# Patient Record
Sex: MALE | Race: WHITE | Employment: FULL TIME | URBAN - METROPOLITAN AREA
[De-identification: names, ages, dates, MRNs, and addresses within clinical notes are randomized per-mention and may not be internally consistent; named-entity substitution may affect disease eponyms.]

---

## 2019-03-16 ENCOUNTER — HOSPITAL ENCOUNTER (EMERGENCY)
Age: 49
Discharge: HOME | End: 2019-03-16

## 2019-03-16 VITALS
TEMPERATURE: 98.6 F | BODY MASS INDEX: 29.82 KG/M2 | OXYGEN SATURATION: 96 % | WEIGHT: 225 LBS | SYSTOLIC BLOOD PRESSURE: 144 MMHG | DIASTOLIC BLOOD PRESSURE: 86 MMHG | HEART RATE: 106 BPM | HEIGHT: 73 IN | RESPIRATION RATE: 18 BRPM

## 2019-03-16 DIAGNOSIS — L73.9 FOLLICULITIS: Primary | ICD-10-CM

## 2019-03-16 DIAGNOSIS — L03.312 CELLULITIS OF BACK EXCEPT BUTTOCK: ICD-10-CM

## 2019-03-16 PROCEDURE — 99282 EMERGENCY DEPT VISIT SF MDM: CPT

## 2019-03-16 PROCEDURE — 6370000000 HC RX 637 (ALT 250 FOR IP): Performed by: PHYSICIAN ASSISTANT

## 2019-03-16 RX ORDER — CEPHALEXIN 250 MG/1
500 CAPSULE ORAL ONCE
Status: COMPLETED | OUTPATIENT
Start: 2019-03-16 | End: 2019-03-16

## 2019-03-16 RX ORDER — SULFAMETHOXAZOLE AND TRIMETHOPRIM 800; 160 MG/1; MG/1
1 TABLET ORAL ONCE
Status: COMPLETED | OUTPATIENT
Start: 2019-03-16 | End: 2019-03-16

## 2019-03-16 RX ORDER — ONDANSETRON 4 MG/1
4 TABLET, FILM COATED ORAL EVERY 8 HOURS PRN
Qty: 10 TABLET | Refills: 0 | Status: SHIPPED | OUTPATIENT
Start: 2019-03-16 | End: 2019-09-12

## 2019-03-16 RX ORDER — CEPHALEXIN 500 MG/1
500 CAPSULE ORAL 4 TIMES DAILY
Qty: 28 CAPSULE | Refills: 0 | Status: SHIPPED | OUTPATIENT
Start: 2019-03-16 | End: 2019-03-23

## 2019-03-16 RX ORDER — MUPIROCIN CALCIUM 20 MG/G
CREAM TOPICAL
Qty: 1 TUBE | Refills: 0 | Status: SHIPPED | OUTPATIENT
Start: 2019-03-16 | End: 2019-04-15

## 2019-03-16 RX ORDER — SULFAMETHOXAZOLE AND TRIMETHOPRIM 800; 160 MG/1; MG/1
1 TABLET ORAL 2 TIMES DAILY
Qty: 14 TABLET | Refills: 0 | Status: SHIPPED | OUTPATIENT
Start: 2019-03-16 | End: 2019-03-23

## 2019-03-16 RX ADMIN — CEPHALEXIN 500 MG: 250 CAPSULE ORAL at 15:42

## 2019-03-16 RX ADMIN — SULFAMETHOXAZOLE AND TRIMETHOPRIM 1 TABLET: 800; 160 TABLET ORAL at 15:42

## 2019-03-16 ASSESSMENT — PAIN SCALES - GENERAL: PAINLEVEL_OUTOF10: 6

## 2019-03-16 ASSESSMENT — PAIN DESCRIPTION - LOCATION: LOCATION: BACK

## 2019-03-16 ASSESSMENT — PAIN DESCRIPTION - PAIN TYPE: TYPE: ACUTE PAIN

## 2019-09-12 ENCOUNTER — OFFICE VISIT (OUTPATIENT)
Dept: INTERNAL MEDICINE CLINIC | Age: 49
End: 2019-09-12
Payer: COMMERCIAL

## 2019-09-12 DIAGNOSIS — M54.50 CHRONIC BILATERAL LOW BACK PAIN WITHOUT SCIATICA: ICD-10-CM

## 2019-09-12 DIAGNOSIS — Z76.89 ENCOUNTER TO ESTABLISH CARE WITH NEW DOCTOR: ICD-10-CM

## 2019-09-12 DIAGNOSIS — G89.29 CHRONIC BILATERAL LOW BACK PAIN WITHOUT SCIATICA: ICD-10-CM

## 2019-09-12 DIAGNOSIS — E11.622 TYPE 2 DIABETES MELLITUS WITH OTHER SKIN ULCER, WITHOUT LONG-TERM CURRENT USE OF INSULIN (HCC): ICD-10-CM

## 2019-09-12 DIAGNOSIS — I10 BENIGN ESSENTIAL HYPERTENSION: ICD-10-CM

## 2019-09-12 DIAGNOSIS — M72.0 DUPUYTREN'S CONTRACTURE: ICD-10-CM

## 2019-09-12 DIAGNOSIS — E78.2 MIXED HYPERLIPIDEMIA: ICD-10-CM

## 2019-09-12 DIAGNOSIS — L98.491 CHRONIC SKIN ULCER, LIMITED TO BREAKDOWN OF SKIN (HCC): ICD-10-CM

## 2019-09-12 PROBLEM — E11.9 DIABETES MELLITUS (HCC): Status: ACTIVE | Noted: 2019-09-12

## 2019-09-12 PROBLEM — K21.9 GASTROESOPHAGEAL REFLUX DISEASE WITHOUT ESOPHAGITIS: Status: ACTIVE | Noted: 2019-09-12

## 2019-09-12 LAB
BASOPHILS ABSOLUTE: 0 K/UL (ref 0–0.2)
BASOPHILS RELATIVE PERCENT: 0.9 %
BILIRUBIN URINE: NEGATIVE
BLOOD, URINE: NEGATIVE
CLARITY: CLEAR
COLOR: YELLOW
EOSINOPHILS ABSOLUTE: 0.1 K/UL (ref 0–0.6)
EOSINOPHILS RELATIVE PERCENT: 2.3 %
GLUCOSE URINE: >=1000 MG/DL
HCT VFR BLD CALC: 48.2 % (ref 40.5–52.5)
HEMOGLOBIN: 16.9 G/DL (ref 13.5–17.5)
KETONES, URINE: NEGATIVE MG/DL
LEUKOCYTE ESTERASE, URINE: NEGATIVE
LYMPHOCYTES ABSOLUTE: 1.4 K/UL (ref 1–5.1)
LYMPHOCYTES RELATIVE PERCENT: 34.1 %
MCH RBC QN AUTO: 30.3 PG (ref 26–34)
MCHC RBC AUTO-ENTMCNC: 35 G/DL (ref 31–36)
MCV RBC AUTO: 86.6 FL (ref 80–100)
MICROSCOPIC EXAMINATION: ABNORMAL
MONOCYTES ABSOLUTE: 0.4 K/UL (ref 0–1.3)
MONOCYTES RELATIVE PERCENT: 9 %
NEUTROPHILS ABSOLUTE: 2.2 K/UL (ref 1.7–7.7)
NEUTROPHILS RELATIVE PERCENT: 53.7 %
NITRITE, URINE: NEGATIVE
PDW BLD-RTO: 13.6 % (ref 12.4–15.4)
PH UA: 6.5 (ref 5–8)
PLATELET # BLD: 187 K/UL (ref 135–450)
PMV BLD AUTO: 8.6 FL (ref 5–10.5)
PROTEIN UA: NEGATIVE MG/DL
RBC # BLD: 5.57 M/UL (ref 4.2–5.9)
SPECIFIC GRAVITY UA: >1.03 (ref 1–1.03)
URINE REFLEX TO CULTURE: ABNORMAL
URINE TYPE: ABNORMAL
UROBILINOGEN, URINE: 0.2 E.U./DL
WBC # BLD: 4.1 K/UL (ref 4–11)

## 2019-09-12 PROCEDURE — 99204 OFFICE O/P NEW MOD 45 MIN: CPT | Performed by: FAMILY MEDICINE

## 2019-09-12 RX ORDER — CYCLOBENZAPRINE HCL 5 MG
5 TABLET ORAL NIGHTLY
Qty: 30 TABLET | Refills: 0 | Status: SHIPPED | OUTPATIENT
Start: 2019-09-12 | End: 2019-10-09

## 2019-09-12 RX ORDER — LOSARTAN POTASSIUM 100 MG/1
100 TABLET ORAL DAILY
Qty: 30 TABLET | Refills: 2 | Status: SHIPPED | OUTPATIENT
Start: 2019-09-12 | End: 2019-10-30 | Stop reason: SDUPTHER

## 2019-09-12 RX ORDER — EPINEPHRINE 0.15 MG/.3ML
INJECTION INTRAMUSCULAR
Qty: 2 DEVICE | Refills: 3 | Status: SHIPPED | OUTPATIENT
Start: 2019-09-12 | End: 2019-12-30 | Stop reason: ALTCHOICE

## 2019-09-12 RX ORDER — ATORVASTATIN CALCIUM 40 MG/1
40 TABLET, FILM COATED ORAL DAILY
Qty: 30 TABLET | Refills: 2 | Status: SHIPPED | OUTPATIENT
Start: 2019-09-12 | End: 2019-12-30 | Stop reason: SDUPTHER

## 2019-09-12 ASSESSMENT — ENCOUNTER SYMPTOMS
ABDOMINAL PAIN: 0
SORE THROAT: 0
EYE ITCHING: 0
NAUSEA: 0
EYE REDNESS: 0
DIARRHEA: 0
VOMITING: 0
SHORTNESS OF BREATH: 0
ABDOMINAL DISTENTION: 0
SINUS PAIN: 0
BACK PAIN: 1
CHEST TIGHTNESS: 0
COUGH: 0
TROUBLE SWALLOWING: 0
WHEEZING: 0
CONSTIPATION: 0

## 2019-09-12 NOTE — PROGRESS NOTES
Tobacco Use    Smoking status: Never Smoker    Smokeless tobacco: Never Used   Substance Use Topics    Alcohol use: Not Currently     Alcohol/week: 2.0 standard drinks     Types: 2 Standard drinks or equivalent per week        Review of Systems   Constitutional: Positive for fatigue. Negative for appetite change, chills, fever and unexpected weight change. HENT: Negative for congestion, ear pain, sinus pain, sore throat and trouble swallowing. Eyes: Negative for redness and itching. Respiratory: Negative for cough, chest tightness, shortness of breath and wheezing. Cardiovascular: Negative for chest pain and palpitations. Gastrointestinal: Negative for abdominal distention, abdominal pain, constipation, diarrhea, nausea and vomiting. Endocrine: Negative for polyuria. Genitourinary: Negative for difficulty urinating, dysuria, frequency and urgency. Difficulty in erection  Polyuria and polydipsea   Musculoskeletal: Positive for arthralgias, back pain and myalgias. Skin: Negative for rash. Non-healing skin ulcer on forehead. Neurological: Negative for dizziness and headaches. Psychiatric/Behavioral: Negative for behavioral problems, confusion and suicidal ideas. Objective: There were no vitals taken for this visit. Physical Exam   Constitutional: He is oriented to person, place, and time. He appears well-developed and well-nourished. HENT:   Head: Normocephalic. Mouth/Throat: Oropharynx is clear and moist.   Eyes: Pupils are equal, round, and reactive to light. Conjunctivae are normal.   Neck: Normal range of motion. Neck supple. No thyromegaly present. Cardiovascular: Normal rate, regular rhythm and normal heart sounds. Pulmonary/Chest: Effort normal and breath sounds normal.   Abdominal: Soft. Bowel sounds are normal. He exhibits no distension. There is no tenderness. There is no guarding. Musculoskeletal: He exhibits no edema.    Low back tenderness   Neurological: He is alert and oriented to person, place, and time. Skin: Skin is warm and dry. Larger than pin size ulcer with clean base, no rolling of edges. Midline surgical scar of lower back   Psychiatric: He has a normal mood and affect. His behavior is normal. Judgment and thought content normal.          Assessment / Plan:      1. Type 2 diabetes mellitus with other skin ulcer, without long-term current use of insulin ([facility]  - Patient was started on oral medication. Janumet. - Patient office A1C was over 11. Per diabetic guidline, he was supposed to be on  Insulin, but patient at this time refused insulin and preferred oral medication.   - Patient will bring the record of daily morning and evening blood sugar level    - CBC Auto Differential  - Comprehensive Metabolic Panel  - Lipid, Fasting  - Urinalysis Reflex to Culture  - T4, Free  - TSH without Reflex  - Lipid Panel  - losartan (COZAAR) 100 MG tablet; Take 1 tablet by mouth daily  Dispense: 30 tablet; Refill: 2  - sitaGLIPtan-metformin (JANUMET)  MG per tablet; Take 1 tablet by mouth 2 times daily (with meals)  Dispense: 180 tablet; Refill: 1    2. Benign essential hypertension  - Elevated BP, started on Losartan    - losartan (COZAAR) 100 MG tablet; Take 1 tablet by mouth daily  Dispense: 30 tablet; Refill: 2    3. Chronic bilateral low back pain without sciatica  - Continue home stretching exercises and as need Tylenol  - Muscular relaxant.    - SEDIMENTATION RATE; Future  - GABRIELA Reflex to Antibody Cascade; Future  - cyclobenzaprine (FLEXERIL) 5 MG tablet; Take 1 tablet by mouth nightly  Dispense: 30 tablet; Refill: 0    4. Mixed hyperlipidemia  - Started on Statin    - atorvastatin (LIPITOR) 40 MG tablet; Take 1 tablet by mouth daily  Dispense: 30 tablet; Refill: 2    5. Dupuytren's contracture  - NO action, watch full wating    6.  Chronic skin ulcer, limited to breakdown of skin ([facility] [address]  - Need excision biopsy at later

## 2019-09-13 LAB
A/G RATIO: 1.9 (ref 1.1–2.2)
ALBUMIN SERPL-MCNC: 4.8 G/DL (ref 3.4–5)
ALP BLD-CCNC: 79 U/L (ref 40–129)
ALT SERPL-CCNC: 44 U/L (ref 10–40)
ANION GAP SERPL CALCULATED.3IONS-SCNC: 17 MMOL/L (ref 3–16)
ANTI-NUCLEAR ANTIBODY (ANA): NEGATIVE
AST SERPL-CCNC: 24 U/L (ref 15–37)
BILIRUB SERPL-MCNC: 0.4 MG/DL (ref 0–1)
BUN BLDV-MCNC: 14 MG/DL (ref 7–20)
CALCIUM SERPL-MCNC: 9.6 MG/DL (ref 8.3–10.6)
CHLORIDE BLD-SCNC: 99 MMOL/L (ref 99–110)
CHOLESTEROL, TOTAL: 206 MG/DL (ref 0–199)
CO2: 22 MMOL/L (ref 21–32)
CREAT SERPL-MCNC: 0.7 MG/DL (ref 0.9–1.3)
GFR AFRICAN AMERICAN: >60
GFR NON-AFRICAN AMERICAN: >60
GLOBULIN: 2.5 G/DL
GLUCOSE BLD-MCNC: 257 MG/DL (ref 70–99)
HDLC SERPL-MCNC: 61 MG/DL (ref 40–60)
LDL CHOLESTEROL CALCULATED: 128 MG/DL
POTASSIUM SERPL-SCNC: 4.1 MMOL/L (ref 3.5–5.1)
SEDIMENTATION RATE, ERYTHROCYTE: 3 MM/HR (ref 0–15)
SODIUM BLD-SCNC: 138 MMOL/L (ref 136–145)
T4 FREE: 1 NG/DL (ref 0.9–1.8)
TOTAL PROTEIN: 7.3 G/DL (ref 6.4–8.2)
TRIGL SERPL-MCNC: 84 MG/DL (ref 0–150)
TSH SERPL DL<=0.05 MIU/L-ACNC: 2.22 UIU/ML (ref 0.27–4.2)
VLDLC SERPL CALC-MCNC: 17 MG/DL

## 2019-09-26 ENCOUNTER — OFFICE VISIT (OUTPATIENT)
Dept: INTERNAL MEDICINE CLINIC | Age: 49
End: 2019-09-26
Payer: COMMERCIAL

## 2019-09-26 VITALS — HEART RATE: 79 BPM | DIASTOLIC BLOOD PRESSURE: 92 MMHG | RESPIRATION RATE: 16 BRPM | SYSTOLIC BLOOD PRESSURE: 154 MMHG

## 2019-09-26 DIAGNOSIS — L98.491 CHRONIC SKIN ULCER, LIMITED TO BREAKDOWN OF SKIN (HCC): ICD-10-CM

## 2019-09-26 DIAGNOSIS — I10 BENIGN ESSENTIAL HYPERTENSION: ICD-10-CM

## 2019-09-26 DIAGNOSIS — N52.1 ERECTILE DYSFUNCTION DUE TO DISEASES CLASSIFIED ELSEWHERE: ICD-10-CM

## 2019-09-26 DIAGNOSIS — E11.622 TYPE 2 DIABETES MELLITUS WITH OTHER SKIN ULCER, WITHOUT LONG-TERM CURRENT USE OF INSULIN (HCC): ICD-10-CM

## 2019-09-26 PROCEDURE — 99213 OFFICE O/P EST LOW 20 MIN: CPT | Performed by: FAMILY MEDICINE

## 2019-09-26 RX ORDER — HYDROCHLOROTHIAZIDE 12.5 MG/1
12.5 CAPSULE, GELATIN COATED ORAL EVERY MORNING
Qty: 30 CAPSULE | Refills: 1 | Status: SHIPPED | OUTPATIENT
Start: 2019-09-26 | End: 2019-10-30 | Stop reason: SDUPTHER

## 2019-09-26 RX ORDER — POTASSIUM CHLORIDE 750 MG/1
10 TABLET, EXTENDED RELEASE ORAL DAILY
Qty: 30 TABLET | Refills: 1 | Status: SHIPPED | OUTPATIENT
Start: 2019-09-26 | End: 2019-10-30 | Stop reason: SDUPTHER

## 2019-09-26 RX ORDER — SILDENAFIL CITRATE 20 MG/1
20 TABLET ORAL DAILY PRN
Qty: 10 TABLET | Refills: 1 | Status: SHIPPED | OUTPATIENT
Start: 2019-09-26 | End: 2019-10-30 | Stop reason: SDUPTHER

## 2019-09-26 ASSESSMENT — ENCOUNTER SYMPTOMS
COUGH: 0
WHEEZING: 0
CONSTIPATION: 0
CHEST TIGHTNESS: 0
TROUBLE SWALLOWING: 0
SHORTNESS OF BREATH: 0
ABDOMINAL DISTENTION: 0
EYE ITCHING: 0
NAUSEA: 0
DIARRHEA: 0
VOMITING: 0
BACK PAIN: 0
EYE REDNESS: 0
ROS SKIN COMMENTS: SKIN ULCERS
SORE THROAT: 0
ABDOMINAL PAIN: 0
SINUS PAIN: 0

## 2019-09-26 ASSESSMENT — PATIENT HEALTH QUESTIONNAIRE - PHQ9
SUM OF ALL RESPONSES TO PHQ QUESTIONS 1-9: 0
SUM OF ALL RESPONSES TO PHQ QUESTIONS 1-9: 0
2. FEELING DOWN, DEPRESSED OR HOPELESS: 0
1. LITTLE INTEREST OR PLEASURE IN DOING THINGS: 0
SUM OF ALL RESPONSES TO PHQ9 QUESTIONS 1 & 2: 0

## 2019-09-26 NOTE — PROGRESS NOTES
back pain. Skin: Negative for rash. Skin ulcers   Neurological: Negative for dizziness and headaches. Psychiatric/Behavioral: Negative for behavioral problems, confusion and suicidal ideas. Objective:      BP (!) 154/92 (Site: Right Upper Arm, Position: Sitting, Cuff Size: Large Adult)   Pulse 79   Resp 16      Physical Exam   Constitutional: He is oriented to person, place, and time. He appears well-developed and well-nourished. HENT:   Head: Normocephalic. Mouth/Throat: Oropharynx is clear and moist.   Eyes: Pupils are equal, round, and reactive to light. Conjunctivae are normal.   Neck: Normal range of motion. Neck supple. No thyromegaly present. Cardiovascular: Normal rate, regular rhythm and normal heart sounds. Pulmonary/Chest: Effort normal and breath sounds normal.   Abdominal: Soft. Bowel sounds are normal. He exhibits no distension. There is no tenderness. There is no guarding. Musculoskeletal: Normal range of motion. He exhibits no edema. Neurological: He is alert and oriented to person, place, and time. Skin: Skin is warm and dry. Small, non-healing ulcer on forehead   Psychiatric: He has a normal mood and affect. His behavior is normal. Judgment and thought content normal.          Assessment / Plan:      1. Benign essential hypertension  - Improving on Losartan. - Adding new medication, Hydrochlorothiazides  . - hydrochlorothiazide (MICROZIDE) 12.5 MG capsule; Take 1 capsule by mouth every morning  Dispense: 30 capsule; Refill: 1  - potassium chloride (KLOR-CON M) 10 MEQ extended release tablet; Take 1 tablet by mouth daily  Dispense: 30 tablet; Refill: 1    2. Type 2 diabetes mellitus with other skin ulcer, without long-term current use of insulin ([facility])  - Improving and started new medication, Jardiance    - empagliflozin (JARDIANCE) 10 MG tablet; Take 1 tablet by mouth daily  Dispense: 30 tablet;  Refill: 3  - sitaGLIPtan-metformin (JANUMET)  MG per

## 2019-10-09 ENCOUNTER — OFFICE VISIT (OUTPATIENT)
Dept: INTERNAL MEDICINE CLINIC | Age: 49
End: 2019-10-09
Payer: COMMERCIAL

## 2019-10-09 VITALS — DIASTOLIC BLOOD PRESSURE: 70 MMHG | SYSTOLIC BLOOD PRESSURE: 140 MMHG | RESPIRATION RATE: 15 BRPM | HEART RATE: 64 BPM

## 2019-10-09 DIAGNOSIS — E11.622 TYPE 2 DIABETES MELLITUS WITH OTHER SKIN ULCER, WITHOUT LONG-TERM CURRENT USE OF INSULIN (HCC): ICD-10-CM

## 2019-10-09 DIAGNOSIS — N52.1 ERECTILE DYSFUNCTION DUE TO DISEASES CLASSIFIED ELSEWHERE: ICD-10-CM

## 2019-10-09 DIAGNOSIS — H81.11 BENIGN PAROXYSMAL POSITIONAL VERTIGO OF RIGHT EAR: ICD-10-CM

## 2019-10-09 DIAGNOSIS — I10 BENIGN ESSENTIAL HYPERTENSION: ICD-10-CM

## 2019-10-09 PROCEDURE — 95992 CANALITH REPOSITIONING PROC: CPT | Performed by: FAMILY MEDICINE

## 2019-10-09 PROCEDURE — 99213 OFFICE O/P EST LOW 20 MIN: CPT | Performed by: FAMILY MEDICINE

## 2019-10-09 RX ORDER — HYDROCODONE BITARTRATE AND ACETAMINOPHEN 5; 325 MG/1; MG/1
1 TABLET ORAL 2 TIMES DAILY PRN
COMMUNITY
End: 2020-12-23

## 2019-10-09 ASSESSMENT — ENCOUNTER SYMPTOMS
BACK PAIN: 0
EYE ITCHING: 0
VOMITING: 0
WHEEZING: 0
TROUBLE SWALLOWING: 0
NAUSEA: 0
CHEST TIGHTNESS: 0
SINUS PAIN: 0
ABDOMINAL DISTENTION: 0
DIARRHEA: 0
ABDOMINAL PAIN: 0
CONSTIPATION: 0
EYE REDNESS: 0
SHORTNESS OF BREATH: 0
COUGH: 0
SORE THROAT: 0

## 2019-10-21 ENCOUNTER — HOSPITAL ENCOUNTER (OUTPATIENT)
Dept: CT IMAGING | Age: 49
Discharge: HOME | End: 2019-10-21
Payer: COMMERCIAL

## 2019-10-21 DIAGNOSIS — M54.50 LOW BACK PAIN, UNSPECIFIED BACK PAIN LATERALITY, UNSPECIFIED CHRONICITY, UNSPECIFIED WHETHER SCIATICA PRESENT: ICD-10-CM

## 2019-10-21 DIAGNOSIS — Z98.1 S/P SPINAL FUSION: ICD-10-CM

## 2019-10-21 PROCEDURE — 72131 CT LUMBAR SPINE W/O DYE: CPT

## 2019-10-30 ENCOUNTER — OFFICE VISIT (OUTPATIENT)
Dept: INTERNAL MEDICINE CLINIC | Age: 49
End: 2019-10-30
Payer: COMMERCIAL

## 2019-10-30 VITALS — SYSTOLIC BLOOD PRESSURE: 112 MMHG | DIASTOLIC BLOOD PRESSURE: 78 MMHG | RESPIRATION RATE: 15 BRPM | HEART RATE: 68 BPM

## 2019-10-30 DIAGNOSIS — L98.491 CHRONIC SKIN ULCER, LIMITED TO BREAKDOWN OF SKIN (HCC): ICD-10-CM

## 2019-10-30 DIAGNOSIS — E11.622 TYPE 2 DIABETES MELLITUS WITH OTHER SKIN ULCER, WITHOUT LONG-TERM CURRENT USE OF INSULIN (HCC): ICD-10-CM

## 2019-10-30 DIAGNOSIS — R25.2 MUSCLE CRAMPING: ICD-10-CM

## 2019-10-30 DIAGNOSIS — I10 BENIGN ESSENTIAL HYPERTENSION: ICD-10-CM

## 2019-10-30 DIAGNOSIS — N52.1 ERECTILE DYSFUNCTION DUE TO DISEASES CLASSIFIED ELSEWHERE: ICD-10-CM

## 2019-10-30 PROCEDURE — 99213 OFFICE O/P EST LOW 20 MIN: CPT | Performed by: FAMILY MEDICINE

## 2019-10-30 RX ORDER — LANOLIN ALCOHOL/MO/W.PET/CERES
1000 CREAM (GRAM) TOPICAL DAILY
Qty: 30 TABLET | Refills: 3 | Status: SHIPPED | OUTPATIENT
Start: 2019-10-30 | End: 2019-12-30 | Stop reason: SDUPTHER

## 2019-10-30 RX ORDER — LOSARTAN POTASSIUM 100 MG/1
100 TABLET ORAL DAILY
Qty: 30 TABLET | Refills: 2 | Status: SHIPPED | OUTPATIENT
Start: 2019-10-30 | End: 2019-12-30 | Stop reason: SDUPTHER

## 2019-10-30 RX ORDER — HYDROCHLOROTHIAZIDE 12.5 MG/1
12.5 CAPSULE, GELATIN COATED ORAL EVERY MORNING
Qty: 30 CAPSULE | Refills: 1 | Status: SHIPPED | OUTPATIENT
Start: 2019-10-30 | End: 2019-12-30 | Stop reason: SDUPTHER

## 2019-10-30 RX ORDER — SILDENAFIL CITRATE 20 MG/1
20 TABLET ORAL DAILY PRN
Qty: 30 TABLET | Refills: 2 | Status: SHIPPED | OUTPATIENT
Start: 2019-10-30 | End: 2019-11-25 | Stop reason: SDUPTHER

## 2019-10-30 RX ORDER — CHOLECALCIFEROL (VITAMIN D3) 125 MCG
CAPSULE ORAL
Qty: 30 TABLET | Refills: 3 | Status: SHIPPED | OUTPATIENT
Start: 2019-10-30 | End: 2020-04-08 | Stop reason: SDUPTHER

## 2019-10-30 RX ORDER — POTASSIUM CHLORIDE 750 MG/1
10 TABLET, EXTENDED RELEASE ORAL DAILY
Qty: 30 TABLET | Refills: 1 | Status: SHIPPED | OUTPATIENT
Start: 2019-10-30 | End: 2019-12-30 | Stop reason: SDUPTHER

## 2019-10-30 RX ORDER — CYCLOBENZAPRINE HCL 5 MG
5 TABLET ORAL 2 TIMES DAILY PRN
Qty: 10 TABLET | Refills: 0 | Status: SHIPPED | OUTPATIENT
Start: 2019-10-30 | End: 2019-11-09

## 2019-10-30 ASSESSMENT — ENCOUNTER SYMPTOMS
WHEEZING: 0
SHORTNESS OF BREATH: 0
COUGH: 0
CHEST TIGHTNESS: 0
CONSTIPATION: 0
EYE REDNESS: 0
ABDOMINAL PAIN: 0
BACK PAIN: 1
VOMITING: 0
ABDOMINAL DISTENTION: 0
SORE THROAT: 0
EYE ITCHING: 0
SINUS PAIN: 0
DIARRHEA: 0
TROUBLE SWALLOWING: 0
NAUSEA: 0

## 2019-11-18 ENCOUNTER — TELEPHONE (OUTPATIENT)
Dept: INTERNAL MEDICINE CLINIC | Age: 49
End: 2019-11-18

## 2019-11-19 DIAGNOSIS — E11.622 TYPE 2 DIABETES MELLITUS WITH OTHER SKIN ULCER, WITHOUT LONG-TERM CURRENT USE OF INSULIN (HCC): ICD-10-CM

## 2019-11-25 ENCOUNTER — OFFICE VISIT (OUTPATIENT)
Dept: INTERNAL MEDICINE CLINIC | Age: 49
End: 2019-11-25
Payer: COMMERCIAL

## 2019-11-25 VITALS
OXYGEN SATURATION: 99 % | SYSTOLIC BLOOD PRESSURE: 130 MMHG | RESPIRATION RATE: 16 BRPM | WEIGHT: 227.4 LBS | BODY MASS INDEX: 30 KG/M2 | DIASTOLIC BLOOD PRESSURE: 78 MMHG | HEART RATE: 83 BPM

## 2019-11-25 DIAGNOSIS — R25.2 MUSCLE CRAMPING: ICD-10-CM

## 2019-11-25 DIAGNOSIS — I10 BENIGN ESSENTIAL HYPERTENSION: ICD-10-CM

## 2019-11-25 DIAGNOSIS — E11.42 TYPE 2 DIABETES MELLITUS WITH DIABETIC POLYNEUROPATHY, WITHOUT LONG-TERM CURRENT USE OF INSULIN (HCC): ICD-10-CM

## 2019-11-25 DIAGNOSIS — N52.1 ERECTILE DYSFUNCTION DUE TO DISEASES CLASSIFIED ELSEWHERE: ICD-10-CM

## 2019-11-25 LAB
BASOPHILS ABSOLUTE: 0 K/UL (ref 0–0.2)
BASOPHILS RELATIVE PERCENT: 0.8 %
EOSINOPHILS ABSOLUTE: 0.3 K/UL (ref 0–0.6)
EOSINOPHILS RELATIVE PERCENT: 4.8 %
HBA1C MFR BLD: 14 %
HCT VFR BLD CALC: 48.9 % (ref 40.5–52.5)
HEMOGLOBIN: 17.1 G/DL (ref 13.5–17.5)
LYMPHOCYTES ABSOLUTE: 1.9 K/UL (ref 1–5.1)
LYMPHOCYTES RELATIVE PERCENT: 33.7 %
MCH RBC QN AUTO: 30.5 PG (ref 26–34)
MCHC RBC AUTO-ENTMCNC: 34.9 G/DL (ref 31–36)
MCV RBC AUTO: 87.5 FL (ref 80–100)
MONOCYTES ABSOLUTE: 0.5 K/UL (ref 0–1.3)
MONOCYTES RELATIVE PERCENT: 8.2 %
NEUTROPHILS ABSOLUTE: 3 K/UL (ref 1.7–7.7)
NEUTROPHILS RELATIVE PERCENT: 52.5 %
PDW BLD-RTO: 12.9 % (ref 12.4–15.4)
PLATELET # BLD: 225 K/UL (ref 135–450)
PMV BLD AUTO: 9.2 FL (ref 5–10.5)
RBC # BLD: 5.59 M/UL (ref 4.2–5.9)
WBC # BLD: 5.7 K/UL (ref 4–11)

## 2019-11-25 PROCEDURE — 83036 HEMOGLOBIN GLYCOSYLATED A1C: CPT | Performed by: FAMILY MEDICINE

## 2019-11-25 PROCEDURE — 99213 OFFICE O/P EST LOW 20 MIN: CPT | Performed by: FAMILY MEDICINE

## 2019-11-25 RX ORDER — SILDENAFIL CITRATE 20 MG/1
20 TABLET ORAL DAILY PRN
Qty: 30 TABLET | Refills: 2 | Status: SHIPPED | OUTPATIENT
Start: 2019-11-25 | End: 2019-12-30 | Stop reason: ALTCHOICE

## 2019-11-25 RX ORDER — GABAPENTIN 300 MG/1
300 CAPSULE ORAL 3 TIMES DAILY
Qty: 270 CAPSULE | Refills: 1 | Status: SHIPPED | OUTPATIENT
Start: 2019-11-25 | End: 2019-12-30 | Stop reason: SDUPTHER

## 2019-11-25 ASSESSMENT — ENCOUNTER SYMPTOMS
TROUBLE SWALLOWING: 0
ABDOMINAL PAIN: 0
EYE ITCHING: 0
NAUSEA: 0
DIARRHEA: 0
SINUS PAIN: 0
CONSTIPATION: 0
SORE THROAT: 0
SHORTNESS OF BREATH: 0
VOMITING: 0
BACK PAIN: 0
CHEST TIGHTNESS: 0
EYE REDNESS: 0
WHEEZING: 0
ABDOMINAL DISTENTION: 0
COUGH: 0

## 2019-11-26 LAB
ESTIMATED AVERAGE GLUCOSE: 300.6 MG/DL
HBA1C MFR BLD: 12.1 %

## 2019-12-30 ENCOUNTER — OFFICE VISIT (OUTPATIENT)
Dept: INTERNAL MEDICINE CLINIC | Age: 49
End: 2019-12-30
Payer: COMMERCIAL

## 2019-12-30 VITALS
HEART RATE: 76 BPM | WEIGHT: 221 LBS | RESPIRATION RATE: 20 BRPM | SYSTOLIC BLOOD PRESSURE: 118 MMHG | DIASTOLIC BLOOD PRESSURE: 80 MMHG | BODY MASS INDEX: 29.16 KG/M2

## 2019-12-30 DIAGNOSIS — N52.1 ERECTILE DYSFUNCTION DUE TO DISEASES CLASSIFIED ELSEWHERE: ICD-10-CM

## 2019-12-30 DIAGNOSIS — E78.2 MIXED HYPERLIPIDEMIA: ICD-10-CM

## 2019-12-30 DIAGNOSIS — R25.2 MUSCLE CRAMPING: ICD-10-CM

## 2019-12-30 DIAGNOSIS — I10 BENIGN ESSENTIAL HYPERTENSION: ICD-10-CM

## 2019-12-30 DIAGNOSIS — E11.622 TYPE 2 DIABETES MELLITUS WITH OTHER SKIN ULCER, WITHOUT LONG-TERM CURRENT USE OF INSULIN (HCC): ICD-10-CM

## 2019-12-30 DIAGNOSIS — E11.42 TYPE 2 DIABETES MELLITUS WITH DIABETIC POLYNEUROPATHY, WITHOUT LONG-TERM CURRENT USE OF INSULIN (HCC): ICD-10-CM

## 2019-12-30 PROCEDURE — 99213 OFFICE O/P EST LOW 20 MIN: CPT | Performed by: FAMILY MEDICINE

## 2019-12-30 RX ORDER — ATORVASTATIN CALCIUM 40 MG/1
40 TABLET, FILM COATED ORAL DAILY
Qty: 90 TABLET | Refills: 1 | Status: SHIPPED | OUTPATIENT
Start: 2019-12-30 | End: 2020-04-08 | Stop reason: SDUPTHER

## 2019-12-30 RX ORDER — SILDENAFIL CITRATE 20 MG/1
20 TABLET ORAL DAILY PRN
Qty: 30 TABLET | Refills: 2 | Status: SHIPPED | OUTPATIENT
Start: 2019-12-30 | End: 2020-04-08 | Stop reason: SDUPTHER

## 2019-12-30 RX ORDER — POTASSIUM CHLORIDE 750 MG/1
10 TABLET, EXTENDED RELEASE ORAL DAILY
Qty: 90 TABLET | Refills: 1 | Status: SHIPPED | OUTPATIENT
Start: 2019-12-30 | End: 2020-04-08 | Stop reason: SDUPTHER

## 2019-12-30 RX ORDER — LANOLIN ALCOHOL/MO/W.PET/CERES
1000 CREAM (GRAM) TOPICAL DAILY
Qty: 90 TABLET | Refills: 1 | Status: SHIPPED | OUTPATIENT
Start: 2019-12-30 | End: 2020-04-08 | Stop reason: SDUPTHER

## 2019-12-30 RX ORDER — HYDROCHLOROTHIAZIDE 12.5 MG/1
12.5 CAPSULE, GELATIN COATED ORAL EVERY MORNING
Qty: 90 CAPSULE | Refills: 1 | Status: SHIPPED | OUTPATIENT
Start: 2019-12-30 | End: 2020-04-08 | Stop reason: SDUPTHER

## 2019-12-30 RX ORDER — GABAPENTIN 300 MG/1
300 CAPSULE ORAL 3 TIMES DAILY
Qty: 270 CAPSULE | Refills: 5 | Status: SHIPPED | OUTPATIENT
Start: 2019-12-30 | End: 2020-04-08 | Stop reason: SDUPTHER

## 2019-12-30 RX ORDER — LOSARTAN POTASSIUM 100 MG/1
100 TABLET ORAL DAILY
Qty: 90 TABLET | Refills: 1 | Status: SHIPPED | OUTPATIENT
Start: 2019-12-30 | End: 2020-04-08 | Stop reason: SDUPTHER

## 2019-12-30 ASSESSMENT — ENCOUNTER SYMPTOMS
BACK PAIN: 0
CHEST TIGHTNESS: 0
SINUS PAIN: 0
EYE ITCHING: 0
CONSTIPATION: 0
WHEEZING: 0
SORE THROAT: 0
EYE REDNESS: 0
VOMITING: 0
ABDOMINAL DISTENTION: 0
TROUBLE SWALLOWING: 0
COUGH: 0
NAUSEA: 0
ABDOMINAL PAIN: 0
DIARRHEA: 0
SHORTNESS OF BREATH: 0

## 2020-01-16 ENCOUNTER — OFFICE VISIT (OUTPATIENT)
Dept: INTERNAL MEDICINE CLINIC | Age: 50
End: 2020-01-16
Payer: COMMERCIAL

## 2020-01-16 VITALS — SYSTOLIC BLOOD PRESSURE: 150 MMHG | HEART RATE: 72 BPM | DIASTOLIC BLOOD PRESSURE: 82 MMHG

## 2020-01-16 LAB — HBA1C MFR BLD: 12.2 %

## 2020-01-16 PROCEDURE — 83036 HEMOGLOBIN GLYCOSYLATED A1C: CPT | Performed by: FAMILY MEDICINE

## 2020-01-16 PROCEDURE — 99213 OFFICE O/P EST LOW 20 MIN: CPT | Performed by: FAMILY MEDICINE

## 2020-01-16 RX ORDER — INSULIN LISPRO 100 [IU]/ML
12 INJECTION, SOLUTION INTRAVENOUS; SUBCUTANEOUS EVERY EVENING
COMMUNITY
End: 2020-02-04 | Stop reason: SDUPTHER

## 2020-01-16 ASSESSMENT — ENCOUNTER SYMPTOMS
CONSTIPATION: 0
DIARRHEA: 0
TROUBLE SWALLOWING: 0
SORE THROAT: 0
EYE ITCHING: 0
SHORTNESS OF BREATH: 0
COUGH: 0
NAUSEA: 0
WHEEZING: 0
EYE REDNESS: 0
ABDOMINAL PAIN: 0
CHEST TIGHTNESS: 0
ABDOMINAL DISTENTION: 0
VOMITING: 0
BACK PAIN: 0
SINUS PAIN: 0

## 2020-01-16 NOTE — PROGRESS NOTES
Subjective:      Chief Complaint: Follow-up on diabetes    HPI:  Jermaine Ayala is a 54 y.o. male who presents today follow-up on diabetes    Type 2 diabetes mellitus: Patient continues to have glucose over 200 sometimes over 300 morning. Currently on  Metformin and Steglujan. . Patient last visit was started on insulin at why he was not able to take it due to lack of knowledge of usage. Peripheral neuropathy: Stable on gabapentin    Hypertension: Stable on hydrochlorothiazide and losartan. Patient Active Problem List   Diagnosis    Ventral hernia without obstruction or gangrene    Benign essential hypertension    Mixed hyperlipidemia    Gastroesophageal reflux disease without esophagitis    Dupuytren's contracture    Chronic skin ulcer, limited to breakdown of skin (HCC)    Chronic bilateral low back pain without sciatica    Diabetes mellitus ([facility] [address]    Muscle cramping    Erectile dysfunction due to diseases classified elsewhere       Past Medical History:   Diagnosis Date    Sleep disorder     Ventral hernia without obstruction or gangrene 11/4/2015        Social History     Tobacco Use    Smoking status: Never Smoker    Smokeless tobacco: Never Used   Substance Use Topics    Alcohol use: Not Currently     Alcohol/week: 2.0 standard drinks     Types: 2 Standard drinks or equivalent per week        Review of Systems   Constitutional: Negative for appetite change, chills, fatigue, fever and unexpected weight change. HENT: Negative for congestion, ear pain, sinus pain, sore throat and trouble swallowing. Eyes: Negative for redness and itching. Respiratory: Negative for cough, chest tightness, shortness of breath and wheezing. Cardiovascular: Negative for chest pain and palpitations. Gastrointestinal: Negative for abdominal distention, abdominal pain, constipation, diarrhea, nausea and vomiting. Endocrine: Negative for polyuria.    Genitourinary: Negative for difficulty urinating,

## 2020-02-04 ENCOUNTER — OFFICE VISIT (OUTPATIENT)
Dept: INTERNAL MEDICINE CLINIC | Age: 50
End: 2020-02-04
Payer: COMMERCIAL

## 2020-02-04 VITALS
HEART RATE: 85 BPM | DIASTOLIC BLOOD PRESSURE: 84 MMHG | OXYGEN SATURATION: 97 % | BODY MASS INDEX: 29.92 KG/M2 | SYSTOLIC BLOOD PRESSURE: 138 MMHG | WEIGHT: 226.8 LBS

## 2020-02-04 LAB — HBA1C MFR BLD: 10.3 %

## 2020-02-04 PROCEDURE — 83036 HEMOGLOBIN GLYCOSYLATED A1C: CPT | Performed by: FAMILY MEDICINE

## 2020-02-04 PROCEDURE — 99213 OFFICE O/P EST LOW 20 MIN: CPT | Performed by: FAMILY MEDICINE

## 2020-02-04 RX ORDER — INSULIN LISPRO 100 [IU]/ML
INJECTION, SOLUTION INTRAVENOUS; SUBCUTANEOUS
Qty: 5 PEN | Refills: 5 | Status: SHIPPED | OUTPATIENT
Start: 2020-02-04 | End: 2020-04-08 | Stop reason: SDUPTHER

## 2020-02-04 RX ORDER — PEN NEEDLE, DIABETIC 30 GX5/16"
1 NEEDLE, DISPOSABLE MISCELLANEOUS DAILY
Qty: 100 EACH | Refills: 3 | Status: SHIPPED | OUTPATIENT
Start: 2020-02-04 | End: 2020-04-08 | Stop reason: SDUPTHER

## 2020-02-04 ASSESSMENT — ENCOUNTER SYMPTOMS
SINUS PAIN: 0
SHORTNESS OF BREATH: 0
NAUSEA: 0
EYE ITCHING: 0
ABDOMINAL DISTENTION: 0
CHEST TIGHTNESS: 0
EYE REDNESS: 0
DIARRHEA: 0
BACK PAIN: 1
VOMITING: 0
WHEEZING: 0
SORE THROAT: 0
ABDOMINAL PAIN: 0
CONSTIPATION: 0
COUGH: 0
TROUBLE SWALLOWING: 0

## 2020-02-04 ASSESSMENT — PATIENT HEALTH QUESTIONNAIRE - PHQ9
2. FEELING DOWN, DEPRESSED OR HOPELESS: 0
SUM OF ALL RESPONSES TO PHQ QUESTIONS 1-9: 0
1. LITTLE INTEREST OR PLEASURE IN DOING THINGS: 0
SUM OF ALL RESPONSES TO PHQ9 QUESTIONS 1 & 2: 0
SUM OF ALL RESPONSES TO PHQ QUESTIONS 1-9: 0

## 2020-02-05 NOTE — PROGRESS NOTES
Type 2 diabetes mellitus with diabetic polyneuropathy, without long-term current use of insulin ([address]  - Patient requested to increase his basilar insulin level 16 units per night.  - Continue taking metformin and Steglujan. - Patient hemoglobin A1c dropped from 12.2 to 10. 3. Hilda Colindres - POCT glycosylated hemoglobin (Hb A1C)  - insulin lispro, 1 Unit Dial, (HUMALOG KWIKPEN) 100 UNIT/ML SOPN; TID with meals Glucose [id number] No Insulin 140-199 2 Units 200-249 4 Units 250-299 6 Units 300-349 8 Units 350-400 10 Units over 400 12 Units  Dispense: 5 pen; Refill: 5  - insulin glargine (BASAGLAR KWIKPEN) 100 UNIT/ML injection pen; Inject 16 Units into the skin nightly  Dispense: 5 pen; Refill: 5  - Insulin Pen Needle (PEN NEEDLES 3/16\") 31G X 5 MM MISC; 1 each by Does not apply route daily  Dispense: 100 each; Refill: 3  - Diabetic Foot Exam     2. Benign essential hypertension  -  Stable on hydrochlorothiazide and losartan. Note:   Patient understand the assessment and agreed to the plan. Medication side effects was discussed during the encounter. Before discharging the patient, all questions and concern were addressed. After visit summary was provided. Advice to call clinic or me for any further questions or concern.        Matthew Mccarthy,

## 2020-02-14 ENCOUNTER — TELEPHONE (OUTPATIENT)
Dept: INTERNAL MEDICINE CLINIC | Age: 50
End: 2020-02-14

## 2020-03-17 ENCOUNTER — OFFICE VISIT (OUTPATIENT)
Dept: FAMILY MEDICINE CLINIC | Age: 50
End: 2020-03-17
Payer: COMMERCIAL

## 2020-03-17 VITALS
HEIGHT: 73 IN | TEMPERATURE: 98.8 F | DIASTOLIC BLOOD PRESSURE: 74 MMHG | WEIGHT: 226 LBS | SYSTOLIC BLOOD PRESSURE: 124 MMHG | BODY MASS INDEX: 29.95 KG/M2 | HEART RATE: 92 BPM | OXYGEN SATURATION: 97 %

## 2020-03-17 PROCEDURE — 99213 OFFICE O/P EST LOW 20 MIN: CPT | Performed by: NURSE PRACTITIONER

## 2020-03-17 ASSESSMENT — ENCOUNTER SYMPTOMS
CHEST TIGHTNESS: 0
VOMITING: 0
DIARRHEA: 1
SHORTNESS OF BREATH: 0
WHEEZING: 0
COUGH: 0
SINUS PRESSURE: 0
NAUSEA: 0
SINUS PAIN: 0
SORE THROAT: 0
RHINORRHEA: 1

## 2020-03-17 NOTE — PROGRESS NOTES
Candida Seals   54 y.o.  male  [id number]      Chief Complaint   Patient presents with    Letter for School/Work        Subjective:  54 y.o.male is here for a follow up. He has the following chronic/acute medical problems:  Patient Active Problem List   Diagnosis    Ventral hernia without obstruction or gangrene    Benign essential hypertension    Mixed hyperlipidemia    Gastroesophageal reflux disease without esophagitis    Dupuytren's contracture    Chronic skin ulcer, limited to breakdown of skin ([facility])    Chronic bilateral low back pain without sciatica    Diabetes mellitus ([address]    Muscle cramping    Erectile dysfunction due to diseases classified elsewhere       Patient states on  night he started with a sore throat, low grade fever, chills, and body aches. Patient states he had a low grade fever this morning. He denies cough and shortness of breath. He states he did have some body aches this morning. Review of Systems   Constitutional: Positive for appetite change (), chills (this morning) and fever (low grade this morning). Negative for fatigue. HENT: Positive for postnasal drip and rhinorrhea. Negative for congestion, ear pain, sinus pressure, sinus pain, sneezing and sore throat. Respiratory: Negative for cough, chest tightness, shortness of breath and wheezing. Cardiovascular: Negative for chest pain and palpitations. Gastrointestinal: Positive for diarrhea. Negative for nausea and vomiting. Skin: Negative for rash. Neurological: Negative for dizziness, light-headedness and headaches.        Current Outpatient Medications   Medication Sig Dispense Refill    insulin lispro, 1 Unit Dial, (HUMALOG KWIKPEN) 100 UNIT/ML SOPN TID with meals Glucose [id number] No Insulin 140-199 2 Units 200-249 4 Units 250-299 6 Units 300-349 8 Units 350-400 10 Units over 400 12 Units 5 pen 5    insulin glargine (BASAGLAR KWIKPEN) 100 UNIT/ML injection pen Inject 16 Units into the skin membrane, ear canal and external ear normal.      Nose: Nose normal.      Mouth/Throat:      Lips: Pink. Mouth: Mucous membranes are moist.      Pharynx: Oropharynx is clear. Neck:      Musculoskeletal: Neck supple. Cardiovascular:      Rate and Rhythm: Normal rate and regular rhythm. Heart sounds: Normal heart sounds. Pulmonary:      Effort: Pulmonary effort is normal.      Breath sounds: Normal breath sounds. Skin:     General: Skin is warm and dry. Neurological:      Mental Status: He is alert and oriented to person, place, and time. Psychiatric:         Mood and Affect: Mood normal.         Behavior: Behavior normal.         Lab Results   Component Value Date    WBC 5.7 11/25/2019    HGB 17.1 11/25/2019    HCT 48.9 11/25/2019    MCV 87.5 11/25/2019     11/25/2019     Lab Results   Component Value Date     09/12/2019    K 4.1 09/12/2019    CL 99 09/12/2019    CO2 22 09/12/2019    BUN 14 09/12/2019    CREATININE 0.7 (L) 09/12/2019    GLUCOSE 257 (H) 09/12/2019    CALCIUM 9.6 09/12/2019    PROT 7.3 09/12/2019    LABALBU 4.8 09/12/2019    BILITOT 0.4 09/12/2019    ALKPHOS 79 09/12/2019    AST 24 09/12/2019    ALT 44 (H) 09/12/2019    LABGLOM >60 09/12/2019    GFRAA >60 09/12/2019    AGRATIO 1.9 09/12/2019    GLOB 2.5 09/12/2019     Lab Results   Component Value Date    CHOL 206 (H) 09/12/2019    CHOL 171 12/28/2011     Lab Results   Component Value Date    TRIG 84 09/12/2019    TRIG 73 12/28/2011     Lab Results   Component Value Date    HDL 61 (H) 09/12/2019    HDL 48 (L) 12/28/2011     Lab Results   Component Value Date    LDLCALC 128 (H) 09/12/2019     Lab Results   Component Value Date    LABA1C 10.3 02/04/2020     Lab Results   Component Value Date    TSH 2.22 09/12/2019    TSHHS 1.534 12/28/2011         ASSESSMENT/PLAN:      1. Flu-like symptoms  We will swab and sent for a respiratory panel. Will call patient when results are back.   Explained to patient if results are negative and he is afebrile for 24 hours without the use of Tylenol or Motrin he may return to work at this time. Can continue to use Tylenol or Motrin over-the-counter as needed for fever and muscle aches. - Respiratory Virus PCR Panel        No orders of the defined types were placed in this encounter. Medications Discontinued During This Encounter   Medication Reason    Insulin Glargine ([facility] [place]) [address] discussed with patient. Questions answered. Patient verbalizes understanding and agrees with plan. After visit summary provided. Advised to call for any problems, questions, or concerns. Return if symptoms worsen or fail to improve.                                              Signed:  EDGAR Charles CNP  03/17/20  2:46 PM

## 2020-03-21 LAB
ADENOVIRUS PCR: NOT DETECTED
HUMAN METAPNEUMOVIRUS PCR: NOT DETECTED
INFLUENZA A: NOT DETECTED
INFLUENZA B: NOT DETECTED
PARAINFLUENZA 1 PCR: NOT DETECTED
PARAINFLUENZA 2 PCR: NOT DETECTED
PARAINFLUENZA 3 PCR: NOT DETECTED
PARAINFLUENZA 4 PCR: NOT DETECTED
RHINO/ENTEROVIRUS PCR: NOT DETECTED
RSV BY PCR: NOT DETECTED
RSV SOURCE: NORMAL

## 2020-03-23 ENCOUNTER — TELEPHONE (OUTPATIENT)
Dept: FAMILY MEDICINE CLINIC | Age: 50
End: 2020-03-23

## 2020-03-23 NOTE — TELEPHONE ENCOUNTER
Pt returned message and informed of negative respiratory panel. Pt asked if he could return to work. Pt informed he could return if he has been afebrile for 24 hrs w/o the use of Tylenol or Motrin. Pt states he has not had a fever since 3/18/2020. Encouraged pt to cont social distancing practice. Pt verbalizes understanding and thanked this nurse.

## 2020-04-08 RX ORDER — POTASSIUM CHLORIDE 750 MG/1
10 TABLET, EXTENDED RELEASE ORAL DAILY
Qty: 90 TABLET | Refills: 1 | Status: SHIPPED | OUTPATIENT
Start: 2020-04-08 | End: 2020-08-07 | Stop reason: SDUPTHER

## 2020-04-08 RX ORDER — CHOLECALCIFEROL (VITAMIN D3) 125 MCG
CAPSULE ORAL
Qty: 30 TABLET | Refills: 3 | Status: SHIPPED | OUTPATIENT
Start: 2020-04-08 | End: 2020-06-30 | Stop reason: SDUPTHER

## 2020-04-08 RX ORDER — INSULIN GLARGINE 100 [IU]/ML
16 INJECTION, SOLUTION SUBCUTANEOUS NIGHTLY
Qty: 5 PEN | Refills: 5 | Status: SHIPPED | OUTPATIENT
Start: 2020-04-08 | End: 2020-06-30 | Stop reason: SDUPTHER

## 2020-04-08 RX ORDER — INSULIN LISPRO 100 [IU]/ML
INJECTION, SOLUTION INTRAVENOUS; SUBCUTANEOUS
Qty: 5 PEN | Refills: 5 | Status: SHIPPED | OUTPATIENT
Start: 2020-04-08 | End: 2020-06-30 | Stop reason: SDUPTHER

## 2020-04-08 RX ORDER — ATORVASTATIN CALCIUM 40 MG/1
40 TABLET, FILM COATED ORAL DAILY
Qty: 90 TABLET | Refills: 1 | Status: SHIPPED | OUTPATIENT
Start: 2020-04-08 | End: 2020-06-30 | Stop reason: SDUPTHER

## 2020-04-08 RX ORDER — SILDENAFIL CITRATE 20 MG/1
20 TABLET ORAL DAILY PRN
Qty: 30 TABLET | Refills: 2 | Status: SHIPPED | OUTPATIENT
Start: 2020-04-08 | End: 2020-06-18 | Stop reason: SDUPTHER

## 2020-04-08 RX ORDER — LOSARTAN POTASSIUM 100 MG/1
100 TABLET ORAL DAILY
Qty: 90 TABLET | Refills: 1 | Status: SHIPPED | OUTPATIENT
Start: 2020-04-08 | End: 2020-06-30 | Stop reason: SDUPTHER

## 2020-04-08 RX ORDER — PEN NEEDLE, DIABETIC 30 GX5/16"
1 NEEDLE, DISPOSABLE MISCELLANEOUS DAILY
Qty: 100 EACH | Refills: 3 | Status: SHIPPED | OUTPATIENT
Start: 2020-04-08 | End: 2020-06-30 | Stop reason: SDUPTHER

## 2020-04-08 RX ORDER — GABAPENTIN 300 MG/1
300 CAPSULE ORAL 3 TIMES DAILY
Qty: 270 CAPSULE | Refills: 5 | Status: SHIPPED | OUTPATIENT
Start: 2020-04-08 | End: 2020-06-30 | Stop reason: SDUPTHER

## 2020-04-08 RX ORDER — HYDROCODONE BITARTRATE AND ACETAMINOPHEN 5; 325 MG/1; MG/1
1 TABLET ORAL 2 TIMES DAILY PRN
Status: CANCELLED | OUTPATIENT
Start: 2020-04-08

## 2020-04-08 RX ORDER — LANOLIN ALCOHOL/MO/W.PET/CERES
1000 CREAM (GRAM) TOPICAL DAILY
Qty: 90 TABLET | Refills: 1 | Status: SHIPPED | OUTPATIENT
Start: 2020-04-08 | End: 2020-06-30 | Stop reason: SDUPTHER

## 2020-04-08 RX ORDER — HYDROCHLOROTHIAZIDE 12.5 MG/1
12.5 CAPSULE, GELATIN COATED ORAL EVERY MORNING
Qty: 90 CAPSULE | Refills: 1 | Status: SHIPPED | OUTPATIENT
Start: 2020-04-08 | End: 2020-06-30 | Stop reason: SDUPTHER

## 2020-04-23 ENCOUNTER — TELEPHONE (OUTPATIENT)
Dept: INTERNAL MEDICINE CLINIC | Age: 50
End: 2020-04-23

## 2020-05-01 ENCOUNTER — TELEPHONE (OUTPATIENT)
Dept: INTERNAL MEDICINE CLINIC | Age: 50
End: 2020-05-01

## 2020-05-04 ENCOUNTER — PATIENT MESSAGE (OUTPATIENT)
Dept: PRIMARY CARE CLINIC | Age: 50
End: 2020-05-04

## 2020-05-08 ENCOUNTER — OFFICE VISIT (OUTPATIENT)
Dept: PRIMARY CARE CLINIC | Age: 50
End: 2020-05-08
Payer: COMMERCIAL

## 2020-05-08 ENCOUNTER — HOSPITAL ENCOUNTER (OUTPATIENT)
Age: 50
Setting detail: SPECIMEN
Discharge: HOME | End: 2020-05-08
Payer: COMMERCIAL

## 2020-05-08 VITALS — TEMPERATURE: 98.2 F | HEART RATE: 69 BPM | OXYGEN SATURATION: 99 %

## 2020-05-08 PROCEDURE — U0002 COVID-19 LAB TEST NON-CDC: HCPCS

## 2020-05-08 PROCEDURE — 99213 OFFICE O/P EST LOW 20 MIN: CPT | Performed by: FAMILY MEDICINE

## 2020-05-08 NOTE — PATIENT INSTRUCTIONS
- Your covid-19 testing result takes 3-4 days to come back. [address] will notify the test result or result can be viewed at [address] [place] [address] [address]. - Fluid hydration with plain water was advised. Mix Gatorade with Pedialyte. - Stay at home and self quarantine until COVID-19 test results are back. If  COVID-19 test result is positive, 14 self quarantine is recommended. - Please return to clinic or call us if symptoms are worsened.

## 2020-05-11 LAB
SARS-COV-2: NOT DETECTED
SOURCE: NORMAL

## 2020-06-18 RX ORDER — SILDENAFIL CITRATE 20 MG/1
20 TABLET ORAL DAILY PRN
Qty: 30 TABLET | Refills: 2 | Status: SHIPPED | OUTPATIENT
Start: 2020-06-18 | End: 2020-11-16 | Stop reason: SDUPTHER

## 2020-06-30 ENCOUNTER — OFFICE VISIT (OUTPATIENT)
Dept: INTERNAL MEDICINE CLINIC | Age: 50
End: 2020-06-30
Payer: COMMERCIAL

## 2020-06-30 VITALS
BODY MASS INDEX: 29.29 KG/M2 | DIASTOLIC BLOOD PRESSURE: 86 MMHG | WEIGHT: 222 LBS | HEART RATE: 75 BPM | SYSTOLIC BLOOD PRESSURE: 140 MMHG | OXYGEN SATURATION: 99 % | RESPIRATION RATE: 18 BRPM | TEMPERATURE: 97.2 F

## 2020-06-30 PROCEDURE — 99213 OFFICE O/P EST LOW 20 MIN: CPT | Performed by: FAMILY MEDICINE

## 2020-06-30 RX ORDER — CHOLECALCIFEROL (VITAMIN D3) 125 MCG
CAPSULE ORAL
Qty: 30 TABLET | Refills: 3 | Status: SHIPPED | OUTPATIENT
Start: 2020-06-30

## 2020-06-30 RX ORDER — LOSARTAN POTASSIUM 100 MG/1
100 TABLET ORAL DAILY
Qty: 90 TABLET | Refills: 1 | Status: SHIPPED | OUTPATIENT
Start: 2020-06-30 | End: 2020-10-28 | Stop reason: SDUPTHER

## 2020-06-30 RX ORDER — INSULIN LISPRO 100 [IU]/ML
INJECTION, SOLUTION INTRAVENOUS; SUBCUTANEOUS
Qty: 5 PEN | Refills: 5 | Status: SHIPPED | OUTPATIENT
Start: 2020-06-30 | End: 2021-03-24 | Stop reason: SDUPTHER

## 2020-06-30 RX ORDER — GABAPENTIN 300 MG/1
300 CAPSULE ORAL 3 TIMES DAILY
Qty: 270 CAPSULE | Refills: 5 | Status: SHIPPED | OUTPATIENT
Start: 2020-06-30 | End: 2020-10-28 | Stop reason: SDUPTHER

## 2020-06-30 RX ORDER — LANOLIN ALCOHOL/MO/W.PET/CERES
1000 CREAM (GRAM) TOPICAL DAILY
Qty: 90 TABLET | Refills: 1 | Status: SHIPPED | OUTPATIENT
Start: 2020-06-30

## 2020-06-30 RX ORDER — ATORVASTATIN CALCIUM 40 MG/1
40 TABLET, FILM COATED ORAL DAILY
Qty: 90 TABLET | Refills: 1 | Status: SHIPPED | OUTPATIENT
Start: 2020-06-30 | End: 2020-10-28 | Stop reason: SDUPTHER

## 2020-06-30 RX ORDER — PEN NEEDLE, DIABETIC 30 GX5/16"
1 NEEDLE, DISPOSABLE MISCELLANEOUS DAILY
Qty: 100 EACH | Refills: 3 | Status: SHIPPED | OUTPATIENT
Start: 2020-06-30 | End: 2021-10-20 | Stop reason: SDUPTHER

## 2020-06-30 RX ORDER — HYDROCHLOROTHIAZIDE 12.5 MG/1
12.5 CAPSULE, GELATIN COATED ORAL EVERY MORNING
Qty: 90 CAPSULE | Refills: 1 | Status: SHIPPED | OUTPATIENT
Start: 2020-06-30 | End: 2020-10-28 | Stop reason: SDUPTHER

## 2020-06-30 RX ORDER — INSULIN GLARGINE 100 [IU]/ML
16 INJECTION, SOLUTION SUBCUTANEOUS NIGHTLY
Qty: 5 PEN | Refills: 5 | Status: SHIPPED | OUTPATIENT
Start: 2020-06-30 | End: 2020-12-23 | Stop reason: SDUPTHER

## 2020-06-30 NOTE — PROGRESS NOTES
and unexpected weight change. HENT: Negative for congestion, ear pain, sinus pain, sore throat and trouble swallowing. Eyes: Negative for redness and itching. Respiratory: Negative for cough, chest tightness, shortness of breath and wheezing. Cardiovascular: Negative for chest pain and palpitations. Gastrointestinal: Negative for abdominal distention, abdominal pain, constipation, diarrhea, nausea and vomiting. Endocrine: Negative for polyuria. Genitourinary: Negative for difficulty urinating, dysuria, frequency and urgency. Musculoskeletal: Negative for arthralgias, back pain and myalgias. Skin: Negative for rash. Neurological: Negative for dizziness and headaches. Psychiatric/Behavioral: Negative for behavioral problems, confusion and suicidal ideas. Objective:      BP (!) 140/86   Pulse 75   Temp 97.2 °F (36.2 °C) (Tympanic)   Resp 18   Wt 222 lb (100.7 kg)   SpO2 99%   BMI 29.29 kg/m²      Physical Exam  Vitals signs reviewed. Constitutional:       Appearance: Normal appearance. He is well-developed. HENT:      Head: Normocephalic and atraumatic. Right Ear: External ear normal.      Left Ear: External ear normal.      Mouth/Throat:      Mouth: Mucous membranes are moist.      Pharynx: Oropharynx is clear. Eyes:      Extraocular Movements: Extraocular movements intact. Conjunctiva/sclera: Conjunctivae normal.      Pupils: Pupils are equal, round, and reactive to light. Neck:      Musculoskeletal: Normal range of motion and neck supple. Thyroid: No thyromegaly or thyroid tenderness. Vascular: No carotid bruit. Cardiovascular:      Rate and Rhythm: Normal rate and regular rhythm. Pulses: Normal pulses. Heart sounds: Normal heart sounds, S1 normal and S2 normal. No murmur. Pulmonary:      Effort: Pulmonary effort is normal.      Breath sounds: Normal breath sounds.    Abdominal:      General: Bowel sounds are normal. There is no distension. Palpations: Abdomen is soft. Tenderness: There is no abdominal tenderness. There is no guarding. Hernia: No hernia is present. Comments: Soft, no hepatosplenomegaly   Musculoskeletal: Normal range of motion. Right lower leg: No edema. Left lower leg: No edema. Comments: 5-5 muscular strength throughout and bilateral.   Lymphadenopathy:      Cervical: No cervical adenopathy. Skin:     General: Skin is warm and dry. Findings: No rash. Neurological:      General: No focal deficit present. Mental Status: He is alert and oriented to person, place, and time. Sensory: No sensory deficit. Motor: No weakness. Psychiatric:         Mood and Affect: Mood normal.         Behavior: Behavior normal.         Thought Content: Thought content normal.         Judgment: Judgment normal.            Assessment / Plan:      1. Type 2 diabetes mellitus with diabetic polyneuropathy, with long-term current use of insulin ([facility])  -Uncontrolled and improving. Continue current home medication.    - Insulin Pen Needle (PEN NEEDLES 3/16\") 31G X 5 MM MISC; 1 each by Does not apply route daily  Dispense: 100 each; Refill: 3  - insulin lispro, 1 Unit Dial, (HUMALOG KWIKPEN) 100 UNIT/ML SOPN; TID with meals Glucose [id number] No Insulin 140-199 2 Units 200-249 4 Units 250-299 6 Units 300-349 8 Units 350-400 10 Units over 400 12 Units  Dispense: 5 pen; Refill: 5  - insulin glargine (BASAGLAR KWIKPEN) 100 UNIT/ML injection pen; Inject 16 Units into the skin nightly  Dispense: 5 pen; Refill: 5  - gabapentin (NEURONTIN) 300 MG capsule; Take 1 capsule by mouth 3 times daily for 90 days. Intended supply: 90 days  Dispense: 270 capsule; Refill: 5  - Ertugliflozin-SITagliptin 5-100 MG TABS; Take 1 tablet by mouth daily  Dispense: 90 tablet; Refill: 1    2. Benign essential hypertension  -Stable and near goal.  Continue home medication, losartan and hydrochlorothiazide.   - losartan (COZAAR) 100 MG tablet; Take 1 tablet by mouth daily  Dispense: 90 tablet; Refill: 1  - hydroCHLOROthiazide (MICROZIDE) 12.5 MG capsule; Take 1 capsule by mouth every morning  Dispense: 90 capsule; Refill: 1    3. Muscle cramping  - Stable and asymptomatic  - vitamin B-12 (CYANOCOBALAMIN) 1000 MCG tablet; Take 1 tablet by mouth daily  Dispense: 90 tablet; Refill: 1  - Cholecalciferol (VITAMIN D3) 50 MCG (2000 UT) TABS; Take one tablet by mouth once a day  Dispense: 30 tablet; Refill: 3    4. Mixed hyperlipidemia  - atorvastatin (LIPITOR) 40 MG tablet; Take 1 tablet by mouth daily  Dispense: 90 tablet; Refill: 1          Note:   Patient understand the assessment and agreed to the plan. Medication side effects was discussed during the encounter. Before discharging the patient, all questions and concern were addressed. After visit summary was provided. Advice to call clinic or me for any further questions or concern.        Sami Levora Klinefelter, DO

## 2020-07-01 PROBLEM — E11.42 TYPE 2 DIABETES MELLITUS WITH DIABETIC POLYNEUROPATHY, WITH LONG-TERM CURRENT USE OF INSULIN (HCC): Status: ACTIVE | Noted: 2019-09-12

## 2020-07-01 PROBLEM — Z79.4 TYPE 2 DIABETES MELLITUS WITH DIABETIC POLYNEUROPATHY, WITH LONG-TERM CURRENT USE OF INSULIN (HCC): Status: ACTIVE | Noted: 2019-09-12

## 2020-07-01 ASSESSMENT — ENCOUNTER SYMPTOMS
CONSTIPATION: 0
SORE THROAT: 0
VOMITING: 0
EYE REDNESS: 0
DIARRHEA: 0
ABDOMINAL DISTENTION: 0
BACK PAIN: 0
COUGH: 0
ABDOMINAL PAIN: 0
SINUS PAIN: 0
NAUSEA: 0
TROUBLE SWALLOWING: 0
WHEEZING: 0
SHORTNESS OF BREATH: 0
CHEST TIGHTNESS: 0
EYE ITCHING: 0

## 2020-08-07 RX ORDER — POTASSIUM CHLORIDE 750 MG/1
10 TABLET, EXTENDED RELEASE ORAL DAILY
Qty: 90 TABLET | Refills: 1 | Status: SHIPPED | OUTPATIENT
Start: 2020-08-07 | End: 2020-10-28 | Stop reason: SDUPTHER

## 2020-08-28 ENCOUNTER — HOSPITAL ENCOUNTER (OUTPATIENT)
Age: 50
Setting detail: SPECIMEN
Discharge: HOME | End: 2020-08-28
Payer: COMMERCIAL

## 2020-08-28 ENCOUNTER — OFFICE VISIT (OUTPATIENT)
Dept: PRIMARY CARE CLINIC | Age: 50
End: 2020-08-28

## 2020-08-28 VITALS — OXYGEN SATURATION: 99 % | TEMPERATURE: 96.7 F | HEART RATE: 69 BPM

## 2020-08-28 PROCEDURE — 99213 OFFICE O/P EST LOW 20 MIN: CPT | Performed by: NURSE PRACTITIONER

## 2020-08-28 PROCEDURE — U0002 COVID-19 LAB TEST NON-CDC: HCPCS

## 2020-08-28 NOTE — PROGRESS NOTES
8/28/20  Brayden Graham  1970    FLU/COVID-19 CLINIC EVALUATION    HPI SYMPTOMS:    Employer: [employer]    [] Fevers  [x] Chills  [] Cough  [] Coughing up blood  [] Chest Congestion  [] Nasal Congestion  [] Feeling short of breath  [] Sometimes  [] Frequently  [] All the time  [] Chest pain  [] Headaches  []Tolerable  [] Severe  [x] Sore throat  [] Muscle aches  [] Nausea  [] Vomiting  []Unable to keep fluids down  [] Diarrhea  []Severe    [x] OTHER SYMPTOMS: SWEATS      Symptom Duration:   [] 1  [] 2   [] 3   [] 4    [] 5   [] 6   [] 7   [] 8   [] 9   [] 10   [] 11   [] 12   [] 13   [] 14   [] Longer than 14 days    Symptom course:   [] Worsening     [] Stable     [] Improving    RISK FACTORS:    [] Pregnant or possibly pregnant  [] Age over 61  [] Diabetes  [] Heart disease  [] Asthma  [] COPD/Other chronic lung diseases  [] Active Cancer  [] On Chemotherapy  [] Taking oral steroids  [] History Lymphoma/Leukemia  [] Close contact with a lab confirmed COVID-19 patient within 14 days of symptom onset  [] History of travel from affected geographical areas within 14 days of symptom onset       VITALS:  There were no vitals filed for this visit. TESTS:    POCT FLU:  [] Positive     []Negative    ASSESSMENT:    [] Flu  [] Possible COVID-19  [] Strep    PLAN:    [] Discharge home with written instructions for:  [] Flu management  [] Possible COVID-19 infection with self-quarantine and management of symptoms  [] Follow-up with primary care physician or emergency department if worsens  [] Evaluation per physician/nurse practitioner in clinic  [] Sent to ER       An  electronic signature was used to authenticate this note.      --Chirag Wild MA on 8/28/2020 at 10:27 AM

## 2020-08-28 NOTE — PROGRESS NOTES
8/28/2020    HPI:  Chief complaint and history of present illness as per medical assistant/nurse documented today in the Flu/COVID-19 clinic. MEDICATIONS:  Prior to Visit Medications    Medication Sig Taking? Authorizing Provider   potassium chloride (KLOR-CON M) 10 MEQ extended release tablet Take 1 tablet by mouth daily  Matthew Parker DO   vitamin B-12 (CYANOCOBALAMIN) 1000 MCG tablet Take 1 tablet by mouth daily  Matthew Parker DO   losartan (COZAAR) 100 MG tablet Take 1 tablet by mouth daily  Matthew Parker DO   Insulin Pen Needle (PEN NEEDLES 3/16\") 31G X 5 MM MISC 1 each by Does not apply route daily  Matthew Parker DO   insulin lispro, 1 Unit Dial, (HUMALOG KWIKPEN) 100 UNIT/ML SOPN TID with meals Glucose setb651 No Insulin 140-199 2 Units 200-249 4 Units 250-299 6 Units 300-349 8 Units 350-400 10 Units over 400 12 Units  Matthew Parker DO   insulin glargine (BASAGLAR KWIKPEN) 100 UNIT/ML injection pen Inject 16 Units into the skin nightly  Matthew Parker DO   hydroCHLOROthiazide (MICROZIDE) 12.5 MG capsule Take 1 capsule by mouth every morning  Matthew Parker DO   gabapentin (NEURONTIN) 300 MG capsule Take 1 capsule by mouth 3 times daily for 90 days. Intended supply: 90 days  Matthew Parker DO   Cholecalciferol (VITAMIN D3) 50 MCG (2000 UT) TABS Take one tablet by mouth once a day  Matthew Parker DO   atorvastatin (LIPITOR) 40 MG tablet Take 1 tablet by mouth daily  Matthew Parker DO   Ertugliflozin-SITagliptin 5-100 MG TABS Take 1 tablet by mouth daily  Matthew Parker DO   sildenafil (REVATIO) 20 MG tablet Take 1 tablet by mouth daily as needed (Erectile Dysfuction)  Matthew Parker DO   metFORMIN (GLUCOPHAGE) 1000 MG tablet Take 1 tablet by mouth 2 times daily (with meals)  Matthew Parker DO   HYDROcodone-acetaminophen (NORCO) 5-325 MG per tablet Take 1 tablet by mouth 2 times daily as needed for Pain.   Historical Provider, MD Allergies   Allergen Reactions    Bee Venom Anaphylaxis    Hornet Venom    ,   Past Medical History:   Diagnosis Date    Sleep disorder     Ventral hernia without obstruction or gangrene 11/4/2015   ,   Past Surgical History:   Procedure Laterality Date    HERNIA REPAIR      [address] [facility], 2010    Fusion of L3, L4, L5    UMBILICAL HERNIA REPAIR  11/4/15    repair incarcerated supra umbilical hernia       PHYSICAL EXAM:  Physical Exam  Constitutional:       Appearance: He is well-developed. HENT:      Mouth/Throat:      Mouth: Mucous membranes are moist.   Eyes:      General: No scleral icterus. Conjunctiva/sclera: Conjunctivae normal.   Cardiovascular:      Rate and Rhythm: Normal rate and regular rhythm. Heart sounds: Normal heart sounds. No murmur. Pulmonary:      Effort: Pulmonary effort is normal. No respiratory distress. Breath sounds: Normal breath sounds. No wheezing. Neurological:      General: No focal deficit present. Mental Status: He is alert. Psychiatric:         Mood and Affect: Mood normal.         ASSESSMENT/PLAN:  1. Sore throat  - COVID-19 Ambulatory    2. Chills  - COVID-19 Ambulatory    FOLLOW-UP:  Return if symptoms worsen or fail to improve.     In addition to other information, the printed after visit summary provided to the patient includes:  [x] COVID-19 Self care instructions  [x] COVID-19 General patient information

## 2020-08-28 NOTE — PATIENT INSTRUCTIONS
Your COVID 19 test can take 3-5 days for the results come back. We ask that you make a Mychart page and view your test results this way. You will need to Self quarantine until you know your results. Increase fluids rest  Saline nasal spray as directed  Warm salt gargles for throat discomfort  Monitor temperature twice a day  Tylenol for fevers and/or discomfort. If symptoms are worse -Go to the ER. Follow up with your primary doctor    To Whom it May Concern:    Savanna Forrest has been tested for COVID on 08/28/20. They may NOT return to work until their lab test results back and they been fever free for 3 days. If test is positive they must stay home for 2 weeks or until they test negative or as directed by the [facility] Department.

## 2020-08-30 LAB
SARS-COV-2: DETECTED
SOURCE: ABNORMAL

## 2020-09-01 ENCOUNTER — CARE COORDINATION (OUTPATIENT)
Dept: CARE COORDINATION | Age: 50
End: 2020-09-01

## 2020-09-01 ENCOUNTER — TELEPHONE (OUTPATIENT)
Dept: INTERNAL MEDICINE CLINIC | Age: 50
End: 2020-09-01

## 2020-09-01 RX ORDER — ONDANSETRON 4 MG/1
4 TABLET, ORALLY DISINTEGRATING ORAL EVERY 8 HOURS PRN
Qty: 15 TABLET | Refills: 0 | Status: SHIPPED | OUTPATIENT
Start: 2020-09-01 | End: 2020-09-04

## 2020-09-01 NOTE — CARE COORDINATION
Michael Rajan returned my call. Date/Time:  2020 10:49 AM    Patient contacted regarding remote symptom monitoring program alert or comment received. Verified patients name and  as identifiers. Discussed COVID-19 related testing which was available at this time. Test results were positive. Patient informed of results, if available? Yes    RN contacted the patient by telephone regarding red alert in Loop remote symptom monitoring program.    Patient reported the following symptoms: shortness of breath, chills or shaking, sweating and nausea. Michael Rajan reports that although he isn't vomiting , he feels nauseous and has no desire to eat or drink. I advised him to call his primary care doctor and ask them for something for the nausea. He is diabetic and understands that it is important for him to be able to eat and push more fluid. He agreed and will call now. Due to worsening symptoms, patient was advised to contact provider. Due to worsening symptoms encounter was not routed to provider for escalation. Education provided regarding infection prevention, and signs and symptoms of COVID-19 and when to seek medical attention with patient who verbalized understanding. Discussed exposure protocols and quarantine from [address] you at higher risk for severe illness 2019 and given an opportunity for questions and concerns. The patient agrees to contact the [facility] exposure line [phone], [facility] department [place] Department [facility]: ([phone]) and PCP office for questions related to their healthcare. From CDC: Are you at higher risk for severe illness?  Wash your hands often.  Avoid close contact (6 feet, which is about two arm lengths) with people who are sick.  Put distance between yourself and other people if COVID-19 is spreading in your community.  Clean and disinfect frequently touched surfaces.  Avoid all cruise travel and non-essential air travel.    Call your healthcare professional if you have concerns about COVID-19 and your underlying condition or if you are sick. For more information on steps you can take to protect yourself, see CDC's How to Protect Yourself      Patient will continue to self report symptoms using Loop self monitoring. Patient has RN's contact information.

## 2020-09-01 NOTE — TELEPHONE ENCOUNTER
Patient called to request Zofran for nausea. Patient states he had a positive Covid-19 result yesterday. Patient uses [facility].

## 2020-09-01 NOTE — CARE COORDINATION
This RN responded to a red alert sent over the get well loop for breathing issues. I attempted to call and left a voicemail with contact information. I also sent a message back over the get well loop. Ewelina Isbell, RN, 10:34 AM   Thank you for checking in on the loop [place] [facility]. I tried to call and left you a voicemail. If you would like to speak to a nurse we are available M-F 8-4 and 9-1 weekends.  I can be reached today at [phone], Ewelina Isbell RN

## 2020-09-02 ENCOUNTER — CARE COORDINATION (OUTPATIENT)
Dept: CARE COORDINATION | Age: 50
End: 2020-09-02

## 2020-09-02 NOTE — CARE COORDINATION
This RN responded to a red alert on the get well loop for breathing issues and symptom check. Chris Medrano RN, 8:17 AM   Good morning Marcus Vasquez and thank you for checking in with the loop team today. I see that you were able to get some Zofran yesterday from your doctor. I hope it is helping. Please let us know if you have any new or worsening symptoms since yesterday. If you would like to speak to a nurse we are available until 4 pm. I can be reached at [phone], Chris Vasquez, 10:12 AM   Yes the Zofran does work and it does help but my issue is I cant get more than 40 or 45 minutes asleep at a time due to the cold sweats the hot sweats the chills its been two days now and I havent slept more than an hour at a time. My breathing is still hindered I have no appetite Im still having issues with fluids thank you   reply     Chris Medrano RN, 10:25 AM   I am sorry Marcus Vasquez that you have been so sick. It's worse when you can't sleep. I don't really know how to deal with the hot and cold sweats accept to continue to try and increase your fluid intake and possibly using Tylenol consistently to help control fever. I really don't know if it is related. Keep using your albuterol inhaler. Maybe you could get a virtual visit today with your doctor to get some advice.  Jessica Lomeli, 10:29 AM   Thank you I will Keep you updated today I feel the worst

## 2020-09-03 ENCOUNTER — CARE COORDINATION (OUTPATIENT)
Dept: CARE COORDINATION | Age: 50
End: 2020-09-03

## 2020-09-03 NOTE — CARE COORDINATION
This RN responded to a yellow alert on the loop for symptom check. Bobette Cockayne, RN, 8:12 AM   Good morning Charlie Langford and thank you for checking in with the loop team. I hope you are feeling better today. Please let us know if you have any new or worsening symptoms from yesterday. If you would like to speak to a nurse, I am available today until 4 pm. Bobette Cockayne RN, [phone].

## 2020-09-04 ENCOUNTER — CARE COORDINATION (OUTPATIENT)
Dept: FAMILY MEDICINE CLINIC | Age: 50
End: 2020-09-04

## 2020-09-04 ENCOUNTER — APPOINTMENT (OUTPATIENT)
Dept: GENERAL RADIOLOGY | Age: 50
End: 2020-09-04
Payer: COMMERCIAL

## 2020-09-04 ENCOUNTER — HOSPITAL ENCOUNTER (EMERGENCY)
Age: 50
Discharge: HOME | End: 2020-09-04
Payer: COMMERCIAL

## 2020-09-04 VITALS
DIASTOLIC BLOOD PRESSURE: 87 MMHG | SYSTOLIC BLOOD PRESSURE: 124 MMHG | OXYGEN SATURATION: 98 % | HEART RATE: 69 BPM | TEMPERATURE: 98.7 F | BODY MASS INDEX: 27.83 KG/M2 | WEIGHT: 210 LBS | RESPIRATION RATE: 25 BRPM | HEIGHT: 73 IN

## 2020-09-04 LAB
ALBUMIN SERPL-MCNC: 3.8 GM/DL (ref 3.4–5)
ALP BLD-CCNC: 81 IU/L (ref 40–129)
ALT SERPL-CCNC: 42 U/L (ref 10–40)
ANION GAP SERPL CALCULATED.3IONS-SCNC: 13 MMOL/L (ref 4–16)
AST SERPL-CCNC: 41 IU/L (ref 15–37)
BASOPHILS ABSOLUTE: 0 K/CU MM
BASOPHILS RELATIVE PERCENT: 0.3 % (ref 0–1)
BILIRUB SERPL-MCNC: 0.5 MG/DL (ref 0–1)
BUN BLDV-MCNC: 13 MG/DL (ref 6–23)
CALCIUM SERPL-MCNC: 9.1 MG/DL (ref 8.3–10.6)
CHLORIDE BLD-SCNC: 96 MMOL/L (ref 99–110)
CO2: 20 MMOL/L (ref 21–32)
CREAT SERPL-MCNC: 0.7 MG/DL (ref 0.9–1.3)
DIFFERENTIAL TYPE: ABNORMAL
EOSINOPHILS ABSOLUTE: 0 K/CU MM
EOSINOPHILS RELATIVE PERCENT: 0.6 % (ref 0–3)
GFR AFRICAN AMERICAN: >60 ML/MIN/1.73M2
GFR NON-AFRICAN AMERICAN: >60 ML/MIN/1.73M2
GLUCOSE BLD-MCNC: 270 MG/DL (ref 70–99)
HCT VFR BLD CALC: 52.3 % (ref 42–52)
HEMOGLOBIN: 18.3 GM/DL (ref 13.5–18)
IMMATURE NEUTROPHIL %: 0.6 % (ref 0–0.43)
LV EF: 53 %
LVEF MODALITY: NORMAL
LYMPHOCYTES ABSOLUTE: 0.9 K/CU MM
LYMPHOCYTES RELATIVE PERCENT: 28.5 % (ref 24–44)
MAGNESIUM: 2 MG/DL (ref 1.8–2.4)
MCH RBC QN AUTO: 29.5 PG (ref 27–31)
MCHC RBC AUTO-ENTMCNC: 35 % (ref 32–36)
MCV RBC AUTO: 84.4 FL (ref 78–100)
MONOCYTES ABSOLUTE: 0.4 K/CU MM
MONOCYTES RELATIVE PERCENT: 11.3 % (ref 0–4)
NUCLEATED RBC %: 0 %
PDW BLD-RTO: 11.7 % (ref 11.7–14.9)
PLATELET # BLD: 150 K/CU MM (ref 140–440)
PMV BLD AUTO: 10 FL (ref 7.5–11.1)
POTASSIUM SERPL-SCNC: 5.2 MMOL/L (ref 3.5–5.1)
PRO-BNP: 15.33 PG/ML
RBC # BLD: 6.2 M/CU MM (ref 4.6–6.2)
SEGMENTED NEUTROPHILS ABSOLUTE COUNT: 1.9 K/CU MM
SEGMENTED NEUTROPHILS RELATIVE PERCENT: 58.7 % (ref 36–66)
SODIUM BLD-SCNC: 129 MMOL/L (ref 135–145)
TOTAL CK: 107 IU/L (ref 38–174)
TOTAL IMMATURE NEUTOROPHIL: 0.02 K/CU MM
TOTAL NUCLEATED RBC: 0 K/CU MM
TOTAL PROTEIN: 7.5 GM/DL (ref 6.4–8.2)
TROPONIN T: <0.01 NG/ML
TROPONIN T: <0.01 NG/ML
WBC # BLD: 3.2 K/CU MM (ref 4–10.5)

## 2020-09-04 PROCEDURE — 93306 TTE W/DOPPLER COMPLETE: CPT

## 2020-09-04 PROCEDURE — 2580000003 HC RX 258: Performed by: PHYSICIAN ASSISTANT

## 2020-09-04 PROCEDURE — 99285 EMERGENCY DEPT VISIT HI MDM: CPT

## 2020-09-04 PROCEDURE — 71045 X-RAY EXAM CHEST 1 VIEW: CPT

## 2020-09-04 PROCEDURE — 6360000002 HC RX W HCPCS: Performed by: PHYSICIAN ASSISTANT

## 2020-09-04 PROCEDURE — 83880 ASSAY OF NATRIURETIC PEPTIDE: CPT

## 2020-09-04 PROCEDURE — 93005 ELECTROCARDIOGRAM TRACING: CPT | Performed by: PHYSICIAN ASSISTANT

## 2020-09-04 PROCEDURE — 80053 COMPREHEN METABOLIC PANEL: CPT

## 2020-09-04 PROCEDURE — 99203 OFFICE O/P NEW LOW 30 MIN: CPT | Performed by: INTERNAL MEDICINE

## 2020-09-04 PROCEDURE — 94761 N-INVAS EAR/PLS OXIMETRY MLT: CPT

## 2020-09-04 PROCEDURE — 6370000000 HC RX 637 (ALT 250 FOR IP): Performed by: PHYSICIAN ASSISTANT

## 2020-09-04 PROCEDURE — 93010 ELECTROCARDIOGRAM REPORT: CPT | Performed by: INTERNAL MEDICINE

## 2020-09-04 PROCEDURE — 82550 ASSAY OF CK (CPK): CPT

## 2020-09-04 PROCEDURE — 84484 ASSAY OF TROPONIN QUANT: CPT

## 2020-09-04 PROCEDURE — 96374 THER/PROPH/DIAG INJ IV PUSH: CPT

## 2020-09-04 PROCEDURE — 83735 ASSAY OF MAGNESIUM: CPT

## 2020-09-04 PROCEDURE — 85025 COMPLETE CBC W/AUTO DIFF WBC: CPT

## 2020-09-04 RX ORDER — ASPIRIN 325 MG
325 TABLET ORAL ONCE
Status: COMPLETED | OUTPATIENT
Start: 2020-09-04 | End: 2020-09-04

## 2020-09-04 RX ORDER — 0.9 % SODIUM CHLORIDE 0.9 %
500 INTRAVENOUS SOLUTION INTRAVENOUS ONCE
Status: COMPLETED | OUTPATIENT
Start: 2020-09-04 | End: 2020-09-04

## 2020-09-04 RX ORDER — ONDANSETRON 2 MG/ML
4 INJECTION INTRAMUSCULAR; INTRAVENOUS EVERY 30 MIN PRN
Status: DISCONTINUED | OUTPATIENT
Start: 2020-09-04 | End: 2020-09-04 | Stop reason: HOSPADM

## 2020-09-04 RX ORDER — ONDANSETRON 4 MG/1
4 TABLET, ORALLY DISINTEGRATING ORAL EVERY 8 HOURS PRN
Qty: 15 TABLET | Refills: 0 | Status: SHIPPED | OUTPATIENT
Start: 2020-09-04 | End: 2020-12-23

## 2020-09-04 RX ADMIN — ASPIRIN 325 MG ORAL TABLET 325 MG: 325 PILL ORAL at 11:51

## 2020-09-04 RX ADMIN — SODIUM CHLORIDE 500 ML: 9 INJECTION, SOLUTION INTRAVENOUS at 11:51

## 2020-09-04 RX ADMIN — ONDANSETRON 4 MG: 2 INJECTION INTRAMUSCULAR; INTRAVENOUS at 11:51

## 2020-09-04 ASSESSMENT — HEART SCORE: ECG: 0

## 2020-09-04 NOTE — ED PROVIDER NOTES
EKG:   Normal sinus rhythm with a rate of 78. FL interval 114, , QTc 481. No ST elevations or depressions. Normal T waves. 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 Impression: Sigrid-Parkinson-White pattern, abnormal EKG. When compared to previous EKG from 8/14/2017, the previously noted atrial fibrillation with RVR is resolved and the WPW pattern is new.       Linda Whiting MD  09/04/20 [id number]

## 2020-09-04 NOTE — CONSULTS
Name:  Kj Alfred /Age/Sex: 1970  (54 y.o. male)   MRN & CSN:  [id number] & [id number] Admission Date/Time: 2020 10:12 AM   Location:  ED14/ED-14 PCP: Dev Wooten, [address] Day: 1          Referring physician:  No admitting provider for patient encounter. Reason for consultation:  WPW        Thanks for referral.    Information source: patient    CC;  SOB      HPI:   Thank you for involving me in taking  care of Kj Alfred who  is a 54 y. o.year  Old male  Presents with  covid pos, now  With mid sternal CP with mild SOB, very anxious regarding being Covid pos. 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 Jessica trop. Past medical history:    has a past medical history of Depression, Sleep disorder, and Ventral hernia without obstruction or gangrene. Past surgical history:   has a past surgical history that includes hernia repair; Spine surgery (, ); Umbilical hernia repair (11/4/15); and Elbow surgery (Right). Social History:   reports that he has never smoked. He has never used smokeless tobacco. He reports previous alcohol use of about 2.0 standard drinks of alcohol per week. He reports current drug use. Drug: Marijuana. Family history:  family history is not on file.     Allergies   Allergen Reactions    Bee Venom Anaphylaxis    Hornet Venom        0.9 % sodium chloride bolus, Once  ondansetron (ZOFRAN) injection 4 mg, Q30 Min PRN      Current Facility-Administered Medications   Medication Dose Route Frequency Provider Last Rate Last Dose    0.9 % sodium chloride bolus  500 mL Intravenous Once Korinne Lusterio, PA-C 250 mL/hr at 20 1151 500 mL at 20 1151    ondansetron (ZOFRAN) injection 4 mg  4 mg Intravenous Q30 Min PRN Korinne Lusterio, PA-C   4 mg at 20 1151     Current Outpatient Medications   Medication Sig Dispense Refill    ondansetron (ZOFRAN ODT) 4 MG disintegrating tablet Take 1 tablet by mouth every 8 hours as needed for Nausea 15 tablet 0    potassium chloride (KLOR-CON M) 10 MEQ extended release tablet Take 1 tablet by mouth daily 90 tablet 1    vitamin B-12 (CYANOCOBALAMIN) 1000 MCG tablet Take 1 tablet by mouth daily 90 tablet 1    losartan (COZAAR) 100 MG tablet Take 1 tablet by mouth daily 90 tablet 1    Insulin Pen Needle (PEN NEEDLES 3/16\") 31G X 5 MM MISC 1 each by Does not apply route daily 100 each 3    insulin lispro, 1 Unit Dial, (HUMALOG KWIKPEN) 100 UNIT/ML SOPN TID with meals Glucose vpht554 No Insulin 140-199 2 Units 200-249 4 Units 250-299 6 Units 300-349 8 Units 350-400 10 Units over 400 12 Units 5 pen 5    insulin glargine (BASAGLAR KWIKPEN) 100 UNIT/ML injection pen Inject 16 Units into the skin nightly 5 pen 5    hydroCHLOROthiazide (MICROZIDE) 12.5 MG capsule Take 1 capsule by mouth every morning 90 capsule 1    gabapentin (NEURONTIN) 300 MG capsule Take 1 capsule by mouth 3 times daily for 90 days. Intended supply: 90 days 270 capsule 5    Cholecalciferol (VITAMIN D3) 50 MCG (2000 UT) TABS Take one tablet by mouth once a day 30 tablet 3    atorvastatin (LIPITOR) 40 MG tablet Take 1 tablet by mouth daily 90 tablet 1    Ertugliflozin-SITagliptin 5-100 MG TABS Take 1 tablet by mouth daily 90 tablet 1    sildenafil (REVATIO) 20 MG tablet Take 1 tablet by mouth daily as needed (Erectile Dysfuction) 30 tablet 2    metFORMIN (GLUCOPHAGE) 1000 MG tablet Take 1 tablet by mouth 2 times daily (with meals) 180 tablet 5    HYDROcodone-acetaminophen (NORCO) 5-325 MG per tablet Take 1 tablet by mouth 2 times daily as needed for Pain.        Review of Systems:  All 14 systems reviewed, all negative except for  CP, SOB    Physical Examination:    /89   Pulse 71   Temp 98.7 °F (37.1 °C) (Oral)   Resp 28   Ht 6' 1\" (1.854 m)   Wt 210 lb (95.3 kg)   SpO2 100%   BMI 27.71 kg/m²      Wt Readings from Last 3 Encounters:   09/04/20 210 lb (95.3 kg)   06/30/20 222 lb (100.7 kg)   03/17/20 226 lb (102.5 kg)     Body mass index is 27.71 kg/m². General Appearance:  Fair   Head: normocephalic     Eyes: normal, noninjected conjunctiva    ENT: normal mucosa, noninjected throat, normal     NECK: No JVP  No thyromegaly        Cardiovascular: No thrills palpated   Auscultation: Normal S1 and S2,  no murmur   carotid bruit no   Abdominal Aorta no bruit    Respiratory:    Breath sounds Clear = 0    Extremities:  none Edema clubbing ,   no cyanosis    SKIN: Warm and well perfused, no pallor or cyanosis    Vascular exam:  Pedal Pulses: palp  bilaterally        Abdomen:  No masses or tenderness. No organomegaly noted. Neurological:  Oriented to time, place, and person   No focal neurological deficit noted. Psychiatric:normal mood, no anxiety    Lab Review   Recent Labs     09/04/20  1055   WBC 3.2*   HGB 18.3*   HCT 52.3*         Recent Labs     09/04/20  1055   *   K 5.2*   CL 96*   CO2 20*   BUN 13   CREATININE 0.7*     Recent Labs     09/04/20  1055   AST 41*   ALT 42*   BILITOT 0.5   ALKPHOS 81     No results for input(s): TROPONINI in the last 72 hours. No results found for: BNP  No results found for: INR, PROTIME        Assessment/Recommendations:     - CP; ?  Atypical  - has WPW had A fib in past  - echo today  - EP will FU as OP         Bhavana Moses MD, 9/4/2020 1:02 PM

## 2020-09-04 NOTE — ED PROVIDER NOTES
EMERGENCY DEPARTMENT ENCOUNTER      PCP: [facility], [address] [facility]    Chief Complaint   Patient presents with    Shortness of Breath     diagnosed with covid on monday; symptoms have worsened    Chills    Nausea       This patient was not evaluated by the attending physician. I have independently evaluated this patient. ELOISA Templeton is a 54 y.o. male who presents with body aches, shortness of breath and chest pain. Was prior to arrival, approximately a week ago. Context is patient states that he and female significant other began having similar onset of symptoms with generalized body aches, body sweats and chills. Has had low-grade temperatures up to 100 point 1F. [facility] and was tested positive for COVID-19 as well as his contact. He has since been self isolating. States for the last week, he has not been eating and drinking secondary to nausea and significant decreased appetite. Denies any loss of taste or sensation. He is beginning to have increased shortness of breath without cough as well as anterior chest \"discomfort\" without hemoptysis. Patient has no underlying known history of coronary artery disease never been seen by cardiologist.  Does have a history of hypertension, hyperlipidemia as well as type 2 diabetes. He states that his cough and shortness of breath symptoms feel similar to his asthma \"bronchitis\" symptoms and he has been utilizing an old prescription of albuterol inhaler. Patient is a non-smoker. Has had intermittent diarrhea without abdominal pain dysuria hematuria. Feels generally fatigued. Patient is tearful and anxious, states that since being told he was diagnosed with COVID-19, his been isolating and feels \"very alone. \"  Denying any SI/HI. Does have chronic pain surgery for multiple back surgeries, is currently on medical marijuana, no other narcotics.   Patient states that with his decreased appetite and decreased food intake, he has lost 7 pounds in the last week. Denying any eye pain, sore throat, headache, difficulty swallowing. No previous history of DVT/PE. No calf pain or swelling. Does state the chest pain and shortness of breath is slightly exertional.  Denying any palpitations dizziness or lightheadedness. Chest pain at this time is a heavy anterior 6/10 pressure, does not rating to the back, no tearing or ripping sensation. No previous history of DVT/PE.     REVIEW OF SYSTEMS    Constitutional:  No fever. +chills  HEENT:  See HPI  Cardiovascular: See HPI  Respiratory:  See HPI  GI:  Denies abdominal pain, vomiting, or diarrhea   Neurologic:  Denies confusion, light headedness, dizziness, or syncope   Skin:  No rash    All other review of systems are negative  See HPI and nursing notes for additional information       PAST MEDICAL AND SURGICAL HISTORY    Past Medical History:   Diagnosis Date    Depression     Sleep disorder     Ventral hernia without obstruction or gangrene 11/4/2015     Past Surgical History:   Procedure Laterality Date    ELBOW SURGERY Right    [address] [facility], 2010    Fusion of L3, L4, L5    UMBILICAL HERNIA REPAIR  11/4/15    repair incarcerated supra umbilical hernia       CURRENT MEDICATIONS    Current Outpatient Rx   Medication Sig Dispense Refill    ondansetron (ZOFRAN ODT) 4 MG disintegrating tablet Take 1 tablet by mouth every 8 hours as needed for Nausea 15 tablet 0    potassium chloride (KLOR-CON M) 10 MEQ extended release tablet Take 1 tablet by mouth daily 90 tablet 1    vitamin B-12 (CYANOCOBALAMIN) 1000 MCG tablet Take 1 tablet by mouth daily 90 tablet 1    losartan (COZAAR) 100 MG tablet Take 1 tablet by mouth daily 90 tablet 1    Insulin Pen Needle (PEN NEEDLES 3/16\") 31G X 5 MM MISC 1 each by Does not apply route daily 100 each 3    insulin lispro, 1 Unit Dial, (HUMALOG KWIKPEN) 100 UNIT/ML SOPN TID with meals Glucose [id number] No Insulin 140-199 2 Units 200-249 4 Units 250-299 6 Units 300-349 8 Units 350-400 10 Units over 400 12 Units 5 pen 5    insulin glargine (BASAGLAR KWIKPEN) 100 UNIT/ML injection pen Inject 16 Units into the skin nightly 5 pen 5    hydroCHLOROthiazide (MICROZIDE) 12.5 MG capsule Take 1 capsule by mouth every morning 90 capsule 1    gabapentin (NEURONTIN) 300 MG capsule Take 1 capsule by mouth 3 times daily for 90 days. Intended supply: 90 days 270 capsule 5    Cholecalciferol (VITAMIN D3) 50 MCG (2000 UT) TABS Take one tablet by mouth once a day 30 tablet 3    atorvastatin (LIPITOR) 40 MG tablet Take 1 tablet by mouth daily 90 tablet 1    Ertugliflozin-SITagliptin 5-100 MG TABS Take 1 tablet by mouth daily 90 tablet 1    sildenafil (REVATIO) 20 MG tablet Take 1 tablet by mouth daily as needed (Erectile Dysfuction) 30 tablet 2    metFORMIN (GLUCOPHAGE) 1000 MG tablet Take 1 tablet by mouth 2 times daily (with meals) 180 tablet 5    HYDROcodone-acetaminophen (NORCO) 5-325 MG per tablet Take 1 tablet by mouth 2 times daily as needed for Pain. ALLERGIES    Allergies   Allergen Reactions    Bee Venom Anaphylaxis    Hornet Venom        FAMILY AND SOCIAL HISTORY    History reviewed. No pertinent family history.   Social History     Socioeconomic History    Marital status: Single     Spouse name: None    Number of children: None    Years of education: None    Highest education level: None   Occupational History    None   Social Needs    Financial resource strain: None    Food insecurity     Worry: None     Inability: None    Transportation needs     Medical: None     Non-medical: None   Tobacco Use    Smoking status: Never Smoker    Smokeless tobacco: Never Used   Substance and Sexual Activity    Alcohol use: Not Currently     Alcohol/week: 2.0 standard drinks     Types: 2 Standard drinks or equivalent per week    Drug use: Yes     Types: Marijuana    Sexual activity: None   Lifestyle    Physical activity     Days per week: None     Minutes per session: None    Stress: None   Relationships    Social connections     Talks on phone: None     Gets together: None     Attends Taoist service: None     Active member of club or organization: None     Attends meetings of clubs or organizations: None     Relationship status: None    Intimate partner violence     Fear of current or ex partner: None     Emotionally abused: None     Physically abused: None     Forced sexual activity: None   Other Topics Concern    None   Social History Narrative    None       PHYSICAL EXAM    VITAL SIGNS: /87   Pulse 69   Temp 98.7 °F (37.1 °C) (Oral)   Resp 25   Ht 6' 1\" (1.854 m)   Wt 210 lb (95.3 kg)   SpO2 98%   BMI 27.71 kg/m²   Constitutional:  Well developed, well nourished, no acute distress, non-toxic appearance   Eyes: Conjunctiva normal, sclera non-icteric  HEENT:     - Normocephalic, atraumatic   - PERRL, EOM intact. Conjunctiva normal    -  Frontal/Maxillary sinuses NONtender to percussion.   - External auditory canals clear   - TMs clear without discharge, fluid, or bulging.   - Nasal passages with mildly erythematous and edematous turbinates. No masses. - Oropharynx mildly erythematous without tonsillar hypertrophy or exudate. Uvula is midline and rises evenly with phonation. Tolerating oral secretions. No hot potato voice. No inspiratory stridor. Neck/Lymphatics:    - supple, no JVD, no swollen nodes   - No nuchal rigidity. Trachea is midline. Respiratory:  99% on room air. Clear to auscultation in bilateral lung fields, no wheezes/rales/rhonchi. No retractions, no accessory muscle use  Cardiovascular:  Normal rate, normal rhythm   GI:   No gross discoloration. Bowel sounds present in all quadrants, No audible bruits. Soft,  Nondistended. + Abdominal tenderness without rebound tenderness or guarding, No palpable pulsatile masses or obvious hernias.     No McBurney's point tenderness Negative Rovsing sign    Negative Garay's sign. Back:  No CVA tenderness to percussion. Musculoskeletal:  No obvious deficits, no edema. Compartments are soft throughout the bilateral upper and lower extremities. Integument:  Skin pink, warm, dry, intact   Neuro: Alert and oriented x3. Cranial nerves II through XII grossly intact. No slurred speech. No facial droop. Moving all extremities appropriate tone and coordination. Psych: Tearful and anxious, cooperative. No SI/HI. No hallucinations.       LABS:  Results for orders placed or performed during the hospital encounter of 09/04/20   CBC Auto Differential   Result Value Ref Range    WBC 3.2 (L) 4.0 - 10.5 K/CU MM    RBC 6.20 4.6 - 6.2 M/CU MM    Hemoglobin 18.3 (H) 13.5 - 18.0 GM/DL    Hematocrit 52.3 (H) 42 - 52 %    MCV 84.4 78 - 100 FL    MCH 29.5 27 - 31 PG    MCHC 35.0 32.0 - 36.0 %    RDW 11.7 11.7 - 14.9 %    Platelets 247 142 - 914 K/CU MM    MPV 10.0 7.5 - 11.1 FL    Differential Type AUTOMATED DIFFERENTIAL     Segs Relative 58.7 36 - 66 %    Lymphocytes % 28.5 24 - 44 %    Monocytes % 11.3 (H) 0 - 4 %    Eosinophils % 0.6 0 - 3 %    Basophils % 0.3 0 - 1 %    Segs Absolute 1.9 K/CU MM    Lymphocytes Absolute 0.9 K/CU MM    Monocytes Absolute 0.4 K/CU MM    Eosinophils Absolute 0.0 K/CU MM    Basophils Absolute 0.0 K/CU MM    Nucleated RBC % 0.0 %    Total Nucleated RBC 0.0 K/CU MM    Total Immature Neutrophil 0.02 K/CU MM    Immature Neutrophil % 0.6 (H) 0 - 0.43 %   Comprehensive Metabolic Panel   Result Value Ref Range    Sodium 129 (L) 135 - 145 MMOL/L    Potassium 5.2 (H) 3.5 - 5.1 MMOL/L    Chloride 96 (L) 99 - 110 mMol/L    CO2 20 (L) 21 - 32 MMOL/L    BUN 13 6 - 23 MG/DL    CREATININE 0.7 (L) 0.9 - 1.3 MG/DL    Glucose 270 (H) 70 - 99 MG/DL    Calcium 9.1 8.3 - 10.6 MG/DL    Alb 3.8 3.4 - 5.0 GM/DL    Total Protein 7.5 6.4 - 8.2 GM/DL    Total Bilirubin 0.5 0.0 - 1.0 MG/DL    ALT 42 (H) 10 - 40 U/L    AST 41 (H) 15 - 37 IU/L    Alkaline Phosphatase 81 40 - 129 IU/L    GFR Non-African American >60 >60 mL/min/1.73m2    GFR African American >60 >60 mL/min/1.73m2    Anion Gap 13 4 - 16   Troponin   Result Value Ref Range    Troponin T <0.010 <0.01 NG/ML   Brain Natriuretic Peptide   Result Value Ref Range    Pro-BNP 15.33 <300 PG/ML   Magnesium   Result Value Ref Range    Magnesium 2.0 1.8 - 2.4 mg/dl   CK   Result Value Ref Range    Total  38 - 174 IU/L   Troponin   Result Value Ref Range    Troponin T <0.010 <0.01 NG/ML   EKG 12 Lead   Result Value Ref Range    Ventricular Rate 78 BPM    Atrial Rate 78 BPM    P-R Interval 114 ms    QRS Duration 112 ms    Q-T Interval 422 ms    QTc Calculation (Bazett) 481 ms    P Axis 54 degrees    R Axis 79 degrees    T Axis 52 degrees    Diagnosis       Normal sinus rhythm  Sigrid-Parkinson-White  Abnormal ECG  When compared with ECG of 14-AUG-2017 13:40,  Sinus rhythm has replaced Atrial fibrillation  Vent. rate has decreased BY  76 BPM  Sigrid-Parkinson-White is now present         RADIOLOGY/PROCEDURES    XR CHEST PORTABLE   Preliminary Result   No acute cardiopulmonary process. XR CHEST PORTABLE (Preliminary result)   Result time 09/04/20 11:32:57   Preliminary result by Emmie Prabhakar MD (09/04/20 11:32:57)                 Impression:     No acute cardiopulmonary process.              Narrative:     EXAMINATION:   ONE XRAY VIEW OF THE CHEST     9/4/2020 11:00 am     COMPARISON:   August 14, 2017     HISTORY:   ORDERING SYSTEM PROVIDED HISTORY: chest pain   TECHNOLOGIST PROVIDED HISTORY:   Reason for exam:->chest pain   Reason for Exam: chest pain   Acuity: Acute   Type of Exam: Initial   Relevant Medical/Surgical History: COVID 19     FINDINGS:   The cardiomediastinal silhouette is normal in size.  The lungs are clear.  No   pleural effusion or pneumothorax is present. Quyen Friday is no subdiaphragmatic   free air.                  Preliminary result by Emmie Prabhakar MD (09/04/20 11:32:28) without peak T waves. Mildly decreased sodium of 129 the patient has normal mentation. Creatinine is normal.  CO2 is also mildly decreased at 20 with a normal anion gap and patient does not appear clinically septic. Very minimal elevated LFTs which does appear consistent with COVID-19. Normal troponin and BNP. Normal Magnesium and CK. Chest x-ray reveals no evidence of acute cardiopulmonary process. iven patient's age, history and risk factors, heart score is a 3-4. Given new EKG findings as well as patient's chest pain complaints, plan to consult with cardiology. Likely majority of presenting symptoms are secondary to his known Covid 19 infection. Does not appear to be in acute respiratory failure at this time and is not requiring nasal cannula therapy. Patient was ambulated in the ED on room air and remained 99 % and above and did not feel short of breath or having chest pain. I did consult with Dr. Raven Casiano - cardiology - and discussed patient's history, ED presentation/course including any pertinent laboratory findings and imaging study findings. He/she did come evaluate patient in the ED. Ordering stat echocardiogram on emergency department and will be agreeable plan for discharge if echo is unremarkable with a repeat unchanged throughout his troponin. Three hour delta trop is normal/unchanged. While in the ED, patient did well on room air. I was called by cardiology who states echocardiogram is unremarkable and they are comfortable with patient being discharged home and following up outpatient. This plan was discussed with patient who verbalizes understanding and agreement. I did utilize appropriate PPE (including N95 face mask, safety glasses, gloves, isolation gown), as recommended by the health facility/national standard best practice, during my bedside interactions with the patient.  Full droplet precaution as well as full PPE was followed throughout patient's ED course and evaluation. Patient was masked throughout ED course. Given the best available information and clinical assessment, I estimate the risk of hospitalization to be greater than the risk of treatment at home. I have explained to the patient that their condition may change rapidly and have given them strict return precautions. In addition, they are given instructions regarding appropriate symptomatic care at home and instructions on how to minimize spread of the infection. Out of an abundance of caution I advised patient and household members to self quarantine for at least 14 days or until patient is asymptomatic    I prescribed patient Zofran, encouraged Tylenol for body aches and subjective fevers. Patient is given a low threshold to return back to the ED with any new or worsening symptoms including intractable fevers, worsening shortness of breath, abscess, worsening chest pain. Should otherwise follow-up with cardiology as well as PCP. Educated on continuing self-isolation until resolution of symptoms or until cleared by PCP. I have low clinical suspicion for ARDS, pneumonia requiring admission, bacteremia/sepsis, airway compromise or airway obstruction. Patient is comfortable with discharge at this time. Patient is nontoxic appearing. Vital signs stable. Patient is stable for outpatient management. The patient and/or the family were informed of the results of any tests/labs/imaging, the treatment plan, and time was allotted to answer questions. Diagnosis, disposition, and plan discussed in detail with patient who understands and agrees. Patient understands and agrees to follow up with PCP for recheck in the next 2-3 days. Patient understands and agrees to return to emergency department if symptoms worsen or any new symptoms develop- strict return precautions given.       Disposition referral (if applicable):  Matthew Elizabeth,   [address]

## 2020-09-04 NOTE — CARE COORDINATION
Comment alert noted in Loop remote symptom monitoring program.     Patient responded to previous RN message: \"Okay I just woke up thank you and I will\"    Patient agreeable to go to ED for further evaluation.     Debbie Sorto RN, MSN  Ambulatory Care Manager  [phone]

## 2020-09-05 ENCOUNTER — CARE COORDINATION (OUTPATIENT)
Dept: CARE COORDINATION | Age: 50
End: 2020-09-05

## 2020-09-05 NOTE — CARE COORDINATION
Yellow alert noted in Loop remote symptom monitoring program. Messaged patient to notify Xander Goff if symptoms have worsened since yesterday. Donnell Pineda RN, 9:20 AM  Hi, I'm Donnell Pineda RN from the [facility]. I see you triggered an Alert. Thank you for checking in with us. Please let us know if your symptoms are worse than yesterday or if you wish to speak to a nurse. You can also message us here on Loop. We are available between 8am to 4 pm Mon-Fri. and 9am to 1pm weekends. If your symptoms are severe - Please call your doctor, call 911 and/or go to the nearest Emergency Room.

## 2020-09-07 ENCOUNTER — CARE COORDINATION (OUTPATIENT)
Dept: CARE COORDINATION | Age: 50
End: 2020-09-07

## 2020-09-08 ENCOUNTER — TELEPHONE (OUTPATIENT)
Dept: CARDIOLOGY CLINIC | Age: 50
End: 2020-09-08

## 2020-09-09 LAB
EKG ATRIAL RATE: 78 BPM
EKG DIAGNOSIS: NORMAL
EKG P AXIS: 54 DEGREES
EKG P-R INTERVAL: 114 MS
EKG Q-T INTERVAL: 422 MS
EKG QRS DURATION: 112 MS
EKG QTC CALCULATION (BAZETT): 481 MS
EKG R AXIS: 79 DEGREES
EKG T AXIS: 52 DEGREES
EKG VENTRICULAR RATE: 78 BPM

## 2020-09-17 ENCOUNTER — TELEPHONE (OUTPATIENT)
Dept: INTERNAL MEDICINE CLINIC | Age: 50
End: 2020-09-17

## 2020-09-17 NOTE — TELEPHONE ENCOUNTER
Patient needs a letter stating he can work the donut schedule working [place] off Wed work [address] off weekend. Letter for patient to  to give to his work.

## 2020-09-17 NOTE — LETTER
[address] [place] [address] Internal and Family Medicine  [facility] [place] [address]  Phone: [phone]  Fax: [phone]    Matthew Delvalle,         September 21, 2020     Patient: Monico Thomas   YOB: 1970   Date of Visit:  8/28/20       To Whom it May Concern:    Phil Arriaza has been cleared to return to work. Return date 9/16/20. It is my professional opinion, that he work the donut schedule, due to shortness of breath and irregular heartbeat, caused by late effect of Covid-19. If you have any questions or concerns, please don't hesitate to call.     Sincerely,         Matthew Delvalle, DO

## 2020-09-21 ENCOUNTER — TELEPHONE (OUTPATIENT)
Dept: CARDIOLOGY CLINIC | Age: 50
End: 2020-09-21

## 2020-09-28 ENCOUNTER — TELEPHONE (OUTPATIENT)
Dept: CARDIOLOGY CLINIC | Age: 50
End: 2020-09-28

## 2020-10-07 ENCOUNTER — TELEPHONE (OUTPATIENT)
Dept: CARDIOLOGY CLINIC | Age: 50
End: 2020-10-07

## 2020-10-28 RX ORDER — GABAPENTIN 300 MG/1
300 CAPSULE ORAL 3 TIMES DAILY
Qty: 270 CAPSULE | Refills: 0 | Status: SHIPPED | OUTPATIENT
Start: 2020-10-28 | End: 2021-03-24 | Stop reason: SDUPTHER

## 2020-10-28 RX ORDER — LOSARTAN POTASSIUM 100 MG/1
100 TABLET ORAL DAILY
Qty: 90 TABLET | Refills: 0 | Status: SHIPPED | OUTPATIENT
Start: 2020-10-28 | End: 2021-01-25

## 2020-10-28 RX ORDER — POTASSIUM CHLORIDE 750 MG/1
10 TABLET, EXTENDED RELEASE ORAL DAILY
Qty: 90 TABLET | Refills: 0 | Status: SHIPPED | OUTPATIENT
Start: 2020-10-28 | End: 2021-01-25

## 2020-10-28 RX ORDER — HYDROCHLOROTHIAZIDE 12.5 MG/1
12.5 CAPSULE, GELATIN COATED ORAL EVERY MORNING
Qty: 90 CAPSULE | Refills: 0 | Status: SHIPPED | OUTPATIENT
Start: 2020-10-28 | End: 2021-01-25

## 2020-10-28 RX ORDER — ATORVASTATIN CALCIUM 40 MG/1
40 TABLET, FILM COATED ORAL DAILY
Qty: 90 TABLET | Refills: 0 | Status: SHIPPED | OUTPATIENT
Start: 2020-10-28 | End: 2021-01-25

## 2020-11-03 ENCOUNTER — TELEPHONE (OUTPATIENT)
Dept: INTERNAL MEDICINE CLINIC | Age: 50
End: 2020-11-03

## 2020-11-03 RX ORDER — ERTUGLIFLOZIN 5 MG/1
5 TABLET, FILM COATED ORAL DAILY
COMMUNITY
End: 2021-07-21

## 2020-11-03 NOTE — TELEPHONE ENCOUNTER
The pervious medication was too expensive for the patient. The doctor wanted to chnge the medication to Steglatro 5 MG and Januvia 100 MG.

## 2020-11-16 RX ORDER — SILDENAFIL CITRATE 20 MG/1
20 TABLET ORAL DAILY PRN
Qty: 30 TABLET | Refills: 1 | Status: SHIPPED | OUTPATIENT
Start: 2020-11-16 | End: 2020-12-23

## 2020-12-23 ENCOUNTER — OFFICE VISIT (OUTPATIENT)
Dept: INTERNAL MEDICINE CLINIC | Age: 50
End: 2020-12-23
Payer: COMMERCIAL

## 2020-12-23 ENCOUNTER — TELEPHONE (OUTPATIENT)
Dept: CARDIOLOGY CLINIC | Age: 50
End: 2020-12-23

## 2020-12-23 VITALS
TEMPERATURE: 97.3 F | DIASTOLIC BLOOD PRESSURE: 82 MMHG | SYSTOLIC BLOOD PRESSURE: 136 MMHG | WEIGHT: 217.6 LBS | OXYGEN SATURATION: 98 % | HEART RATE: 87 BPM | BODY MASS INDEX: 28.71 KG/M2

## 2020-12-23 LAB
CHOLESTEROL, FASTING: 131 MG/DL (ref 0–199)
CHP ED QC CHECK: NORMAL
GLUCOSE BLD-MCNC: 449 MG/DL
HDLC SERPL-MCNC: 57 MG/DL (ref 40–60)
LDL CHOLESTEROL CALCULATED: 64 MG/DL
TRIGLYCERIDE, FASTING: 48 MG/DL (ref 0–150)
VLDLC SERPL CALC-MCNC: 10 MG/DL

## 2020-12-23 PROCEDURE — 99204 OFFICE O/P NEW MOD 45 MIN: CPT | Performed by: INTERNAL MEDICINE

## 2020-12-23 PROCEDURE — 82962 GLUCOSE BLOOD TEST: CPT | Performed by: INTERNAL MEDICINE

## 2020-12-23 RX ORDER — INSULIN GLARGINE 100 [IU]/ML
30 INJECTION, SOLUTION SUBCUTANEOUS NIGHTLY
Qty: 5 PEN | Refills: 5 | Status: SHIPPED | OUTPATIENT
Start: 2020-12-23 | End: 2021-03-24 | Stop reason: SDUPTHER

## 2020-12-23 RX ORDER — SILDENAFIL 50 MG/1
50 TABLET, FILM COATED ORAL DAILY PRN
Qty: 10 TABLET | Refills: 0 | Status: SHIPPED | OUTPATIENT
Start: 2020-12-23 | End: 2021-03-15 | Stop reason: SDUPTHER

## 2020-12-23 NOTE — PROGRESS NOTES
Name: Brittani Loo  [id number]  Age: 52 y.o. YOB: 1970  Sex: male    CHIEF COMPLAINT:    Chief Complaint   Patient presents with    New Patient    Diabetes       HISTORY OF PRESENT ILLNESS:     This is a pleasant  52 y.o. male  is seen today to establish care and for management of chronic medical problems and medications refills. Previous records reviewed . Patient claims that his sugars are running high recently. Mostly in the 300s to 400+. He is taking all his medications regularly. Denies CP or SOB. No fever , sore throat or cough or congestion. But he complains of palpitation when he exerted himself. He had tested positive for COVID-19 in August 2020. Since then he has this palpitation. Denies any abdominal pain. Appetite OK. Bowels moving [place]. No urinary symptoms. Denies any significant arthritis. Hearing is ok. Vision Ok with glasses. Denies  any significant skin lesions. Denies any significant depression or anxiety. Complains of erectile dysfunction and it has gone little worse since he got COVID-19. No other complaints. Refused a Flushot.         Past Medical History:    Patient Active Problem List   Diagnosis    Ventral hernia without obstruction or gangrene    Benign essential hypertension    Mixed hyperlipidemia    Gastroesophageal reflux disease without esophagitis    Dupuytren's contracture    Chronic skin ulcer, limited to breakdown of skin ([address]    Chronic bilateral low back pain without sciatica    Type 2 diabetes mellitus with diabetic polyneuropathy, with long-term current use of insulin ([facility])    Muscle cramping    Erectile dysfunction due to diseases classified elsewhere    Chest pain    Laboratory confirmed diagnosis of COVID-19    Kayden Coaster Parkinson White pattern seen on electrocardiogram        Past Surgical History:        Procedure Laterality Date    ELBOW SURGERY Right     HERNIA REPAIR     [facility] SURGERY  1990, 2010 Fusion of L3, L4, L5    UMBILICAL HERNIA REPAIR  11/4/15    repair incarcerated supra umbilical hernia       Social History:   Social History     Tobacco Use    Smoking status: Never Smoker    Smokeless tobacco: Never Used   Substance Use Topics    Alcohol use: Not Currently     Alcohol/week: 2.0 standard drinks     Types: 2 Standard drinks or equivalent per week       Family History:       Problem Relation Age of Onset    Breast Cancer Mother        Allergies:  Bee venom and Hornet venom    Current Medications :      Prior to Admission medications    Medication Sig Start Date End Date Taking? Authorizing Provider   sildenafil (VIAGRA) 50 MG tablet Take 1 tablet by mouth daily as needed for Erectile Dysfunction 12/23/20  Yes Vince Dinero MD   insulin glargine [facility]) 100 UNIT/ML injection pen Inject 30 Units into the skin nightly 12/23/20  Yes Vince Dinero MD   Ertugliflozin L-PyroglutamicAc (STEGLATRO) 5 MG TABS Take 5 mg by mouth daily   Yes Historical Provider, MD   SITagliptin (JANUVIA) 100 MG tablet Take 100 mg by mouth daily   Yes Historical Provider, MD   atorvastatin (LIPITOR) 40 MG tablet Take 1 tablet by mouth daily 10/28/20  Yes Vince Dinero MD   gabapentin (NEURONTIN) 300 MG capsule Take 1 capsule by mouth 3 times daily for 90 days.  Intended supply: 90 days 10/28/20 1/26/21 Yes Vince Dinero MD   losartan (COZAAR) 100 MG tablet Take 1 tablet by mouth daily 10/28/20 1/26/21 Yes Vince Dinero MD   metFORMIN (GLUCOPHAGE) 1000 MG tablet Take 1 tablet by mouth 2 times daily (with meals) 10/28/20  Yes Vince Dinero MD   potassium chloride (KLOR-CON M) 10 MEQ extended release tablet Take 1 tablet by mouth daily 10/28/20  Yes Vince Dinero MD   vitamin B-12 (CYANOCOBALAMIN) 1000 MCG tablet Take 1 tablet by mouth daily 6/30/20  Yes [address] [place] [address],[facility],    Insulin Pen Needle (PEN NEEDLES 3/16\") 31G X 5 MM MISC 1 each by Does not apply route daily 6/30/20  Yes [address] [place] [address],[address] [place] [address], [place] Advised low sugar  and exercise . Advised to take medications regularly. Blood sugars reviewed from the home log. Medications reviewed. Patient denies side effects with medications. Continue Januvia and [place] and Glucophage. Increase Tresiba to 30 units at bedtime, currently taking 16 units. NovoLog subcu as needed. Also advised patient to increase Tresiba 2 units extra every 3 days until his morning blood sugar is less than 150 or he reaches [place] 45 units at bedtime.  -     POCT Glucose  -     Hemoglobin A1C  -     insulin glargine (BASAGLAR KWIKPEN) 100 UNIT/ML injection pen; Inject 30 Units into the skin nightly  -     Amb External Referral To Endocrinology    Benign essential hypertension. Continue current medications, denies side effect with medicationss. Low salt diet and exercise advised. Continue Cozaar and hydrochlorothiazide.  -     CBC Auto Differential; Future  -     Comprehensive Metabolic Panel; Future    Mixed hyperlipidemia. Patient is taking cholesterol medications regularly. Denies any side effects. Diet and exercise advised. Continue Lipitor.  -     Lipid, Fasting    Laboratory confirmed diagnosis of COVID-19. History of COVID-19 in August 2020. Erectile dysfunction, unspecified erectile dysfunction type. Increase sildenafil to 50 mg as needed. -     sildenafil (VIAGRA) 50 MG tablet; Take 1 tablet by mouth daily as needed for Erectile Dysfunction    Palpitations. Refer to cardiologist.  Multiple cardiovascular risk factors also present. -     Marlyn Gil MD, Cardiology, [facility]    Fatty liver. Possible fatty liver. History of abnormal liver function tests since many years. Patient claimed that he used to be heavy and has lost around 200+ pounds. Monitor liver function tests and may need further testing if liver function tests remains high. I have recommended that the patient follow CDC guidelines for prevention of COVID-19 infection.

## 2020-12-24 LAB
ESTIMATED AVERAGE GLUCOSE: 263.3 MG/DL
HBA1C MFR BLD: 10.8 %

## 2020-12-29 ENCOUNTER — TELEPHONE (OUTPATIENT)
Dept: CARDIOLOGY CLINIC | Age: 50
End: 2020-12-29

## 2021-01-06 ENCOUNTER — TELEPHONE (OUTPATIENT)
Dept: CARDIOLOGY CLINIC | Age: 51
End: 2021-01-06

## 2021-01-15 ENCOUNTER — TELEPHONE (OUTPATIENT)
Dept: CARDIOLOGY CLINIC | Age: 51
End: 2021-01-15

## 2021-01-15 NOTE — TELEPHONE ENCOUNTER
LM to r/s maxim appt      make appt w/ lena  F/ u from [facility]  9/20  Not seen in office yet     cx'd multiple OVs

## 2021-01-23 DIAGNOSIS — E78.2 MIXED HYPERLIPIDEMIA: ICD-10-CM

## 2021-01-23 DIAGNOSIS — I10 BENIGN ESSENTIAL HYPERTENSION: ICD-10-CM

## 2021-01-25 RX ORDER — HYDROCHLOROTHIAZIDE 12.5 MG/1
CAPSULE, GELATIN COATED ORAL
Qty: 90 CAPSULE | Refills: 0 | Status: SHIPPED | OUTPATIENT
Start: 2021-01-25 | End: 2021-03-24 | Stop reason: SDUPTHER

## 2021-01-25 RX ORDER — ATORVASTATIN CALCIUM 40 MG/1
TABLET, FILM COATED ORAL
Qty: 90 TABLET | Refills: 0 | Status: SHIPPED | OUTPATIENT
Start: 2021-01-25 | End: 2021-03-24 | Stop reason: SDUPTHER

## 2021-01-25 RX ORDER — POTASSIUM CHLORIDE 750 MG/1
TABLET, EXTENDED RELEASE ORAL
Qty: 90 TABLET | Refills: 0 | Status: SHIPPED | OUTPATIENT
Start: 2021-01-25 | End: 2021-04-26

## 2021-01-25 RX ORDER — LOSARTAN POTASSIUM 100 MG/1
TABLET ORAL
Qty: 90 TABLET | Refills: 0 | Status: SHIPPED | OUTPATIENT
Start: 2021-01-25 | End: 2021-03-24 | Stop reason: SDUPTHER

## 2021-02-02 ENCOUNTER — TELEPHONE (OUTPATIENT)
Dept: INTERNAL MEDICINE CLINIC | Age: 51
End: 2021-02-02

## 2021-02-03 NOTE — TELEPHONE ENCOUNTER
Did not see an Emergency room note? Patient has appointment scheduled with DR. Awa Schilling 2/17/2021. Verbal order DR. Toth add Humalog 10 units with each meal and still use the sliding scale. Left message on patient's voice mail to callback.

## 2021-03-15 DIAGNOSIS — N52.9 ERECTILE DYSFUNCTION, UNSPECIFIED ERECTILE DYSFUNCTION TYPE: ICD-10-CM

## 2021-03-15 RX ORDER — SILDENAFIL 50 MG/1
50 TABLET, FILM COATED ORAL DAILY PRN
Qty: 10 TABLET | Refills: 0 | Status: SHIPPED | OUTPATIENT
Start: 2021-03-15 | End: 2021-03-24 | Stop reason: SDUPTHER

## 2021-03-22 ENCOUNTER — OFFICE VISIT (OUTPATIENT)
Dept: PRIMARY CARE CLINIC | Age: 51
End: 2021-03-22
Payer: COMMERCIAL

## 2021-03-22 ENCOUNTER — HOSPITAL ENCOUNTER (OUTPATIENT)
Age: 51
Setting detail: SPECIMEN
Discharge: HOME | End: 2021-03-22
Payer: COMMERCIAL

## 2021-03-22 VITALS — HEART RATE: 78 BPM | OXYGEN SATURATION: 98 % | TEMPERATURE: 96.8 F

## 2021-03-22 DIAGNOSIS — R05.9 COUGH: ICD-10-CM

## 2021-03-22 DIAGNOSIS — J40 BRONCHITIS: Primary | ICD-10-CM

## 2021-03-22 PROCEDURE — 99213 OFFICE O/P EST LOW 20 MIN: CPT | Performed by: NURSE PRACTITIONER

## 2021-03-22 PROCEDURE — U0002 COVID-19 LAB TEST NON-CDC: HCPCS

## 2021-03-22 RX ORDER — AZITHROMYCIN 250 MG/1
TABLET, FILM COATED ORAL
Qty: 1 PACKET | Refills: 0 | Status: SHIPPED | OUTPATIENT
Start: 2021-03-22 | End: 2021-07-21

## 2021-03-22 RX ORDER — BENZONATATE 100 MG/1
100 CAPSULE ORAL 3 TIMES DAILY PRN
Qty: 20 CAPSULE | Refills: 0 | Status: SHIPPED | OUTPATIENT
Start: 2021-03-22 | End: 2021-07-21

## 2021-03-22 NOTE — PROGRESS NOTES
3/22/21  Davida Organ  1970    FLU/COVID-19 CLINIC EVALUATION    HPI SYMPTOMS:    Employer:    [] Fevers  [] Chills  [x] Cough  [] Coughing up blood  [] Chest Congestion  [] Nasal Congestion  [] Feeling short of breath  [] Sometimes  [] Frequently  [] All the time  [] Chest pain  [] Headaches  []Tolerable  [] Severe  [] Sore throat  [] Muscle aches  [] Nausea  [] Vomiting  []Unable to keep fluids down  [] Diarrhea  []Severe    [] OTHER SYMPTOMS:      Symptom Duration:   [] 1  [] 2   [] 3   [x] 4    [] 5   [] 6   [] 7   [] 8   [] 9   [] 10   [] 11   [] 12   [] 13   [] 14   [] Longer than 14 days    Symptom course:   [] Worsening     [] Stable     [x] Improving    RISK FACTORS:    [] Pregnant or possibly pregnant  [] Age over 61  [x] Diabetes  [] Heart disease  [x] Asthma  [] COPD/Other chronic lung diseases  [] Active Cancer  [] On Chemotherapy  [] Taking oral steroids  [] History Lymphoma/Leukemia  [] Close contact with a lab confirmed COVID-19 patient within 14 days of symptom onset  [] History of travel from affected geographical areas within 14 days of symptom onset       VITALS:  There were no vitals filed for this visit. TESTS:    POCT FLU:  [] Positive     []Negative    ASSESSMENT:    [] Flu  [] Possible COVID-19  [] Strep    PLAN:    [] Discharge home with written instructions for:  [] Flu management  [] Possible COVID-19 infection with self-quarantine and management of symptoms  [] Follow-up with primary care physician or emergency department if worsens  [] Evaluation per physician/NP/PA in clinic  [] Sent to ER       An  electronic signature was used to authenticate this note.      --Josette Sumner MA on 3/22/2021 at 2:50 PM

## 2021-03-22 NOTE — PATIENT INSTRUCTIONS
Your COVID 19 test can take 3-5 days for the results to come back. We ask that you make a Mychart page and view your test results this way. You will need to Self quarantine until you know your results. Increase fluids and rest  Saline nasal spray as needed for nasal congestion  Warm salt gargles as needed for throat discomfort  Monitor temperature twice a day  Tylenol as needed for fevers and/or discomfort. Big deep breaths periodically throughout the day  Regular Mucinex over the counter as needed for chest congestion  If symptoms worsen -Go to the ER. Follow up with your primary care provider      To Whom it May Concern:    Leena Gray was tested for COVID-19 3/22/2021. He/she must stay home until test results are back. If test is positive, he/she must quarantine for a total of 10 days starting from day one of symptom onset. He/she must also be fever-free for 24 hours at that time, and also have improvement in symptoms. We do not recommend retesting as patients may continue to test positive for months even though no longer contagious. It is suggested you call [facility] ([facility]) or [address] [place] [address] with any questions regarding quarantine timeframe/return to work/school details.

## 2021-03-23 LAB
SARS-COV-2: NOT DETECTED
SOURCE: NORMAL

## 2021-03-24 ENCOUNTER — OFFICE VISIT (OUTPATIENT)
Dept: INTERNAL MEDICINE CLINIC | Age: 51
End: 2021-03-24
Payer: COMMERCIAL

## 2021-03-24 VITALS
WEIGHT: 215 LBS | HEIGHT: 73 IN | OXYGEN SATURATION: 97 % | HEART RATE: 101 BPM | TEMPERATURE: 96.9 F | BODY MASS INDEX: 28.49 KG/M2

## 2021-03-24 DIAGNOSIS — Z11.4 ENCOUNTER FOR SCREENING FOR HIV: ICD-10-CM

## 2021-03-24 DIAGNOSIS — Z11.59 NEED FOR HEPATITIS C SCREENING TEST: ICD-10-CM

## 2021-03-24 DIAGNOSIS — Z79.4 TYPE 2 DIABETES MELLITUS WITH DIABETIC POLYNEUROPATHY, WITH LONG-TERM CURRENT USE OF INSULIN (HCC): Primary | ICD-10-CM

## 2021-03-24 DIAGNOSIS — N52.9 ERECTILE DYSFUNCTION, UNSPECIFIED ERECTILE DYSFUNCTION TYPE: ICD-10-CM

## 2021-03-24 DIAGNOSIS — M54.9 DORSALGIA: ICD-10-CM

## 2021-03-24 DIAGNOSIS — E78.2 MIXED HYPERLIPIDEMIA: ICD-10-CM

## 2021-03-24 DIAGNOSIS — G62.9 NEUROPATHY: ICD-10-CM

## 2021-03-24 DIAGNOSIS — E11.42 TYPE 2 DIABETES MELLITUS WITH DIABETIC POLYNEUROPATHY, WITH LONG-TERM CURRENT USE OF INSULIN (HCC): Primary | ICD-10-CM

## 2021-03-24 DIAGNOSIS — K76.0 FATTY LIVER: ICD-10-CM

## 2021-03-24 DIAGNOSIS — I10 BENIGN ESSENTIAL HYPERTENSION: ICD-10-CM

## 2021-03-24 LAB
CHP ED QC CHECK: NORMAL
GLUCOSE BLD-MCNC: 293 MG/DL

## 2021-03-24 PROCEDURE — 82962 GLUCOSE BLOOD TEST: CPT | Performed by: INTERNAL MEDICINE

## 2021-03-24 PROCEDURE — 99214 OFFICE O/P EST MOD 30 MIN: CPT | Performed by: INTERNAL MEDICINE

## 2021-03-24 RX ORDER — ATORVASTATIN CALCIUM 40 MG/1
40 TABLET, FILM COATED ORAL DAILY
Qty: 90 TABLET | Refills: 1 | Status: SHIPPED | OUTPATIENT
Start: 2021-03-24 | End: 2021-10-20 | Stop reason: SDUPTHER

## 2021-03-24 RX ORDER — HYDROCHLOROTHIAZIDE 12.5 MG/1
12.5 CAPSULE, GELATIN COATED ORAL EVERY MORNING
Qty: 90 CAPSULE | Refills: 1 | Status: SHIPPED | OUTPATIENT
Start: 2021-03-24 | End: 2021-10-20 | Stop reason: SDUPTHER

## 2021-03-24 RX ORDER — AMITRIPTYLINE HYDROCHLORIDE 50 MG/1
TABLET, FILM COATED ORAL
COMMUNITY
Start: 2021-03-07 | End: 2021-08-10

## 2021-03-24 RX ORDER — TIZANIDINE 4 MG/1
4 TABLET ORAL DAILY
Qty: 30 TABLET | Refills: 3 | Status: SHIPPED | OUTPATIENT
Start: 2021-03-24 | End: 2021-09-15

## 2021-03-24 RX ORDER — INSULIN GLARGINE 100 [IU]/ML
30 INJECTION, SOLUTION SUBCUTANEOUS 2 TIMES DAILY
Qty: 6 VIAL | Refills: 3 | Status: SHIPPED | OUTPATIENT
Start: 2021-03-24 | End: 2021-10-20

## 2021-03-24 RX ORDER — INSULIN LISPRO 100 [IU]/ML
INJECTION, SOLUTION INTRAVENOUS; SUBCUTANEOUS
Qty: 5 PEN | Refills: 5 | Status: SHIPPED | OUTPATIENT
Start: 2021-03-24 | End: 2021-10-20 | Stop reason: SDUPTHER

## 2021-03-24 RX ORDER — ERTUGLIFLOZIN 5 MG/1
5 TABLET, FILM COATED ORAL DAILY
Qty: 90 TABLET | Refills: 1 | Status: CANCELLED | OUTPATIENT
Start: 2021-03-24

## 2021-03-24 RX ORDER — ERTUGLIFLOZIN 15 MG/1
1 TABLET, FILM COATED ORAL DAILY
Qty: 30 TABLET | Refills: 3 | Status: SHIPPED | OUTPATIENT
Start: 2021-03-24 | End: 2021-10-25

## 2021-03-24 RX ORDER — TIZANIDINE 4 MG/1
TABLET ORAL
COMMUNITY
Start: 2021-03-07 | End: 2021-03-24 | Stop reason: SDUPTHER

## 2021-03-24 RX ORDER — LOSARTAN POTASSIUM 100 MG/1
100 TABLET ORAL DAILY
Qty: 90 TABLET | Refills: 1 | Status: SHIPPED | OUTPATIENT
Start: 2021-03-24 | End: 2021-10-20 | Stop reason: SDUPTHER

## 2021-03-24 RX ORDER — SILDENAFIL 50 MG/1
50 TABLET, FILM COATED ORAL DAILY PRN
Qty: 10 TABLET | Refills: 0 | Status: SHIPPED | OUTPATIENT
Start: 2021-03-24 | End: 2021-04-05

## 2021-03-24 RX ORDER — GABAPENTIN 300 MG/1
300 CAPSULE ORAL 3 TIMES DAILY
Qty: 270 CAPSULE | Refills: 1 | Status: SHIPPED | OUTPATIENT
Start: 2021-03-24 | End: 2021-07-21 | Stop reason: SDUPTHER

## 2021-03-24 RX ORDER — AMITRIPTYLINE HYDROCHLORIDE 50 MG/1
50 TABLET, FILM COATED ORAL NIGHTLY
Qty: 30 TABLET | Refills: 1 | Status: SHIPPED | OUTPATIENT
Start: 2021-03-24 | End: 2021-07-21

## 2021-03-24 ASSESSMENT — PATIENT HEALTH QUESTIONNAIRE - PHQ9
SUM OF ALL RESPONSES TO PHQ QUESTIONS 1-9: 0
SUM OF ALL RESPONSES TO PHQ9 QUESTIONS 1 & 2: 0
SUM OF ALL RESPONSES TO PHQ QUESTIONS 1-9: 0
SUM OF ALL RESPONSES TO PHQ QUESTIONS 1-9: 0

## 2021-03-24 NOTE — PROGRESS NOTES
Name: Carla Simon  [id number]  Age: 52 y.o. YOB: 1970  Sex: male    CHIEF COMPLAINT:    Chief Complaint   Patient presents with    Diabetes       HISTORY OF PRESENT ILLNESS:     This is a pleasant  52 y.o. male  is seen today for management of chronic medical problems and medications refills. Previous records reviewed . His sugars remain around 300+. He did see Dr. Chelly Lomax and he recommended him to take Basaglar 30 units twice a day and Humalog subcu as needed low scale along with Steglatro and Glucophage and Januvia. Patient claims that he could not afford Januvia or [place] Baseman and is not taking it. He claims his sugars remain high. Complains of paresthesia of the feet. Takes amitriptyline which helped his pain and helps him sleep. He is seeing pain management doctor. He has chronic low back pain. Doing OK . Denies CP or SOB. No fever , sore throat or cough or congestion. Denies any abdominal pain. Appetite OK. Bowels moving [place]. No urinary symptoms. Has chronic low back pain. Hearing is ok. Vision Ok with glasses. Denies  any significant skin lesions. Denies any significant depression or anxiety. No other complaints.       Past Medical History:    Patient Active Problem List   Diagnosis    Ventral hernia without obstruction or gangrene    Benign essential hypertension    Mixed hyperlipidemia    Gastroesophageal reflux disease without esophagitis    Dupuytren's contracture    Chronic skin ulcer, limited to breakdown of skin (HCC)    Chronic bilateral low back pain without sciatica    Type 2 diabetes mellitus with diabetic polyneuropathy, with long-term current use of insulin (HCC)    Muscle cramping    Erectile dysfunction    Chest pain    Laboratory confirmed diagnosis of COVID-19    Sigrid Parkinson White pattern seen on electrocardiogram    Fatty liver    Neuropathy    Dorsalgia    Need for hepatitis C screening test    Encounter for screening for HIV Past Surgical History:        Procedure Laterality Date    ELBOW SURGERY Right     HERNIA REPAIR      SPINE SURGERY  1990, 2010    Fusion of L3, L4, L5    UMBILICAL HERNIA REPAIR  11/4/15    repair incarcerated supra umbilical hernia       Social History:   Social History     Tobacco Use    Smoking status: Never Smoker    Smokeless tobacco: Never Used   Substance Use Topics    Alcohol use: Not Currently     Alcohol/week: 2.0 standard drinks     Types: 2 Standard drinks or equivalent per week       Family History:       Problem Relation Age of Onset    Breast Cancer Mother        Allergies:  Bee venom and Hornet venom    Current Medications :      Prior to Admission medications    Medication Sig Start Date End Date Taking? Authorizing Provider   amitriptyline (ELAVIL) 50 MG tablet TAKE 1 TABLET BY MOUTH AT BEDTIME 3/7/21  Yes Historical Provider, MD   atorvastatin (LIPITOR) 40 MG tablet Take 1 tablet by mouth daily 3/24/21  Yes Alta Paez MD   gabapentin (NEURONTIN) 300 MG capsule Take 1 capsule by mouth 3 times daily for 90 days.  Intended supply: 90 days 3/24/21 6/22/21 Yes Alta Paez MD   hydroCHLOROthiazide (MICROZIDE) 12.5 MG capsule Take 1 capsule by mouth every morning 3/24/21  Yes Alta Paez MD   insulin lispro, 1 Unit Dial, (HUMALOG KWIKPEN) 100 UNIT/ML SOPN TID with meals Glucose [id number] No Insulin 140-199 2 Units 200-249 4 Units 250-299 6 Units 300-349 8 Units 350-400 10 Units over 400 12 Units 3/24/21  Yes Alta Paez MD   losartan (COZAAR) 100 MG tablet Take 1 tablet by mouth daily 3/24/21  Yes Alta Paez MD   metFORMIN (GLUCOPHAGE) 1000 MG tablet Take 1 tablet by mouth 2 times daily (with meals) 3/24/21  Yes Alta Paez MD   sildenafil (VIAGRA) 50 MG tablet Take 1 tablet by mouth daily as needed for Erectile Dysfunction 3/24/21  Yes Alta Paez MD   Ertugliflozin L-PyroglutamicAc [facility]) 15 MG TABS Take 1 tablet by mouth daily 3/24/21  Yes Alta Paez MD   tiZANidine (ZANAFLEX) 4 MG tablet Take 1 tablet by mouth daily 3/24/21  Yes Suzy Hope MD   amitriptyline (ELAVIL) 50 MG tablet Take 1 tablet by mouth nightly 3/24/21  Yes Suzy Hope MD   insulin glargine (LANTUS) 100 UNIT/ML injection vial Inject 30 Units into the skin 2 times daily 3/24/21  Yes Suzy Hope MD   azithromycin (ZITHROMAX) 250 MG tablet Take 2 tabs (500 mg) on Day 1, and take 1 tab (250 mg) on days 2 through 5. 3/22/21  Yes EDGAR Hopper CNP   benzonatate (TESSALON PERLES) 100 MG capsule Take 1 capsule by mouth 3 times daily as needed for Cough 3/22/21  Yes EDGAR Hopper CNP   potassium chloride (KLOR-CON M) 10 MEQ extended release tablet TAKE 1 TABLET BY MOUTH EVERY DAY   Patient taking differently: OTC 1/25/21  Yes Suzy Hope MD   Ertugliflozin L-PyroglutamicAc (STEGLATRO) 5 MG TABS Take 5 mg by mouth daily   Yes Historical Provider, MD   vitamin B-12 (CYANOCOBALAMIN) 1000 MCG tablet Take 1 tablet by mouth daily  Patient taking differently: Take 1,000 mcg by mouth daily OTC 6/30/20  Yes Matthew Lee DO   Insulin Pen Needle (PEN NEEDLES 3/16\") 31G X 5 MM MISC 1 each by Does not apply route daily 6/30/20  Yes Matthew Lee DO   Cholecalciferol (VITAMIN D3) 50 MCG (2000 UT) TABS Take one tablet by mouth once a day  Patient taking differently: Take one tablet by mouth once a day OTC 6/30/20  Yes Matthew Lee DO       LAB DATA: Reviewed. REVIEW OF SYSTEMS:   see HPI/ Comprehensive review of systems negative except for the ones mentioned in HPI. PHYSICAL EXAMINATION:   Pulse 101   Temp 96.9 °F (36.1 °C)   Ht 6' 1\" (1.854 m)   Wt 215 lb (97.5 kg)   SpO2 97%   BMI 28.37 kg/m²      GENERAL APPEARANCE:    Alert, oriented x 3, well developed, cooperative, not in any distress, appears stated age. HEAD:   Normocephalic, atraumatic   EYES:   PERRLA, EOMI, lids normal, conjuctivea clear, sclera anicteric.    NECK:    Supple, symmetrical,  trachea midline, no thyromegaly, no JVD, no lymphadenopathy. LUNGS:    Clear to auscultation bilaterally, respirations unlabored, accessory muscles are not used. HEART:     Regular rate and rhythm, S1 and S2 normal, no murmur, rub or gallop. PMI in MCL. ABDOMEN:    Soft, non-tender, bowel sounds are normoactive, no masses, no hepatospleenomegaly. EXTREMITY:   no bipedal edema  NEURO:  Alert, oriented to person, place and time. Paresthesia of of of his feet. Musculoskeletal:         No kyphosis or scoliosis, mild tenderness in his lower back. Skin:                            Warm and dry. No rash or obvious suspicious lesions. PSYCH:  Mood euthymic, insight and judgement good. ASSESSMENT/PLAN:    1. Type 2 diabetes mellitus with diabetic polyneuropathy, with long-term current use of insulin ([facility])  Advised strict diet and exercise. Advised to restart taking insulin. His insurance covers Lantus and not Basaglar as such we will switch it to Lantus. Also will increase Steglatro to 15 mg daily and DC Januvia as it is expensive for him. Advised to continue to follow with his endocrinologist.  - POCT Glucose  - gabapentin (NEURONTIN) 300 MG capsule; Take 1 capsule by mouth 3 times daily for 90 days. Intended supply: 90 days  Dispense: 270 capsule; Refill: 1  - insulin lispro, 1 Unit Dial, (HUMALOG KWIKPEN) 100 UNIT/ML SOPN; TID with meals Glucose [id number] No Insulin 140-199 2 Units 200-249 4 Units 250-299 6 Units 300-349 8 Units 350-400 10 Units over 400 12 Units  Dispense: 5 pen; Refill: 5  - metFORMIN (GLUCOPHAGE) 1000 MG tablet; Take 1 tablet by mouth 2 times daily (with meals)  Dispense: 180 tablet; Refill: 1  - insulin glargine (LANTUS) 100 UNIT/ML injection vial; Inject 30 Units into the skin 2 times daily  Dispense: 6 vial; Refill: 3  - Hemoglobin A1C; Future    2. Mixed hyperlipidemia  Patient is taking cholesterol medications regularly. Denies any side effects. Diet and exercise advised.   - atorvastatin (LIPITOR) 40 MG tablet; Take 1 tablet by mouth daily  Dispense: 90 tablet; Refill: 1  - Lipid, Fasting; Future    3. Benign essential hypertension  Continue current medications, denies side effect with medicationss. Low salt diet and exercise advised. - hydroCHLOROthiazide (MICROZIDE) 12.5 MG capsule; Take 1 capsule by mouth every morning  Dispense: 90 capsule; Refill: 1  - losartan (COZAAR) 100 MG tablet; Take 1 tablet by mouth daily  Dispense: 90 tablet; Refill: 1  - Comprehensive Metabolic Panel; Future  - CBC Auto Differential; Future    4. Erectile dysfunction, unspecified erectile dysfunction type  - sildenafil (VIAGRA) 50 MG tablet; Take 1 tablet by mouth daily as needed for Erectile Dysfunction  Dispense: 10 tablet; Refill: 0    5. Fatty liver  Advised low-fat diet and exercise. Recheck labs. 6. Neuropathy  Continue amitriptyline. 7. Dorsalgia  Continue Zanaflex and Tylenol. Advised to follow with his pain management doctor whom he was following before. 8. Need for hepatitis C screening test  - Hepatitis C Antibody; Future    9. Encounter for screening for HIV  - HIV-1 AND HIV-2 ANTIBODIES; Future    I have recommended that the patient follow CDC guidelines for prevention of COVID-19 infection. I also discussed Coronavirus precaution including wearing face mask, handwashing practice, wiping items touched in public such as gas pumps, door handles, shopping carts, etc. Also Self monitoring for infection - fever, chills, cough, SOB. Should he/she develop symptoms he/she should call office or go to ER for further instructions. Care discussed with patient. Questions answered and patient verbalizes understanding and agrees with plan. Medications reviewed and reconciled. Continue current medications. Appropriate prescriptions are ordered. Risks and benefits of meds are discussed. After visit summary provided. Advised to call for any problems, questions, or concerns.    If symptoms worsen

## 2021-04-03 DIAGNOSIS — N52.9 ERECTILE DYSFUNCTION, UNSPECIFIED ERECTILE DYSFUNCTION TYPE: ICD-10-CM

## 2021-04-05 RX ORDER — SILDENAFIL 50 MG/1
50 TABLET, FILM COATED ORAL DAILY PRN
Qty: 10 TABLET | Refills: 0 | Status: SHIPPED | OUTPATIENT
Start: 2021-04-05 | End: 2021-05-24

## 2021-04-23 PROBLEM — Z11.4 ENCOUNTER FOR SCREENING FOR HIV: Status: RESOLVED | Noted: 2021-03-24 | Resolved: 2021-04-23

## 2021-04-23 PROBLEM — Z11.59 NEED FOR HEPATITIS C SCREENING TEST: Status: RESOLVED | Noted: 2021-03-24 | Resolved: 2021-04-23

## 2021-04-26 DIAGNOSIS — I10 BENIGN ESSENTIAL HYPERTENSION: ICD-10-CM

## 2021-04-26 RX ORDER — POTASSIUM CHLORIDE 750 MG/1
10 TABLET, EXTENDED RELEASE ORAL DAILY
Qty: 90 TABLET | Refills: 1 | Status: SHIPPED | OUTPATIENT
Start: 2021-04-26 | End: 2021-10-20 | Stop reason: SDUPTHER

## 2021-05-24 DIAGNOSIS — N52.9 ERECTILE DYSFUNCTION, UNSPECIFIED ERECTILE DYSFUNCTION TYPE: ICD-10-CM

## 2021-05-24 RX ORDER — SILDENAFIL 50 MG/1
TABLET, FILM COATED ORAL
Qty: 10 TABLET | Refills: 0 | Status: SHIPPED | OUTPATIENT
Start: 2021-05-24 | End: 2021-07-14 | Stop reason: SDUPTHER

## 2021-07-14 DIAGNOSIS — N52.9 ERECTILE DYSFUNCTION, UNSPECIFIED ERECTILE DYSFUNCTION TYPE: ICD-10-CM

## 2021-07-14 RX ORDER — SILDENAFIL 50 MG/1
50 TABLET, FILM COATED ORAL PRN
Qty: 10 TABLET | Refills: 3 | Status: SHIPPED | OUTPATIENT
Start: 2021-07-14 | End: 2022-01-20 | Stop reason: SDUPTHER

## 2021-07-21 ENCOUNTER — OFFICE VISIT (OUTPATIENT)
Dept: INTERNAL MEDICINE CLINIC | Age: 51
End: 2021-07-21
Payer: COMMERCIAL

## 2021-07-21 VITALS
BODY MASS INDEX: 28.23 KG/M2 | WEIGHT: 214 LBS | DIASTOLIC BLOOD PRESSURE: 64 MMHG | HEART RATE: 81 BPM | OXYGEN SATURATION: 99 % | SYSTOLIC BLOOD PRESSURE: 132 MMHG

## 2021-07-21 DIAGNOSIS — G89.29 CHRONIC PAIN OF LEFT KNEE: ICD-10-CM

## 2021-07-21 DIAGNOSIS — I10 ESSENTIAL HYPERTENSION: Primary | ICD-10-CM

## 2021-07-21 DIAGNOSIS — M79.672 PAIN IN BOTH FEET: ICD-10-CM

## 2021-07-21 DIAGNOSIS — K21.9 GASTROESOPHAGEAL REFLUX DISEASE WITHOUT ESOPHAGITIS: ICD-10-CM

## 2021-07-21 DIAGNOSIS — E11.42 TYPE 2 DIABETES MELLITUS WITH DIABETIC POLYNEUROPATHY, WITH LONG-TERM CURRENT USE OF INSULIN (HCC): ICD-10-CM

## 2021-07-21 DIAGNOSIS — N52.9 ERECTILE DYSFUNCTION, UNSPECIFIED ERECTILE DYSFUNCTION TYPE: ICD-10-CM

## 2021-07-21 DIAGNOSIS — E78.00 PURE HYPERCHOLESTEROLEMIA: ICD-10-CM

## 2021-07-21 DIAGNOSIS — M79.671 PAIN IN BOTH FEET: ICD-10-CM

## 2021-07-21 DIAGNOSIS — Z12.11 SCREENING FOR COLON CANCER: ICD-10-CM

## 2021-07-21 DIAGNOSIS — M25.562 CHRONIC PAIN OF LEFT KNEE: ICD-10-CM

## 2021-07-21 DIAGNOSIS — Z79.4 TYPE 2 DIABETES MELLITUS WITH DIABETIC POLYNEUROPATHY, WITH LONG-TERM CURRENT USE OF INSULIN (HCC): ICD-10-CM

## 2021-07-21 DIAGNOSIS — K40.90 LEFT GROIN HERNIA: ICD-10-CM

## 2021-07-21 DIAGNOSIS — M54.9 DORSALGIA: ICD-10-CM

## 2021-07-21 LAB
A/G RATIO: 1.8 (ref 1.1–2.2)
ALBUMIN SERPL-MCNC: 5 G/DL (ref 3.4–5)
ALP BLD-CCNC: 98 U/L (ref 40–129)
ALT SERPL-CCNC: 39 U/L (ref 10–40)
ANION GAP SERPL CALCULATED.3IONS-SCNC: 19 MMOL/L (ref 3–16)
AST SERPL-CCNC: 32 U/L (ref 15–37)
BASOPHILS ABSOLUTE: 0.1 K/UL (ref 0–0.2)
BASOPHILS RELATIVE PERCENT: 1 %
BILIRUB SERPL-MCNC: 0.6 MG/DL (ref 0–1)
BUN BLDV-MCNC: 26 MG/DL (ref 7–20)
CALCIUM SERPL-MCNC: 10.2 MG/DL (ref 8.3–10.6)
CHLORIDE BLD-SCNC: 98 MMOL/L (ref 99–110)
CHOLESTEROL, FASTING: 158 MG/DL (ref 0–199)
CHP ED QC CHECK: NORMAL
CO2: 23 MMOL/L (ref 21–32)
CREAT SERPL-MCNC: 0.8 MG/DL (ref 0.9–1.3)
EOSINOPHILS ABSOLUTE: 0.2 K/UL (ref 0–0.6)
EOSINOPHILS RELATIVE PERCENT: 4 %
GFR AFRICAN AMERICAN: >60
GFR NON-AFRICAN AMERICAN: >60
GLOBULIN: 2.8 G/DL
GLUCOSE BLD-MCNC: 274 MG/DL
GLUCOSE BLD-MCNC: 288 MG/DL (ref 70–99)
HBA1C MFR BLD: 12.8 %
HCT VFR BLD CALC: 53.5 % (ref 40.5–52.5)
HDLC SERPL-MCNC: 63 MG/DL (ref 40–60)
HEMOGLOBIN: 18.2 G/DL (ref 13.5–17.5)
LDL CHOLESTEROL CALCULATED: 75 MG/DL
LYMPHOCYTES ABSOLUTE: 1.5 K/UL (ref 1–5.1)
LYMPHOCYTES RELATIVE PERCENT: 29.3 %
MCH RBC QN AUTO: 29.6 PG (ref 26–34)
MCHC RBC AUTO-ENTMCNC: 34 G/DL (ref 31–36)
MCV RBC AUTO: 86.9 FL (ref 80–100)
MONOCYTES ABSOLUTE: 0.5 K/UL (ref 0–1.3)
MONOCYTES RELATIVE PERCENT: 9.4 %
NEUTROPHILS ABSOLUTE: 2.9 K/UL (ref 1.7–7.7)
NEUTROPHILS RELATIVE PERCENT: 56.3 %
PDW BLD-RTO: 12.9 % (ref 12.4–15.4)
PLATELET # BLD: 221 K/UL (ref 135–450)
PMV BLD AUTO: 9.5 FL (ref 5–10.5)
POTASSIUM SERPL-SCNC: 4.7 MMOL/L (ref 3.5–5.1)
RBC # BLD: 6.15 M/UL (ref 4.2–5.9)
SODIUM BLD-SCNC: 140 MMOL/L (ref 136–145)
TOTAL PROTEIN: 7.8 G/DL (ref 6.4–8.2)
TRIGLYCERIDE, FASTING: 100 MG/DL (ref 0–150)
VLDLC SERPL CALC-MCNC: 20 MG/DL
WBC # BLD: 5.1 K/UL (ref 4–11)

## 2021-07-21 PROCEDURE — 83036 HEMOGLOBIN GLYCOSYLATED A1C: CPT | Performed by: INTERNAL MEDICINE

## 2021-07-21 PROCEDURE — 82962 GLUCOSE BLOOD TEST: CPT | Performed by: INTERNAL MEDICINE

## 2021-07-21 PROCEDURE — 36415 COLL VENOUS BLD VENIPUNCTURE: CPT | Performed by: INTERNAL MEDICINE

## 2021-07-21 PROCEDURE — 99214 OFFICE O/P EST MOD 30 MIN: CPT | Performed by: INTERNAL MEDICINE

## 2021-07-21 RX ORDER — GABAPENTIN 400 MG/1
400 CAPSULE ORAL 3 TIMES DAILY
Qty: 270 CAPSULE | Refills: 1 | Status: SHIPPED | OUTPATIENT
Start: 2021-07-21 | End: 2021-10-20 | Stop reason: SDUPTHER

## 2021-07-21 RX ORDER — MELOXICAM 7.5 MG/1
7.5 TABLET ORAL DAILY
Qty: 30 TABLET | Refills: 3 | Status: SHIPPED | OUTPATIENT
Start: 2021-07-21 | End: 2021-10-20 | Stop reason: SDUPTHER

## 2021-07-21 RX ORDER — INSULIN DEGLUDEC INJECTION 100 U/ML
30 INJECTION, SOLUTION SUBCUTANEOUS NIGHTLY
Qty: 3 PEN | Refills: 5 | Status: SHIPPED | OUTPATIENT
Start: 2021-07-21 | End: 2021-10-20 | Stop reason: SDUPTHER

## 2021-07-21 SDOH — ECONOMIC STABILITY: FOOD INSECURITY: WITHIN THE PAST 12 MONTHS, THE FOOD YOU BOUGHT JUST DIDN'T LAST AND YOU DIDN'T HAVE MONEY TO GET MORE.: NEVER TRUE

## 2021-07-21 SDOH — ECONOMIC STABILITY: FOOD INSECURITY: WITHIN THE PAST 12 MONTHS, YOU WORRIED THAT YOUR FOOD WOULD RUN OUT BEFORE YOU GOT MONEY TO BUY MORE.: NEVER TRUE

## 2021-07-21 ASSESSMENT — SOCIAL DETERMINANTS OF HEALTH (SDOH): HOW HARD IS IT FOR YOU TO PAY FOR THE VERY BASICS LIKE FOOD, HOUSING, MEDICAL CARE, AND HEATING?: NOT HARD AT ALL

## 2021-07-21 NOTE — LETTER
[address] [place] [address] Internal and Family Medicine  [address] [place] [address]  Phone: [phone]  Fax: [phone]    Paula Verdugo MD        July 21, 2021     Patient: Brittani Loo   YOB: 1970   Date of Visit: 7/21/2021       To Whom It May Concern: It is my medical opinion that [employer] Odell only work during the hours of 9:30am-6pm Monday through Friday. If you have any questions or concerns, please don't hesitate to call.     Sincerely,        Paula Verdugo MD

## 2021-07-21 NOTE — PROGRESS NOTES
Name: Dell Rodriguez  [id number]  Age: 52 y.o. YOB: 1970  Sex: male    CHIEF COMPLAINT:    Chief Complaint   Patient presents with    Diabetes     discuss meds    Foot Pain     both feet    Knee Pain     right knee    Hernia     possible: left side    Back Pain     left side lower back       HISTORY OF PRESENT ILLNESS:     This is a pleasant  52 y.o. male  is seen today for management of chronic medical problems and medications refills. Previous records reviewed . Patient has multiple complaints. C/O pain in his feet along with numbness and tingling and can hardly walk in the mornings and also has difficulty walking during the day. C/O pain in right knee and it pops out at times. C/O left groin pain  and a lump and he thinks he has developed hernia. He was pulling a skid and felt a pop 10 days ago. Patient claims that his sugars are still running high  But better than before @ 200-250. He did see Dr. Rodrick Ontiveros once but did not keep follow up appointment because of job schedule. He is taking all his medications regularly. But not taking Lantus insulin as he could not afford it. Denies CP or SOB. No fever , sore throat or cough or congestion. Denies any abdominal pain. Appetite OK. Bowels moving [id number] [place] [address] No urinary symptoms. Hearing is ok. Vision Ok with glasses. .. He sees Dr. Velasquez Alonso, an optometrist and she told him that there is small hemorrhage in each eye. He does not want to see an ophthalmologist at this time and going to follow with Dr. Em Carpenter. Denies  any significant skin lesions. Denies any significant depression or anxiety. He has chronic erectile dysfunction and Viagra helps. No other complaints.       Past Medical History:    Patient Active Problem List   Diagnosis    Ventral hernia without obstruction or gangrene    Essential hypertension    Gastroesophageal reflux disease without esophagitis    Dupuytren's contracture    Chronic skin ulcer, limited to breakdown of skin ([facility] [address]    Chronic bilateral low back pain without sciatica    Type 2 diabetes mellitus with diabetic polyneuropathy, with long-term current use of insulin (HCC)    Muscle cramping    Erectile dysfunction    Chest pain    Laboratory confirmed diagnosis of COVID-19    Sigrid Parkinson White pattern seen on electrocardiogram    Fatty liver    Neuropathy    Dorsalgia    Pure hypercholesterolemia        Past Surgical History:        Procedure Laterality Date    ELBOW SURGERY Right     HERNIA REPAIR      SPINE SURGERY  1990, 2010    Fusion of L3, L4, L5    UMBILICAL HERNIA REPAIR  11/4/15    repair incarcerated supra umbilical hernia       Social History:   Social History     Tobacco Use    Smoking status: Never Smoker    Smokeless tobacco: Never Used   Substance Use Topics    Alcohol use: Not Currently     Alcohol/week: 2.0 standard drinks     Types: 2 Standard drinks or equivalent per week       Family History:       Problem Relation Age of Onset    Breast Cancer Mother        Allergies:  Bee venom and Hornet venom    Current Medications :      Prior to Admission medications    Medication Sig Start Date End Date Taking? Authorizing Provider   Insulin Degludec (TRESIBA FLEXTOUCH) 100 UNIT/ML SOPN Inject 30 Units into the skin nightly 7/21/21  Yes Guille Harper MD   meloxicam (MOBIC) 7.5 MG tablet Take 1 tablet by mouth daily 7/21/21  Yes Guille Harper MD   gabapentin (NEURONTIN) 400 MG capsule Take 1 capsule by mouth 3 times daily for 90 days.  Intended supply: 90 days 7/21/21 10/19/21 Yes Guille Harper MD   sildenafil (VIAGRA) 50 MG tablet Take 1 tablet by mouth as needed for Erectile Dysfunction 7/14/21  Yes Guille Harper MD   potassium chloride (KLOR-CON M) 10 MEQ extended release tablet Take 1 tablet by mouth daily  Patient taking differently: Take 10 mEq by mouth daily OTC 4/26/21  Yes Guille Harper MD   amitriptyline (ELAVIL) 50 MG tablet TAKE 1 TABLET BY MOUTH AT BEDTIME 3/7/21  Yes Historical Provider, MD   atorvastatin (LIPITOR) 40 MG tablet Take 1 tablet by mouth daily 3/24/21  Yes Adryan Alicia MD   hydroCHLOROthiazide (MICROZIDE) 12.5 MG capsule Take 1 capsule by mouth every morning 3/24/21  Yes Adryan Alicia MD   insulin lispro, 1 Unit Dial, (HUMALOG KWIKPEN) 100 UNIT/ML SOPN TID with meals Glucose [id number] No Insulin 140-199 2 Units 200-249 4 Units 250-299 6 Units 300-349 8 Units 350-400 10 Units over 400 12 Units 3/24/21  Yes Adryan Alicia MD   losartan (COZAAR) 100 MG tablet Take 1 tablet by mouth daily 3/24/21  Yes Adryan Alicia MD   metFORMIN (GLUCOPHAGE) 1000 MG tablet Take 1 tablet by mouth 2 times daily (with meals) 3/24/21  Yes Adryan Alicia MD   Ertugliflozin L-PyroglutamicAc [facility]) 15 MG TABS Take 1 tablet by mouth daily 3/24/21  Yes Adryan Alicia MD   tiZANidine (ZANAFLEX) 4 MG tablet Take 1 tablet by mouth daily 3/24/21  Yes Adryan Alicia MD   vitamin B-12 (CYANOCOBALAMIN) 1000 MCG tablet Take 1 tablet by mouth daily  Patient taking differently: Take 1,000 mcg by mouth daily OTC 6/30/20  Yes Matthew Pan DO   Insulin Pen Needle (PEN NEEDLES 3/16\") 31G X 5 MM MISC 1 each by Does not apply route daily 6/30/20  Yes Matthew Pan DO   Cholecalciferol (VITAMIN D3) 50 MCG (2000 UT) TABS Take one tablet by mouth once a day  Patient taking differently: Take one tablet by mouth once a day OTC 6/30/20  Yes Matthew Pan DO   insulin glargine (LANTUS) 100 UNIT/ML injection vial Inject 30 Units into the skin 2 times daily  Patient not taking: Reported on 7/21/2021 3/24/21   Adryan Alicia MD       LAB DATA: Reviewed. REVIEW OF SYSTEMS:   see HPI/ Comprehensive review of systems negative except for the ones mentioned in HPI. PHYSICAL EXAMINATION:   /64   Pulse 81   Wt 214 lb (97.1 kg)   SpO2 99%   BMI 28.23 kg/m²      GENERAL APPEARANCE:    Alert, oriented x 3, well developed, cooperative, not in any distress, appears stated age.   HEAD: Normocephalic, atraumatic   EYES:   PERRLA, EOMI, lids normal, conjuctivea clear, sclera anicteric. NECK:    Supple, symmetrical,  trachea midline, no thyromegaly, no JVD, no lymphadenopathy. LUNGS:    Clear to auscultation bilaterally, respirations unlabored, accessory muscles are not used. HEART:     Regular rate and rhythm, S1 and S2 normal, no murmur, rub or gallop. PMI in MCL. ABDOMEN:    Soft, non-tender, bowel sounds are normoactive, no masses, no hepatospleenomegaly. .  Questionable small mass in the left groin with mild tenderness. EXTREMITY:   no bipedal edema, no ulcers. Tenderness both feet. .  Mild tenderness left knee. NEURO:  Alert, oriented to person, place and time. Paresthesia of both feet. Musculoskeletal:         No kyphosis or scoliosis, no deformity in any extremity noted, muscle strength and tone are normal.  Skin:                            Warm and dry. No rash or obvious suspicious lesions. PSYCH:  Mood euthymic, insight and judgement good. ASSESSMENT/PLAN:    1. Essential hypertension  Stable, Continue current medications, denies side effect with medicationss. Low salt diet and exercise advised. continue Cozaar and hydrochlorothiazide  - Comprehensive Metabolic Panel  - CBC Auto Differential    2. Pure hypercholesterolemia  Patient is taking cholesterol medications regularly. Denies any side effects. Diet and exercise advised. Continue Lipitor  - Lipid, Fasting    3. Type 2 diabetes mellitus with diabetic polyneuropathy, with long-term current use of insulin (HCC)  Will DC Lantus as he is not taking it. Start him on Tresiba 30 units at bedtime and advised to increase by 2 units every 5 days until his morning sugars are around 140 or less. Advised to get a coupon for [facility] and call if it is not covered.   Continue Glucophage, Steglatro and Humalog as needed  - POCT Glucose  - Insulin Degludec (TRESIBA FLEXTOUCH) 100 UNIT/ML SOPN; Inject 30 Units into the skin nightly  Dispense: 3 pen; Refill: 5  - Amb External Referral To Podiatry  - POCT glycosylated hemoglobin (Hb A1C)  - Microalbumin / Creatinine Urine Ratio  Advised to keep following with Dr. Ashutosh Han    4. Dorsalgia  Start him on Mobic and continue Zanaflex and Elavil and Neurontin. 5. Gastroesophageal reflux disease without esophagitis  Not on any medication for gastritis at this time. Denies any symptoms. 6. Erectile dysfunction, unspecified erectile dysfunction type  Continue Viagra as needed    7. Left groin hernia  Referred to Dr. Arielle Diaz MD, [facility]    8. Pain in both feet  Secondary to plantar fasciitis and neuropathy. - Amb External Referral To Podiatry  - meloxicam (MOBIC) 7.5 MG tablet; Take 1 tablet by mouth daily  Dispense: 30 tablet; Refill: 3  Increase Neurontin to 400 mg 3 times daily.  - gabapentin (NEURONTIN) 400 MG capsule; Take 1 capsule by mouth 3 times daily for 90 days. Intended supply: 90 days  Dispense: 270 capsule; Refill: 1    9. Chronic pain of left knee  Start him on Mobic and also can take Tylenol as needed. Refer to Dr. Cano. - External Referral To Orthopedic Surgery  - meloxicam (MOBIC) 7.5 MG tablet; Take 1 tablet by mouth daily  Dispense: 30 tablet; Refill: 3    10. Screening for colon cancer  Referred to Dr. Roz Connors for surveillance colonoscopy. - Tang Lopez MD, Gastroenterology, [place]    Advised to follow COVID-19 precautions    Care discussed with patient. Questions answered and patient verbalizes understanding and agrees with plan. Medications reviewed and reconciled. Continue current medications. Appropriate prescriptions are ordered. Risks and benefits of meds are discussed. After visit summary provided. Advised to call for any problems, questions, or concerns. If symptoms worsen or don't improve as expected, to call us or go to ER.     Follow up as directed, sooner if needed. Return in about 3 months (around 10/21/2021). This dictation was performed with a verbal recognition program and it was checked for errors. It is possible that there are still dictated errors within this office note. Any errors should be brought immediately to my attention for correction. All efforts were made to ensure that this office note is accurate.      Gabriela Marinelli MD MD

## 2021-07-22 LAB
CREATININE URINE: 39.1 MG/DL (ref 39–259)
MICROALBUMIN UR-MCNC: <1.2 MG/DL
MICROALBUMIN/CREAT UR-RTO: NORMAL MG/G (ref 0–30)

## 2021-08-10 RX ORDER — AMITRIPTYLINE HYDROCHLORIDE 50 MG/1
TABLET, FILM COATED ORAL
Qty: 30 TABLET | Refills: 0 | Status: SHIPPED | OUTPATIENT
Start: 2021-08-10 | End: 2021-09-15

## 2021-08-16 NOTE — PROGRESS NOTES
Meg Graham  1970  52 y.o. SUBJECT BRADEN:    HPI Mathew Apley is a 52 y.o. male who presents for left inguinal hernia. Symptoms were first noted 1 month ago. States he was pulling a skid and felt a pop. Pain is intermittent, feels like something is pushing out. Patient has no symptoms of  chronic constipation, chronic cough, difficulty urinating. Patient has previous hx of ventral and umbilical hernia    Past Medical History:   Diagnosis Date    Depression     Hyperlipidemia     Hypertension     Sleep disorder     Ventral hernia without obstruction or gangrene 11/4/2015     Past Surgical History:   Procedure Laterality Date    ELBOW SURGERY Right     HERNIA REPAIR      SPINE SURGERY  1990, 2010    Fusion of L3, L4, L5    UMBILICAL HERNIA REPAIR  11/4/15    repair incarcerated supra umbilical hernia     Current Outpatient Medications   Medication Sig Dispense Refill    amitriptyline (ELAVIL) 50 MG tablet TAKE 1 TABLET BY MOUTH nightly 30 tablet 0    Insulin Degludec (TRESIBA FLEXTOUCH) 100 UNIT/ML SOPN Inject 30 Units into the skin nightly 3 pen 5    meloxicam (MOBIC) 7.5 MG tablet Take 1 tablet by mouth daily 30 tablet 3    gabapentin (NEURONTIN) 400 MG capsule Take 1 capsule by mouth 3 times daily for 90 days.  Intended supply: 90 days 270 capsule 1    sildenafil (VIAGRA) 50 MG tablet Take 1 tablet by mouth as needed for Erectile Dysfunction 10 tablet 3    potassium chloride (KLOR-CON M) 10 MEQ extended release tablet Take 1 tablet by mouth daily (Patient taking differently: Take 10 mEq by mouth daily OTC) 90 tablet 1    atorvastatin (LIPITOR) 40 MG tablet Take 1 tablet by mouth daily 90 tablet 1    hydroCHLOROthiazide (MICROZIDE) 12.5 MG capsule Take 1 capsule by mouth every morning 90 capsule 1    insulin lispro, 1 Unit Dial, (HUMALOG KWIKPEN) 100 UNIT/ML SOPN TID with meals Glucose [id number] No Insulin 140-199 2 Units 200-249 4 Units 250-299 6 Units 300-349 8 Units 350-400 10 Units over 400 12 Units 5 pen 5    losartan (COZAAR) 100 MG tablet Take 1 tablet by mouth daily 90 tablet 1    metFORMIN (GLUCOPHAGE) 1000 MG tablet Take 1 tablet by mouth 2 times daily (with meals) 180 tablet 1    Ertugliflozin L-PyroglutamicAc (STEGLATRO) 15 MG TABS Take 1 tablet by mouth daily 30 tablet 3    tiZANidine (ZANAFLEX) 4 MG tablet Take 1 tablet by mouth daily 30 tablet 3    insulin glargine (LANTUS) 100 UNIT/ML injection vial Inject 30 Units into the skin 2 times daily 6 vial 3    vitamin B-12 (CYANOCOBALAMIN) 1000 MCG tablet Take 1 tablet by mouth daily (Patient taking differently: Take 1,000 mcg by mouth daily OTC) 90 tablet 1    Insulin Pen Needle (PEN NEEDLES 3/16\") 31G X 5 MM MISC 1 each by Does not apply route daily 100 each 3    Cholecalciferol (VITAMIN D3) 50 MCG (2000 UT) TABS Take one tablet by mouth once a day (Patient taking differently: Take one tablet by mouth once a day OTC) 30 tablet 3     No current facility-administered medications for this visit. Family History   Problem Relation Age of Onset    Breast Cancer Mother      Allergies   Allergen Reactions    Bee Venom Anaphylaxis    Hornet Venom     Tramadol Other (See Comments)     Night terrors and sweats      Review of Systems   Constitutional: Negative for chills and fever. Respiratory: Negative for shortness of breath and wheezing. Cardiovascular: Negative for chest pain and leg swelling. Gastrointestinal: Positive for abdominal pain (left groin). Negative for constipation, diarrhea, nausea and vomiting. Skin: Negative for color change. Neurological: Negative for light-headedness. OBJECTIVE:     BP (!) 140/80 (Site: Right Upper Arm, Position: Sitting, Cuff Size: Large Adult)   Pulse 73   Temp 97.1 °F (36.2 °C) (Infrared)   Ht 6' 1\" (1.854 m)   Wt 215 lb 6.4 oz (97.7 kg)   SpO2 100%   BMI 28.42 kg/m²     Physical Exam  Constitutional:       Appearance: He is well-developed.    Eyes:      General: No scleral icterus. Conjunctiva/sclera: Conjunctivae normal.   Cardiovascular:      Rate and Rhythm: Normal rate and regular rhythm. Heart sounds: Normal heart sounds. No murmur heard. Pulmonary:      Effort: Pulmonary effort is normal. No respiratory distress. Breath sounds: Normal breath sounds. No wheezing. Abdominal:      Comments: Tenderness in left groin. No mass palpated on exam       ASSESSMENT/ PLAN:  1. Groin pain, left  - CT PELVIS WO CONTRAST Additional Contrast? None; Future    Will get CT and have him follow up after for results.       Orders Placed This Encounter   Procedures    CT PELVIS WO CONTRAST Additional Contrast? None     Standing Status:   Future     Standing Expiration Date:   8/17/2022     Order Specific Question:   Additional Contrast?     Answer:   None     Order Specific Question:   Reason for exam:     Answer:   possible inguinal hernia       EDGAR Peace - CNP, CNP

## 2021-08-17 ENCOUNTER — OFFICE VISIT (OUTPATIENT)
Dept: SURGERY | Age: 51
End: 2021-08-17
Payer: COMMERCIAL

## 2021-08-17 VITALS
OXYGEN SATURATION: 100 % | DIASTOLIC BLOOD PRESSURE: 80 MMHG | SYSTOLIC BLOOD PRESSURE: 140 MMHG | HEIGHT: 73 IN | TEMPERATURE: 97.1 F | HEART RATE: 73 BPM | BODY MASS INDEX: 28.55 KG/M2 | WEIGHT: 215.4 LBS

## 2021-08-17 DIAGNOSIS — R10.32 GROIN PAIN, LEFT: Primary | ICD-10-CM

## 2021-08-17 PROCEDURE — 99204 OFFICE O/P NEW MOD 45 MIN: CPT | Performed by: NURSE PRACTITIONER

## 2021-08-17 ASSESSMENT — ENCOUNTER SYMPTOMS
COLOR CHANGE: 0
VOMITING: 0
CONSTIPATION: 0
WHEEZING: 0
NAUSEA: 0
DIARRHEA: 0
SHORTNESS OF BREATH: 0
ABDOMINAL PAIN: 1

## 2021-08-25 ENCOUNTER — HOSPITAL ENCOUNTER (OUTPATIENT)
Dept: CT IMAGING | Age: 51
Discharge: HOME | End: 2021-08-25
Payer: COMMERCIAL

## 2021-08-25 DIAGNOSIS — R10.32 GROIN PAIN, LEFT: ICD-10-CM

## 2021-08-25 PROCEDURE — 72192 CT PELVIS W/O DYE: CPT

## 2021-08-26 ENCOUNTER — TELEPHONE (OUTPATIENT)
Dept: GASTROENTEROLOGY | Age: 51
End: 2021-08-26

## 2021-09-15 RX ORDER — TIZANIDINE 4 MG/1
TABLET ORAL
Qty: 30 TABLET | Refills: 0 | Status: SHIPPED | OUTPATIENT
Start: 2021-09-15 | End: 2021-10-22

## 2021-09-15 RX ORDER — AMITRIPTYLINE HYDROCHLORIDE 50 MG/1
TABLET, FILM COATED ORAL
Qty: 30 TABLET | Refills: 0 | Status: SHIPPED | OUTPATIENT
Start: 2021-09-15 | End: 2021-10-20 | Stop reason: SDUPTHER

## 2021-10-20 ENCOUNTER — OFFICE VISIT (OUTPATIENT)
Dept: INTERNAL MEDICINE CLINIC | Age: 51
End: 2021-10-20
Payer: COMMERCIAL

## 2021-10-20 VITALS
SYSTOLIC BLOOD PRESSURE: 130 MMHG | TEMPERATURE: 97.2 F | HEART RATE: 78 BPM | WEIGHT: 220 LBS | HEIGHT: 73 IN | DIASTOLIC BLOOD PRESSURE: 73 MMHG | OXYGEN SATURATION: 99 % | BODY MASS INDEX: 29.16 KG/M2

## 2021-10-20 DIAGNOSIS — Z12.11 SCREENING FOR COLON CANCER: ICD-10-CM

## 2021-10-20 DIAGNOSIS — Z79.4 TYPE 2 DIABETES MELLITUS WITH DIABETIC POLYNEUROPATHY, WITH LONG-TERM CURRENT USE OF INSULIN (HCC): Primary | ICD-10-CM

## 2021-10-20 DIAGNOSIS — I10 BENIGN ESSENTIAL HYPERTENSION: ICD-10-CM

## 2021-10-20 DIAGNOSIS — M79.672 PAIN IN BOTH FEET: ICD-10-CM

## 2021-10-20 DIAGNOSIS — G62.9 NEUROPATHY: ICD-10-CM

## 2021-10-20 DIAGNOSIS — E11.42 TYPE 2 DIABETES MELLITUS WITH DIABETIC POLYNEUROPATHY, WITH LONG-TERM CURRENT USE OF INSULIN (HCC): Primary | ICD-10-CM

## 2021-10-20 DIAGNOSIS — M25.562 CHRONIC PAIN OF LEFT KNEE: ICD-10-CM

## 2021-10-20 DIAGNOSIS — E78.2 MIXED HYPERLIPIDEMIA: ICD-10-CM

## 2021-10-20 DIAGNOSIS — N52.9 ERECTILE DYSFUNCTION, UNSPECIFIED ERECTILE DYSFUNCTION TYPE: ICD-10-CM

## 2021-10-20 DIAGNOSIS — G89.29 CHRONIC PAIN OF LEFT KNEE: ICD-10-CM

## 2021-10-20 DIAGNOSIS — M79.671 PAIN IN BOTH FEET: ICD-10-CM

## 2021-10-20 LAB
CHP ED QC CHECK: NORMAL
GLUCOSE BLD-MCNC: 233 MG/DL
HBA1C MFR BLD: 8.9 %

## 2021-10-20 PROCEDURE — 99214 OFFICE O/P EST MOD 30 MIN: CPT | Performed by: INTERNAL MEDICINE

## 2021-10-20 PROCEDURE — 82962 GLUCOSE BLOOD TEST: CPT | Performed by: INTERNAL MEDICINE

## 2021-10-20 PROCEDURE — 3052F HG A1C>EQUAL 8.0%<EQUAL 9.0%: CPT | Performed by: INTERNAL MEDICINE

## 2021-10-20 PROCEDURE — 83036 HEMOGLOBIN GLYCOSYLATED A1C: CPT | Performed by: INTERNAL MEDICINE

## 2021-10-20 RX ORDER — AMITRIPTYLINE HYDROCHLORIDE 50 MG/1
50 TABLET, FILM COATED ORAL NIGHTLY
Qty: 90 TABLET | Refills: 1 | Status: SHIPPED | OUTPATIENT
Start: 2021-10-20 | End: 2022-01-20 | Stop reason: SDUPTHER

## 2021-10-20 RX ORDER — GABAPENTIN 400 MG/1
400 CAPSULE ORAL 3 TIMES DAILY
Qty: 270 CAPSULE | Refills: 1 | Status: SHIPPED | OUTPATIENT
Start: 2021-10-20 | End: 2022-01-20 | Stop reason: SDUPTHER

## 2021-10-20 RX ORDER — INSULIN DEGLUDEC INJECTION 100 U/ML
40 INJECTION, SOLUTION SUBCUTANEOUS NIGHTLY
Qty: 6 PEN | Refills: 5 | Status: SHIPPED | OUTPATIENT
Start: 2021-10-20 | End: 2022-01-20 | Stop reason: SDUPTHER

## 2021-10-20 RX ORDER — MELOXICAM 7.5 MG/1
7.5 TABLET ORAL DAILY
Qty: 90 TABLET | Refills: 1 | Status: SHIPPED | OUTPATIENT
Start: 2021-10-20 | End: 2022-01-20 | Stop reason: SDUPTHER

## 2021-10-20 RX ORDER — SILDENAFIL 50 MG/1
50 TABLET, FILM COATED ORAL PRN
Qty: 10 TABLET | Refills: 3 | Status: CANCELLED | OUTPATIENT
Start: 2021-10-20

## 2021-10-20 RX ORDER — HYDROCHLOROTHIAZIDE 12.5 MG/1
12.5 CAPSULE, GELATIN COATED ORAL EVERY MORNING
Qty: 90 CAPSULE | Refills: 1 | Status: SHIPPED | OUTPATIENT
Start: 2021-10-20 | End: 2022-01-20 | Stop reason: SDUPTHER

## 2021-10-20 RX ORDER — TADALAFIL 20 MG/1
20 TABLET ORAL DAILY PRN
Qty: 30 TABLET | Refills: 2 | Status: SHIPPED | OUTPATIENT
Start: 2021-10-20 | End: 2022-01-20

## 2021-10-20 RX ORDER — POTASSIUM CHLORIDE 750 MG/1
10 TABLET, EXTENDED RELEASE ORAL DAILY
Qty: 90 TABLET | Refills: 1 | Status: SHIPPED | OUTPATIENT
Start: 2021-10-20 | End: 2022-01-20 | Stop reason: SDUPTHER

## 2021-10-20 RX ORDER — INSULIN LISPRO 200 [IU]/ML
INJECTION, SOLUTION SUBCUTANEOUS
Qty: 5 PEN | Refills: 5 | Status: SHIPPED | OUTPATIENT
Start: 2021-10-20 | End: 2022-01-20 | Stop reason: SDUPTHER

## 2021-10-20 RX ORDER — LOSARTAN POTASSIUM 100 MG/1
100 TABLET ORAL DAILY
Qty: 90 TABLET | Refills: 1 | Status: SHIPPED | OUTPATIENT
Start: 2021-10-20 | End: 2022-01-20 | Stop reason: SDUPTHER

## 2021-10-20 RX ORDER — TIZANIDINE 4 MG/1
4 TABLET ORAL DAILY
Qty: 90 TABLET | Refills: 1 | Status: CANCELLED | OUTPATIENT
Start: 2021-10-20

## 2021-10-20 RX ORDER — PEN NEEDLE, DIABETIC 30 GX5/16"
1 NEEDLE, DISPOSABLE MISCELLANEOUS DAILY
Qty: 100 EACH | Refills: 5 | Status: SHIPPED | OUTPATIENT
Start: 2021-10-20 | End: 2022-03-15

## 2021-10-20 RX ORDER — ATORVASTATIN CALCIUM 40 MG/1
40 TABLET, FILM COATED ORAL DAILY
Qty: 90 TABLET | Refills: 1 | Status: SHIPPED | OUTPATIENT
Start: 2021-10-20 | End: 2022-01-20 | Stop reason: SDUPTHER

## 2021-10-20 NOTE — PROGRESS NOTES
Name: Massimo Chairez  [id number]  Age: 52 y.o. YOB: 1970  Sex: male    CHIEF COMPLAINT:    Chief Complaint   Patient presents with    Diabetes    Hypertension    Other     other chronic conditions       HISTORY OF PRESENT ILLNESS:     This is a pleasant  52 y.o. male  is seen today for management of chronic medical problems and medications refills. Previous records reviewed . Doing better. Pain and numbness and tingling of the feet is a still there but much better. He did see Dr. Nancy Roche recently and he is going to order MRI of the both knees. Patient think that he is planning to give cortisone injection in his right knee.     Patient claims that his sugars are still running high  But better than before @150-220 most of the time  He did see Dr. Krystina Sim once but did not keep follow up appointment because of job schedule. He will try to make an appointment Dr. Krystina Sim soon. He is taking all his medications regularly. He is taking [place] 36 units at night now. Denies CP or SOB. No fever , sore throat or cough or congestion. Denies any abdominal pain. Appetite OK. Bowels moving [id number] No urinary symptoms. Hearing is ok. Vision Ok with glasses. .. He sees Dr. Sita Auguste, an optometrist and she told him that there is small hemorrhage in each eye. He does not want to see an ophthalmologist at this time and going to follow with Dr. Jeanine Miranda. Denies  any significant skin lesions. Denies any significant depression or anxiety. He has chronic erectile dysfunction and Viagra is not helping him. He wants Cialis to try. No other new complaints. He has been fully vaccinated for COVID-19 and also had a booster dose. He will get a flu shot at his job. He could not keep appointment for colonoscopy and he wants a Cologuard test instead of colonoscopy. He takes medical marijuana for aches and pains and insomnia.     Past Medical History:    Patient Active Problem List   Diagnosis    Ventral hernia without obstruction or gangrene    Essential hypertension    Gastroesophageal reflux disease without esophagitis    Dupuytren's contracture    Chronic skin ulcer, limited to breakdown of skin (HCC)    Chronic bilateral low back pain without sciatica    Type 2 diabetes mellitus with diabetic polyneuropathy, with long-term current use of insulin (HCC)    Muscle cramping    Erectile dysfunction    Chest pain    Laboratory confirmed diagnosis of COVID-19    Sigrid Parkinson White pattern seen on electrocardiogram    Fatty liver    Neuropathy    Dorsalgia    Pure hypercholesterolemia        Past Surgical History:        Procedure Laterality Date    ELBOW SURGERY Right     HERNIA REPAIR      SPINE SURGERY  1990, 2010    Fusion of L3, L4, L5    UMBILICAL HERNIA REPAIR  11/4/15    repair incarcerated supra umbilical hernia       Social History:   Social History     Tobacco Use    Smoking status: Never Smoker    Smokeless tobacco: Never Used   Substance Use Topics    Alcohol use: Not Currently     Alcohol/week: 2.0 standard drinks     Types: 2 Standard drinks or equivalent per week       Family History:       Problem Relation Age of Onset    Breast Cancer Mother        Allergies:  Bee venom, Hornet venom, and Tramadol    Current Medications :      Prior to Admission medications    Medication Sig Start Date End Date Taking? Authorizing Provider   amitriptyline (ELAVIL) 50 MG tablet Take 1 tablet by mouth nightly 10/20/21  Yes Mike Lomax MD   atorvastatin (LIPITOR) 40 MG tablet Take 1 tablet by mouth daily 10/20/21  Yes Mike Lomax MD   gabapentin (NEURONTIN) 400 MG capsule Take 1 capsule by mouth 3 times daily for 90 days.  Intended supply: 90 days 10/20/21 1/18/22 Yes Mike Lomax MD   hydroCHLOROthiazide (MICROZIDE) 12.5 MG capsule Take 1 capsule by mouth every morning 10/20/21  Yes Mike Lomax MD   Insulin Degludec (TRESIBA FLEXTOUCH) 100 UNIT/ML SOPN Inject 40 Units into the skin nightly 10/20/21  Yes Norma Goode MD   Insulin Pen Needle (PEN NEEDLES 3/16\") 31G X 5 MM MISC 1 each by Does not apply route daily 10/20/21  Yes Norma Goode MD   losartan (COZAAR) 100 MG tablet Take 1 tablet by mouth daily 10/20/21  Yes Norma Goode MD   meloxicam (MOBIC) 7.5 MG tablet Take 1 tablet by mouth daily 10/20/21  Yes Norma Goode MD   metFORMIN (GLUCOPHAGE) 1000 MG tablet Take 1 tablet by mouth 2 times daily (with meals) 10/20/21  Yes Norma Goode MD   potassium chloride (KLOR-CON M) 10 MEQ extended release tablet Take 1 tablet by mouth daily OTC 10/20/21  Yes Norma Goode MD   insulin lispro (HUMALOG KWIKPEN) 200 UNIT/ML SOPN pen TID with meals Glucose [id number] No Insulin 140-199 2 Units 200-249 4 Units 250-299 6 Units 300-349 8 Units 350-400 10 Units over 400 12 Units 10/20/21  Yes Norma Goode MD   tadalafil (CIALIS) 20 MG tablet Take 1 tablet by mouth daily as needed for Erectile Dysfunction 10/20/21  Yes Norma Goode MD   tiZANidine (ZANAFLEX) 4 MG tablet TAKE 1 TABLET BY MOUTH EVERY DAY  9/15/21  Yes Norma Goode MD   sildenafil (VIAGRA) 50 MG tablet Take 1 tablet by mouth as needed for Erectile Dysfunction 7/14/21  Yes Norma Goode MD   Ertugliflozin L-PyroglutamicAc [facility]) 15 MG TABS Take 1 tablet by mouth daily 3/24/21  Yes Norma Goode MD   vitamin B-12 (CYANOCOBALAMIN) 1000 MCG tablet Take 1 tablet by mouth daily  Patient taking differently: Take 1,000 mcg by mouth daily OTC 6/30/20  Yes Matthew Lewis DO   Cholecalciferol (VITAMIN D3) 50 MCG (2000 UT) TABS Take one tablet by mouth once a day  Patient taking differently: Take one tablet by mouth once a day OTC 6/30/20  Yes Matthew Lewis DO       LAB DATA: Reviewed. REVIEW OF SYSTEMS:   see HPI/ Comprehensive review of systems negative except for the ones mentioned in HPI.     PHYSICAL EXAMINATION:   /73 (Site: Right Upper Arm, Position: Sitting, Cuff Size: Medium Adult)   Pulse 78   Temp 97.2 °F (36.2 °C)   Ht 6' 1\" (1.854 m)   Wt 220 lb (99.8 kg)   SpO2 99%   BMI 29.03 kg/m²      GENERAL APPEARANCE:    Alert, oriented x 3, well developed, cooperative, not in any distress, appears stated age. HEAD:   Normocephalic, atraumatic   EYES:   PERRLA, EOMI, lids normal, conjuctivea clear, sclera anicteric. NECK:    Supple, symmetrical,  trachea midline, no thyromegaly, no JVD, no lymphadenopathy. LUNGS:    Clear to auscultation bilaterally, respirations unlabored, accessory muscles are not used. HEART:     Regular rate and rhythm, S1 and S2 normal, no murmur, rub or gallop. PMI in MCL. ABDOMEN:    Soft, non-tender, bowel sounds are normoactive, no masses, no hepatospleenomegaly. EXTREMITY:   no bipedal edema  NEURO:  Alert, oriented to person, place and time. Mild paresthesia of his feet. Musculoskeletal:         No kyphosis or scoliosis, no deformity in any extremity noted, muscle strength and tone are normal.  Skin:                            Warm and dry. No rash or obvious suspicious lesions. PSYCH:  Mood euthymic, insight and judgement good. ASSESSMENT/PLAN:    1. Type 2 diabetes mellitus with diabetic polyneuropathy, with long-term current use of insulin (HCC)  A1c has come down to 8.9. It was around 15+ a few months ago. Will increase Tresiba to 40 units nightly. Continue other meds. Continue low sugar diet and exercise. - POCT Glucose  - POCT glycosylated hemoglobin (Hb A1C)  - Insulin Degludec (TRESIBA FLEXTOUCH) 100 UNIT/ML SOPN; Inject 40 Units into the skin nightly  Dispense: 6 pen; Refill: 5  - Insulin Pen Needle (PEN NEEDLES 3/16\") 31G X 5 MM MISC; 1 each by Does not apply route daily  Dispense: 100 each; Refill: 5  - metFORMIN (GLUCOPHAGE) 1000 MG tablet; Take 1 tablet by mouth 2 times daily (with meals)  Dispense: 180 tablet;  Refill: 1  - insulin lispro (HUMALOG KWIKPEN) 200 UNIT/ML SOPN pen; TID with meals Glucose [id number] No Insulin 140-199 2 Units 200-249 4 Units 250-299 6 Units 300-349 8 Units 350-400 10 Units over 400 12 Units  Dispense: 5 pen; Refill: 5    2. Mixed hyperlipidemia  Patient is taking cholesterol medications regularly. Denies any side effects. Diet and exercise advised. Continue Lipitor  - atorvastatin (LIPITOR) 40 MG tablet; Take 1 tablet by mouth daily  Dispense: 90 tablet; Refill: 1    3. Benign essential hypertension  Stable,Continue current medications, denies side effect with medicationss. Low salt diet and exercise advised. Continue Cozaar and hydrochlorothiazide  - hydroCHLOROthiazide (MICROZIDE) 12.5 MG capsule; Take 1 capsule by mouth every morning  Dispense: 90 capsule; Refill: 1  - losartan (COZAAR) 100 MG tablet; Take 1 tablet by mouth daily  Dispense: 90 tablet; Refill: 1  - potassium chloride (KLOR-CON M) 10 MEQ extended release tablet; Take 1 tablet by mouth daily OTC  Dispense: 90 tablet; Refill: 1    4. Pain in both feet  Continue meloxicam and Tylenol as needed. - meloxicam (MOBIC) 7.5 MG tablet; Take 1 tablet by mouth daily  Dispense: 90 tablet; Refill: 1    5. Chronic pain of left knee  Continue meloxicam and Tylenol as needed. Also he has right knee pain for which he is seeing Dr. James Esquivel and going to have MRI of both knees. - meloxicam (MOBIC) 7.5 MG tablet; Take 1 tablet by mouth daily  Dispense: 90 tablet; Refill: 1    6. Erectile dysfunction, unspecified erectile dysfunction type  DC Viagra and try Cialis 20 mg daily as needed  - tadalafil (CIALIS) 20 MG tablet; Take 1 tablet by mouth daily as needed for Erectile Dysfunction  Dispense: 30 tablet; Refill: 2    7. Neuropathy  Continue Neurontin and Elavil  - amitriptyline (ELAVIL) 50 MG tablet; Take 1 tablet by mouth nightly  Dispense: 90 tablet; Refill: 1  - gabapentin (NEURONTIN) 400 MG capsule; Take 1 capsule by mouth 3 times daily for 90 days. Intended supply: 90 days  Dispense: 270 capsule; Refill: 1    8.  [facility] colon cancer  [facility] Future    Advised to follow COVID-19 precautions    Care discussed with patient. Questions answered and patient verbalizes understanding and agrees with plan. Medications reviewed and reconciled. Continue current medications. Appropriate prescriptions are ordered. Risks and benefits of meds are discussed. After visit summary provided. Advised to call for any problems, questions, or concerns. If symptoms worsen or don't improve as expected, to call us or go to ER. Follow up as directed, sooner if needed. No follow-ups on file. This dictation was performed with a verbal recognition program and it was checked for errors. It is possible that there are still dictated errors within this office note. Any errors should be brought immediately to my attention for correction. All efforts were made to ensure that this office note is accurate.      Phil Don MD MD

## 2021-10-22 RX ORDER — TIZANIDINE 4 MG/1
TABLET ORAL
Qty: 30 TABLET | Refills: 0 | Status: SHIPPED | OUTPATIENT
Start: 2021-10-22 | End: 2021-12-02

## 2021-10-25 RX ORDER — ERTUGLIFLOZIN 15 MG/1
TABLET, FILM COATED ORAL
Qty: 30 TABLET | Refills: 0 | Status: SHIPPED | OUTPATIENT
Start: 2021-10-25 | End: 2021-12-02

## 2021-12-02 RX ORDER — ERTUGLIFLOZIN 15 MG/1
TABLET, FILM COATED ORAL
Qty: 30 TABLET | Refills: 0 | Status: SHIPPED | OUTPATIENT
Start: 2021-12-02 | End: 2022-01-03

## 2021-12-02 RX ORDER — TIZANIDINE 4 MG/1
TABLET ORAL
Qty: 30 TABLET | Refills: 0 | Status: SHIPPED | OUTPATIENT
Start: 2021-12-02 | End: 2022-01-03

## 2021-12-29 ENCOUNTER — PATIENT MESSAGE (OUTPATIENT)
Dept: INTERNAL MEDICINE CLINIC | Age: 51
End: 2021-12-29

## 2022-01-03 RX ORDER — TIZANIDINE 4 MG/1
TABLET ORAL
Qty: 30 TABLET | Refills: 0 | Status: SHIPPED | OUTPATIENT
Start: 2022-01-03 | End: 2022-01-20 | Stop reason: ALTCHOICE

## 2022-01-03 RX ORDER — ERTUGLIFLOZIN 15 MG/1
TABLET, FILM COATED ORAL
Qty: 30 TABLET | Refills: 0 | Status: SHIPPED | OUTPATIENT
Start: 2022-01-03 | End: 2022-01-20 | Stop reason: SDUPTHER

## 2022-01-13 DIAGNOSIS — K92.0 HEMATEMESIS WITH NAUSEA: Primary | ICD-10-CM

## 2022-01-13 RX ORDER — OMEPRAZOLE 20 MG/1
20 CAPSULE, DELAYED RELEASE ORAL DAILY
Qty: 30 CAPSULE | Refills: 3 | Status: SHIPPED | OUTPATIENT
Start: 2022-01-13 | End: 2022-07-08

## 2022-01-14 ENCOUNTER — TELEPHONE (OUTPATIENT)
Dept: GASTROENTEROLOGY | Age: 52
End: 2022-01-14

## 2022-01-20 ENCOUNTER — OFFICE VISIT (OUTPATIENT)
Dept: INTERNAL MEDICINE CLINIC | Age: 52
End: 2022-01-20
Payer: COMMERCIAL

## 2022-01-20 ENCOUNTER — OFFICE VISIT (OUTPATIENT)
Dept: PSYCHOLOGY | Age: 52
End: 2022-01-20
Payer: COMMERCIAL

## 2022-01-20 VITALS
WEIGHT: 220 LBS | SYSTOLIC BLOOD PRESSURE: 132 MMHG | HEART RATE: 72 BPM | HEIGHT: 73 IN | TEMPERATURE: 97 F | BODY MASS INDEX: 29.16 KG/M2 | OXYGEN SATURATION: 99 % | DIASTOLIC BLOOD PRESSURE: 82 MMHG

## 2022-01-20 VITALS — TEMPERATURE: 97.7 F

## 2022-01-20 DIAGNOSIS — F32.A DEPRESSIVE DISORDER: Primary | ICD-10-CM

## 2022-01-20 DIAGNOSIS — M25.562 CHRONIC PAIN OF LEFT KNEE: ICD-10-CM

## 2022-01-20 DIAGNOSIS — Z79.4 TYPE 2 DIABETES MELLITUS WITH DIABETIC POLYNEUROPATHY, WITH LONG-TERM CURRENT USE OF INSULIN (HCC): Primary | ICD-10-CM

## 2022-01-20 DIAGNOSIS — G89.29 CHRONIC PAIN OF LEFT KNEE: ICD-10-CM

## 2022-01-20 DIAGNOSIS — M79.672 PAIN IN BOTH FEET: ICD-10-CM

## 2022-01-20 DIAGNOSIS — E78.2 MIXED HYPERLIPIDEMIA: ICD-10-CM

## 2022-01-20 DIAGNOSIS — N52.9 ERECTILE DYSFUNCTION, UNSPECIFIED ERECTILE DYSFUNCTION TYPE: ICD-10-CM

## 2022-01-20 DIAGNOSIS — G62.9 NEUROPATHY: ICD-10-CM

## 2022-01-20 DIAGNOSIS — K21.9 GASTROESOPHAGEAL REFLUX DISEASE WITHOUT ESOPHAGITIS: ICD-10-CM

## 2022-01-20 DIAGNOSIS — I10 ESSENTIAL HYPERTENSION: ICD-10-CM

## 2022-01-20 DIAGNOSIS — F41.9 ANXIETY: ICD-10-CM

## 2022-01-20 DIAGNOSIS — F32.A CHRONIC DEPRESSION: ICD-10-CM

## 2022-01-20 DIAGNOSIS — Z23 NEED FOR INFLUENZA VACCINATION: ICD-10-CM

## 2022-01-20 DIAGNOSIS — M79.671 PAIN IN BOTH FEET: ICD-10-CM

## 2022-01-20 DIAGNOSIS — E11.42 TYPE 2 DIABETES MELLITUS WITH DIABETIC POLYNEUROPATHY, WITH LONG-TERM CURRENT USE OF INSULIN (HCC): Primary | ICD-10-CM

## 2022-01-20 LAB
A/G RATIO: 1.7 (ref 1.1–2.2)
ALBUMIN SERPL-MCNC: 5 G/DL (ref 3.4–5)
ALP BLD-CCNC: 89 U/L (ref 40–129)
ALT SERPL-CCNC: 24 U/L (ref 10–40)
ANION GAP SERPL CALCULATED.3IONS-SCNC: 17 MMOL/L (ref 3–16)
AST SERPL-CCNC: 25 U/L (ref 15–37)
BASOPHILS ABSOLUTE: 0 K/UL (ref 0–0.2)
BASOPHILS RELATIVE PERCENT: 0.5 %
BILIRUB SERPL-MCNC: 0.9 MG/DL (ref 0–1)
BUN BLDV-MCNC: 20 MG/DL (ref 7–20)
CALCIUM SERPL-MCNC: 10.4 MG/DL (ref 8.3–10.6)
CHLORIDE BLD-SCNC: 96 MMOL/L (ref 99–110)
CHOLESTEROL, FASTING: 156 MG/DL (ref 0–199)
CHP ED QC CHECK: NORMAL
CO2: 24 MMOL/L (ref 21–32)
CREAT SERPL-MCNC: 0.8 MG/DL (ref 0.9–1.3)
EOSINOPHILS ABSOLUTE: 0.1 K/UL (ref 0–0.6)
EOSINOPHILS RELATIVE PERCENT: 2.7 %
GFR AFRICAN AMERICAN: >60
GFR NON-AFRICAN AMERICAN: >60
GLUCOSE BLD-MCNC: 131 MG/DL
GLUCOSE BLD-MCNC: 132 MG/DL (ref 70–99)
HCT VFR BLD CALC: 57.3 % (ref 40.5–52.5)
HDLC SERPL-MCNC: 56 MG/DL (ref 40–60)
HEMOGLOBIN: 19.3 G/DL (ref 13.5–17.5)
LDL CHOLESTEROL CALCULATED: 86 MG/DL
LYMPHOCYTES ABSOLUTE: 1.4 K/UL (ref 1–5.1)
LYMPHOCYTES RELATIVE PERCENT: 27.8 %
MCH RBC QN AUTO: 29.7 PG (ref 26–34)
MCHC RBC AUTO-ENTMCNC: 33.6 G/DL (ref 31–36)
MCV RBC AUTO: 88.2 FL (ref 80–100)
MONOCYTES ABSOLUTE: 0.5 K/UL (ref 0–1.3)
MONOCYTES RELATIVE PERCENT: 10.2 %
NEUTROPHILS ABSOLUTE: 3 K/UL (ref 1.7–7.7)
NEUTROPHILS RELATIVE PERCENT: 58.8 %
PDW BLD-RTO: 13.9 % (ref 12.4–15.4)
PLATELET # BLD: 204 K/UL (ref 135–450)
PMV BLD AUTO: 8.8 FL (ref 5–10.5)
POTASSIUM SERPL-SCNC: 5.2 MMOL/L (ref 3.5–5.1)
RBC # BLD: 6.5 M/UL (ref 4.2–5.9)
SODIUM BLD-SCNC: 137 MMOL/L (ref 136–145)
TOTAL PROTEIN: 8 G/DL (ref 6.4–8.2)
TRIGLYCERIDE, FASTING: 70 MG/DL (ref 0–150)
VLDLC SERPL CALC-MCNC: 14 MG/DL
WBC # BLD: 5 K/UL (ref 4–11)

## 2022-01-20 PROCEDURE — 90674 CCIIV4 VAC NO PRSV 0.5 ML IM: CPT | Performed by: INTERNAL MEDICINE

## 2022-01-20 PROCEDURE — 90791 PSYCH DIAGNOSTIC EVALUATION: CPT | Performed by: PSYCHOLOGIST

## 2022-01-20 PROCEDURE — 90471 IMMUNIZATION ADMIN: CPT | Performed by: INTERNAL MEDICINE

## 2022-01-20 PROCEDURE — 36415 COLL VENOUS BLD VENIPUNCTURE: CPT | Performed by: INTERNAL MEDICINE

## 2022-01-20 PROCEDURE — 99214 OFFICE O/P EST MOD 30 MIN: CPT | Performed by: INTERNAL MEDICINE

## 2022-01-20 PROCEDURE — 82962 GLUCOSE BLOOD TEST: CPT | Performed by: INTERNAL MEDICINE

## 2022-01-20 RX ORDER — ATORVASTATIN CALCIUM 40 MG/1
40 TABLET, FILM COATED ORAL DAILY
Qty: 90 TABLET | Refills: 1 | Status: SHIPPED | OUTPATIENT
Start: 2022-01-20 | End: 2022-10-12 | Stop reason: SDUPTHER

## 2022-01-20 RX ORDER — ERTUGLIFLOZIN 15 MG/1
15 TABLET, FILM COATED ORAL DAILY
Qty: 30 TABLET | Refills: 3 | Status: SHIPPED | OUTPATIENT
Start: 2022-01-20 | End: 2022-08-15

## 2022-01-20 RX ORDER — LOSARTAN POTASSIUM 100 MG/1
100 TABLET ORAL DAILY
Qty: 90 TABLET | Refills: 1 | Status: SHIPPED | OUTPATIENT
Start: 2022-01-20 | End: 2022-10-12 | Stop reason: SDUPTHER

## 2022-01-20 RX ORDER — SILDENAFIL 50 MG/1
50 TABLET, FILM COATED ORAL PRN
Qty: 10 TABLET | Refills: 3 | Status: SHIPPED | OUTPATIENT
Start: 2022-01-20 | End: 2022-02-23

## 2022-01-20 RX ORDER — TIZANIDINE 4 MG/1
4 TABLET ORAL NIGHTLY
COMMUNITY
End: 2022-01-20

## 2022-01-20 RX ORDER — TIZANIDINE 4 MG/1
4 TABLET ORAL NIGHTLY
Qty: 30 TABLET | Refills: 3 | Status: SHIPPED | OUTPATIENT
Start: 2022-01-20 | End: 2022-08-15

## 2022-01-20 RX ORDER — POTASSIUM CHLORIDE 750 MG/1
10 TABLET, EXTENDED RELEASE ORAL DAILY
Qty: 90 TABLET | Refills: 1 | Status: SHIPPED | OUTPATIENT
Start: 2022-01-20 | End: 2022-10-12 | Stop reason: SDUPTHER

## 2022-01-20 RX ORDER — MELOXICAM 7.5 MG/1
7.5 TABLET ORAL DAILY
Qty: 90 TABLET | Refills: 1 | Status: SHIPPED | OUTPATIENT
Start: 2022-01-20 | End: 2022-10-12 | Stop reason: SDUPTHER

## 2022-01-20 RX ORDER — AMITRIPTYLINE HYDROCHLORIDE 50 MG/1
50 TABLET, FILM COATED ORAL NIGHTLY
Qty: 90 TABLET | Refills: 1 | Status: SHIPPED | OUTPATIENT
Start: 2022-01-20

## 2022-01-20 RX ORDER — HYDROCHLOROTHIAZIDE 12.5 MG/1
12.5 CAPSULE, GELATIN COATED ORAL EVERY MORNING
Qty: 90 CAPSULE | Refills: 1 | Status: SHIPPED | OUTPATIENT
Start: 2022-01-20 | End: 2022-10-12 | Stop reason: SDUPTHER

## 2022-01-20 RX ORDER — INSULIN DEGLUDEC INJECTION 100 U/ML
40 INJECTION, SOLUTION SUBCUTANEOUS NIGHTLY
Qty: 6 PEN | Refills: 5 | Status: SHIPPED | OUTPATIENT
Start: 2022-01-20 | End: 2022-10-12 | Stop reason: SDUPTHER

## 2022-01-20 RX ORDER — GABAPENTIN 400 MG/1
400 CAPSULE ORAL 3 TIMES DAILY
Qty: 270 CAPSULE | Refills: 1 | Status: SHIPPED | OUTPATIENT
Start: 2022-01-20 | End: 2022-10-12 | Stop reason: SDUPTHER

## 2022-01-20 RX ORDER — INSULIN LISPRO 200 [IU]/ML
INJECTION, SOLUTION SUBCUTANEOUS
Qty: 5 PEN | Refills: 5 | Status: SHIPPED | OUTPATIENT
Start: 2022-01-20

## 2022-01-20 RX ORDER — ESCITALOPRAM OXALATE 10 MG/1
10 TABLET ORAL DAILY
Qty: 30 TABLET | Refills: 3 | Status: SHIPPED | OUTPATIENT
Start: 2022-01-20 | End: 2022-06-30

## 2022-01-20 ASSESSMENT — PATIENT HEALTH QUESTIONNAIRE - PHQ9
8. MOVING OR SPEAKING SO SLOWLY THAT OTHER PEOPLE COULD HAVE NOTICED. OR THE OPPOSITE, BEING SO FIGETY OR RESTLESS THAT YOU HAVE BEEN MOVING AROUND A LOT MORE THAN USUAL: 0
SUM OF ALL RESPONSES TO PHQ QUESTIONS 1-9: 19
7. TROUBLE CONCENTRATING ON THINGS, SUCH AS READING THE NEWSPAPER OR WATCHING TELEVISION: 0
4. FEELING TIRED OR HAVING LITTLE ENERGY: 2
SUM OF ALL RESPONSES TO PHQ9 QUESTIONS 1 & 2: 6
SUM OF ALL RESPONSES TO PHQ QUESTIONS 1-9: 19
9. THOUGHTS THAT YOU WOULD BE BETTER OFF DEAD, OR OF HURTING YOURSELF: 2
2. FEELING DOWN, DEPRESSED OR HOPELESS: 3
1. LITTLE INTEREST OR PLEASURE IN DOING THINGS: 3
3. TROUBLE FALLING OR STAYING ASLEEP: 3
SUM OF ALL RESPONSES TO PHQ QUESTIONS 1-9: 19
6. FEELING BAD ABOUT YOURSELF - OR THAT YOU ARE A FAILURE OR HAVE LET YOURSELF OR YOUR FAMILY DOWN: 3
SUM OF ALL RESPONSES TO PHQ QUESTIONS 1-9: 17
5. POOR APPETITE OR OVEREATING: 3

## 2022-01-20 NOTE — PROGRESS NOTES
Vaccine Information Sheet, \"Influenza - Inactivated\"  given to Ariadne Jordan, or parent/legal guardian of  Ariadne Jordan and verbalized understanding. Patient responses:    Have you ever had a reaction to a flu vaccine? No  Are you able to eat eggs without adverse effects? Yes  Do you have any current illness? No  Have you ever had Guillian Gleason Syndrome? No    Flu vaccine given per order. Please see immunization tab.

## 2022-01-20 NOTE — PROGRESS NOTES
Behavioral Health Consultation  Fran Dodd Psy.D. Psychologist    Time spent with Patient: 30 minutes  Visit number: 1  Reason for Consult:  depression and anxiety  Referring Provider: Mimi Warner MD  [address] [place] [address]    Informed consent:  Pt provided informed consent for the behavioral health program. Discussed with patient model of service to include the limits of confidentiality (i.e. abuse reporting, suicide intervention, etc.) and focused intervention approach. Pt indicated understanding. S:  ----------------------------------------------------------------------------------------------------------------------    Presenting problem:  Anxiety and depression   Endorsed depressed mood, anhedonia, anergia, amotivation, poor sleep, fluctuating appetite, diminished concentration, and corrosive self-image. Passive SI, w neither planning, nor intent. Endorsed nervousness, anxiety, worry, difficulty relaxing, restlessness, irritability, and fear. Obtained degree in education years ago and was let go d/t budget cuts. 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 He plans to return to working in education when 12 mos ban is over. \"I'm immature and act like a kid still. \" Went on to remark, \"I don't have any direction in life. \"     Ended dating relationship of 4 years ~12 mos ago     Social:   One son in college, [place]. Eldest son, Yordan Gillis, does not speak w him. He has not communicated w him since he left his mother 8 years ago. Currently works at [employer] and \"hate[s] it. \"     curated.by is going to raise rent in near future. 77yo mom lives locally. She remarried last year     Last saw father in 18. He  years ago.      Substance Use: not assessed d/t time   EtOH:   Cannabis:   Tobacco:    Other:     Psychiatric tx hx:    Psych meds: None at present, however paxil and effexor in the past.   Outpatient psychotherapy: 9 years ago during divorce     Inpatient psych hospitalizations: denied     Relevant medical conditions/concerns:  See EHR    Goals:  \"I want to feel like an adult. \"     O:  ----------------------------------------------------------------------------------------------------------------------  MSE:    Orientation:  oriented to person, place, time, and general circumstances  Appearance and behavior:  alert, cooperative, crying  Speech:  spontaneous, normal rate and normal volume  Mood: depressed and anxious   Thought Content:  intact, hopelessness, helplessness, worthlessness and excessive guilt  Thought Process:  linear, goal directed and coherent  Interest/Pleasure: Loss of Pleasure/Fun  Sleep disturbance: Yes  Motivation: Poor  Energy: Tired/Fatigued  Morbid ideation No  Suicide Assessment: no suicidal ideation    A:  ----------------------------------------------------------------------------------------------------------------------  Diagnosis:    1. Depressive disorder         PHQ Scores 1/20/2022 3/24/2021 2/4/2020 9/26/2019   PHQ2 Score 6 0 0 0   PHQ9 Score 19 0 0 0     Interpretation of Total Score Depression Severity: 1-4 = Minimal depression, 5-9 = Mild depression, 10-14 = Moderate depression, 15-19 = Moderately severe depression, 20-27 = Severe depression    P:  ----------------------------------------------------------------------------------------------------------------------     [x]Discussed potential treatments for   1. Depressive disorder       [x]Conducted functional assessment  [x]Established rapport  [x]Supportive interventions   [x]Celeste-setting to identify pt's primary goals for [facility] visit / overall health  [x]Provided psychoeducation/handout on   1. Depressive disorder            Other:   []     Pt Behavioral Change Plan:    1. Return to Dr. Ethan Hollingsworth in 4 week(s)    2.                 Feedback provided to pt's PCP via EPIC and/or oral report

## 2022-01-20 NOTE — PROGRESS NOTES
Name: Génesis Benson  [id number]  Age: 54 y.o. YOB: 1970  Sex: male    CHIEF COMPLAINT:    Chief Complaint   Patient presents with    Diabetes    Cough    Other     other chronic conditions       HISTORY OF PRESENT ILLNESS:     This is a pleasant  54 y.o. male  is seen today for management of chronic medical problems and medications refills. Previous records reviewed . Patient claims that he has nausea and vomiting and vomiting blood-tinged sputum mostly in the morning. He claims that he thinks that his nausea and vomiting are related to his anxiety and depression. He is depressed about financial situations. He was referred to Dr. Marino Hatfield but has not seen him yet. Also he was started on omeprazole which he has not picked up from the pharmacy yet. He does not want any medication for anxiety as he takes medical marijuana and it helps him. He wants some medications for depression and he wants to see his psychiatrist and psychologist.  He claims his sugars are better mostly around 1520-180 most of the time. He continues to have pain in his knees and lower back. And also complains of pain and tingling in his feet which is better with Neurontin and Elavil. He is seeing Dr. Brandan Menezes periodically for his pain in his knees. Apparently he did an MRI of his knees told him that it was okay except for arthritis. He is planning to give injections in his knees if pain is not better. Denies CP or SOB. No fever , sore throat or cough or congestion. Denies any abdominal pain. Appetite OK. Bowels moving [id number] No urinary symptoms. He has chronic erectile dysfunction and is taking Viagra as needed. Hearing is ok. Vision Ok with glasses. .. He sees Dr. Erin Ochoa optometrist and she told him that there is small hemorrhage in each eye. Juliane Kumar does not want to see an ophthalmologist at this time and going to follow with Dr. Kendra Chavez. Denies  any significant skin lesions.     He had both shots of COVID-19 but has not had any booster dose yet. He will get booster dose soon. He wants a flu shot given.         Past Medical History:    Patient Active Problem List   Diagnosis    Ventral hernia without obstruction or gangrene    Essential hypertension    Gastroesophageal reflux disease without esophagitis    Dupuytren's contracture    Chronic skin ulcer, limited to breakdown of skin (HCC)    Chronic bilateral low back pain without sciatica    Type 2 diabetes mellitus with diabetic polyneuropathy, with long-term current use of insulin (HCC)    Muscle cramping    Erectile dysfunction    Chest pain    Laboratory confirmed diagnosis of COVID-19    Sigrid Parkinson White pattern seen on electrocardiogram    Fatty liver    Neuropathy    Dorsalgia    Pure hypercholesterolemia    Pain in both feet    Chronic pain of left knee    Chronic depression    Anxiety    Need for influenza vaccination        Past Surgical History:        Procedure Laterality Date    ELBOW SURGERY Right     HERNIA REPAIR      [address] [facility]    Fusion of L3, L4, L5    UMBILICAL HERNIA REPAIR  11/4/15    repair incarcerated supra umbilical hernia       Social History:   Social History     Tobacco Use    Smoking status: Never Smoker    Smokeless tobacco: Never Used   Substance Use Topics    Alcohol use: Not Currently     Alcohol/week: 2.0 standard drinks     Types: 2 Standard drinks or equivalent per week       Family History:       Problem Relation Age of Onset    Breast Cancer Mother        Allergies:  Bee venom, Hornet venom, and Tramadol    Current Medications :      Prior to Admission medications    Medication Sig Start Date End Date Taking?  Authorizing Provider   amitriptyline (ELAVIL) 50 MG tablet Take 1 tablet by mouth nightly 1/20/22  Yes Mike Amador MD   atorvastatin (LIPITOR) 40 MG tablet Take 1 tablet by mouth daily 1/20/22  Yes Mike Amador MD   gabapentin (NEURONTIN) 400 MG capsule Take 1 capsule by mouth 3 times daily for 90 days.  Intended supply: 90 days 1/20/22 4/20/22 Yes Jerry Gannon MD   hydroCHLOROthiazide (MICROZIDE) 12.5 MG capsule Take 1 capsule by mouth every morning 1/20/22  Yes Jerry Gannon MD   Insulin Degludec (TRESIBA FLEXTOUCH) 100 UNIT/ML SOPN Inject 40 Units into the skin nightly 1/20/22  Yes Jerry Gannon MD   insulin lispro (HUMALOG KWIKPEN) 200 UNIT/ML SOPN pen TID with meals Glucose [id number] No Insulin 140-199 2 Units 200-249 4 Units 250-299 6 Units 300-349 8 Units 350-400 10 Units over 400 12 Units 1/20/22  Yes Jerry Gannon MD   losartan (COZAAR) 100 MG tablet Take 1 tablet by mouth daily 1/20/22  Yes Jerry Gannon MD   meloxicam (MOBIC) 7.5 MG tablet Take 1 tablet by mouth daily 1/20/22  Yes Jerry Gannon MD   metFORMIN (GLUCOPHAGE) 1000 MG tablet Take 1 tablet by mouth 2 times daily (with meals) 1/20/22  Yes Jerry Gannon MD   potassium chloride (KLOR-CON M) 10 MEQ extended release tablet Take 1 tablet by mouth daily OTC 1/20/22  Yes Jerry Gannon MD   Ertugliflozin L-PyroglutamicAc (STEGLATRO) 15 MG TABS Take 15 mg by mouth daily 1/20/22  Yes Jerry Gannon MD   sildenafil (VIAGRA) 50 MG tablet Take 1 tablet by mouth as needed for Erectile Dysfunction 1/20/22  Yes Jerry Gannon MD   tiZANidine (ZANAFLEX) 4 MG tablet Take 1 tablet by mouth at bedtime 1/20/22  Yes Jerry Gannon MD   escitalopram (LEXAPRO) 10 MG tablet Take 1 tablet by mouth daily 1/20/22  Yes Jerry Gannon MD   omeprazole (PRILOSEC) 20 MG delayed release capsule Take 1 capsule by mouth daily 1/13/22  Yes Jerry Gannon MD   Insulin Pen Needle (PEN NEEDLES 3/16\") 31G X 5 MM MISC 1 each by Does not apply route daily 10/20/21  Yes Jerry Gannon MD   vitamin B-12 (CYANOCOBALAMIN) 1000 MCG tablet Take 1 tablet by mouth daily  Patient taking differently: Take 1,000 mcg by mouth daily OTC 6/30/20  Yes Matthew Pichardo DO   Cholecalciferol (VITAMIN D3) 50 MCG (2000 UT) TABS Take one tablet by mouth once a day  Patient taking differently: Take one tablet by mouth once a day OTC 6/30/20  Yes Matthew Kolb Police, DO       LAB DATA: Reviewed. REVIEW OF SYSTEMS:   see HPI/ Comprehensive review of systems negative except for the ones mentioned in HPI. PHYSICAL EXAMINATION:   /82 (Site: Left Upper Arm, Position: Sitting, Cuff Size: Medium Adult)   Pulse 72   Temp 97 °F (36.1 °C)   Ht 6' 1\" (1.854 m)   Wt 220 lb (99.8 kg)   SpO2 99%   BMI 29.03 kg/m²      GENERAL APPEARANCE:    Alert, oriented x 3, well developed, cooperative, not in any distress, appears stated age. HEAD:   Normocephalic, atraumatic   EYES:   PERRLA, EOMI, lids normal, conjuctivea clear, sclera anicteric. NECK:    Supple, symmetrical,  trachea midline, no thyromegaly, no JVD, no lymphadenopathy. LUNGS:    Clear to auscultation bilaterally, respirations unlabored, accessory muscles are not used. HEART:     Regular rate and rhythm, S1 and S2 normal, no murmur, rub or gallop. PMI in MCL. ABDOMEN:    Soft, non-tender, bowel sounds are normoactive, no masses, no hepatospleenomegaly. EXTREMITY:   no bipedal edema  NEURO:  Alert, oriented to person, place and time. Grossly intact. Musculoskeletal:         No kyphosis or scoliosis, no deformity in any extremity noted, muscle strength and tone are normal.  Skin:                            Warm and dry. No rash or obvious suspicious lesions. PSYCH:  Mood euthymic, insight and judgement good. ASSESSMENT/PLAN:    1. Type 2 diabetes mellitus with diabetic polyneuropathy, with long-term current use of insulin ([facility])  Advised low sugar diet and exercise. Continue current medication for diabetes.   - POCT Glucose  - Insulin Degludec (TRESIBA FLEXTOUCH) 100 UNIT/ML SOPN; Inject 40 Units into the skin nightly  Dispense: 6 pen; Refill: 5  - insulin lispro (HUMALOG KWIKPEN) 200 UNIT/ML SOPN pen; TID with meals Glucose [id number] No Insulin 140-199 2 Units 200-249 4 Units 250-299 6 Units 300-349 8 Units 350-400 10 Units over 400 12 Units  Dispense: 5 pen; Refill: 5  - metFORMIN (GLUCOPHAGE) 1000 MG tablet; Take 1 tablet by mouth 2 times daily (with meals)  Dispense: 180 tablet; Refill: 1  - Ertugliflozin L-PyroglutamicAc (STEGLATRO) 15 MG TABS; Take 15 mg by mouth daily  Dispense: 30 tablet; Refill: 3    2. Mixed hyperlipidemia  Patient is taking cholesterol medications regularly. Denies any side effects. Diet and exercise advised. Continue Lipitor  - atorvastatin (LIPITOR) 40 MG tablet; Take 1 tablet by mouth daily  Dispense: 90 tablet; Refill: 1  - Lipid, Fasting    3. Essential hypertension  Stable,Continue current medications, denies side effect with medicationss. Low salt diet and exercise advised. Continue losartan and hydrochlorothiazide. - Comprehensive Metabolic Panel  - CBC Auto Differential  - Hemoglobin A1C    4. Gastroesophageal reflux disease without esophagitis  Advised to start taking omeprazole soon. Advised to see Dr. Phuc Rodgers soon. Patient will make his own appointment. Advised to hold Mobic for a few days until omeprazole start working. 5. Neuropathy  Continue Neurontin and Elavil  - amitriptyline (ELAVIL) 50 MG tablet; Take 1 tablet by mouth nightly  Dispense: 90 tablet; Refill: 1  - gabapentin (NEURONTIN) 400 MG capsule; Take 1 capsule by mouth 3 times daily for 90 days. Intended supply: 90 days  Dispense: 270 capsule; Refill: 1    6. Pain in both feet  Continue Tylenol and Zanaflex. Can start Mobic once his gastritis symptoms settles. - meloxicam (MOBIC) 7.5 MG tablet; Take 1 tablet by mouth daily  Dispense: 90 tablet; Refill: 1  - tiZANidine (ZANAFLEX) 4 MG tablet; Take 1 tablet by mouth at bedtime  Dispense: 30 tablet; Refill: 3    7. Chronic pain of left knee  As above. Advised to continue to follow with Dr. Pako Benson as per his recommendations  - meloxicam (MOBIC) 7.5 MG tablet; Take 1 tablet by mouth daily  Dispense: 90 tablet; Refill: 1    8.  Erectile dysfunction, unspecified erectile dysfunction type  Continue Viagra  - sildenafil (VIAGRA) 50 MG tablet; Take 1 tablet by mouth as needed for Erectile Dysfunction  Dispense: 10 tablet; Refill: 3    9. Chronic depression  Start him on Lexapro and refer to psychiatrist  - External Referral To Psychiatry  - escitalopram (LEXAPRO) 10 MG tablet; Take 1 tablet by mouth daily  Dispense: 30 tablet; Refill: 3  - [address] [place], [place], [place]    10. Anxiety  Patient does not want any medication for anxiety. He is taking medical marijuana which helps him. 11. Need for influenza vaccination  Flu shot was given  - INFLUENZA, MDCK QUADV, 2 YRS AND OLDER, IM, PF, PREFILL SYR OR SDV, 0.5ML (FLUCELVAX QUADV, PF)    Advised to follow COVID-19 precautions  . Care discussed with patient. Questions answered and patient verbalizes understanding and agrees with plan. Medications reviewed and reconciled. Continue current medications. Appropriate prescriptions are ordered. Risks and benefits of meds are discussed. After visit summary provided. Advised to call for any problems, questions, or concerns. If symptoms worsen or don't improve as expected, to call us or go to ER. Follow up as directed, sooner if needed. Return in about 3 months (around 4/20/2022). This dictation was performed with a verbal recognition program and it was checked for errors. It is possible that there are still dictated errors within this office note. Any errors should be brought immediately to my attention for correction. All efforts were made to ensure that this office note is accurate.      Kami Mcfadden MD MD

## 2022-01-21 ENCOUNTER — TELEPHONE (OUTPATIENT)
Dept: GASTROENTEROLOGY | Age: 52
End: 2022-01-21

## 2022-01-21 LAB
ESTIMATED AVERAGE GLUCOSE: 191.5 MG/DL
HBA1C MFR BLD: 8.3 %

## 2022-01-21 NOTE — TELEPHONE ENCOUNTER
Called pt. In regards to a referral for hematemesis.  Made appt for pt to see dalton on 1/26/22 @9:45am

## 2022-01-26 ENCOUNTER — OFFICE VISIT (OUTPATIENT)
Dept: GASTROENTEROLOGY | Age: 52
End: 2022-01-26
Payer: COMMERCIAL

## 2022-01-26 VITALS
SYSTOLIC BLOOD PRESSURE: 132 MMHG | OXYGEN SATURATION: 97 % | BODY MASS INDEX: 28.89 KG/M2 | WEIGHT: 218 LBS | HEART RATE: 77 BPM | TEMPERATURE: 97.2 F | DIASTOLIC BLOOD PRESSURE: 78 MMHG | HEIGHT: 73 IN

## 2022-01-26 DIAGNOSIS — R10.13 DYSPEPSIA: ICD-10-CM

## 2022-01-26 DIAGNOSIS — Z01.818 PREOP TESTING: ICD-10-CM

## 2022-01-26 DIAGNOSIS — R14.0 BLOATING: ICD-10-CM

## 2022-01-26 DIAGNOSIS — Z12.11 ENCOUNTER FOR SCREENING COLONOSCOPY: ICD-10-CM

## 2022-01-26 DIAGNOSIS — K92.0 HEMATEMESIS WITH NAUSEA: Primary | ICD-10-CM

## 2022-01-26 PROCEDURE — 99204 OFFICE O/P NEW MOD 45 MIN: CPT | Performed by: NURSE PRACTITIONER

## 2022-01-26 RX ORDER — POLYETHYLENE GLYCOL 3350 17 G/17G
238 POWDER, FOR SOLUTION ORAL ONCE
Qty: 238 G | Refills: 0 | Status: SHIPPED | OUTPATIENT
Start: 2022-01-26 | End: 2022-01-26

## 2022-01-26 RX ORDER — ONDANSETRON 4 MG/1
4 TABLET, FILM COATED ORAL DAILY PRN
Qty: 30 TABLET | Refills: 3 | Status: SHIPPED | OUTPATIENT
Start: 2022-01-26 | End: 2022-02-23

## 2022-01-26 RX ORDER — BISACODYL 5 MG
TABLET, DELAYED RELEASE (ENTERIC COATED) ORAL
Qty: 4 TABLET | Refills: 0 | Status: SHIPPED | OUTPATIENT
Start: 2022-01-26 | End: 2022-04-13

## 2022-01-26 ASSESSMENT — ENCOUNTER SYMPTOMS
COUGH: 0
EYE PAIN: 0
ABDOMINAL PAIN: 0
EYE DISCHARGE: 0
DIARRHEA: 0
WHEEZING: 0
SHORTNESS OF BREATH: 0
BLOOD IN STOOL: 1
VOMITING: 0
BACK PAIN: 1
CONSTIPATION: 0
COLOR CHANGE: 0
NAUSEA: 1

## 2022-01-26 NOTE — PROGRESS NOTES
More Parra 54 y.o. male was seen by MARIA ELENA Delacruz on 1/26/2022     Wt Readings from Last 3 Encounters:   01/26/22 218 lb (98.9 kg)   01/20/22 220 lb (99.8 kg)   10/20/21 220 lb (99.8 kg)       ELOISA Parra is a pleasant 54 y.o.  male who presents today for acid reflux, bloating, and hematemesis. He has a past medical history of anxiety, depression, ventral hernia without obstruction, essential hypertension. GERD, Dupuytren's contracture, chronic skin ulcer, chronic bilateral low back pain without sciatica, type 2 diabetes mellitus, muscle cramping, erectile dysfunction, fatty liver, and Sigrid Parkinson's White pattern seen on ECG. He denies NSAID or alcohol use. He has a medical marijuana card. He has never had an EGD or colonoscopy. His appetite is poor with early satiety. He is avoiding sodas, no caffeine or chocolate. His weight is stable. He has lost weight in the last three years and his prior weight was 337 pounds. He had hematemesis after dry heaving that was first noted on January 12. In the last few weeks he has had two to four episodes of dry heaving with mucus tinged with blood. No current nausea. He had vomiting yesterday afternoon with small amount of blood and mucus. His heartburn and acid reflux is controlled with taking Prilosec. No nocturnal awakenings with acid reflux. No dysphagia or pain with swallowing. No abdominal pain. He has abdominal bloating that occurs less than an hour after eating. No excess belching or flatulence. He denies changes in his bowel pattern. His typical bowel pattern is three times daily with soft brown formed stools. No diarrhea or constipation. No blood in his stools or melena. No family history of stomach or colon cancer. ROS  Review of Systems   Constitutional: Positive for appetite change, fatigue and unexpected weight change. Negative for chills, diaphoresis and fever. HENT: Positive for ear pain (both ears). Negative for hearing loss and tinnitus. Eyes: Negative for pain, discharge and visual disturbance. Respiratory: Negative for cough, shortness of breath and wheezing. Cardiovascular: Negative for chest pain, palpitations and leg swelling. Gastrointestinal: Positive for blood in stool and nausea. Negative for abdominal pain, constipation, diarrhea and vomiting. Endocrine: Negative for cold intolerance and heat intolerance. Genitourinary: Negative for dysuria, frequency, hematuria and urgency. Musculoskeletal: Positive for back pain. Negative for myalgias and neck pain. Skin: Negative for color change, pallor and rash. Allergic/Immunologic: Negative for environmental allergies and food allergies. Neurological: Positive for dizziness and headaches. Negative for seizures and weakness. Hematological: Does not bruise/bleed easily. Psychiatric/Behavioral: Positive for dysphoric mood and sleep disturbance. The patient is nervous/anxious. Allergies  Allergies   Allergen Reactions    Bee Venom Anaphylaxis    Hornet Venom     Tramadol Other (See Comments)     Night terrors and sweats       Medications  Current Outpatient Medications   Medication Sig Dispense Refill    amitriptyline (ELAVIL) 50 MG tablet Take 1 tablet by mouth nightly 90 tablet 1    atorvastatin (LIPITOR) 40 MG tablet Take 1 tablet by mouth daily 90 tablet 1    gabapentin (NEURONTIN) 400 MG capsule Take 1 capsule by mouth 3 times daily for 90 days.  Intended supply: 90 days 270 capsule 1    hydroCHLOROthiazide (MICROZIDE) 12.5 MG capsule Take 1 capsule by mouth every morning 90 capsule 1    Insulin Degludec (TRESIBA FLEXTOUCH) 100 UNIT/ML SOPN Inject 40 Units into the skin nightly 6 pen 5    insulin lispro (HUMALOG KWIKPEN) 200 UNIT/ML SOPN pen TID with meals Glucose [id number] No Insulin 140-199 2 Units 200-249 4 Units 250-299 6 Units 300-349 8 Units 350-400 10 Units over 400 12 Units 5 pen 5    losartan (COZAAR) 100 MG tablet Take 1 tablet by mouth daily 90 tablet 1    meloxicam (MOBIC) 7.5 MG tablet Take 1 tablet by mouth daily 90 tablet 1    metFORMIN (GLUCOPHAGE) 1000 MG tablet Take 1 tablet by mouth 2 times daily (with meals) 180 tablet 1    potassium chloride (KLOR-CON M) 10 MEQ extended release tablet Take 1 tablet by mouth daily OTC 90 tablet 1    Ertugliflozin L-PyroglutamicAc (STEGLATRO) 15 MG TABS Take 15 mg by mouth daily 30 tablet 3    tiZANidine (ZANAFLEX) 4 MG tablet Take 1 tablet by mouth at bedtime 30 tablet 3    escitalopram (LEXAPRO) 10 MG tablet Take 1 tablet by mouth daily 30 tablet 3    omeprazole (PRILOSEC) 20 MG delayed release capsule Take 1 capsule by mouth daily 30 capsule 3    vitamin B-12 (CYANOCOBALAMIN) 1000 MCG tablet Take 1 tablet by mouth daily (Patient taking differently: Take 1,000 mcg by mouth daily OTC) 90 tablet 1    Cholecalciferol (VITAMIN D3) 50 MCG (2000 UT) TABS Take one tablet by mouth once a day (Patient taking differently: Take one tablet by mouth once a day OTC) 30 tablet 3    sildenafil (VIAGRA) 50 MG tablet Take 1 tablet by mouth as needed for Erectile Dysfunction (Patient not taking: Reported on 1/26/2022) 10 tablet 3    Insulin Pen Needle (PEN NEEDLES 3/16\") 31G X 5 MM MISC 1 each by Does not apply route daily (Patient not taking: Reported on 1/26/2022) 100 each 5     No current facility-administered medications for this visit. Past medical history:   He has a past medical history of Depression, Hyperlipidemia, Hypertension, Sleep disorder, and Ventral hernia without obstruction or gangrene. Past surgical history:  He has a past surgical history that includes hernia repair; Spine surgery (1990, 2010); Umbilical hernia repair (11/4/15); and Elbow surgery (Right). Social History:  He reports that he has never smoked. He has never used smokeless tobacco. He reports previous alcohol use of about 2.0 standard drinks of alcohol per week.  He reports current drug use. Drug: Marijuana Ana Kianna). Family history:  His family history includes Breast Cancer in his mother. Objective    Vitals:    01/26/22 0951   BP: 132/78   Pulse: 77   Temp: 97.2 °F (36.2 °C)   SpO2: 97%        Physical exam    Physical Exam  Constitutional:       General: He is not in acute distress. Appearance: Normal appearance. He is well-developed. He is not ill-appearing, toxic-appearing or diaphoretic. HENT:      Head: Normocephalic and atraumatic. Nose: Nose normal.      Mouth/Throat:      Mouth: Mucous membranes are moist.   Cardiovascular:      Rate and Rhythm: Normal rate and regular rhythm. Pulses: Normal pulses. Heart sounds: Normal heart sounds. No murmur heard. No gallop. Pulmonary:      Effort: Pulmonary effort is normal. No respiratory distress. Breath sounds: Normal breath sounds. No stridor. No wheezing or rhonchi. Abdominal:      General: Bowel sounds are normal. There is no distension. Palpations: Abdomen is soft. There is no mass. Tenderness: There is no abdominal tenderness. Hernia: No hernia is present. Musculoskeletal:         General: Normal range of motion. Cervical back: Neck supple. Skin:     General: Skin is warm and dry. Neurological:      Mental Status: He is alert and oriented to person, place, and time. Psychiatric:         Mood and Affect: Mood normal.         Office Visit on 01/20/2022   Component Date Value Ref Range Status    Glucose 01/20/2022 131  mg/dL Final    Sodium 01/20/2022 137  136 - 145 mmol/L Final    Potassium 01/20/2022 5.2* 3.5 - 5.1 mmol/L Final    Comment: Specimen hemolysis has exceeded the interference as defined by Roche. Value may be falsely increased. Suggest recollection if clinically  indicated.       Chloride 01/20/2022 96* 99 - 110 mmol/L Final    CO2 01/20/2022 24  21 - 32 mmol/L Final    Anion Gap 01/20/2022 17* 3 - 16 Final    Glucose 01/20/2022 132* 70 - 99 mg/dL Final    BUN 01/20/2022 20  7 - 20 mg/dL Final    CREATININE 01/20/2022 0.8* 0.9 - 1.3 mg/dL Final    GFR Non- 01/20/2022 >60  >60 Final    Comment: >60 mL/min/1.73m2 EGFR, calc. for ages 25 and older using the  MDRD formula (not corrected for weight), is valid for stable  renal function.  GFR  01/20/2022 >60  >60 Final    Comment: Chronic Kidney Disease: less than 60 ml/min/1.73 sq.m. Kidney Failure: less than 15 ml/min/1.73 sq.m. Results valid for patients 18 years and older.  Calcium 01/20/2022 10.4  8.3 - 10.6 mg/dL Final    Total Protein 01/20/2022 8.0  6.4 - 8.2 g/dL Final    Albumin 01/20/2022 5.0  3.4 - 5.0 g/dL Final    Albumin/Globulin Ratio 01/20/2022 1.7  1.1 - 2.2 Final    Total Bilirubin 01/20/2022 0.9  0.0 - 1.0 mg/dL Final    Alkaline Phosphatase 01/20/2022 89  40 - 129 U/L Final    ALT 01/20/2022 24  10 - 40 U/L Final    AST 01/20/2022 25  15 - 37 U/L Final    Comment: Specimen hemolysis has exceeded the interference as defined by Roche. Value may be falsely increased. Suggest recollection if clinically  indicated.       WBC 01/20/2022 5.0  4.0 - 11.0 K/uL Final    RBC 01/20/2022 6.50* 4.20 - 5.90 M/uL Final    Hemoglobin 01/20/2022 19.3* 13.5 - 17.5 g/dL Final    Hematocrit 01/20/2022 57.3* 40.5 - 52.5 % Final    MCV 01/20/2022 88.2  80.0 - 100.0 fL Final    MCH 01/20/2022 29.7  26.0 - 34.0 pg Final    MCHC 01/20/2022 33.6  31.0 - 36.0 g/dL Final    RDW 01/20/2022 13.9  12.4 - 15.4 % Final    Platelets 86/93/6816 204  135 - 450 K/uL Final    MPV 01/20/2022 8.8  5.0 - 10.5 fL Final    Neutrophils % 01/20/2022 58.8  % Final    Lymphocytes % 01/20/2022 27.8  % Final    Monocytes % 01/20/2022 10.2  % Final    Eosinophils % 01/20/2022 2.7  % Final    Basophils % 01/20/2022 0.5  % Final    Neutrophils Absolute 01/20/2022 3.0  1.7 - 7.7 K/uL Final    Lymphocytes Absolute 01/20/2022 1.4  1.0 - 5.1 K/uL Final    Monocytes Absolute 01/20/2022 0.5  0.0 - 1.3 K/uL Final    Eosinophils Absolute 01/20/2022 0.1  0.0 - 0.6 K/uL Final    Basophils Absolute 01/20/2022 0.0  0.0 - 0.2 K/uL Final    Cholesterol, Fasting 01/20/2022 156  0 - 199 mg/dL Final    Triglyceride, Fasting 01/20/2022 70  0 - 150 mg/dL Final    HDL 01/20/2022 56  40 - 60 mg/dL Final    LDL Calculated 01/20/2022 86  <100 mg/dL Final    VLDL Cholesterol Calculated 01/20/2022 14  Not Established mg/dL Final    Hemoglobin A1C 01/20/2022 8.3  See comment % Final    Comment: Comment:  Diagnosis of Diabetes: > or = 6.5%  Increased risk of diabetes (Prediabetes): 5.7-6.4%  Glycemic Control: Nonpregnant Adults: <7.0%                    Pregnant: <6.0%        eAG 01/20/2022 191.5  mg/dL Final       Assessment and Plan:  1. Will plan for an EGD/colonoscopy with MAC anesthesia. The patient was informed of the risks and benefits of the procedures. Will obtain cardiac clearance for [facility] Parkinson's white noted on ECG. 2.  Hematemesis most likely due to [facility] tear from dry heaving. Recommend avoidance of NSAID's. Continue Prilosec. Will order EGD to rule out other abnormalities. 3.  Episodic nausea and bloating most likely due to gastritis or functional dyspepsia. The patient was encouraged to take Zofran as needed. 4. GERD without dysphagia or odynophagia. The patient was encouraged to continue taking Prilosec daily for treatment. The patient was encouraged to continue with anti-reflux measures and avoid foods that trigger. 5.  Age appropriate for screening colonoscopy will order colonoscopy for colorectal cancer surveillance. 6.  Further recommendations for follow-up will be determined after the EGD/colonoscopy have been completed.

## 2022-01-26 NOTE — PATIENT INSTRUCTIONS
Patient Education        Upper GI Endoscopy: Before Your Procedure  What is an upper GI endoscopy? An upper gastrointestinal (or GI) endoscopy is a test that allows your doctor to look at the inside of your esophagus, stomach, and the first part of your small intestine, called the duodenum. The esophagus is the tube that carries food to your stomach. The doctor uses a thin, lighted tube that bends. It is called an endoscope, or scope. The doctor puts the tip of the scope in your mouth and gently moves it down your throat. The scope is a flexible video camera. The doctor looks at a monitor (like a TV set or a computer screen) as he or she moves the scope. A doctor may do this procedure to look for ulcers, tumors, infection, or bleeding. It also can be used to look for signs of acid backing up into your esophagus. This is called gastroesophageal reflux disease, or GERD. The doctor can use the scope to take a sample of tissue for study (a biopsy). The doctor also can use the scope to take out growths or stop bleeding. Follow-up care is a key part of your treatment and safety. Be sure to make and go to all appointments, and call your doctor if you are having problems. It's also a good idea to know your test results and keep a list of the medicines you take. How do you prepare for the procedure? Procedures can be stressful. This information will help you understand what you can expect. And it will help you safely prepare for your procedure. Preparing for the procedure    · Do not eat or drink anything for 6 to 8 hours before the test. An empty stomach helps your doctor see your stomach clearly during the test. It also reduces your chances of vomiting. If you vomit, there is a small risk that the vomit could enter your lungs.  (This is called aspiration.) If the test is done in an emergency, a tube may be inserted through your nose or mouth to empty your stomach.     · Do not take sucralfate (Carafate) or antacids on the day of the test. These medicines can make it hard for your doctor to see your upper GI tract.     · If your doctor tells you to, stop taking iron supplements 7 to 14 days before the test.     · Be sure you have someone to take you home. Anesthesia and pain medicine will make it unsafe for you to drive or get home on your own.     · Understand exactly what procedure is planned, along with the risks, benefits, and other options. · Tell your doctor ALL the medicines, vitamins, supplements, and herbal remedies you take. Some may increase the risk of problems during your procedure. Your doctor will tell you if you should stop taking any of them before the procedure and how soon to do it.     · If you take aspirin or some other blood thinner, ask your doctor if you should stop taking it before your procedure. Make sure that you understand exactly what your doctor wants you to do. These medicines increase the risk of bleeding.     · Make sure your doctor and the hospital have a copy of your advance directive. If you don't have one, you may want to prepare one. It lets others know your health care wishes. It's a good thing to have before any type of surgery or procedure. What happens on the day of the procedure? · Follow the instructions exactly about when to stop eating and drinking. If you don't, your procedure may be canceled. If your doctor told you to take your medicines on the day of the procedure, take them with only a sip of water.     · Take a bath or shower before you come in for your procedure. Do not apply lotions, perfumes, deodorants, or nail polish.     · Take off all jewelry and piercings. And take out contact lenses, if you wear them. At the hospital or surgery center   · Bring a picture ID.     · The test may take 15 to 30 minutes.     · The doctor may spray medicine on the back of your throat to numb it.  You also will get medicine to prevent pain and to relax you.     · You will lie on your left side. The doctor will put the scope in your mouth and toward the back of your throat. The doctor will tell you when to swallow. This helps the scope move down your throat. You will be able to breathe normally. The doctor will move the scope down your esophagus into your stomach. The doctor also may look at the duodenum.     · If your doctor wants to take a sample of tissue for a biopsy, he or she may use small surgical tools, which are put into the scope, to cut off some tissue. You will not feel a biopsy, if one is taken. The doctor also can use the tools to stop bleeding or to do other treatments, if needed.     · You will stay at the hospital or surgery center for 1 to 2 hours until the medicine you were given wears off. What happens after an upper GI endoscopy? · After the test, you may belch and feel bloated for a while.     · You may have a tickling, dry throat or mouth. You may feel a bit hoarse, and you may have a mild sore throat. These symptoms may last several days. Throat lozenges and warm saltwater gargles can help relieve the throat symptoms.     · Ask your doctor when you can drive again.     · Your doctor will tell you when you can go back to your usual diet and activities.     · Don't drink alcohol for 12 to 24 hours after the test.   When should you call your doctor? · You have questions or concerns.     · You don't understand how to prepare for your procedure.     · You become ill before the procedure (such as fever, flu, or a cold).     · You need to reschedule or have changed your mind about having the procedure. Where can you learn more? Go to https://Brain Tunnelgenix Technologies.COZero. org and sign in to your "[employer] account. Enter P790 in the [employer] box to learn more about \"Upper GI Endoscopy: Before Your Procedure. \"     If you do not have an account, please click on the \"Sign Up Now\" link.   Current as of: September 8, 2021               Content Version: 13.1  © 0061-5353 Healthwise, [employer]. Care instructions adapted under license by [facility] ([facility]). If you have questions about a medical condition or this instruction, always ask your healthcare professional. [address] any warranty or liability for your use of this information. Patient Education        Learning About a Meg-Cabrera Tear  What is a Meg-Cabrera tear? A Meg-Cabrera tear is a rip or tear of a mucous membrane in the digestive tract. Mucous membranes are tissues that line body cavities or canals. They make a thick, slippery liquid called mucus. The mucus protects the membranes and keeps them moist.  This kind of tear is most often caused by severe vomiting. It usually happens where the lower esophagus and the stomach meet. (The esophagus is the tube that connects the throat to the stomach.)  How is a Meg-Cabrera tear diagnosed? You will have a physical exam by your doctor. You may also have blood tests. And you might have an endoscopy. This is a test that uses a thin, flexible, lighted viewing tool (endoscope) to allow a doctor to see the insides of organs, canals, and cavities in the body. What are the symptoms? Symptoms may include:  · Belly pain. · Vomiting blood. · Dark stools that look like tar or that contain dark red blood. How is a Meg-Cabrera tear treated? In most cases, the tear heals on its own in a few days. You may not need treatment. If you do need treatment, you may have to stay in the hospital. Treatment may include:  · Intravenous (IV) fluids to help prevent dehydration. · Medicines that block stomach acid. · Repair through the endoscope with a special clip (endoclip) to stop the bleeding. · A blood transfusion to replace heavy blood loss. · Surgery (only in severe cases). Follow-up care is a key part of your treatment and safety. Be sure to make and go to all appointments, and call your doctor if you are having problems.  It's also a good idea to know your test results and keep a list of the medicines you take. Where can you learn more? Go to https://chpepiceweb.SoftArt. org and sign in to your Qminder account. Enter B410 in the [employer] box to learn more about \"Learning About a Meg-Cabrera Tear. \"     If you do not have an account, please click on the \"Sign Up Now\" link. Current as of: September 8, 2021               Content Version: 13.1  © 8665-9852 [employer]Vinyl. Care instructions adapted under license by [facility] ([facility]). If you have questions about a medical condition or this instruction, always ask your healthcare professional. [address] any warranty or liability for your use of this information. Patient Education        Colonoscopy: Before Your Procedure  What is a colonoscopy? A colonoscopy is a test that lets a doctor look inside your colon. The doctor uses a thin, lighted tube called a colonoscope to look for problems. These include small growths called polyps, cancer, or bleeding. During the test, the doctor can take samples of tissue that can be checked for cancer or other problems. This is called a biopsy. The doctor can also take out polyps. Before the test, you will need to stop eating solid foods. You also will be given instructions on how to clean out your colon. This helps your doctor be able to see inside your colon during the test.  How do you prepare for the procedure? Procedures can be stressful. This information will help you understand what you can expect. And it will help you safely prepare for your procedure. Preparing for the procedure    · Be sure you have someone to take you home. Anesthesia and pain medicine will make it unsafe for you to drive or get home on your own.     · Understand exactly what procedure is planned, along with the risks, benefits, and other options.     · Tell your doctor ALL the medicines, vitamins, supplements, and herbal remedies you take. Some may increase the risk of problems during your procedure. Your doctor will tell you if you should stop taking any of them before the procedure and how soon to do it.     · If you take aspirin or some other blood thinner, ask your doctor if you should stop taking it before your procedure. Make sure that you understand exactly what your doctor wants you to do. These medicines increase the risk of bleeding.     · Make sure your doctor and the hospital have a copy of your advance directive. If you don't have one, you may want to prepare one. It lets others know your health care wishes. It's a good thing to have before any type of surgery or procedure. Before the procedure    · Follow your doctor's directions about when to stop eating solid foods and drink only clear liquids. You can drink water, clear juices, clear broths, flavored ice pops, and gelatin (such as Jell-O). Do not eat or drink anything red or purple. This includes grape juice and grape-flavored ice pops. It also includes fruit punch and cherry gelatin.     · Drink the \"colon prep\" liquid as your doctor tells you. You will want to stay home, because the liquid will make you go to the bathroom a lot. Your stools will be loose and watery. It's very important to drink all of the liquid. If you have problems drinking it, call your doctor.     · Do not eat any solid foods after you drink the colon prep.     · Stop drinking clear liquids for a few hours before the test. Your doctor will tell you how many hours this will be. What happens on the day of the procedure? · Follow the instructions exactly about when to stop eating and drinking. If you don't, your procedure may be canceled. If your doctor told you to take your medicines on the day of the procedure, take them with only a sip of water.     · Take a bath or shower before you come in for your procedure.  Do not apply lotions, perfumes, deodorants, or nail polish.     · Take off all jewelry and piercings. And take out contact lenses, if you wear them. At the doctor's office or hospital   · Bring a picture ID.     · You will be kept comfortable and safe by your anesthesia provider. The anesthesia may make you sleep.     · You will lie on your back or your side with your knees drawn up toward your belly. The doctor will gently put a gloved finger into your anus. Then the doctor puts the scope in and moves it into your colon. The scope goes in easily because it is lubricated.     · The doctor may also use small tools to take tissue samples for a biopsy or to remove polyps. This does not hurt.     · The test usually takes 30 to 45 minutes. But it may take longer. It depends on what is found and what is done. When should you call your doctor? · You have questions or concerns.     · You don't understand how to prepare for your procedure.     · You are having trouble with the bowel prep.     · You become ill before the procedure (such as fever, flu, or a cold).     · You need to reschedule or have changed your mind about having the procedure. Where can you learn more? Go to https://Communication Specialist [email or web address].[email or web address] Property Manager. org and sign in to your LogRhythm account. Enter C315 in the [employer]ifi box to learn more about \"Colonoscopy: Before Your Procedure. \"     If you do not have an account, please click on the \"Sign Up Now\" link. Current as of: September 8, 2021               Content Version: 13.1  © 2006-2021 [employer]joy. Care instructions adapted under license by [facility] ([facility]). If you have questions about a medical condition or this instruction, always ask your healthcare professional. [place] [address] any warranty or liability for your use of this information. Patient Education        Indigestion (Dyspepsia or Heartburn): Care Instructions  Your Care Instructions  Sometimes it can be hard to pinpoint the cause of indigestion.  (It is also called dyspepsia or heartburn.) Most cases of an upset stomach with bloating, burning, burping, and nausea are minor and go away within several hours. Home treatment and over-the-counter medicine often are able to control symptoms. But if you take medicine to relieve your indigestion without making diet and lifestyle changes, your symptoms are likely to return again and again. If you get indigestion often, it may be a sign of a more serious medical problem. Be sure to follow up with your doctor, who may want to do tests to be sure of the cause of your indigestion. Follow-up care is a key part of your treatment and safety. Be sure to make and go to all appointments, and call your doctor if you are having problems. It's also a good idea to know your test results and keep a list of the medicines you take. How can you care for yourself at home? · Your doctor may recommend over-the-counter medicine. For mild or occasional indigestion, antacids such as Gaviscon, Mylanta, Maalox, or Tums, may help. Be safe with medicines. Be careful when you take over-the-counter antacid medicines. Many of these medicines have aspirin in them. Read the label to make sure that you are not taking more than the recommended dose. Too much aspirin can be harmful. · Your doctor also may recommend over-the-counter acid reducers, such as Pepcid AC (famotidine), Tagamet HB (cimetidine), or Prilosec (omeprazole). Read and follow all instructions on the label. If you use these medicines often, talk with your doctor. · Change your eating habits. ? It's best to eat several small meals instead of two or three large meals. ? After you eat, wait 2 to 3 hours before you lie down. ? Avoid foods that make your symptoms worse. These may include chocolate, mint, alcohol, pepper, spicy foods, high-fat foods, or drinks with caffeine in them, such as tea, coffee, lou, or energy drinks.  If your symptoms are worse after you eat a certain food, you may want to stop eating it to see if your symptoms get better. · Do not smoke or chew tobacco. Smoking can make GERD worse. If you need help quitting, talk to your doctor about stop-smoking programs and medicines. These can increase your chances of quitting for good. · If you have GERD symptoms at night, raise the head of your bed 6 to 8 inches. You can do this by putting the frame on blocks or placing a foam wedge under the head of your mattress. (Adding extra pillows does not work.)  · Do not wear tight clothing around your middle. · Lose weight if you need to. Losing just 5 to 10 pounds can help. · Do not take anti-inflammatory medicines, such as aspirin, ibuprofen (Advil, Motrin), or naproxen (Aleve). These can irritate the stomach. If you need a pain medicine, try acetaminophen (Tylenol), which does not cause stomach upset. When should you call for help? Call your doctor now or seek immediate medical care if:    · You have new or worse belly pain.     · You are vomiting. Watch closely for changes in your health, and be sure to contact your doctor if:    · You have new or worse symptoms of indigestion.     · You have trouble or pain swallowing.     · You are losing weight.     · You do not get better as expected. Where can you learn more? Go to https://eÃ‡iftpeaniaSymptom.lyeb.SafeLogic. org and sign in to your 5[employer]z [employer] account. Enter Z823 in the [facility] box to learn more about \"Indigestion (Dyspepsia or Heartburn): Care Instructions. \"     If you do not have an account, please click on the \"Sign Up Now\" link. Current as of: September 8, 2021               Content Version: 13.1  © 6367-7615 Healthwise, Incorporated. Care instructions adapted under license by [facility] ([facility]). If you have questions about a medical condition or this instruction, always ask your healthcare professional. [address] any warranty or liability for your use of this information.

## 2022-01-27 ENCOUNTER — TELEPHONE (OUTPATIENT)
Dept: CARDIOLOGY CLINIC | Age: 52
End: 2022-01-27

## 2022-02-19 PROBLEM — Z23 NEED FOR INFLUENZA VACCINATION: Status: RESOLVED | Noted: 2022-01-20 | Resolved: 2022-02-19

## 2022-02-23 ENCOUNTER — TELEMEDICINE (OUTPATIENT)
Dept: PSYCHOLOGY | Age: 52
End: 2022-02-23
Payer: COMMERCIAL

## 2022-02-23 ENCOUNTER — INITIAL CONSULT (OUTPATIENT)
Dept: CARDIOLOGY CLINIC | Age: 52
End: 2022-02-23
Payer: COMMERCIAL

## 2022-02-23 VITALS
SYSTOLIC BLOOD PRESSURE: 130 MMHG | WEIGHT: 219 LBS | DIASTOLIC BLOOD PRESSURE: 86 MMHG | HEART RATE: 61 BPM | HEIGHT: 73 IN | BODY MASS INDEX: 29.03 KG/M2

## 2022-02-23 DIAGNOSIS — E11.42 TYPE 2 DIABETES MELLITUS WITH DIABETIC POLYNEUROPATHY, WITH LONG-TERM CURRENT USE OF INSULIN (HCC): ICD-10-CM

## 2022-02-23 DIAGNOSIS — E78.00 PURE HYPERCHOLESTEROLEMIA: ICD-10-CM

## 2022-02-23 DIAGNOSIS — I10 ESSENTIAL HYPERTENSION: Primary | ICD-10-CM

## 2022-02-23 DIAGNOSIS — I45.6 WOLFF PARKINSON WHITE PATTERN SEEN ON ELECTROCARDIOGRAM: ICD-10-CM

## 2022-02-23 DIAGNOSIS — F32.A DEPRESSIVE DISORDER: Primary | ICD-10-CM

## 2022-02-23 DIAGNOSIS — N52.9 ERECTILE DYSFUNCTION, UNSPECIFIED ERECTILE DYSFUNCTION TYPE: ICD-10-CM

## 2022-02-23 DIAGNOSIS — Z79.4 TYPE 2 DIABETES MELLITUS WITH DIABETIC POLYNEUROPATHY, WITH LONG-TERM CURRENT USE OF INSULIN (HCC): ICD-10-CM

## 2022-02-23 PROCEDURE — 99204 OFFICE O/P NEW MOD 45 MIN: CPT | Performed by: INTERNAL MEDICINE

## 2022-02-23 PROCEDURE — 93000 ELECTROCARDIOGRAM COMPLETE: CPT | Performed by: INTERNAL MEDICINE

## 2022-02-23 PROCEDURE — 3052F HG A1C>EQUAL 8.0%<EQUAL 9.0%: CPT | Performed by: INTERNAL MEDICINE

## 2022-02-23 PROCEDURE — 90832 PSYTX W PT 30 MINUTES: CPT | Performed by: PSYCHOLOGIST

## 2022-02-23 RX ORDER — SILDENAFIL 50 MG/1
50 TABLET, FILM COATED ORAL PRN
Qty: 10 TABLET | Refills: 0 | Status: SHIPPED | OUTPATIENT
Start: 2022-02-23 | End: 2022-03-31

## 2022-02-23 NOTE — PROGRESS NOTES
CARDIAC CONSULT NOTE       Savannah Hooper  54 y.o.  male    Chief Complaint   Patient presents with    Hyperlipidemia    Hypertension       Referring physician:  Maite Barba MD     Primary care physician:  Maite Barba MD    History of Present Illness:     Savannah Hooper is a 54 y.o. male referred for pre-op evaluation prior to GI W/U. Patient has known H/O WPW & at least one EKG with atrial fibrillation in the past. Patient has no complaints. Particularly no H/O Palpitations or Syncope. Never had a stress test. Last Echo was normal.     has a past medical history of Depression, Hyperlipidemia, Hypertension, Diabetes, Sleep disorder, and Ventral hernia without obstruction or gangrene. has a past surgical history that includes hernia repair; Spine surgery (1990, 2010); Umbilical hernia repair (11/4/15); and Elbow surgery (Right). reports that he has never smoked. He has never used smokeless tobacco. He reports previous alcohol use of about 2.0 standard drinks of alcohol per week. He reports previous drug use. Drug: Marijuana Don Safer). family history includes Breast Cancer in his mother. Review of Systems:   1. Cardiovascular: No chest pain, dyspnea on exertion, palpitations or loss of consciousness  2. Respiratory: No cough or wheezing    3. Musculoskeletal:  No gait disturbance, weakness, muscle cramps, aches & pains or joint complaints  4. Neurological: No TIA or stroke symptoms  5. Psychiatric: No anxiety or depression  6. Hematologic/Lymphatic: No bleeding problems, blood clots or swollen lymph nodes    Physical Examination:    /86   Pulse 61   Ht 6' 1\" (1.854 m)   Wt 219 lb (99.3 kg)   BMI 28.89 kg/m²    Wt Readings from Last 3 Encounters:   02/23/22 219 lb (99.3 kg)   01/26/22 218 lb (98.9 kg)   01/20/22 220 lb (99.8 kg)     Body mass index is 28.89 kg/m². General Appearance:  Non-obese/Well Nourished  1. Skin: It is warm & dry. No rashes noted.   2. Eyes: No conjunctival Pallor seen. No jaundice noted. 3. Neck: is supple there is no elevation of JVD. No thyromegaly  4. Respiratory:  · Resp Assessment: No abnormal findings. · Resp Auscultation: Vesicular breath sounds without rales or wheezing. 5. Cardiovascular:  · Auscultation: Normal S1 & S2, No prominent murmurs  · Carotid Arteries: No bruits present  · Abdominal Aorta: Non-palpable  · Pedal Pulses: 2+ and equal   6. Abdomen:  · No masses or tenderness  · Liver/Spleen: No Abnormalities Noted, no organomegaly. 7. Musculoskeletal: No joint deformities. No muscle wasting  8. Extremities:  ·  No Cyanosis or Clubbing. No significant edema   9. Rectal / genital:  ·  Deferred  10.  Neurological/Psychiatric:  · Oriented to time, place, and person  · Non-anxious    Lab Results   Component Value Date    CKTOTAL 107 09/04/2020    CKTOTAL 142 08/14/2017    TROPONINT <0.010 09/04/2020    TROPONINT <0.010 09/04/2020     BNP:    Lab Results   Component Value Date    PROBNP 15.33 09/04/2020    PROBNP 93.09 08/14/2017     PT/INR:  No results found for: PTINR  Lab Results   Component Value Date    LABA1C 8.3 01/20/2022    LABA1C 8.9 10/20/2021     Lab Results   Component Value Date    CHOL 206 (H) 09/12/2019    CHOL 171 12/28/2011    TRIG 84 09/12/2019    TRIG 73 12/28/2011    HDL 56 01/20/2022    HDL 63 (H) 07/21/2021    LDLCALC 86 01/20/2022    LDLCALC 75 07/21/2021    LDLDIRECT 126 (H) 12/28/2011     Lab Results   Component Value Date    ALT 24 01/20/2022    ALT 39 07/21/2021    AST 25 01/20/2022    AST 32 07/21/2021     BMP:    Lab Results   Component Value Date     01/20/2022     07/21/2021    K 5.2 01/20/2022    K 4.7 07/21/2021    CL 96 01/20/2022    CL 98 07/21/2021    CO2 24 01/20/2022    CO2 23 07/21/2021    BUN 20 01/20/2022    BUN 26 07/21/2021    CREATININE 0.8 01/20/2022    CREATININE 0.8 07/21/2021     CMP:    Lab Results   Component Value Date     01/20/2022     07/21/2021    K 5.2 01/20/2022    K 4.7 07/21/2021    CL 96 01/20/2022    CL 98 07/21/2021    CO2 24 01/20/2022    CO2 23 07/21/2021    BUN 20 01/20/2022    BUN 26 07/21/2021    CREATININE 0.8 01/20/2022    CREATININE 0.8 07/21/2021    PROT 8.0 01/20/2022    PROT 7.8 07/21/2021    PROT 6.7 12/28/2011     TSH:    Lab Results   Component Value Date    TSH 2.22 09/12/2019    TSHHS 1.534 12/28/2011       Echo: 9/2020    Left ventricular systolic function is normal.   Ejection fraction is visually estimated at 50-55%. Small left ventricular cavity. No significant valvular disease noted. No evidence of any pericardial effusion. Stress Cardiolyte: not done  EKG: normal sinus rhythm, short WA & WPW. QUALITY MEASURES REVIEWED:  1.CAD:Patient is taking anti platelet agent:No  Patient does not have Hx of documented CAD  2. DYSLIPIDEMIA: Patient is on cholesterol lowering medication:Yes   3. Beta-Blocker therapy for CAD, if prior Myocardial Infarction:No   4. Counselled regarding smoking cessation. No   Patient does not Smoke. 5.Anticoagulation therapy (for A.Fib) No   ? PAF  6. Discussed weight management strategies. Assessment, Recommendations & Tests:     1. Hypertension: for more than 10 years. Controlled on current management. Takes HCTZ & Cozaar. 2. Dyslipidemia: Good profile on current medication. Takes Lipitor    3. Diabetes:For > 10 years, still not well control, but says A1c is down from 15 to 8.3.    4. 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 Explained the condition. Will get ETT done & refer to Khris Baez for further evaluation & recommendations. ? Ablation. 5. PAF; Will have patient wear a 30 day event monitor. His CHADS score is high. 's input will be valuable. ? Anticoagulate. I spent about 30 min.  of time in review of the available data, chart Prep., interviewing patient, obtaining history, performing physical exam, going through decision making analysis for assessment & plans of management on this patient. Office Visit for test results.      Angie Lopez MD, 2/23/2022 8:49 AM

## 2022-02-23 NOTE — PATIENT INSTRUCTIONS
1. Hypertension: for more than 10 years. Controlled on current management. Takes HCTZ & Cozaar. 2. Dyslipidemia: Good profile on current medication. Takes Lipitor    3. Diabetes:For > 10 years, still not well control, but says A1c is down from 15 to 8.3.    4. 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 Explained the condition. Will get ETT done & refer to Sharif Graf for further evaluation & recommendations. ? Ablation. 5. PAF; Will have patient wear a 30 day event monitor. His CHADS score is high. 's input will be valuable. ? Anticoagulate. I spent about 30 min. of time in review of the available data, chart Prep., interviewing patient, obtaining history, performing physical exam, going through decision making analysis for assessment & plans of management on this patient. Office Visit for test results. Please hold on to these instructions the  will call you within 1-9 business days when we receive authorization from your insurance. Treadmill Stress test    WHAT TO EXPECT:     The exercise stress test is a test used to provide information about how the heart responds to exertion. It involves walking on a treadmill at increasing levels of difficulty, while electrocardiogram, heart rate, and blood pressure are monitored. ? This test will take approximately 1 hours: Please arrive at the office 5-10 min before the scheduled testing time. ? Once you are taken back to the stress lab you will be asked to read and sign a consent form before proceeding with the test. At this time feel free to ask any question that you may have as the procedure is explained to you.   ? You will be attached to the EKG monitoring equipment, your blood pressure will be taken, and you will begin walking on the treadmill. The treadmill starts off slowly and every 3 minutes the treadmill speeds up and the elevation increases. The average person usually walks for a period of 6-8min.     PREPARATION FOR TEST:    ? Eat a light meal such as juice and toast at least 2 hours prior to the procedure. ? AVOID CAFFEINE 24 HOURS PRIOR TO THE TEST: Including coffee, Tea, Rosario and other soft drinks even those labeled  caffeine free or decaffeinated. ? Please wear loose comfortable clothing and comfortable walking shoes. Please wear a short sleeved shirt. ? Please shower or bathe and do not apply powder or lotion to the skin prior to testing, as the electrodes will adhere better giving us a clearer visual EKG recording.

## 2022-02-23 NOTE — LETTER
Patient Name: Cade Cespedes  : 1970  MRN# [id number]    REASON FOR VISIT:Consult Cardiac Clearance [facility] C scope DTBD      Current Outpatient Medications   Medication Sig Dispense Refill    ondansetron (ZOFRAN) 4 MG tablet Take 1 tablet by mouth daily as needed for Nausea or Vomiting 30 tablet 3    bisacodyl (BISACODYL) 5 MG EC tablet Take 4 tablets once for colonoscopy prep 4 tablet 0    amitriptyline (ELAVIL) 50 MG tablet Take 1 tablet by mouth nightly 90 tablet 1    atorvastatin (LIPITOR) 40 MG tablet Take 1 tablet by mouth daily 90 tablet 1    gabapentin (NEURONTIN) 400 MG capsule Take 1 capsule by mouth 3 times daily for 90 days.  Intended supply: 90 days 270 capsule 1    hydroCHLOROthiazide (MICROZIDE) 12.5 MG capsule Take 1 capsule by mouth every morning 90 capsule 1    Insulin Degludec (TRESIBA FLEXTOUCH) 100 UNIT/ML SOPN Inject 40 Units into the skin nightly 6 pen 5    insulin lispro (HUMALOG KWIKPEN) 200 UNIT/ML SOPN pen TID with meals Glucose [id number] No Insulin 140-199 2 Units 200-249 4 Units 250-299 6 Units 300-349 8 Units 350-400 10 Units over 400 12 Units 5 pen 5    losartan (COZAAR) 100 MG tablet Take 1 tablet by mouth daily 90 tablet 1    meloxicam (MOBIC) 7.5 MG tablet Take 1 tablet by mouth daily 90 tablet 1    metFORMIN (GLUCOPHAGE) 1000 MG tablet Take 1 tablet by mouth 2 times daily (with meals) 180 tablet 1    potassium chloride (KLOR-CON M) 10 MEQ extended release tablet Take 1 tablet by mouth daily OTC 90 tablet 1    Ertugliflozin L-PyroglutamicAc (STEGLATRO) 15 MG TABS Take 15 mg by mouth daily 30 tablet 3    sildenafil (VIAGRA) 50 MG tablet Take 1 tablet by mouth as needed for Erectile Dysfunction (Patient not taking: Reported on 2022) 10 tablet 3    tiZANidine (ZANAFLEX) 4 MG tablet Take 1 tablet by mouth at bedtime 30 tablet 3    escitalopram (LEXAPRO) 10 MG tablet Take 1 tablet by mouth daily 30 tablet 3    omeprazole (PRILOSEC) 20 MG delayed release capsule Take 1 capsule by mouth daily 30 capsule 3    Insulin Pen Needle (PEN NEEDLES 3/16\") 31G X 5 MM MISC 1 each by Does not apply route daily (Patient not taking: Reported on 1/26/2022) 100 each 5    vitamin B-12 (CYANOCOBALAMIN) 1000 MCG tablet Take 1 tablet by mouth daily (Patient taking differently: Take 1,000 mcg by mouth daily OTC) 90 tablet 1    Cholecalciferol (VITAMIN D3) 50 MCG (2000 UT) TABS Take one tablet by mouth once a day (Patient taking differently: Take one tablet by mouth once a day OTC) 30 tablet 3     No current facility-administered medications for this visit. Smoke: What:                           How much:    Alcohol: How Much:     Caffeine: Pop:         Tea:            Coffee:                Chocolate:    Exercise:    Labs: Lipids:             CBC:       BMP/CMP:          TSH:              A1C:    Last Visit:  Complaints:  Changes:    Last EKG:      STRESS TEST:  NONE    ECHO: 9/2020   Left ventricular systolic function is normal.   Ejection fraction is visually estimated at 50-55%. Small left ventricular cavity. No significant valvular disease noted. No evidence of any pericardial effusion. CAROTID: NONE    MUGA: NONE    LAST PACER CHECK: NONE    CARDIAC CATH: NONE       Amio Protocol:    CHADS: [id number] Score for Atrial Fibrillation Stroke Risk   Risk   Factors  Component Value   C CHF No 0   H HTN Yes 1   A2 Age >= 76 No,  (50 y.o.) 0   D DM Yes 1   S2 Prior Stroke/TIA No 0   V Vascular Disease No 0   A Age 74-69 No,  (50 y.o.) 0   Sc Sex male 0    [id number]  Score  2   Score last updated 2/23/22 9:86 AM EST    Click here for a link to the UpToDate guideline \"Atrial Fibrillation: Anticoagulation therapy to prevent embolization    Disclaimer: Risk Score calculation is dependent on accuracy of patient problem list and past encounter diagnosis.

## 2022-02-23 NOTE — PROGRESS NOTES
Patient Location: Home       Provider Location ([facility]/State): [place]       This virtual visit was conducted via interactive/real-time audio/video. Pursuant to the emergency declaration under the [facility], [facility] waiver authority and the [employer] Act, this Virtual  Visit was conducted, with patient's consent, to reduce the patient's risk of exposure to COVID-19 and provide continuity of care for an established patient. Services were provided through a video synchronous discussion virtually to substitute for in-person clinic visit. Additionally, this provider made reasonable effort to verify identify of patient, conducted risk benefit analysis and have determined patient's presenting problem and condition are consistent with the use of telepsychology to patient's benefit, ensured pt has access, knowledge, and skills required to use required technology, obtained alternative means of contacting patient, provided pt with alternative means of contacting provider, reviewed informed consent and obtained verbal agreement in lieu of written informed consent, as such is rendered impossible due to the unexpected nature secondary to COVID-19 clinical recommendations. Behavioral Health Consultation  Fran Lopes Psy.D. Psychologist      Time spent with Patient: 30 minutes  Visit number: 2  Reason for Consult:  depression  Referring Provider: Lisa Perera MD  [address] [place] [address]    S:  ----------------------------------------------------------------------------------------------------------------------  depression  Has been writing in his \"list of goodbyes. \" Explained he will write in this journal when he is \"feeling really down. \" Denied this being a suicide note. Denied any plan or intent to end his life.      Some excitement abt retuning to classroom, however concerned abt sudden discontinuation of medical marijuana. Discussed mask vs man. Destroy the mask, build the man. \"Why do I avoid people? \"  \"I don't know if my whole life has been an act? \"  -Has been using spider approach-avoidance metaphor and begun \"taking things head on. \"   -wondering if he has AD/HD. He took some adderall given to him by a friend. Open to testing. O:  ----------------------------------------------------------------------------------------------------------------------  MSE:  Orientation:  oriented to person, place, time, and general circumstances  Appearance and behavior:  alert, cooperative, crying  Speech:  spontaneous, normal rate and normal volume  Mood: depressed and anxious   Thought Content:  intact, hopelessness, helplessness, worthlessness and excessive guilt  Thought Process:  linear, goal directed and coherent  Interest/Pleasure: Loss of Pleasure/Fun  Sleep disturbance: Yes  Motivation: Poor  Energy: Tired/Fatigued  Morbid ideation No  Suicide Assessment: no suicidal ideation    A:  ----------------------------------------------------------------------------------------------------------------------  Diagnosis:    1. Depressive disorder         PHQ Scores 1/20/2022 3/24/2021 2/4/2020 9/26/2019   PHQ2 Score 6 0 0 0   PHQ9 Score 19 0 0 0     Interpretation of Total Score Depression Severity: 1-4 = Minimal depression, 5-9 = Mild depression, 10-14 = Moderate depression, 15-19 = Moderately severe depression, 20-27 = Severe depression    P:  ----------------------------------------------------------------------------------------------------------------------    Open to AD/HD assessment. Will discuss at next appt. General:   [x] [place]-setting to identify pt's primary goals for [facility] visit / overall health   [x] Provided psychoeducation/handout on:   1.  Depressive disorder        [x]  Supportive interventions    Cognitive:   [x] Trained in strategies for increasing balanced thinking   [x] Cognitive strategies to target current mental health sx    [x] Identified and challenged maladaptive thoughts    Behavioral:   [x] Discussed and set plan for behavioral activation   [x] Discussed and problem-solved barriers in adhering to behavioral change plan   [x] Motivational Interviewing to increase patient confidence and compliance with       adhering to behavioral change plan   [x] Discussed potential barriers to change   [x] Discussed self-care (sleep, nutrition, rewarding activities, social support, exercise)    Other:   []   []   []   []    Recommendations to patient:    1. Return to Dr. Ana Marlow in 4 week(s)    2.                   Feedback provided to pt's PCP via EPIC and/or oral report

## 2022-02-23 NOTE — LETTER
[address] [place] [address]. [address] [id number]  Phone: [phone]  Fax: [phone]    Daiana Lind MD    February 23, 2022     [facility] [address]    Patient: Estela Schaffer   MR Number: [id number]   YOB: 1970   Date of Visit: 2/23/2022       Dear Starr Nixon:    Thank you for referring Gray Jean to me for evaluation/treatment. Below are the relevant portions of my assessment and plan of care. If you have questions, please do not hesitate to call me. I look forward to following Amy Song along with you.     Sincerely,      Daiana Lind MD

## 2022-03-02 ENCOUNTER — PROCEDURE VISIT (OUTPATIENT)
Dept: CARDIOLOGY CLINIC | Age: 52
End: 2022-03-02
Payer: COMMERCIAL

## 2022-03-02 ENCOUNTER — NURSE ONLY (OUTPATIENT)
Dept: CARDIOLOGY CLINIC | Age: 52
End: 2022-03-02
Payer: COMMERCIAL

## 2022-03-02 DIAGNOSIS — E11.42 TYPE 2 DIABETES MELLITUS WITH DIABETIC POLYNEUROPATHY, WITH LONG-TERM CURRENT USE OF INSULIN (HCC): ICD-10-CM

## 2022-03-02 DIAGNOSIS — R07.9 CHEST PAIN, UNSPECIFIED TYPE: ICD-10-CM

## 2022-03-02 DIAGNOSIS — I10 ESSENTIAL HYPERTENSION: Primary | ICD-10-CM

## 2022-03-02 DIAGNOSIS — E78.00 PURE HYPERCHOLESTEROLEMIA: ICD-10-CM

## 2022-03-02 DIAGNOSIS — Z79.4 TYPE 2 DIABETES MELLITUS WITH DIABETIC POLYNEUROPATHY, WITH LONG-TERM CURRENT USE OF INSULIN (HCC): ICD-10-CM

## 2022-03-02 DIAGNOSIS — I45.6 WOLFF PARKINSON WHITE PATTERN SEEN ON ELECTROCARDIOGRAM: ICD-10-CM

## 2022-03-02 DIAGNOSIS — I10 ESSENTIAL HYPERTENSION: ICD-10-CM

## 2022-03-02 PROCEDURE — 93270 REMOTE 30 DAY ECG REV/REPORT: CPT | Performed by: INTERNAL MEDICINE

## 2022-03-02 PROCEDURE — 93015 CV STRESS TEST SUPVJ I&R: CPT | Performed by: INTERNAL MEDICINE

## 2022-03-02 NOTE — PROGRESS NOTES
30 days e-cardio monitor placed.  # [id number]. Instructed patient on how to record the event and to call monitoring center at [phone] if any problems arise. Instructed patient to disconnect the lead wires from the electrodes before bathing or showering and reattach them afterwards. Instructed patient that the electrodes should be changed every 3 days or if they no longer adhere to the skin. Patient to mail package after the monitor has ended. Patient verbalized understanding.

## 2022-03-15 ENCOUNTER — OFFICE VISIT (OUTPATIENT)
Dept: INTERNAL MEDICINE CLINIC | Age: 52
End: 2022-03-15
Payer: COMMERCIAL

## 2022-03-15 VITALS
BODY MASS INDEX: 29.24 KG/M2 | SYSTOLIC BLOOD PRESSURE: 134 MMHG | HEART RATE: 70 BPM | OXYGEN SATURATION: 97 % | TEMPERATURE: 97.1 F | WEIGHT: 220.6 LBS | DIASTOLIC BLOOD PRESSURE: 84 MMHG | HEIGHT: 73 IN

## 2022-03-15 DIAGNOSIS — Z79.4 TYPE 2 DIABETES MELLITUS WITH DIABETIC POLYNEUROPATHY, WITH LONG-TERM CURRENT USE OF INSULIN (HCC): ICD-10-CM

## 2022-03-15 DIAGNOSIS — F41.9 ANXIETY: ICD-10-CM

## 2022-03-15 DIAGNOSIS — D75.1 POLYCYTHEMIA: ICD-10-CM

## 2022-03-15 DIAGNOSIS — M79.671 PAIN IN BOTH FEET: ICD-10-CM

## 2022-03-15 DIAGNOSIS — G62.9 NEUROPATHY: ICD-10-CM

## 2022-03-15 DIAGNOSIS — M25.562 CHRONIC PAIN OF LEFT KNEE: ICD-10-CM

## 2022-03-15 DIAGNOSIS — I45.6 WPW (WOLFF-PARKINSON-WHITE SYNDROME): ICD-10-CM

## 2022-03-15 DIAGNOSIS — G89.29 CHRONIC BILATERAL LOW BACK PAIN WITHOUT SCIATICA: ICD-10-CM

## 2022-03-15 DIAGNOSIS — G89.29 CHRONIC PAIN OF LEFT KNEE: ICD-10-CM

## 2022-03-15 DIAGNOSIS — M54.50 CHRONIC BILATERAL LOW BACK PAIN WITHOUT SCIATICA: ICD-10-CM

## 2022-03-15 DIAGNOSIS — I10 ESSENTIAL HYPERTENSION: ICD-10-CM

## 2022-03-15 DIAGNOSIS — Z86.16 HISTORY OF COVID-19: ICD-10-CM

## 2022-03-15 DIAGNOSIS — R51.9 SEVERE HEADACHE: Primary | ICD-10-CM

## 2022-03-15 DIAGNOSIS — E11.42 TYPE 2 DIABETES MELLITUS WITH DIABETIC POLYNEUROPATHY, WITH LONG-TERM CURRENT USE OF INSULIN (HCC): ICD-10-CM

## 2022-03-15 DIAGNOSIS — M79.672 PAIN IN BOTH FEET: ICD-10-CM

## 2022-03-15 DIAGNOSIS — K21.9 GASTROESOPHAGEAL REFLUX DISEASE WITHOUT ESOPHAGITIS: ICD-10-CM

## 2022-03-15 DIAGNOSIS — E78.00 PURE HYPERCHOLESTEROLEMIA: ICD-10-CM

## 2022-03-15 PROBLEM — R25.2 MUSCLE CRAMPING: Status: RESOLVED | Noted: 2019-10-30 | Resolved: 2022-03-15

## 2022-03-15 LAB
A/G RATIO: 1.8 (ref 1.1–2.2)
ALBUMIN SERPL-MCNC: 5.1 G/DL (ref 3.4–5)
ALP BLD-CCNC: 89 U/L (ref 40–129)
ALT SERPL-CCNC: 26 U/L (ref 10–40)
ANION GAP SERPL CALCULATED.3IONS-SCNC: 19 MMOL/L (ref 3–16)
AST SERPL-CCNC: 22 U/L (ref 15–37)
BASOPHILS ABSOLUTE: 0 K/UL (ref 0–0.2)
BASOPHILS RELATIVE PERCENT: 0.8 %
BILIRUB SERPL-MCNC: 0.4 MG/DL (ref 0–1)
BUN BLDV-MCNC: 24 MG/DL (ref 7–20)
CALCIUM SERPL-MCNC: 10.4 MG/DL (ref 8.3–10.6)
CHLORIDE BLD-SCNC: 98 MMOL/L (ref 99–110)
CO2: 19 MMOL/L (ref 21–32)
CREAT SERPL-MCNC: 0.8 MG/DL (ref 0.9–1.3)
EOSINOPHILS ABSOLUTE: 0.2 K/UL (ref 0–0.6)
EOSINOPHILS RELATIVE PERCENT: 4.8 %
FERRITIN: 120.4 NG/ML (ref 30–400)
GFR AFRICAN AMERICAN: >60
GFR NON-AFRICAN AMERICAN: >60
GLUCOSE BLD-MCNC: 173 MG/DL (ref 70–99)
HCT VFR BLD CALC: 52 % (ref 40.5–52.5)
HEMOGLOBIN: 18 G/DL (ref 13.5–17.5)
IRON SATURATION: 32 % (ref 20–50)
IRON: 108 UG/DL (ref 59–158)
LYMPHOCYTES ABSOLUTE: 1.2 K/UL (ref 1–5.1)
LYMPHOCYTES RELATIVE PERCENT: 31.1 %
MCH RBC QN AUTO: 30.1 PG (ref 26–34)
MCHC RBC AUTO-ENTMCNC: 34.6 G/DL (ref 31–36)
MCV RBC AUTO: 87 FL (ref 80–100)
MONOCYTES ABSOLUTE: 0.4 K/UL (ref 0–1.3)
MONOCYTES RELATIVE PERCENT: 10.3 %
NEUTROPHILS ABSOLUTE: 2.1 K/UL (ref 1.7–7.7)
NEUTROPHILS RELATIVE PERCENT: 53 %
PDW BLD-RTO: 14 % (ref 12.4–15.4)
PLATELET # BLD: 181 K/UL (ref 135–450)
PMV BLD AUTO: 8.3 FL (ref 5–10.5)
POTASSIUM SERPL-SCNC: 5 MMOL/L (ref 3.5–5.1)
RBC # BLD: 5.98 M/UL (ref 4.2–5.9)
SEDIMENTATION RATE, ERYTHROCYTE: 6 MM/HR (ref 0–15)
SODIUM BLD-SCNC: 136 MMOL/L (ref 136–145)
TOTAL IRON BINDING CAPACITY: 336 UG/DL (ref 260–445)
TOTAL PROTEIN: 7.9 G/DL (ref 6.4–8.2)
WBC # BLD: 4 K/UL (ref 4–11)

## 2022-03-15 PROCEDURE — 99214 OFFICE O/P EST MOD 30 MIN: CPT | Performed by: INTERNAL MEDICINE

## 2022-03-15 PROCEDURE — 3052F HG A1C>EQUAL 8.0%<EQUAL 9.0%: CPT | Performed by: INTERNAL MEDICINE

## 2022-03-15 PROCEDURE — 36415 COLL VENOUS BLD VENIPUNCTURE: CPT | Performed by: INTERNAL MEDICINE

## 2022-03-15 RX ORDER — BUTALBITAL, ACETAMINOPHEN AND CAFFEINE 50; 325; 40 MG/1; MG/1; MG/1
1 TABLET ORAL EVERY 4 HOURS PRN
Qty: 180 TABLET | Refills: 3 | Status: SHIPPED | OUTPATIENT
Start: 2022-03-15 | End: 2022-09-28

## 2022-03-15 NOTE — PROGRESS NOTES
Name: Niyah Mott  Age: 54 y.o. YOB: 1970  Sex: male    CHIEF COMPLAINT:    Chief Complaint   Patient presents with    Headache     for about 3 weeks     Other     other chronic conditions       HISTORY OF PRESENT ILLNESS:     This is a pleasant  54 y.o. male  is seen today for management of chronic medical problems and medications refills. Also for severe HAs from last 2 weeks. Previous records reviewed . He claims he never gets headaches but recently getting severe HAs on his right side and also wobbly from last few days. He claims light bothers him but it is chronic, no N/V. No triggering factors. He did see Dr. Brandan Chapa and he diagnosed him with WPW and PAF and he placed a event monitor for 30 days and referred him to Dr. Mel Selby. Dr. Mel Selby is going to see him on 3/23/2022. Denies any palpitations. Dizziness only with HAs, denies palpitations. Blood sugars are @ 150-175 mostly. Denies CP or SOB. No fever , sore throat or cough or congestion. Denies any abdominal pain, But he feels a mass and a pulling feeling LLQ area. Appetite OK. Bowels moving [facility]. No urinary symptoms. He has chronic erectile dysfunction and is taking Viagra as needed. Hearing is ok. Vision Ok with glasses. .. He sees Dr. Brittaney Taylor optometrist and she told him that there is small hemorrhage in each eye. Paul Doan does not want to see an ophthalmologist at this time and going to follow with Dr. Alexia Metzger. Denies  any significant skin lesions.     Past Medical History:    Patient Active Problem List   Diagnosis    Ventral hernia without obstruction or gangrene    Essential hypertension    Gastroesophageal reflux disease without esophagitis    Dupuytren's contracture    Chronic skin ulcer, limited to breakdown of skin (HCC)    Chronic bilateral low back pain without sciatica    Type 2 diabetes mellitus with diabetic polyneuropathy, with long-term current use of insulin (HCC)    Erectile dysfunction    Chest pain    Sigrid Parkinson White pattern seen on electrocardiogram    Fatty liver    Neuropathy    Dorsalgia    Pure hypercholesterolemia    Pain in both feet    Chronic pain of left knee    Chronic depression    Anxiety    Polycythemia    History of COVID-19        Past Surgical History:        Procedure Laterality Date    ELBOW SURGERY Right     HERNIA REPAIR      SPINE SURGERY  1990, 2010    Fusion of L3, L4, L5    UMBILICAL HERNIA REPAIR  11/4/15    repair incarcerated supra umbilical hernia       Social History:   Social History     Tobacco Use    Smoking status: Never Smoker    Smokeless tobacco: Never Used   Substance Use Topics    Alcohol use: Not Currently     Alcohol/week: 2.0 standard drinks     Types: 2 Standard drinks or equivalent per week       Family History:       Problem Relation Age of Onset    Breast Cancer Mother        Allergies:  Bee venom, Hornet venom, and Tramadol    Current Medications :      Prior to Admission medications    Medication Sig Start Date End Date Taking? Authorizing Provider   butalbital-acetaminophen-caffeine (FIORICET, ESGIC) -56 MG per tablet Take 1 tablet by mouth every 4 hours as needed for Headaches 3/15/22  Yes Dm Torre MD   sildenafil (VIAGRA) 50 MG tablet TAKE 1 TABLET BY MOUTH AS NEEDED FOR ERECTILE DYSFUNCTION 2/23/22  Yes Dm Torre MD   bisacodyl (BISACODYL) 5 MG EC tablet Take 4 tablets once for colonoscopy prep 1/26/22  Yes EDGAR Vargas CNP   amitriptyline (ELAVIL) 50 MG tablet Take 1 tablet by mouth nightly 1/20/22  Yes Dm Torre MD   atorvastatin (LIPITOR) 40 MG tablet Take 1 tablet by mouth daily 1/20/22  Yes Dm Torre MD   gabapentin (NEURONTIN) 400 MG capsule Take 1 capsule by mouth 3 times daily for 90 days.  Intended supply: 90 days 1/20/22 4/20/22 Yes Dm Torre MD   hydroCHLOROthiazide (MICROZIDE) 12.5 MG capsule Take 1 capsule by mouth every morning 1/20/22  Yes Dm Torre MD   Insulin Degludec (TRESIBA FLEXTOUCH) 100 UNIT/ML SOPN Inject 40 Units into the skin nightly 1/20/22  Yes Emily Younger MD   insulin lispro (HUMALOG KWIKPEN) 200 UNIT/ML SOPN pen TID with meals Glucose [id number] No Insulin 140-199 2 Units 200-249 4 Units 250-299 6 Units 300-349 8 Units 350-400 10 Units over 400 12 Units 1/20/22  Yes Emily Younger MD   losartan (COZAAR) 100 MG tablet Take 1 tablet by mouth daily 1/20/22  Yes Emily Younger MD   meloxicam (MOBIC) 7.5 MG tablet Take 1 tablet by mouth daily 1/20/22  Yes Emily Younger MD   metFORMIN (GLUCOPHAGE) 1000 MG tablet Take 1 tablet by mouth 2 times daily (with meals) 1/20/22  Yes Emily Younger MD   potassium chloride (KLOR-CON M) 10 MEQ extended release tablet Take 1 tablet by mouth daily OTC 1/20/22  Yes Emily Younger MD   Ertugliflozin L-PyroglutamicAc (STEGLATRO) 15 MG TABS Take 15 mg by mouth daily 1/20/22  Yes Emily Younger MD   tiZANidine (ZANAFLEX) 4 MG tablet Take 1 tablet by mouth at bedtime 1/20/22  Yes Emily Younger MD   escitalopram (LEXAPRO) 10 MG tablet Take 1 tablet by mouth daily 1/20/22  Yes Emily Younger MD   omeprazole (PRILOSEC) 20 MG delayed release capsule Take 1 capsule by mouth daily 1/13/22  Yes Emily Younger MD   Insulin Pen Needle (PEN NEEDLES 3/16\") 31G X 5 MM MISC 1 each by Does not apply route daily 10/20/21  Yes Emily Younger MD   vitamin B-12 (CYANOCOBALAMIN) 1000 MCG tablet Take 1 tablet by mouth daily  Patient taking differently: Take 1,000 mcg by mouth daily OTC 6/30/20  Yes Sami Harvey Cranker, DO   Cholecalciferol (VITAMIN D3) 50 MCG (2000 UT) TABS Take one tablet by mouth once a day  Patient taking differently: Take one tablet by mouth once a day OTC 6/30/20  Yes Sami Harvey Cranker, DO       LAB DATA: Reviewed. REVIEW OF SYSTEMS:   see HPI/ Comprehensive review of systems negative except for the ones mentioned in HPI.     PHYSICAL EXAMINATION:   /84 (Site: Left Upper Arm, Position: Sitting, Cuff Size: Medium Adult)   Pulse 70 Temp 97.1 °F (36.2 °C)   Ht 6' 1\" (1.854 m)   Wt 220 lb 9.6 oz (100.1 kg)   SpO2 97%   BMI 29.10 kg/m²      GENERAL APPEARANCE:    Alert, oriented x 3, well developed, cooperative, not in any distress, appears stated age. HEAD:   Normocephalic, atraumatic   EYES:   PERRLA, EOMI, lids normal, conjuctivea clear, sclera anicteric. NECK:    Supple, symmetrical,  trachea midline, no thyromegaly, no JVD, no lymphadenopathy. LUNGS:    Clear to auscultation bilaterally, respirations unlabored, accessory muscles are not used. HEART:     Regular rate and rhythm, S1 and S2 normal, no murmur, rub or gallop. PMI in MCL. ABDOMEN:    Soft, non-tender, bowel sounds are normoactive, no masses, no hepatospleenomegaly. EXTREMITY:   no bipedal edema  NEURO:  Alert, oriented to person, place and time. Grossly intact. Musculoskeletal:         No kyphosis or scoliosis, no deformity in any extremity noted, muscle strength and tone are normal.  Skin:                            Warm and dry. No rash or obvious suspicious lesions. PSYCH:  Mood euthymic, insight and judgement good. ASSESSMENT/PLAN:    1. Severe headache  We will check a sed rate, CT of the head, refer him to neurologist and treat him with Fioricet as needed at this time. - butalbital-acetaminophen-caffeine (FIORICET, ESGIC) -40 MG per tablet; Take 1 tablet by mouth every 4 hours as needed for Headaches  Dispense: 180 tablet; Refill: 3  - CT HEAD WO CONTRAST; Future  - Sedimentation Rate; Future  - Mary Paiz MD, Neurology, [facility]    2. Polycythemia  Check iron studies and refer to Dr. Renee Rucker and Nani Rodriguez MD, Hematology Oncology, [facility]    3. WPW (Sigrid-Parkinson-White syndrome)  Advised to continue to follow with Dr. Larry Salinas and Dr. eMri Arteaga. He has a event monitor on now.     4. Type 2 diabetes mellitus with diabetic polyneuropathy, with long-term current use of insulin ([address]  Continue current medication for diabetes and advised diet and exercise    5. Essential hypertension  Stable,Continue current medications, denies side effect with medicationss. Low salt diet and exercise advised. Continue losartan and hydrochlorothiazide  - Comprehensive Metabolic Panel  - CBC with Auto Differential    6. Neuropathy  Continue Neurontin and Elavil    7. Gastroesophageal reflux disease without esophagitis  Patient on omeprazole    8. Chronic pain of left knee  Advised to continue on Mobic and advised to continue seeing Dr. Aliyah Guan    9. Pain in both feet  Advised to continue on Mobic and Tylenol and Zanaflex as needed    10. Pure hypercholesterolemia  Patient is taking cholesterol medications regularly. Denies any side effects. Diet and exercise advised. Continue Lipitor    11. Anxiety  Patient on medical marijuana for it. 12. Chronic bilateral low back pain without sciatica  Continue Mobic and Tylenol and Zanaflex as needed        13. History of COVID-19  Improved. Care discussed with patient. Questions answered and patient verbalizes understanding and agrees with plan. Medications reviewed and reconciled. Continue current medications. Appropriate prescriptions are ordered. Risks and benefits of meds are discussed. After visit summary provided. Advised to call for any problems, questions, or concerns. If symptoms worsen or don't improve as expected, to call us or go to ER. Follow up as directed, sooner if needed. Return as scheduled. This dictation was performed with a verbal recognition program and it was checked for errors. It is possible that there are still dictated errors within this office note. Any errors should be brought immediately to my attention for correction. All efforts were made to ensure that this office note is accurate.      Krish Méndez MD MD

## 2022-03-16 ENCOUNTER — TELEMEDICINE (OUTPATIENT)
Dept: PSYCHOLOGY | Age: 52
End: 2022-03-16
Payer: COMMERCIAL

## 2022-03-16 DIAGNOSIS — E87.20 METABOLIC ACIDOSIS: Primary | ICD-10-CM

## 2022-03-16 DIAGNOSIS — F32.A DEPRESSIVE DISORDER: Primary | ICD-10-CM

## 2022-03-16 DIAGNOSIS — F90.9 ADULT ADHD: ICD-10-CM

## 2022-03-16 DIAGNOSIS — F41.9 ANXIETY: ICD-10-CM

## 2022-03-16 PROCEDURE — 90832 PSYTX W PT 30 MINUTES: CPT | Performed by: PSYCHOLOGIST

## 2022-03-16 ASSESSMENT — PATIENT HEALTH QUESTIONNAIRE - PHQ9
1. LITTLE INTEREST OR PLEASURE IN DOING THINGS: 0
3. TROUBLE FALLING OR STAYING ASLEEP: 3
5. POOR APPETITE OR OVEREATING: 1
SUM OF ALL RESPONSES TO PHQ QUESTIONS 1-9: 19
SUM OF ALL RESPONSES TO PHQ QUESTIONS 1-9: 19
6. FEELING BAD ABOUT YOURSELF - OR THAT YOU ARE A FAILURE OR HAVE LET YOURSELF OR YOUR FAMILY DOWN: 3
4. FEELING TIRED OR HAVING LITTLE ENERGY: 3
8. MOVING OR SPEAKING SO SLOWLY THAT OTHER PEOPLE COULD HAVE NOTICED. OR THE OPPOSITE, BEING SO FIGETY OR RESTLESS THAT YOU HAVE BEEN MOVING AROUND A LOT MORE THAN USUAL: 3
9. THOUGHTS THAT YOU WOULD BE BETTER OFF DEAD, OR OF HURTING YOURSELF: 0
2. FEELING DOWN, DEPRESSED OR HOPELESS: 3
SUM OF ALL RESPONSES TO PHQ QUESTIONS 1-9: 19
SUM OF ALL RESPONSES TO PHQ9 QUESTIONS 1 & 2: 3
7. TROUBLE CONCENTRATING ON THINGS, SUCH AS READING THE NEWSPAPER OR WATCHING TELEVISION: 3
SUM OF ALL RESPONSES TO PHQ QUESTIONS 1-9: 19

## 2022-03-16 NOTE — Clinical Note
Pt presenting w sx that are consistent with a number of psychiatric disorders, including major depressive disorder, generalized anxiety disorder and AD/HD combined type. Pt would like to discuss with PCP the possible utility of medication to treat AD/HD. Recommend consideration of beginning with non-stimulant medication, given pt's recent cardiovascular complaints and current pharmaceutical load. Should pt fail to respond to non-stimulant medication, consider psychostimulant.

## 2022-03-16 NOTE — PROGRESS NOTES
Patient Location: Home       Provider Location ([facility]/State): Kathyconstantino Trcay       This virtual visit was conducted via interactive/real-time audio/video. Pursuant to the emergency declaration under the [facility], [facility] waiver authority and the [employer] Act, this Virtual  Visit was conducted, with patient's consent, to reduce the patient's risk of exposure to COVID-19 and provide continuity of care for an established patient. Services were provided through a video synchronous discussion virtually to substitute for in-person clinic visit. Additionally, this provider made reasonable effort to verify identify of patient, conducted risk benefit analysis and have determined patient's presenting problem and condition are consistent with the use of telepsychology to patient's benefit, ensured pt has access, knowledge, and skills required to use required technology, obtained alternative means of contacting patient, provided pt with alternative means of contacting provider, reviewed informed consent and obtained verbal agreement in lieu of written informed consent, as such is rendered impossible due to the unexpected nature secondary to COVID-19 clinical recommendations. Behavioral Health Consultation  Fran Garza Psy.D. Psychologist      Time spent with Patient: 30 minutes  Visit number: 3  Reason for Consult:  depression  Referring Provider: Javad Kirk MD  [address] [place] [address]    S:  ----------------------------------------------------------------------------------------------------------------------  depression  \"I've had a lot going on. \" Undergoing a number of medical tests to evaluate cardiovascular sx. Regarding mental health pt remarked has been \"getting irritable with people. \"     Much of visit spent completing AD/HD assessment. See below.      DSM 5 Diagnostic Interview  ADHD    A. Inattention inclusion: Requires a pattern of behavior, with onset before age 15 years, that is present in multiple settings and gives rise to social, educational, or work performance difficulties. The symptoms must be persistently present for at least 6 months to a degree inconsistent with developmental level. The disorder is manifested by at least six of the following symptoms of inattention. 1. Overlooks details: Over at least the last 6 months, have other people told you that you often overlook or miss details, or that you made careless mistakes in your work? YES      Examples:  \"I jump around and can't get things done. \"   2. Task inattention: Do you often have difficulty staying focused on a task or activity, such as reading a lengthy writing or listening to a lecture or conversation? YES   Examples: \"My eyes are always jumping around. \"   3. Appears not to listen: Do other people tell you that when they speak to you, your mind often seems to be elsewhere or that it seems like you are not listening? NO   Examples:    4. Fails to finish tasks: Do you often struggle to finish schoolwork, chores, or work assignments because you lose focus or are easily sidetracked? YES   Examples:  Housework  5. Difficulty organizing tasks: Do you find it difficult to organize tasks or activities? Do you struggle with time management or fail to meet deadlines? NO   Examples:    6. Avoids tasks requiring sustained mental activity: Do you often avoid tasks that require sustained mental effort? NO   Examples:    7. Often loses things necessary for tasks: Do you often lose things that are essential for tasks or activities, such as school materials, books, tools, wallets, keys, paperwork, eyeglasses, or your phone? YES   Examples:  Around the house and at work   8. Easily distracted: Do you find that you are often easily distracted by things or thoughts unrelated to the activity or task your are supposed to be doing? following problems? 1. Feeling nervous, anxious, or on edge   [  ] Not at all (0)  [  ] Several days (1)  [  ] Over half the days (2)  [ X ] Nearly every day (3)  2. Not being able to stop or control worrying    [  ] Not at all (0)  [  ] Several days (1)  [ X ] Over half the days (2)  [  ] Nearly every day (3)  3. Worrying too much about different things    [  ] Not at all (0)  [  ] Several days (1)  [ X ] Over half the days (2)  [  ] Nearly every day (3)  4. Trouble relaxing    [  ] Not at all (0)  [  ] Several days (1)  [  ] Over half the days (2)  [ X ] Nearly every day (3)  5. Being so restless that its hard to sit still    [  ] Not at all (0)  [  ] Several days (1)  [  ] Over half the days (2)  [ X ] Nearly every day (3)  6. Becoming easily annoyed or irritable    [  ] Not at all (0)  [  ] Several days (1)  [  ] Over half the days (2)  [ X ] Nearly every day (3)  7. Feeling afraid as if something awful might happen    [  ] Not at all (0)  [  ] Several days (1)  [ X ] Over half the days (2)  [  ] Nearly every day (3)    Total Score: 18  Severity:  [  ] 0-4 Subclinical  [  ] 5-9 Mild  [  ] 10-14 Moderate  [ X ] 15-21 Severe      P:  ----------------------------------------------------------------------------------------------------------------------    Pt presenting w sx that are consistent with a number of psychiatric disorders, including major depressive disorder, generalized anxiety disorder and AD/HD combined type. Pt would like to discuss with PCP the possible utility of medication to treat AD/HD. Recommend consideration of beginning with non-stimulant medication, given pt's recent cardiovascular complaints and current pharmaceutical load. Should pt fail to respond to non-stimulant medication, consider psychostimulant. General:   [x] [place]-setting to identify pt's primary goals for [facility] visit / overall health   [x] Provided psychoeducation/handout on:   1. Depressive disorder    2. Adult ADHD    3. Anxiety        [x]  Supportive interventions    Cognitive:   [x] Trained in strategies for increasing balanced thinking   [x] Cognitive strategies to target current mental health sx    [x] Identified and challenged maladaptive thoughts    Behavioral:   [x] Discussed and set plan for behavioral activation   [x] Discussed and problem-solved barriers in adhering to behavioral change plan   [x] Motivational Interviewing to increase patient confidence and compliance with       adhering to behavioral change plan   [x] Discussed potential barriers to change   [x] Discussed self-care (sleep, nutrition, rewarding activities, social support, exercise)    Other:   []   []   []   []    Recommendations to patient:    1. Return to Dr. Stephen Loo in 4 week(s)    2.                   Feedback provided to pt's PCP via EPIC and/or oral report

## 2022-03-23 ENCOUNTER — OFFICE VISIT (OUTPATIENT)
Dept: INTERNAL MEDICINE CLINIC | Age: 52
End: 2022-03-23
Payer: COMMERCIAL

## 2022-03-23 ENCOUNTER — TELEPHONE (OUTPATIENT)
Dept: SURGERY | Age: 52
End: 2022-03-23

## 2022-03-23 ENCOUNTER — OFFICE VISIT (OUTPATIENT)
Dept: CARDIOLOGY CLINIC | Age: 52
End: 2022-03-23
Payer: COMMERCIAL

## 2022-03-23 VITALS
DIASTOLIC BLOOD PRESSURE: 90 MMHG | HEART RATE: 68 BPM | WEIGHT: 217.8 LBS | BODY MASS INDEX: 28.86 KG/M2 | SYSTOLIC BLOOD PRESSURE: 140 MMHG | HEIGHT: 73 IN

## 2022-03-23 VITALS
WEIGHT: 217 LBS | BODY MASS INDEX: 28.63 KG/M2 | HEART RATE: 66 BPM | OXYGEN SATURATION: 98 % | RESPIRATION RATE: 20 BRPM | SYSTOLIC BLOOD PRESSURE: 136 MMHG | DIASTOLIC BLOOD PRESSURE: 82 MMHG

## 2022-03-23 DIAGNOSIS — D75.1 POLYCYTHEMIA: ICD-10-CM

## 2022-03-23 DIAGNOSIS — Z79.4 TYPE 2 DIABETES MELLITUS WITH DIABETIC POLYNEUROPATHY, WITH LONG-TERM CURRENT USE OF INSULIN (HCC): ICD-10-CM

## 2022-03-23 DIAGNOSIS — E11.42 TYPE 2 DIABETES MELLITUS WITH DIABETIC POLYNEUROPATHY, WITH LONG-TERM CURRENT USE OF INSULIN (HCC): ICD-10-CM

## 2022-03-23 DIAGNOSIS — I45.6 WOLFF PARKINSON WHITE PATTERN SEEN ON ELECTROCARDIOGRAM: ICD-10-CM

## 2022-03-23 DIAGNOSIS — E78.00 PURE HYPERCHOLESTEROLEMIA: ICD-10-CM

## 2022-03-23 DIAGNOSIS — I10 ESSENTIAL HYPERTENSION: ICD-10-CM

## 2022-03-23 DIAGNOSIS — I10 ESSENTIAL HYPERTENSION: Primary | ICD-10-CM

## 2022-03-23 DIAGNOSIS — K21.9 GASTROESOPHAGEAL REFLUX DISEASE WITHOUT ESOPHAGITIS: ICD-10-CM

## 2022-03-23 DIAGNOSIS — K42.9 UMBILICAL HERNIA WITHOUT OBSTRUCTION AND WITHOUT GANGRENE: Primary | ICD-10-CM

## 2022-03-23 DIAGNOSIS — F32.A CHRONIC DEPRESSION: ICD-10-CM

## 2022-03-23 DIAGNOSIS — Z86.16 HISTORY OF COVID-19: ICD-10-CM

## 2022-03-23 DIAGNOSIS — F90.9 ATTENTION DEFICIT HYPERACTIVITY DISORDER (ADHD), UNSPECIFIED ADHD TYPE: ICD-10-CM

## 2022-03-23 PROCEDURE — 3052F HG A1C>EQUAL 8.0%<EQUAL 9.0%: CPT | Performed by: INTERNAL MEDICINE

## 2022-03-23 PROCEDURE — 99214 OFFICE O/P EST MOD 30 MIN: CPT | Performed by: INTERNAL MEDICINE

## 2022-03-23 PROCEDURE — 99213 OFFICE O/P EST LOW 20 MIN: CPT | Performed by: INTERNAL MEDICINE

## 2022-03-23 RX ORDER — BUPROPION HYDROCHLORIDE 100 MG/1
100 TABLET, EXTENDED RELEASE ORAL 2 TIMES DAILY
Qty: 60 TABLET | Refills: 3 | Status: SHIPPED | OUTPATIENT
Start: 2022-03-23 | End: 2022-10-12 | Stop reason: SDUPTHER

## 2022-03-23 NOTE — LETTER
Patient Name: Alecia Cooks  : 1970  MRN# [id number]    REASON FOR VISIT:stress and event monitor      Current Outpatient Medications   Medication Sig Dispense Refill    butalbital-acetaminophen-caffeine (FIORICET, ESGIC) -40 MG per tablet Take 1 tablet by mouth every 4 hours as needed for Headaches 180 tablet 3    sildenafil (VIAGRA) 50 MG tablet TAKE 1 TABLET BY MOUTH AS NEEDED FOR ERECTILE DYSFUNCTION 10 tablet 0    bisacodyl (BISACODYL) 5 MG EC tablet Take 4 tablets once for colonoscopy prep 4 tablet 0    amitriptyline (ELAVIL) 50 MG tablet Take 1 tablet by mouth nightly 90 tablet 1    atorvastatin (LIPITOR) 40 MG tablet Take 1 tablet by mouth daily 90 tablet 1    gabapentin (NEURONTIN) 400 MG capsule Take 1 capsule by mouth 3 times daily for 90 days.  Intended supply: 90 days 270 capsule 1    hydroCHLOROthiazide (MICROZIDE) 12.5 MG capsule Take 1 capsule by mouth every morning 90 capsule 1    Insulin Degludec (TRESIBA FLEXTOUCH) 100 UNIT/ML SOPN Inject 40 Units into the skin nightly 6 pen 5    insulin lispro (HUMALOG KWIKPEN) 200 UNIT/ML SOPN pen TID with meals Glucose [id number] No Insulin 140-199 2 Units 200-249 4 Units 250-299 6 Units 300-349 8 Units 350-400 10 Units over 400 12 Units 5 pen 5    losartan (COZAAR) 100 MG tablet Take 1 tablet by mouth daily 90 tablet 1    meloxicam (MOBIC) 7.5 MG tablet Take 1 tablet by mouth daily 90 tablet 1    metFORMIN (GLUCOPHAGE) 1000 MG tablet Take 1 tablet by mouth 2 times daily (with meals) 180 tablet 1    potassium chloride (KLOR-CON M) 10 MEQ extended release tablet Take 1 tablet by mouth daily OTC 90 tablet 1    Ertugliflozin L-PyroglutamicAc (STEGLATRO) 15 MG TABS Take 15 mg by mouth daily 30 tablet 3    tiZANidine (ZANAFLEX) 4 MG tablet Take 1 tablet by mouth at bedtime 30 tablet 3    escitalopram (LEXAPRO) 10 MG tablet Take 1 tablet by mouth daily 30 tablet 3    omeprazole (PRILOSEC) 20 MG delayed release capsule Take 1 capsule by mouth daily 30 capsule 3    vitamin B-12 (CYANOCOBALAMIN) 1000 MCG tablet Take 1 tablet by mouth daily (Patient taking differently: Take 1,000 mcg by mouth daily OTC) 90 tablet 1    Cholecalciferol (VITAMIN D3) 50 MCG (2000 UT) TABS Take one tablet by mouth once a day (Patient taking differently: Take one tablet by mouth once a day OTC) 30 tablet 3     No current facility-administered medications for this visit. STRESS TEST:  3/22  Normal near maximal study negative for angina or ECG evidence of ischemia.    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    Exercise tolerance is good. Patient exercised for 9:30 minutes on Noe    protocol.    Appropriate hemodynamic response to exercise is noted    ECHO: 9/2020   Left ventricular systolic function is normal.   Ejection fraction is visually estimated at 50-55%. Small left ventricular cavity. No significant valvular disease noted. No evidence of any pericardial effusion    CAROTID: NONE    MUGA: NONE    LAST PACER CHECK: NONE    CARDIAC CATH: NONE    Amio Protocol:    CHADS: [id number] Score for Atrial Fibrillation Stroke Risk   Risk   Factors  Component Value   C CHF No 0   H HTN Yes 1   A2 Age >= 76 No,  (50 y.o.) 0   D DM Yes 1   S2 Prior Stroke/TIA No 0   V Vascular Disease No 0   A Age 74-69 No,  (50 y.o.) 0   Sc Sex male 0    [id number]  Score  2   Score last updated 3/23/22 5:11 AM EDT    Click here for a link to the UpToDate guideline \"Atrial Fibrillation: Anticoagulation therapy to prevent embolization    Disclaimer: Risk Score calculation is dependent on accuracy of patient problem list and past encounter diagnosis.

## 2022-03-23 NOTE — PROGRESS NOTES
OFFICE PROGRESS NOTES      Sara Watson is a 54 y.o. male who has    CHIEF COMPLAINT AS FOLLOWS:  CHEST PAIN: Patient denies any C/O chest pains at this time. SOB: No C/O SOB at this time. LEG EDEMA: No leg edema   PALPITATIONS: Denies any C/O Palpitations   DIZZINESS: No C/O Dizziness   SYNCOPE: None   OTHER/ ADDITIONAL COMPLAINTS:                                     HPI: Patient is here for F/U on his  HTN & Dyslipidemia problems. Arrhythmia: Patient has sinus tachycardia. ? A-fib  HTN: Patient has known essential HTN. Has been treated with guideline recommended medical / physical/ diet therapy as stated below. Dyslipidemia: Patient has known mixed dyslipidemia. Has been treated with guideline recommended medical / physical/ diet therapy as stated below. Current Outpatient Medications   Medication Sig Dispense Refill    buPROPion (WELLBUTRIN SR) 100 MG extended release tablet Take 1 tablet by mouth 2 times daily 60 tablet 3    butalbital-acetaminophen-caffeine (FIORICET, ESGIC) -40 MG per tablet Take 1 tablet by mouth every 4 hours as needed for Headaches 180 tablet 3    sildenafil (VIAGRA) 50 MG tablet TAKE 1 TABLET BY MOUTH AS NEEDED FOR ERECTILE DYSFUNCTION 10 tablet 0    bisacodyl (BISACODYL) 5 MG EC tablet Take 4 tablets once for colonoscopy prep 4 tablet 0    amitriptyline (ELAVIL) 50 MG tablet Take 1 tablet by mouth nightly 90 tablet 1    atorvastatin (LIPITOR) 40 MG tablet Take 1 tablet by mouth daily 90 tablet 1    gabapentin (NEURONTIN) 400 MG capsule Take 1 capsule by mouth 3 times daily for 90 days.  Intended supply: 90 days 270 capsule 1    hydroCHLOROthiazide (MICROZIDE) 12.5 MG capsule Take 1 capsule by mouth every morning 90 capsule 1    Insulin Degludec (TRESIBA FLEXTOUCH) 100 UNIT/ML SOPN Inject 40 Units into the skin nightly 6 pen 5    insulin lispro (HUMALOG KWIKPEN) 200 UNIT/ML SOPN pen TID with meals Glucose [id number] No Insulin 140-199 2 Units 200-249 4 Units 250-299 6 Units 300-349 8 Units 350-400 10 Units over 400 12 Units 5 pen 5    losartan (COZAAR) 100 MG tablet Take 1 tablet by mouth daily 90 tablet 1    meloxicam (MOBIC) 7.5 MG tablet Take 1 tablet by mouth daily 90 tablet 1    metFORMIN (GLUCOPHAGE) 1000 MG tablet Take 1 tablet by mouth 2 times daily (with meals) 180 tablet 1    potassium chloride (KLOR-CON M) 10 MEQ extended release tablet Take 1 tablet by mouth daily OTC 90 tablet 1    Ertugliflozin L-PyroglutamicAc (STEGLATRO) 15 MG TABS Take 15 mg by mouth daily 30 tablet 3    tiZANidine (ZANAFLEX) 4 MG tablet Take 1 tablet by mouth at bedtime 30 tablet 3    escitalopram (LEXAPRO) 10 MG tablet Take 1 tablet by mouth daily 30 tablet 3    omeprazole (PRILOSEC) 20 MG delayed release capsule Take 1 capsule by mouth daily 30 capsule 3    vitamin B-12 (CYANOCOBALAMIN) 1000 MCG tablet Take 1 tablet by mouth daily (Patient taking differently: Take 1,000 mcg by mouth daily OTC) 90 tablet 1    Cholecalciferol (VITAMIN D3) 50 MCG (2000 UT) TABS Take one tablet by mouth once a day (Patient taking differently: Take one tablet by mouth once a day OTC) 30 tablet 3     No current facility-administered medications for this visit.      Allergies: Bee venom, Hornet venom, and Tramadol  Review of Systems:    Constitutional: Negative for diaphoresis and fatigue  Respiratory: Negative for shortness of breath  Cardiovascular: Negative for chest pain, dyspnea on exertion, claudication, edema, irregular heartbeat, murmur, palpitations or shortness of breath  Musculoskeletal: Negative for muscle pain, muscular weakness, negative for pain in arm and leg or swelling in foot and leg    Objective:  BP (!) 140/90   Pulse 68   Ht 6' 1\" (1.854 m)   Wt 217 lb 12.8 oz (98.8 kg)   BMI 28.74 kg/m²   Wt Readings from Last 3 Encounters:   03/23/22 217 lb 12.8 oz (98.8 kg)   03/23/22 217 lb (98.4 kg)   03/15/22 220 lb 9.6 oz (100.1 kg)     Body mass index is 28.74 kg/m².  GENERAL - Alert, oriented, pleasant, in no apparent distress. EYES: No jaundice, no conjunctival pallor. Neck - Supple. No jugular venous distention noted. No carotid bruits. Cardiovascular  Normal S1 and S2 without obvious murmur or gallop. Extremities - No cyanosis, clubbing, or significant edema. Pulmonary  No respiratory distress. No wheezes or rales.       MEDICAL DECISION MAKING & DATA REVIEW:    Lab Review   Lab Results   Component Value Date    TROPONINT <0.010 09/04/2020    TROPONINT <0.010 09/04/2020     Lab Results   Component Value Date    PROBNP 15.33 09/04/2020    PROBNP 93.09 08/14/2017     No results found for: INR  Lab Results   Component Value Date    LABA1C 8.3 01/20/2022    LABA1C 8.9 10/20/2021     Lab Results   Component Value Date    WBC 4.0 03/15/2022    WBC 5.0 01/20/2022    HCT 52.0 03/15/2022    HCT 57.3 (H) 01/20/2022    MCV 87.0 03/15/2022    MCV 88.2 01/20/2022     03/15/2022     01/20/2022     Lab Results   Component Value Date    CHOL 206 (H) 09/12/2019    CHOL 171 12/28/2011    TRIG 84 09/12/2019    TRIG 73 12/28/2011    HDL 56 01/20/2022    HDL 63 (H) 07/21/2021    LDLCALC 86 01/20/2022    LDLCALC 75 07/21/2021    LDLDIRECT 126 (H) 12/28/2011     Lab Results   Component Value Date    ALT 26 03/15/2022    ALT 24 01/20/2022    AST 22 03/15/2022    AST 25 01/20/2022     BMP:    Lab Results   Component Value Date     03/15/2022     01/20/2022    K 5.0 03/15/2022    K 5.2 01/20/2022    CL 98 03/15/2022    CL 96 01/20/2022    CO2 19 03/15/2022    CO2 24 01/20/2022    BUN 24 03/15/2022    BUN 20 01/20/2022    CREATININE 0.8 03/15/2022    CREATININE 0.8 01/20/2022     CMP:   Lab Results   Component Value Date     03/15/2022     01/20/2022    K 5.0 03/15/2022    K 5.2 01/20/2022    CL 98 03/15/2022    CL 96 01/20/2022    CO2 19 03/15/2022    CO2 24 01/20/2022    BUN 24 03/15/2022    BUN 20 01/20/2022    CREATININE 0.8 03/15/2022 CREATININE 0.8 01/20/2022    PROT 7.9 03/15/2022    PROT 8.0 01/20/2022    PROT 6.7 12/28/2011     Lab Results   Component Value Date    TSH 2.22 09/12/2019    TSHHS 1.534 12/28/2011       QUALITY MEASURES REVIEWED:  1.CAD:Patient is taking anti platelet agent:No  Patient does not have Hx of documented CAD  2. DYSLIPIDEMIA: Patient is on cholesterol lowering medication:Yes   3. Beta-Blocker therapy for CAD, if prior Myocardial Infarction:No   4. Counselled regarding smoking cessation. No   Patient does not Smoke. 5.Anticoagulation therapy (for A.Fib) No   6. Discussed weight management strategies. Assessment & Plan:  Primary / Secondary prevention is the goal by aggressive risk modification, healthy and therapeutic life style changes for cardiovascular risk reduction. CORONARY ARTERY DISEASE:None known. Stress test 3/2/2022   Normal near maximal study negative for angina or ECG evidence of ischemia.    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    Exercise tolerance is good. Patient exercised for 9:30 minutes on Noe    protocol.    Appropriate hemodynamic response to exercise is noted. HYPERTENSION:for more than 10 years. Not Controlled on current management. Takes HCTZ & Cozaar. Will add medications if remains elevated. Counseled regarding low salt diet, exercise & weight control. VALVULAR HEART DISEASE:no   No significant VHD noted  Echo: 9/2020    Left ventricular systolic function is normal.   Ejection fraction is visually estimated at 50-55%.   Small left ventricular cavity.   No significant valvular disease noted.   No evidence of any pericardial effusion. DYSLIPIDEMIA: yes, Good profile on current medication. Takes Lipitor  Patient's profile is at / near [employer],   HDL is  High  Tolerating current medical regimen wellyes.   See most recent Lab values:( Reviewed Labs from family Dr. CONNER     )  LDL is 86  HDL is 56  Diabetes mellitis:yes, For > 10 years, still not well control, but says A1c is down from 15 to 8.3. ARRHYTHMIAS:yes, WPW with PAF                 Event monitor no A-fib documented. Had one EKG with A-fib in the recent past.  Patient seeing Chava Friedman next week. His CHADS score is high. 's input will be valuable. ? Loop recorder ? Anticoagulate. TESTS ORDERED:none this visit     PREVIOUSLY ORDERED TESTS REVIEWED & DISCUSSED WITH THE PATIENT:     I personally reviewed & interpreted, all previously ordered tests as copied above. Latest Labs are pulled in to the note with dates. Labs, specially in Reference to Lipid profile, Cardiac testing in the form of Echo ( dated: ), stress tests ( dated: ) & other relevant cardiac testing reviewed with patient & recommendations made based on assessment of the results. Discussed role of Cardiac risk factors & effects + treatment of co morbidities with patient & advised accordingly. MEDICATIONS: List of medications patient is currently taking is reviewed in detail with the patient. Discussed any side effects or problems taking the medication. Recommend Continue present management & medications as listed. AFFIRMATION: I spent at least 20 minutes of time reviewing patient's history, previous & current medical problems & all Labs + testing. This includes chart prep even prior to the vosit. Various goals are discussed and multiple questions answered. Relevant concelling performed. Office follow up in one month.

## 2022-03-23 NOTE — LETTER
[facility]  [address] [id number]  Phone: [phone]  Fax: [phone]    Anuel Martínez MD    March 23, 2022     Ozzy Pineda MD  [place] [id number]    Patient: Mitali Pace   MR Number: [id number]   YOB: 1970   Date of Visit: 3/23/2022       Dear Ozzy Pineda:    Thank you for referring Savanna Sylvia to me for evaluation/treatment. Below are the relevant portions of my assessment and plan of care. If you have questions, please do not hesitate to call me. I look forward to following Dominga along with you.     Sincerely,      Anuel Martínez MD

## 2022-03-23 NOTE — PROGRESS NOTES
Name: Maurilio Mott  Age: 54 y.o. YOB: 1970  Sex: male    CHIEF COMPLAINT:    Chief Complaint   Patient presents with    Abdominal Pain     Pt c/o umbilical pain. States he felt a \"pop\", 1 week ago. Asking for referral to Gen Surg. Previously saw Dr Henok Rowell. HISTORY OF PRESENT ILLNESS:     This is a pleasant  54 y.o. male  is seen today for management of chronic medical problems and medications refills. Previous records reviewed . C/O umbilical hernia bothering him. He had Umbilical hernia repair years ago. He wants to be referred to a surgeon for it. Also he did see Dr. Elroy Sage, psychologist and he is recommending hattie-stimulant ADHD medication for him. Still with headaches  But fioricet is helping somewhat. To have a CT head and to see Dr. Sultana Xie soon. Denies CP or shortness of breath or palpitations. Going to see Dr. Rebeca Florez today for recommendations  for WPW. Depression is little better.         Past Medical History:    Patient Active Problem List   Diagnosis    Ventral hernia without obstruction or gangrene    Essential hypertension    Gastroesophageal reflux disease without esophagitis    Dupuytren's contracture    Chronic skin ulcer, limited to breakdown of skin (HCC)    Chronic bilateral low back pain without sciatica    Type 2 diabetes mellitus with diabetic polyneuropathy, with long-term current use of insulin ([address]    Erectile dysfunction    Chest pain    Sigrid Parkinson White pattern seen on electrocardiogram    Fatty liver    Neuropathy    Dorsalgia    Pure hypercholesterolemia    Pain in both feet    Chronic pain of left knee    Chronic depression    Anxiety    Polycythemia    History of COVID-19        Past Surgical History:        Procedure Laterality Date    ELBOW SURGERY Right     HERNIA REPAIR      SPINE SURGERY  1990, 2010    Fusion of L3, L4, L5    UMBILICAL HERNIA REPAIR  11/4/15    repair incarcerated supra umbilical hernia Social History:   Social History     Tobacco Use    Smoking status: Never Smoker    Smokeless tobacco: Never Used   Substance Use Topics    Alcohol use: Not Currently     Alcohol/week: 2.0 standard drinks     Types: 2 Standard drinks or equivalent per week       Family History:       Problem Relation Age of Onset    Breast Cancer Mother        Allergies:  Bee venom, Hornet venom, and Tramadol    Current Medications :      Prior to Admission medications    Medication Sig Start Date End Date Taking? Authorizing Provider   buPROPion [facility] SR) 100 MG extended release tablet Take 1 tablet by mouth 2 times daily 3/23/22  Yes Shawn Krueger MD   butalbital-acetaminophen-caffeine (FIORICET, ESGIC) -60 MG per tablet Take 1 tablet by mouth every 4 hours as needed for Headaches 3/15/22  Yes Shawn Krueger MD   sildenafil (VIAGRA) 50 MG tablet TAKE 1 TABLET BY MOUTH AS NEEDED FOR ERECTILE DYSFUNCTION 2/23/22  Yes Shawn Krueger MD   bisacodyl (BISACODYL) 5 MG EC tablet Take 4 tablets once for colonoscopy prep 1/26/22  Yes EDGAR Rowe CNP   amitriptyline (ELAVIL) 50 MG tablet Take 1 tablet by mouth nightly 1/20/22  Yes Shawn Krueger MD   atorvastatin (LIPITOR) 40 MG tablet Take 1 tablet by mouth daily 1/20/22  Yes Shawn Krueger MD   gabapentin (NEURONTIN) 400 MG capsule Take 1 capsule by mouth 3 times daily for 90 days.  Intended supply: 90 days 1/20/22 4/20/22 Yes Shawn Krueger MD   hydroCHLOROthiazide (MICROZIDE) 12.5 MG capsule Take 1 capsule by mouth every morning 1/20/22  Yes Shawn Krueger MD   Insulin Degludec (TRESIBA FLEXTOUCH) 100 UNIT/ML SOPN Inject 40 Units into the skin nightly 1/20/22  Yes Shawn Krueger MD   insulin lispro (HUMALOG KWIKPEN) 200 UNIT/ML SOPN pen TID with meals Glucose [id number] No Insulin 140-199 2 Units 200-249 4 Units 250-299 6 Units 300-349 8 Units 350-400 10 Units over 400 12 Units 1/20/22  Yes Shawn Krueger MD   losartan (COZAAR) 100 MG tablet Take 1 tablet by MCL.  ABDOMEN:    Soft, small umbilical hernia and tenderness around it. No rebound or guarding. Bowel sounds are normoactive, no masses, no hepatospleenomegaly. EXTREMITY:   no bipedal edema  NEURO:  Alert, oriented to person, place and time. Grossly intact. Musculoskeletal:         No kyphosis or scoliosis, no deformity in any extremity noted, muscle strength and tone are normal.  Skin:                            Warm and dry. No rash or obvious suspicious lesions. PSYCH:  Mood euthymic, insight and judgement good. ASSESSMENT/PLAN:    1. Umbilical hernia without obstruction and without gangrene  Will refer to Dr. Ellie Mariano. - Alon Berrios MD, General Surgery, [facility]    2. Attention deficit hyperactivity disorder (ADHD), unspecified ADHD type  We will start him on Wellbutrin 100 mg every morning for 1 week then 100 mg twice daily  - buPROPion (WELLBUTRIN SR) 100 MG extended release tablet; Take 1 tablet by mouth 2 times daily  Dispense: 60 tablet; Refill: 3    3. [employer] Parkinson White pattern seen on electrocardiogram  Patient to see Dr. Karissa Florian today for recommendations    4. Gastroesophageal reflux disease without esophagitis  Continue omeprazole    5. Chronic depression  Patient on Lexapro. No Wellbutrin added for ADD. 6. Essential hypertension  Stable,Continue current medications, denies side effect with medicationss. Low salt diet and exercise advised. Continue losartan and hydrochlorothiazide    7. Type 2 diabetes mellitus with diabetic polyneuropathy, with long-term current use of insulin ([address]  Continue current medication for diabetes and advised diet and exercise. Advised to continue follow COVID-19 precautions    Care discussed with patient. Questions answered and patient verbalizes understanding and agrees with plan. Medications reviewed and reconciled. Continue current medications. Appropriate prescriptions are ordered. Risks and benefits of meds are discussed. After visit summary provided. Advised to call for any problems, questions, or concerns. If symptoms worsen or don't improve as expected, to call us or go to ER. Follow up as directed, sooner if needed. Return As scheduled. This dictation was performed with a verbal recognition program and it was checked for errors. It is possible that there are still dictated errors within this office note. Any errors should be brought immediately to my attention for correction. All efforts were made to ensure that this office note is accurate.      Danielle Worley MD MD

## 2022-03-23 NOTE — PATIENT INSTRUCTIONS
CORONARY ARTERY DISEASE:None known. Stress test 3/2/2022   Normal near maximal study negative for angina or ECG evidence of ischemia.    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    Exercise tolerance is good. Patient exercised for 9:30 minutes on Noe    protocol.    Appropriate hemodynamic response to exercise is noted. HYPERTENSION:for more than 10 years. Not Controlled on current management. Takes HCTZ & Cozaar. Will add medications if remains elevated. Counseled regarding low salt diet, exercise & weight control. VALVULAR HEART DISEASE:no   No significant VHD noted  Echo: 9/2020    Left ventricular systolic function is normal.   Ejection fraction is visually estimated at 50-55%.   Small left ventricular cavity.   No significant valvular disease noted.   No evidence of any pericardial effusion. DYSLIPIDEMIA: yes, Good profile on current medication. Takes Lipitor  Patient's profile is at / near [employer],   HDL is  High  Tolerating current medical regimen wellyes. See most recent Lab values:( Reviewed Labs from family MD,      )  LDL is 86  HDL is 56  Diabetes mellitis:yes, For > 10 years, still not well control, but says A1c is down from 15 to 8.3. ARRHYTHMIAS:yes, WPW with PAF                 Event monitor no A-fib documented. Had one EKG with A-fib in the recent past.  Patient seeing Janeth Scott next week. His CHADS score is high. 's input will be valuable. ? Loop recorder ? Anticoagulate. TESTS ORDERED:none this visit     PREVIOUSLY ORDERED TESTS REVIEWED & DISCUSSED WITH THE PATIENT:     I personally reviewed & interpreted, all previously ordered tests as copied above. Latest Labs are pulled in to the note with dates. Labs, specially in Reference to Lipid profile, Cardiac testing in the form of Echo ( dated: ), stress tests ( dated: ) & other relevant cardiac testing reviewed with patient & recommendations made based on assessment of the results.     Discussed role of Cardiac risk factors & effects + treatment of co morbidities with patient & advised accordingly. MEDICATIONS: List of medications patient is currently taking is reviewed in detail with the patient. Discussed any side effects or problems taking the medication. Recommend Continue present management & medications as listed. AFFIRMATION: I spent at least 20 minutes of time reviewing patient's history, previous & current medical problems & all Labs + testing. This includes chart prep even prior to the vosit. Various goals are discussed and multiple questions answered. Relevant concelling performed. Office follow up in one month.

## 2022-03-23 NOTE — TELEPHONE ENCOUNTER
ALFREDO for pt to return our call and schedule an appt with  for (umb hernia without obstruction and gangrene) ref

## 2022-03-24 DIAGNOSIS — I45.6 WOLFF PARKINSON WHITE PATTERN SEEN ON ELECTROCARDIOGRAM: ICD-10-CM

## 2022-03-24 DIAGNOSIS — E78.00 PURE HYPERCHOLESTEROLEMIA: ICD-10-CM

## 2022-03-24 DIAGNOSIS — I10 ESSENTIAL HYPERTENSION: ICD-10-CM

## 2022-03-24 DIAGNOSIS — Z79.4 TYPE 2 DIABETES MELLITUS WITH DIABETIC POLYNEUROPATHY, WITH LONG-TERM CURRENT USE OF INSULIN (HCC): ICD-10-CM

## 2022-03-24 DIAGNOSIS — E11.42 TYPE 2 DIABETES MELLITUS WITH DIABETIC POLYNEUROPATHY, WITH LONG-TERM CURRENT USE OF INSULIN (HCC): ICD-10-CM

## 2022-03-24 PROCEDURE — 3052F HG A1C>EQUAL 8.0%<EQUAL 9.0%: CPT | Performed by: INTERNAL MEDICINE

## 2022-03-30 ENCOUNTER — HOSPITAL ENCOUNTER (OUTPATIENT)
Dept: CT IMAGING | Age: 52
Discharge: HOME | End: 2022-03-30
Payer: COMMERCIAL

## 2022-03-30 ENCOUNTER — OFFICE VISIT (OUTPATIENT)
Dept: SURGERY | Age: 52
End: 2022-03-30
Payer: COMMERCIAL

## 2022-03-30 VITALS
SYSTOLIC BLOOD PRESSURE: 150 MMHG | BODY MASS INDEX: 29.48 KG/M2 | WEIGHT: 222.4 LBS | HEIGHT: 73 IN | DIASTOLIC BLOOD PRESSURE: 98 MMHG | HEART RATE: 73 BPM | OXYGEN SATURATION: 99 %

## 2022-03-30 DIAGNOSIS — K43.2 INCISIONAL HERNIA, WITHOUT OBSTRUCTION OR GANGRENE: Primary | ICD-10-CM

## 2022-03-30 DIAGNOSIS — R51.9 SEVERE HEADACHE: ICD-10-CM

## 2022-03-30 PROCEDURE — 99203 OFFICE O/P NEW LOW 30 MIN: CPT | Performed by: SURGERY

## 2022-03-30 PROCEDURE — 70450 CT HEAD/BRAIN W/O DYE: CPT

## 2022-03-30 ASSESSMENT — PATIENT HEALTH QUESTIONNAIRE - PHQ9
SUM OF ALL RESPONSES TO PHQ9 QUESTIONS 1 & 2: 0
1. LITTLE INTEREST OR PLEASURE IN DOING THINGS: 0
SUM OF ALL RESPONSES TO PHQ QUESTIONS 1-9: 0
SUM OF ALL RESPONSES TO PHQ QUESTIONS 1-9: 0
2. FEELING DOWN, DEPRESSED OR HOPELESS: 0
SUM OF ALL RESPONSES TO PHQ QUESTIONS 1-9: 0
SUM OF ALL RESPONSES TO PHQ QUESTIONS 1-9: 0

## 2022-03-30 ASSESSMENT — ENCOUNTER SYMPTOMS
VOMITING: 0
ABDOMINAL DISTENTION: 0
TROUBLE SWALLOWING: 0
COLOR CHANGE: 0
NAUSEA: 0
BLOOD IN STOOL: 0
ABDOMINAL PAIN: 1
CHOKING: 0
SORE THROAT: 0
COUGH: 0
SHORTNESS OF BREATH: 0
STRIDOR: 0
BACK PAIN: 0

## 2022-03-30 NOTE — PROGRESS NOTES
SUBJECTIVE:  HPI:     Patient here for recurrent ventral hernia repair. He has a small medical hernia but also a superiorly located ventral hernia which is reoccurred. He recently was lifting drop-off. He notices a bulge when straining/lifting. Discussed significant discomfort and is limiting his daily activity and he would like it repaired. He also is being worked up for WPW by cardiology and has an appointment today. He recently had episode of hematemesis and is having GI work-up including EGD/colonoscopy. Prior abdominal surgeries include left side incision for anterior approach spine surgery.     Past Surgical History:   Procedure Laterality Date    ELBOW SURGERY Right     HERNIA REPAIR      SPINE SURGERY  1990, 2010    Fusion of L3, L4, L5    UMBILICAL HERNIA REPAIR  11/4/15    repair incarcerated supra umbilical hernia     Past Medical History:   Diagnosis Date    Depression     Hyperlipidemia     Hypertension     Sleep disorder     Ventral hernia without obstruction or gangrene 11/4/2015     Family History   Problem Relation Age of Onset    Breast Cancer Mother      Social History     Socioeconomic History    Marital status: Single     Spouse name: Not on file    Number of children: Not on file    Years of education: Not on file    Highest education level: Not on file   Occupational History    Not on file   Tobacco Use    Smoking status: Never Smoker    Smokeless tobacco: Never Used   Vaping Use    Vaping Use: Never used   Substance and Sexual Activity    Alcohol use: Not Currently     Alcohol/week: 2.0 standard drinks     Types: 2 Standard drinks or equivalent per week    Drug use: Not Currently     Types: Marijuana Massimo Free)     Comment: marijuana card    Sexual activity: Not on file   Other Topics Concern    Not on file   Social History Narrative    Not on file     Social Determinants of Health     Financial Resource Strain: Low Risk     Difficulty of Paying Living Expenses: Not hard at all   Food Insecurity: No Food Insecurity    Worried About [address] [place] [address] in the Last Year: Never true    Ran Out of Food in the Last Year: Never true   Transportation Needs:     Lack of Transportation (Medical): Not on file    Lack of Transportation (Non-Medical):  Not on file   Physical Activity:     Days of Exercise per Week: Not on file    Minutes of Exercise per Session: Not on file   Stress:     Feeling of Stress : Not on file   Social Connections:     Frequency of Communication with Friends and Family: Not on file    Frequency of Social Gatherings with Friends and Family: Not on file    Attends Denominational Services: Not on file    Active Member of [address] [place] [address] or Organizations: Not on file    Attends Club or Organization Meetings: Not on file    Marital Status: Not on file   Intimate Partner Violence:     Fear of Current or Ex-Partner: Not on file    Emotionally Abused: Not on file    Physically Abused: Not on file    Sexually Abused: Not on file   Housing Stability:     Unable to Pay for Housing in the Last Year: Not on file    Number of [place] in the Last Year: Not on file    Unstable Housing in the Last Year: Not on file       Current Outpatient Medications   Medication Sig Dispense Refill    buPROPion (WELLBUTRIN SR) 100 MG extended release tablet Take 1 tablet by mouth 2 times daily 60 tablet 3    butalbital-acetaminophen-caffeine (FIORICET, ESGIC) -40 MG per tablet Take 1 tablet by mouth every 4 hours as needed for Headaches 180 tablet 3    sildenafil (VIAGRA) 50 MG tablet TAKE 1 TABLET BY MOUTH AS NEEDED FOR ERECTILE DYSFUNCTION 10 tablet 0    bisacodyl (BISACODYL) 5 MG EC tablet Take 4 tablets once for colonoscopy prep 4 tablet 0    amitriptyline (ELAVIL) 50 MG tablet Take 1 tablet by mouth nightly 90 tablet 1    atorvastatin (LIPITOR) 40 MG tablet Take 1 tablet by mouth daily 90 tablet 1    gabapentin (NEURONTIN) 400 MG capsule Take 1 capsule by mouth 3 times daily for 90 days. Intended supply: 90 days 270 capsule 1    hydroCHLOROthiazide (MICROZIDE) 12.5 MG capsule Take 1 capsule by mouth every morning 90 capsule 1    Insulin Degludec (TRESIBA FLEXTOUCH) 100 UNIT/ML SOPN Inject 40 Units into the skin nightly 6 pen 5    insulin lispro (HUMALOG KWIKPEN) 200 UNIT/ML SOPN pen TID with meals Glucose [id number] No Insulin 140-199 2 Units 200-249 4 Units 250-299 6 Units 300-349 8 Units 350-400 10 Units over 400 12 Units 5 pen 5    losartan (COZAAR) 100 MG tablet Take 1 tablet by mouth daily 90 tablet 1    meloxicam (MOBIC) 7.5 MG tablet Take 1 tablet by mouth daily 90 tablet 1    metFORMIN (GLUCOPHAGE) 1000 MG tablet Take 1 tablet by mouth 2 times daily (with meals) 180 tablet 1    potassium chloride (KLOR-CON M) 10 MEQ extended release tablet Take 1 tablet by mouth daily OTC 90 tablet 1    Ertugliflozin L-PyroglutamicAc (STEGLATRO) 15 MG TABS Take 15 mg by mouth daily 30 tablet 3    tiZANidine (ZANAFLEX) 4 MG tablet Take 1 tablet by mouth at bedtime 30 tablet 3    escitalopram (LEXAPRO) 10 MG tablet Take 1 tablet by mouth daily 30 tablet 3    omeprazole (PRILOSEC) 20 MG delayed release capsule Take 1 capsule by mouth daily 30 capsule 3    vitamin B-12 (CYANOCOBALAMIN) 1000 MCG tablet Take 1 tablet by mouth daily (Patient taking differently: Take 1,000 mcg by mouth daily OTC) 90 tablet 1    Cholecalciferol (VITAMIN D3) 50 MCG (2000 UT) TABS Take one tablet by mouth once a day (Patient taking differently: Take one tablet by mouth once a day OTC) 30 tablet 3     No current facility-administered medications for this visit. Allergies   Allergen Reactions    Bee Venom Anaphylaxis    Hornet Venom     Tramadol Other (See Comments)     Night terrors and sweats       Review of Systems:         Review of Systems   Constitutional: Negative for chills, fatigue, fever and unexpected weight change. HENT: Negative for sore throat and trouble swallowing. Respiratory: Negative for cough, choking, shortness of breath and stridor. Cardiovascular: Negative for chest pain, palpitations and leg swelling. Gastrointestinal: Positive for abdominal pain. Negative for abdominal distention, blood in stool, nausea and vomiting. Hematemesis   Musculoskeletal: Negative for back pain, gait problem and joint swelling. Skin: Negative for color change, rash and wound. Allergic/Immunologic: Negative for immunocompromised state. Neurological: Negative for dizziness, speech difficulty, weakness and light-headedness. Hematological: Negative for adenopathy. Does not bruise/bleed easily. Psychiatric/Behavioral: Negative for agitation and confusion. The patient is not nervous/anxious. OBJECTIVE:  Physical Exam:    BP (!) 150/98 (Site: Right Upper Arm, Position: Sitting, Cuff Size: Medium Adult)   Pulse 73   Ht 6' 1\" (1.854 m)   Wt 222 lb 6.4 oz (100.9 kg)   SpO2 99%   BMI 29.34 kg/m²      Physical Exam  General: No acute respiratory distress  Neck: No JVD, supple  Lungs: Clear to auscultation, chest rise equal bilaterally  Cardiac: RRR  Abdomen: Soft, nontender, nondistended, no rebound or guarding. Supraumbilical incisional hernia reducible. Tiny umbilical hernia. Extremities, no edema, cyanosis, or clubbing  Skin: No rashes or breakdown  Neurologic: cranial nerves II - XII grossly intact    ASSESSMENT:  1. Incisional hernia, without obstruction or gangrene      Recurrent incisional hernia  Recurrent umbilical hernia    PLAN:    I would like patient to complete his GI work-up and once complete follow-up for discussion on hernia repair. Additionally will obtain cardiac clearance once he has completed his cardiology work-up. Patient to call once he has completed these appointments. No orders of the defined types were placed in this encounter. No orders of the defined types were placed in this encounter.        Follow Up:  Return for Call once you come pleated your colonoscopy/EGD and cardiology work-up. Dave Bower, DO

## 2022-03-30 NOTE — PATIENT INSTRUCTIONS
Your breath and work on an incisional hernia which as discussed we will plan for repair. Prior to repair I would like you to see a cardiologist so we can have clearance for procedure. Additionally given pending a GI work-up we will wait until this is completed prior to fixing hernia. We will call to try and have this done sooner given symptoms from your hernia. Once you have completed the colonoscopy and seen your cardiologist please call for an appointment.

## 2022-03-31 ENCOUNTER — OFFICE VISIT (OUTPATIENT)
Dept: CARDIOLOGY CLINIC | Age: 52
End: 2022-03-31
Payer: COMMERCIAL

## 2022-03-31 VITALS
BODY MASS INDEX: 29.34 KG/M2 | HEIGHT: 73 IN | HEART RATE: 74 BPM | DIASTOLIC BLOOD PRESSURE: 86 MMHG | SYSTOLIC BLOOD PRESSURE: 132 MMHG | WEIGHT: 221.4 LBS

## 2022-03-31 DIAGNOSIS — Z86.16 HISTORY OF COVID-19: ICD-10-CM

## 2022-03-31 DIAGNOSIS — I45.6 WOLFF PARKINSON WHITE PATTERN SEEN ON ELECTROCARDIOGRAM: ICD-10-CM

## 2022-03-31 DIAGNOSIS — E11.42 TYPE 2 DIABETES MELLITUS WITH DIABETIC POLYNEUROPATHY, WITH LONG-TERM CURRENT USE OF INSULIN (HCC): ICD-10-CM

## 2022-03-31 DIAGNOSIS — E78.00 PURE HYPERCHOLESTEROLEMIA: ICD-10-CM

## 2022-03-31 DIAGNOSIS — Z79.4 TYPE 2 DIABETES MELLITUS WITH DIABETIC POLYNEUROPATHY, WITH LONG-TERM CURRENT USE OF INSULIN (HCC): ICD-10-CM

## 2022-03-31 DIAGNOSIS — I10 ESSENTIAL HYPERTENSION: Primary | ICD-10-CM

## 2022-03-31 DIAGNOSIS — N52.9 ERECTILE DYSFUNCTION, UNSPECIFIED ERECTILE DYSFUNCTION TYPE: ICD-10-CM

## 2022-03-31 PROCEDURE — 99213 OFFICE O/P EST LOW 20 MIN: CPT | Performed by: INTERNAL MEDICINE

## 2022-03-31 PROCEDURE — 3052F HG A1C>EQUAL 8.0%<EQUAL 9.0%: CPT | Performed by: INTERNAL MEDICINE

## 2022-03-31 RX ORDER — SILDENAFIL 50 MG/1
TABLET, FILM COATED ORAL
Qty: 10 TABLET | Refills: 0 | Status: SHIPPED | OUTPATIENT
Start: 2022-03-31 | End: 2022-04-19

## 2022-03-31 NOTE — LETTER
Marly Carrasco  1970  [address]    Have you had any Chest Pain that is not new? - No  If Yes DO EKG - How does it feel -    How long does the pain last -      How long have you been having the pain -    Did you take a    And did it relieve the pain -      DO EKG IF: Patient has a Heart Rate above 100 or below 40     CAD (Coronary Artery Disease) patient should have one on file every 6 months        Have you had any Shortness of Breath - No  If Yes - When     Have you had any dizziness - No  If Yes DO ORTHOSTATIC BP - when do you feel dizzy    How long does it last .        Sitting wait 5 minutes do supine (laying down) wait 5 minutes then do standing - log each in \"vitals\" area in Epic   Be sure to ask what symptoms they are having if they get dizzy while completing ortho stats such as: room spinning, nausea, etc.    Have you had any palpitations that are not new? -   If Yes DO EKG - Do you feel your heart   How long does it last - . Is the patient on any of the following medications -   If Yes DO EKG - Needs done every 6 months    Do you have any edema - swelling in     If Yes - CHECK TO SEE IF THE EDEMA IS PITTING  How long have they been having edema -   If Yes - Have they worn compression stockings   If they have worn compression stockings      Vein \"LEG PROBLEM Questionnaire\"  1. Do you have prominent leg veins? 2. Do you have any skin discoloration? 3. Do you have any healed or active sores? 4. Do you have swelling of the legs? 5. Do you have a family history of varicose veins? 6. Does your profession involve pro-longed        standing or heavy lifting? 7. Have you been fighting overweight problems? 8. Do you have restless legs? 9. Do you have any night time cramps?     10. Do you have any of the following in your legs:             When did you have your last labs drawn   Where did you have them done   What doctor ordered     If we do not have these labs you are retrieve these labs for these providers! Do you have a surgery or procedure scheduled in the near future - Yes  If Yes- DO EKG  If Yes - Who is the surgery with?  Dr. Rosy Emanuel   Phone number of physician   Fax number of physician   Type of surgery Hernia Surgery   GIVE THIS INFORMATION TO RANJAN BOSS     Ask patient if they want to sign up for MyChart if they are not already signed up     Check to see if we have an E-MAIL on file for the patient     Check medication list thoroughly!!! AND RECONCILE OUTSIDE MEDICATIONS  If dose has changed change the entire order not just the MG  BE SURE TO ASK PATIENT IF [address] check out add to every patient's \"wrap up\" the following dot phrase AFTERHOURSEDUCATION and ensure we explain this to our patients

## 2022-03-31 NOTE — PATIENT INSTRUCTIONS
CORONARY ARTERY DISEASE:None known. Stress test 3/2/2022   Normal near maximal study negative for angina or ECG evidence of ischemia.    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    Exercise tolerance is good. Patient exercised for 9:30 minutes on Noe    protocol.    Appropriate hemodynamic response to exercise is noted.      HYPERTENSION:for more than 10 years. Controlled on current management. Takes HCTZ & Cozaar. Counseled regarding low salt diet, exercise & weight control. VALVULAR HEART DISEASE:no              No significant VHD noted  Echo: 9/2020    Left ventricular systolic function is normal.   Ejection fraction is visually estimated at 50-55%.   Small left ventricular cavity.   No significant valvular disease noted.   No evidence of any pericardial effusion.     DYSLIPIDEMIA: yes, Good profile on current medication. Takes Lipitor  Patient's profile is at / near [employer],   HDL is  High  Tolerating current medical regimen wellyes. See most recent Lab values:( Reviewed Labs from family MD, Dr. Ramiro Potter )  LDL is 86  HDL is 56  Diabetes mellitis:yes, For > 10 years, still not well control, but says A1c is down from 15 to 8. 3.     ARRHYTHMIAS:yes, WPW with PAF                 Event monitor no A-fib documented. Had one EKG with A-fib in the recent past.  Patient seeing Juan Mueller next week. His CHADS score is high. 's input will be valuable. ? Loop recorder ? Anticoagulate. Patient missed appointment with Juan Mueller. TESTS ORDERED: None this visit     PREVIOUSLY ORDERED TESTS REVIEWED & DISCUSSED WITH THE PATIENT:     I personally reviewed & interpreted, all previously ordered tests as copied above. Latest Labs are pulled in to the note with dates. Labs, specially in Reference to Lipid profile, Cardiac testing in the form of Echo ( dated: ), stress tests ( dated: ) & other relevant cardiac testing reviewed with patient & recommendations made based on assessment of the results.     Discussed role of Cardiac risk factors & effects + treatment of co morbidities with patient & advised accordingly. MEDICATIONS: List of medications patient is currently taking is reviewed in detail with the patient. Discussed any side effects or problems taking the medication. Recommend Continue present management & medications as listed. Patient is considered a medium risk candidate for surgery because of risk of Arrhythmias. Monitor rhythm through the surgery & during recovery. AFFIRMATION: I spent at least 20 minutes of time reviewing patient's history, previous & current medical problems & all Labs + testing. This includes chart prep even prior to the vosit. Various goals are discussed and multiple questions answered. Relevant concelling performed. Office follow up in 3 months.

## 2022-03-31 NOTE — LETTER
[address]  [address] [address]  Phone: [phone]  Fax: [phone]    Faheem Boudreaux MD    March 31, 2022     Briana Mcdaniels MD  [place] [id number]    Patient: Cele Blake   MR Number: [id number]   YOB: 1970   Date of Visit: 3/31/2022       Dear Briana Mcdaniels:    Thank you for referring Ulises Bauman to me for evaluation/treatment. Below are the relevant portions of my assessment and plan of care. If you have questions, please do not hesitate to call me. I look forward to following [facility] along with you.     Sincerely,      Faheem Boudreaux MD

## 2022-03-31 NOTE — PROGRESS NOTES
OFFICE PROGRESS NOTES      Wendy Humphries is a 54 y.o. male who has    CHIEF COMPLAINT AS FOLLOWS:  CHEST PAIN: Patient denies any C/O chest pains at this time. SOB: No C/O SOB at this time. LEG EDEMA: No leg edema   PALPITATIONS: Denies any C/O Palpitations   DIZZINESS: No C/O Dizziness   SYNCOPE: None   OTHER/ ADDITIONAL COMPLAINTS: Needs Pre-op assessment for Hernia Surgery. HPI: Patient is here for F/U on his ARRHYTHMIA, HTN & Dyslipidemia problems. Arrhythmia: Patient has known  tachycardia. HTN: Patient has known essential HTN. Has been treated with guideline recommended medical / physical/ diet therapy as stated below. Dyslipidemia: Patient has known mixed dyslipidemia. Has been treated with guideline recommended medical / physical/ diet therapy as stated below. Current Outpatient Medications   Medication Sig Dispense Refill    sildenafil (VIAGRA) 50 MG tablet take 1 tablet by mouth daily as needed for erectile dysfunction 10 tablet 0    buPROPion (WELLBUTRIN SR) 100 MG extended release tablet Take 1 tablet by mouth 2 times daily 60 tablet 3    butalbital-acetaminophen-caffeine (FIORICET, ESGIC) -40 MG per tablet Take 1 tablet by mouth every 4 hours as needed for Headaches 180 tablet 3    bisacodyl (BISACODYL) 5 MG EC tablet Take 4 tablets once for colonoscopy prep 4 tablet 0    amitriptyline (ELAVIL) 50 MG tablet Take 1 tablet by mouth nightly 90 tablet 1    atorvastatin (LIPITOR) 40 MG tablet Take 1 tablet by mouth daily 90 tablet 1    gabapentin (NEURONTIN) 400 MG capsule Take 1 capsule by mouth 3 times daily for 90 days.  Intended supply: 90 days 270 capsule 1    hydroCHLOROthiazide (MICROZIDE) 12.5 MG capsule Take 1 capsule by mouth every morning 90 capsule 1    Insulin Degludec (TRESIBA FLEXTOUCH) 100 UNIT/ML SOPN Inject 40 Units into the skin nightly 6 pen 5    insulin lispro (HUMALOG KWIKPEN) 200 UNIT/ML SOPN pen TID with meals Glucose [id number] No Insulin 140-199 2 Units 200-249 4 Units 250-299 6 Units 300-349 8 Units 350-400 10 Units over 400 12 Units 5 pen 5    losartan (COZAAR) 100 MG tablet Take 1 tablet by mouth daily 90 tablet 1    meloxicam (MOBIC) 7.5 MG tablet Take 1 tablet by mouth daily 90 tablet 1    metFORMIN (GLUCOPHAGE) 1000 MG tablet Take 1 tablet by mouth 2 times daily (with meals) 180 tablet 1    potassium chloride (KLOR-CON M) 10 MEQ extended release tablet Take 1 tablet by mouth daily OTC 90 tablet 1    Ertugliflozin L-PyroglutamicAc (STEGLATRO) 15 MG TABS Take 15 mg by mouth daily 30 tablet 3    tiZANidine (ZANAFLEX) 4 MG tablet Take 1 tablet by mouth at bedtime 30 tablet 3    escitalopram (LEXAPRO) 10 MG tablet Take 1 tablet by mouth daily 30 tablet 3    omeprazole (PRILOSEC) 20 MG delayed release capsule Take 1 capsule by mouth daily 30 capsule 3    vitamin B-12 (CYANOCOBALAMIN) 1000 MCG tablet Take 1 tablet by mouth daily (Patient taking differently: Take 1,000 mcg by mouth daily OTC) 90 tablet 1    Cholecalciferol (VITAMIN D3) 50 MCG (2000 UT) TABS Take one tablet by mouth once a day (Patient taking differently: Take one tablet by mouth once a day OTC) 30 tablet 3     No current facility-administered medications for this visit.      Allergies: Bee venom, Hornet venom, and Tramadol  Review of Systems:    Constitutional: Negative for diaphoresis and fatigue  Respiratory: Negative for shortness of breath  Cardiovascular: Negative for chest pain, dyspnea on exertion, claudication, edema, irregular heartbeat, murmur, palpitations or shortness of breath  Musculoskeletal: Negative for muscle pain, muscular weakness, negative for pain in arm and leg or swelling in foot and leg    Objective:  /86   Pulse 74   Ht 6' 1\" (1.854 m)   Wt 221 lb 6.4 oz (100.4 kg)   BMI 29.21 kg/m²   Wt Readings from Last 3 Encounters:   03/31/22 221 lb 6.4 oz (100.4 kg)   03/30/22 222 lb 6.4 oz (100.9 kg)   03/23/22 217 lb 12.8 oz (98.8 kg)     Body mass index is 29.21 kg/m². GENERAL - Alert, oriented, pleasant, in no apparent distress. EYES: No jaundice, no conjunctival pallor. Neck - Supple. No jugular venous distention noted. No carotid bruits. Cardiovascular  Normal S1 and S2 without obvious murmur or gallop. Extremities - No cyanosis, clubbing, or significant edema. Pulmonary  No respiratory distress. No wheezes or rales.       MEDICAL DECISION MAKING & DATA REVIEW:    Lab Review   Lab Results   Component Value Date    TROPONINT <0.010 09/04/2020    TROPONINT <0.010 09/04/2020     Lab Results   Component Value Date    PROBNP 15.33 09/04/2020    PROBNP 93.09 08/14/2017     No results found for: INR  Lab Results   Component Value Date    LABA1C 8.3 01/20/2022    LABA1C 8.9 10/20/2021     Lab Results   Component Value Date    WBC 4.0 03/15/2022    WBC 5.0 01/20/2022    HCT 52.0 03/15/2022    HCT 57.3 (H) 01/20/2022    MCV 87.0 03/15/2022    MCV 88.2 01/20/2022     03/15/2022     01/20/2022     Lab Results   Component Value Date    CHOL 206 (H) 09/12/2019    CHOL 171 12/28/2011    TRIG 84 09/12/2019    TRIG 73 12/28/2011    HDL 56 01/20/2022    HDL 63 (H) 07/21/2021    LDLCALC 86 01/20/2022    LDLCALC 75 07/21/2021    LDLDIRECT 126 (H) 12/28/2011     Lab Results   Component Value Date    ALT 26 03/15/2022    ALT 24 01/20/2022    AST 22 03/15/2022    AST 25 01/20/2022     BMP:    Lab Results   Component Value Date     03/15/2022     01/20/2022    K 5.0 03/15/2022    K 5.2 01/20/2022    CL 98 03/15/2022    CL 96 01/20/2022    CO2 19 03/15/2022    CO2 24 01/20/2022    BUN 24 03/15/2022    BUN 20 01/20/2022    CREATININE 0.8 03/15/2022    CREATININE 0.8 01/20/2022     CMP:   Lab Results   Component Value Date     03/15/2022     01/20/2022    K 5.0 03/15/2022    K 5.2 01/20/2022    CL 98 03/15/2022    CL 96 01/20/2022    CO2 19 03/15/2022    CO2 24 01/20/2022    BUN 24 03/15/2022 BUN 20 01/20/2022    CREATININE 0.8 03/15/2022    CREATININE 0.8 01/20/2022    PROT 7.9 03/15/2022    PROT 8.0 01/20/2022    PROT 6.7 12/28/2011     Lab Results   Component Value Date    TSH 2.22 09/12/2019    TSHHS 1.534 12/28/2011       QUALITY MEASURES REVIEWED:  1.CAD:Patient is taking anti platelet agent:No  Patient does not have Hx of documented CAD  2. DYSLIPIDEMIA: Patient is on cholesterol lowering medication:Yes   3. Beta-Blocker therapy for CAD, if prior Myocardial Infarction:No   4. Counselled regarding smoking cessation. No   Patient does not Smoke. 5.Anticoagulation therapy (for? A.Fib) No  6. Discussed weight management strategies. Assessment & Plan:  Primary / Secondary prevention is the goal by aggressive risk modification, healthy and therapeutic life style changes for cardiovascular risk reduction. CORONARY ARTERY DISEASE:None known. Stress test 3/2/2022   Normal near maximal study negative for angina or ECG evidence of ischemia.    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    Exercise tolerance is good. Patient exercised for 9:30 minutes on Noe    protocol.    Appropriate hemodynamic response to exercise is noted.      HYPERTENSION:for more than 10 years. Controlled on current management. Takes HCTZ & Cozaar. Counseled regarding low salt diet, exercise & weight control. VALVULAR HEART DISEASE:no              No significant VHD noted  Echo: 9/2020    Left ventricular systolic function is normal.   Ejection fraction is visually estimated at 50-55%.   Small left ventricular cavity.   No significant valvular disease noted.   No evidence of any pericardial effusion.     DYSLIPIDEMIA: yes, Good profile on current medication. Takes Lipitor  Patient's profile is at / near [employer],   HDL is  High  Tolerating current medical regimen wellyes.   See most recent Lab values:( Reviewed Labs from family MD, Dr. Zeeshan Ly )  LDL is 86  HDL is 56  Diabetes mellitis:yes, For > 10 years, still not well control, but says A1c is down from 15 to 8. 3.     ARRHYTHMIAS:yes, WPW with PAF                 Event monitor no A-fib documented. Had one EKG with A-fib in the recent past.  Patient seeing Iman Ordonez next week. His CHADS score is high. 's input will be valuable. ? Loop recorder ? Anticoagulate. Patient missed appointment with Iman Ordonez. TESTS ORDERED: None this visit     PREVIOUSLY ORDERED TESTS REVIEWED & DISCUSSED WITH THE PATIENT:     I personally reviewed & interpreted, all previously ordered tests as copied above. Latest Labs are pulled in to the note with dates. Labs, specially in Reference to Lipid profile, Cardiac testing in the form of Echo ( dated: ), stress tests ( dated: ) & other relevant cardiac testing reviewed with patient & recommendations made based on assessment of the results. Discussed role of Cardiac risk factors & effects + treatment of co morbidities with patient & advised accordingly. MEDICATIONS: List of medications patient is currently taking is reviewed in detail with the patient. Discussed any side effects or problems taking the medication. Recommend Continue present management & medications as listed. Patient is considered a medium risk candidate for surgery because of risk of Arrhythmias. Monitor rhythm through the surgery & during recovery. AFFIRMATION: I spent at least 20 minutes of time reviewing patient's history, previous & current medical problems & all Labs + testing. This includes chart prep even prior to the vosit. Various goals are discussed and multiple questions answered. Relevant concelling performed. Office follow up in 3 months.

## 2022-03-31 NOTE — LETTER
3/31/2022  2:15 PM    Patient Name: Kirk Beltrán  : 1970  MRN# [id number]    REASON FOR VISIT: 1 month f/u    Patient Active Problem List    Diagnosis Date Noted    Polycythemia 03/15/2022    History of COVID-19 03/15/2022    Pain in both feet 2022    Chronic pain of left knee 2022    Chronic depression 2022    Anxiety 2022    Pure hypercholesterolemia 2021    Fatty liver 2021    Neuropathy 2021    Dorsalgia 2021    Chest pain     Valri Zoila Parkinson White pattern seen on electrocardiogram     Erectile dysfunction 10/30/2019    Essential hypertension 2019    Gastroesophageal reflux disease without esophagitis 2019    Dupuytren's contracture 2019    Chronic skin ulcer, limited to breakdown of skin ([facility] [address] 2019    Chronic bilateral low back pain without sciatica 2019    Type 2 diabetes mellitus with diabetic polyneuropathy, with long-term current use of insulin ([facility] [address] 2019    Ventral hernia without obstruction or gangrene 2015       Allergies: Bee venom, Hornet venom, and Tramadol      Current Outpatient Medications   Medication Sig Dispense Refill    buPROPion (WELLBUTRIN SR) 100 MG extended release tablet Take 1 tablet by mouth 2 times daily 60 tablet 3    butalbital-acetaminophen-caffeine (FIORICET, ESGIC) -40 MG per tablet Take 1 tablet by mouth every 4 hours as needed for Headaches 180 tablet 3    sildenafil (VIAGRA) 50 MG tablet TAKE 1 TABLET BY MOUTH AS NEEDED FOR ERECTILE DYSFUNCTION 10 tablet 0    bisacodyl (BISACODYL) 5 MG EC tablet Take 4 tablets once for colonoscopy prep 4 tablet 0    amitriptyline (ELAVIL) 50 MG tablet Take 1 tablet by mouth nightly 90 tablet 1    atorvastatin (LIPITOR) 40 MG tablet Take 1 tablet by mouth daily 90 tablet 1    gabapentin (NEURONTIN) 400 MG capsule Take 1 capsule by mouth 3 times daily for 90 days.  Intended supply: 90 days 270 capsule 1    hydroCHLOROthiazide (MICROZIDE) 12.5 MG capsule Take 1 capsule by mouth every morning 90 capsule 1    Insulin Degludec (TRESIBA FLEXTOUCH) 100 UNIT/ML SOPN Inject 40 Units into the skin nightly 6 pen 5    insulin lispro (HUMALOG KWIKPEN) 200 UNIT/ML SOPN pen TID with meals Glucose [id number] No Insulin 140-199 2 Units 200-249 4 Units 250-299 6 Units 300-349 8 Units 350-400 10 Units over 400 12 Units 5 pen 5    losartan (COZAAR) 100 MG tablet Take 1 tablet by mouth daily 90 tablet 1    meloxicam (MOBIC) 7.5 MG tablet Take 1 tablet by mouth daily 90 tablet 1    metFORMIN (GLUCOPHAGE) 1000 MG tablet Take 1 tablet by mouth 2 times daily (with meals) 180 tablet 1    potassium chloride (KLOR-CON M) 10 MEQ extended release tablet Take 1 tablet by mouth daily OTC 90 tablet 1    Ertugliflozin L-PyroglutamicAc (STEGLATRO) 15 MG TABS Take 15 mg by mouth daily 30 tablet 3    tiZANidine (ZANAFLEX) 4 MG tablet Take 1 tablet by mouth at bedtime 30 tablet 3    escitalopram (LEXAPRO) 10 MG tablet Take 1 tablet by mouth daily 30 tablet 3    omeprazole (PRILOSEC) 20 MG delayed release capsule Take 1 capsule by mouth daily 30 capsule 3    vitamin B-12 (CYANOCOBALAMIN) 1000 MCG tablet Take 1 tablet by mouth daily (Patient taking differently: Take 1,000 mcg by mouth daily OTC) 90 tablet 1    Cholecalciferol (VITAMIN D3) 50 MCG (2000 UT) TABS Take one tablet by mouth once a day (Patient taking differently: Take one tablet by mouth once a day OTC) 30 tablet 3     No current facility-administered medications for this visit.          Lab Review   Lab Results   Component Value Date    CKTOTAL 107 09/04/2020    CKTOTAL 142 08/14/2017    TROPONINT <0.010 09/04/2020    TROPONINT <0.010 09/04/2020     BNP:    Lab Results   Component Value Date    PROBNP 15.33 09/04/2020    PROBNP 93.09 08/14/2017     PT/INR:  No results found for: INR  Lab Results   Component Value Date    LABA1C 8.3 01/20/2022    LABA1C 8.9 10/20/2021     Lab Results Component Value Date    WBC 4.0 03/15/2022    WBC 5.0 2022    HCT 52.0 03/15/2022    HCT 57.3 (H) 2022    MCV 87.0 03/15/2022    MCV 88.2 2022     03/15/2022     2022     Lab Results   Component Value Date    CHOL 206 (H) 2019    CHOL 171 2011    TRIG 84 2019    TRIG 73 2011    HDL 56 2022    HDL 63 (H) 2021    LDLCALC 86 2022    LDLCALC 75 2021    LDLDIRECT 126 (H) 2011     Lab Results   Component Value Date    ALT 26 03/15/2022    ALT 24 2022    AST 22 03/15/2022    AST 25 2022     BMP:    Lab Results   Component Value Date     03/15/2022     2022    K 5.0 03/15/2022    K 5.2 2022    CL 98 03/15/2022    CL 96 2022    CO2 19 03/15/2022    CO2 24 2022    BUN 24 03/15/2022    BUN 20 2022    CREATININE 0.8 03/15/2022    CREATININE 0.8 2022     CMP:   Lab Results   Component Value Date     03/15/2022     2022    K 5.0 03/15/2022    K 5.2 2022    CL 98 03/15/2022    CL 96 2022    CO2 19 03/15/2022    CO2 24 2022    BUN 24 03/15/2022    BUN 20 2022    CREATININE 0.8 03/15/2022    CREATININE 0.8 2022    PROT 7.9 03/15/2022    PROT 8.0 2022    PROT 6.7 2011     TSH:    Lab Results   Component Value Date    TSH 2.22 2019    TSHHS 1.534 2011       Smoke: What:                           How much:    Alcohol: How Much:     Caffeine: Pop:         Tea:            Coffee:                Chocolate:    Exercise:    Last Visit:3/23/22  Complaints:none   Changes:none this visit    LAST EK22  Sinus  Rhythm  -Short MD syndrome   Marvin = 106  -WPW pattern  (Type A).      VENOUS DOPPLER: NONE    HOLTER/ EVENT MONITOR: NONE    STRESS TEST:  3/2/22  Normal near maximal study negative for angina or ECG evidence of ischemia.    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    Exercise tolerance is good. Patient exercised for 9:30 minutes on Noe    protocol.    Appropriate hemodynamic response to exercise is noted    ECHO: 9/4/2020  Left ventricular systolic function is normal.   Ejection fraction is visually estimated at 50-55%. Small left ventricular cavity. No significant valvular disease noted. No evidence of any pericardial effusion    CAROTID: NONE    MUGA: NONE    LAST PACER CHECK: NONE    CARDIAC CATH: NONE    Amio Protocol:    CHADS: [id number] Score for Atrial Fibrillation Stroke Risk   Risk   Factors  Component Value   C CHF No 0   H HTN Yes 1   A2 Age >= 76 No,  (50 y.o.) 0   D DM Yes 1   S2 Prior Stroke/TIA No 0   V Vascular Disease No 0   A Age 74-69 No,  (50 y.o.) 0   Sc Sex male 0    [id number]  Score  2   Score last updated 3/31/22 8:13 AM EDT    Click here for a link to the UpToDate guideline \"Atrial Fibrillation: Anticoagulation therapy to prevent embolization    Disclaimer: Risk Score calculation is dependent on accuracy of patient problem list and past encounter diagnosis.

## 2022-03-31 NOTE — PROGRESS NOTES
[id number] Score for Atrial Fibrillation Stroke Risk   Risk   Factors  Component Value   C CHF No 0   H HTN Yes 1   A2 Age >= 75 No,  (50 y.o.) 0   D DM Yes 1   S2 Prior Stroke/TIA No 0   V Vascular Disease No 0   A Age 74-69 No,  (50 y.o.) 0   Sc Sex male 0    [id number]  Score  2   Score last updated 3/31/22 2:16 PM EDT

## 2022-04-04 ENCOUNTER — TELEPHONE (OUTPATIENT)
Dept: SURGERY | Age: 52
End: 2022-04-04

## 2022-04-04 NOTE — TELEPHONE ENCOUNTER
PT CALLED STATING HE GOT HIS CC AND WOULD LIKE TO SCHEDULE SURGERY. PER LAST OFFICE VISIT NOTE, Return for Call once you come pleated your colonoscopy/EGD and cardiology work-up. ADVISED PT WHO STATED HIS COLONOSCOPY IS IN MAY AND DOES NOT WANT TO WAIT THAT LONG. THERE IS NO CONSENT.  PLEASE ADVISE

## 2022-04-07 ENCOUNTER — TELEPHONE (OUTPATIENT)
Dept: SURGERY | Age: 52
End: 2022-04-07

## 2022-04-07 NOTE — TELEPHONE ENCOUNTER
Patient stopped by office asking for note stating he is going to be off of work until his surgery starting 3/30/2022 to surgery date. Is this ok to write this for the patient. Patient does do a lot of heavy lifting at his job with [address]. Please advise.

## 2022-04-08 ENCOUNTER — TELEPHONE (OUTPATIENT)
Dept: CARDIOLOGY CLINIC | Age: 52
End: 2022-04-08

## 2022-04-08 NOTE — TELEPHONE ENCOUNTER
Patient currently scheduled for consult appointment 4/29/2022. Per Dr Tam Wooten, requesting patient be seen sooner. Did try to contact patient to reschedule to sooner date. No answer. Message left asking patient to return phone call when available.

## 2022-04-11 NOTE — TELEPHONE ENCOUNTER
Again tried to reach patient to discuss rescheduling consult appointment to sooner date as requested by Dr Marlon Narayan. No answer, message again left askng that patient return phone call when available.

## 2022-04-12 ENCOUNTER — TELEPHONE (OUTPATIENT)
Dept: GASTROENTEROLOGY | Age: 52
End: 2022-04-12

## 2022-04-13 ENCOUNTER — HOSPITAL ENCOUNTER (OUTPATIENT)
Dept: INFUSION THERAPY | Age: 52
Discharge: HOME | End: 2022-04-13
Payer: COMMERCIAL

## 2022-04-13 ENCOUNTER — INITIAL CONSULT (OUTPATIENT)
Dept: ONCOLOGY | Age: 52
End: 2022-04-13
Payer: COMMERCIAL

## 2022-04-13 ENCOUNTER — OFFICE VISIT (OUTPATIENT)
Dept: SURGERY | Age: 52
End: 2022-04-13
Payer: COMMERCIAL

## 2022-04-13 VITALS
WEIGHT: 221 LBS | BODY MASS INDEX: 29.29 KG/M2 | SYSTOLIC BLOOD PRESSURE: 152 MMHG | OXYGEN SATURATION: 98 % | HEART RATE: 69 BPM | DIASTOLIC BLOOD PRESSURE: 98 MMHG | HEIGHT: 73 IN

## 2022-04-13 VITALS
OXYGEN SATURATION: 98 % | DIASTOLIC BLOOD PRESSURE: 81 MMHG | RESPIRATION RATE: 16 BRPM | TEMPERATURE: 96.6 F | HEART RATE: 84 BPM | WEIGHT: 220 LBS | HEIGHT: 74 IN | SYSTOLIC BLOOD PRESSURE: 134 MMHG | BODY MASS INDEX: 28.23 KG/M2

## 2022-04-13 DIAGNOSIS — K43.2 INCISIONAL HERNIA, WITHOUT OBSTRUCTION OR GANGRENE: Primary | ICD-10-CM

## 2022-04-13 DIAGNOSIS — K92.2 GASTROINTESTINAL HEMORRHAGE, UNSPECIFIED GASTROINTESTINAL HEMORRHAGE TYPE: ICD-10-CM

## 2022-04-13 DIAGNOSIS — D75.1 POLYCYTHEMIA: Primary | ICD-10-CM

## 2022-04-13 DIAGNOSIS — R19.02 LEFT UPPER QUADRANT ABDOMINAL SWELLING, MASS AND LUMP: ICD-10-CM

## 2022-04-13 DIAGNOSIS — D75.1 POLYCYTHEMIA: ICD-10-CM

## 2022-04-13 LAB
ALBUMIN SERPL-MCNC: 4.9 GM/DL (ref 3.4–5)
ALP BLD-CCNC: 103 IU/L (ref 40–129)
ALT SERPL-CCNC: 20 U/L (ref 10–40)
ANION GAP SERPL CALCULATED.3IONS-SCNC: 15 MMOL/L (ref 4–16)
AST SERPL-CCNC: 18 IU/L (ref 15–37)
BASOPHILS ABSOLUTE: 0 K/CU MM
BASOPHILS RELATIVE PERCENT: 0.4 % (ref 0–1)
BILIRUB SERPL-MCNC: 0.3 MG/DL (ref 0–1)
BUN BLDV-MCNC: 26 MG/DL (ref 6–23)
CALCIUM SERPL-MCNC: 9.7 MG/DL (ref 8.3–10.6)
CHLORIDE BLD-SCNC: 100 MMOL/L (ref 99–110)
CO2: 22 MMOL/L (ref 21–32)
CREAT SERPL-MCNC: 0.8 MG/DL (ref 0.9–1.3)
DIFFERENTIAL TYPE: ABNORMAL
EOSINOPHILS ABSOLUTE: 0.2 K/CU MM
EOSINOPHILS RELATIVE PERCENT: 3.6 % (ref 0–3)
ERYTHROCYTE SEDIMENTATION RATE: 2 MM/HR (ref 0–15)
GFR AFRICAN AMERICAN: >60 ML/MIN/1.73M2
GFR NON-AFRICAN AMERICAN: >60 ML/MIN/1.73M2
GLUCOSE BLD-MCNC: 329 MG/DL (ref 70–99)
HCT VFR BLD CALC: 49.6 % (ref 42–52)
HEMOGLOBIN: 17.7 GM/DL (ref 13.5–18)
LACTATE DEHYDROGENASE: 198 IU/L (ref 120–246)
LYMPHOCYTES ABSOLUTE: 1 K/CU MM
LYMPHOCYTES RELATIVE PERCENT: 19.2 % (ref 24–44)
MCH RBC QN AUTO: 30.1 PG (ref 27–31)
MCHC RBC AUTO-ENTMCNC: 35.7 % (ref 32–36)
MCV RBC AUTO: 84.4 FL (ref 78–100)
MONOCYTES ABSOLUTE: 0.3 K/CU MM
MONOCYTES RELATIVE PERCENT: 6.5 % (ref 0–4)
PDW BLD-RTO: 14 % (ref 11.7–14.9)
PLATELET # BLD: 172 K/CU MM (ref 140–440)
PMV BLD AUTO: 10.2 FL (ref 7.5–11.1)
POTASSIUM SERPL-SCNC: 4.6 MMOL/L (ref 3.5–5.1)
RBC # BLD: 5.88 M/CU MM (ref 4.6–6.2)
SEGMENTED NEUTROPHILS ABSOLUTE COUNT: 3.7 K/CU MM
SEGMENTED NEUTROPHILS RELATIVE PERCENT: 70.3 % (ref 36–66)
SODIUM BLD-SCNC: 137 MMOL/L (ref 135–145)
TOTAL PROTEIN: 7.4 GM/DL (ref 6.4–8.2)
WBC # BLD: 5.3 K/CU MM (ref 4–10.5)

## 2022-04-13 PROCEDURE — 36415 COLL VENOUS BLD VENIPUNCTURE: CPT

## 2022-04-13 PROCEDURE — 80053 COMPREHEN METABOLIC PANEL: CPT

## 2022-04-13 PROCEDURE — 99214 OFFICE O/P EST MOD 30 MIN: CPT | Performed by: SURGERY

## 2022-04-13 PROCEDURE — 99204 OFFICE O/P NEW MOD 45 MIN: CPT | Performed by: INTERNAL MEDICINE

## 2022-04-13 PROCEDURE — 82668 ASSAY OF ERYTHROPOIETIN: CPT

## 2022-04-13 PROCEDURE — 83615 LACTATE (LD) (LDH) ENZYME: CPT

## 2022-04-13 PROCEDURE — 85652 RBC SED RATE AUTOMATED: CPT

## 2022-04-13 PROCEDURE — 85025 COMPLETE CBC W/AUTO DIFF WBC: CPT

## 2022-04-13 RX ORDER — ONDANSETRON 4 MG/1
4 TABLET, FILM COATED ORAL EVERY 8 HOURS PRN
COMMUNITY
End: 2022-06-29

## 2022-04-13 ASSESSMENT — PATIENT HEALTH QUESTIONNAIRE - PHQ9
6. FEELING BAD ABOUT YOURSELF - OR THAT YOU ARE A FAILURE OR HAVE LET YOURSELF OR YOUR FAMILY DOWN: 0
SUM OF ALL RESPONSES TO PHQ QUESTIONS 1-9: 6
8. MOVING OR SPEAKING SO SLOWLY THAT OTHER PEOPLE COULD HAVE NOTICED. OR THE OPPOSITE, BEING SO FIGETY OR RESTLESS THAT YOU HAVE BEEN MOVING AROUND A LOT MORE THAN USUAL: 0
10. IF YOU CHECKED OFF ANY PROBLEMS, HOW DIFFICULT HAVE THESE PROBLEMS MADE IT FOR YOU TO DO YOUR WORK, TAKE CARE OF THINGS AT HOME, OR GET ALONG WITH OTHER PEOPLE: 0
SUM OF ALL RESPONSES TO PHQ9 QUESTIONS 1 & 2: 3
5. POOR APPETITE OR OVEREATING: 0
1. LITTLE INTEREST OR PLEASURE IN DOING THINGS: 0
4. FEELING TIRED OR HAVING LITTLE ENERGY: 0
SUM OF ALL RESPONSES TO PHQ QUESTIONS 1-9: 6
7. TROUBLE CONCENTRATING ON THINGS, SUCH AS READING THE NEWSPAPER OR WATCHING TELEVISION: 3
SUM OF ALL RESPONSES TO PHQ QUESTIONS 1-9: 6
9. THOUGHTS THAT YOU WOULD BE BETTER OFF DEAD, OR OF HURTING YOURSELF: 0
SUM OF ALL RESPONSES TO PHQ QUESTIONS 1-9: 6
3. TROUBLE FALLING OR STAYING ASLEEP: 0
2. FEELING DOWN, DEPRESSED OR HOPELESS: 3

## 2022-04-13 ASSESSMENT — ENCOUNTER SYMPTOMS
ABDOMINAL DISTENTION: 0
SHORTNESS OF BREATH: 0
TROUBLE SWALLOWING: 0
VOMITING: 0
COUGH: 0
BLOOD IN STOOL: 1
ABDOMINAL PAIN: 1
SORE THROAT: 0
BACK PAIN: 0
CHOKING: 0
STRIDOR: 0
NAUSEA: 0
COLOR CHANGE: 0

## 2022-04-13 NOTE — TELEPHONE ENCOUNTER
Again tried to reach patient to discuss sooner date for consult as requested by Dr Jordan Paulson. No answer, again left message asking patient to return phone call when available.

## 2022-04-13 NOTE — PROGRESS NOTES
MA Rooming Questions  Patient: Coreen Moritz  MRN: [id number]    Date: 4/13/2022        NP    5. Did the patient have a depression screening completed today?  Yes    No data recorded     PHQ-9 Given to (if applicable):               PHQ-9 Score (if applicable):                     [] Positive     [x]  Negative              Does question #9 need addressed (if applicable)                     [] Yes    []  No               Shanita Tineo MA

## 2022-04-13 NOTE — H&P
SUBJECTIVE:  HPI:     Patient recently seen for incisional hernia. He had cardiac work-up pending which is now been completed. He also had GI work-up pending for GI bleed. Plan was to have this done first.  He was scheduled fairly far out for his GI work-up and they were unable to move the closure therefore he elected to have this done sooner as hernia significant limiting daily activity. He states that he had a couple months with on and off hematemesis. He also had occasional melena. This was ongoing until approximately 3 weeks ago and since then has not had recurrence. We will plan to proceed with EGD and colonoscopy prior to discussing hernia repair. Prior abdominal surgeries include anterior posterior spine surgery and ventral hernia x2. Denies family history of colon cancer or IBD. No recent unexplained weight loss, no change in caliber stool.     Past Surgical History:   Procedure Laterality Date    ELBOW SURGERY Right     HERNIA REPAIR      SPINE SURGERY  1990, 2010    Fusion of L3, L4, L5    UMBILICAL HERNIA REPAIR  11/4/15    repair incarcerated supra umbilical hernia     Past Medical History:   Diagnosis Date    Depression     Hyperlipidemia     Hypertension     Sleep disorder     Ventral hernia without obstruction or gangrene 11/4/2015     Family History   Problem Relation Age of Onset    Breast Cancer Mother      Social History     Socioeconomic History    Marital status: Single     Spouse name: Not on file    Number of children: Not on file    Years of education: Not on file    Highest education level: Not on file   Occupational History    Not on file   Tobacco Use    Smoking status: Never Smoker    Smokeless tobacco: Never Used   Vaping Use    Vaping Use: Never used   Substance and Sexual Activity    Alcohol use: Not Currently     Alcohol/week: 2.0 standard drinks     Types: 2 Standard drinks or equivalent per week    Drug use: Not Currently     Types: Marijuana Mira Treviño) Comment: marijuana card    Sexual activity: Not on file   Other Topics Concern    Not on file   Social History Narrative    Not on file     Social Determinants of Health     Financial Resource Strain: Low Risk     Difficulty of Paying Living Expenses: Not hard at all   Food Insecurity: No Food Insecurity    Worried About Running Out of Food in the Last Year: Never true    [address] in the Last Year: Never true   Transportation Needs:     Lack of Transportation (Medical): Not on file    Lack of Transportation (Non-Medical):  Not on file   Physical Activity:     Days of Exercise per Week: Not on file    Minutes of Exercise per Session: Not on file   Stress:     Feeling of Stress : Not on file   Social Connections:     Frequency of Communication with Friends and Family: Not on file    Frequency of Social Gatherings with Friends and Family: Not on file    Attends Church Services: Not on file    Active Member of [address] [place] [address] or Organizations: Not on file    Attends Club or Organization Meetings: Not on file    Marital Status: Not on file   Intimate Partner Violence:     Fear of Current or Ex-Partner: Not on file    Emotionally Abused: Not on file    Physically Abused: Not on file    Sexually Abused: Not on file   Housing Stability:     Unable to Pay for Housing in the Last Year: Not on file    Number of [facility] in the Last Year: Not on file    Unstable Housing in the Last Year: Not on file       Current Outpatient Medications   Medication Sig Dispense Refill    sildenafil (VIAGRA) 50 MG tablet take 1 tablet by mouth daily as needed for erectile dysfunction 10 tablet 0    buPROPion (WELLBUTRIN SR) 100 MG extended release tablet Take 1 tablet by mouth 2 times daily 60 tablet 3    butalbital-acetaminophen-caffeine (FIORICET, ESGIC) -40 MG per tablet Take 1 tablet by mouth every 4 hours as needed for Headaches 180 tablet 3    bisacodyl (BISACODYL) 5 MG EC tablet Take 4 tablets once for colonoscopy prep 4 tablet 0    amitriptyline (ELAVIL) 50 MG tablet Take 1 tablet by mouth nightly 90 tablet 1    atorvastatin (LIPITOR) 40 MG tablet Take 1 tablet by mouth daily 90 tablet 1    gabapentin (NEURONTIN) 400 MG capsule Take 1 capsule by mouth 3 times daily for 90 days. Intended supply: 90 days 270 capsule 1    hydroCHLOROthiazide (MICROZIDE) 12.5 MG capsule Take 1 capsule by mouth every morning 90 capsule 1    Insulin Degludec (TRESIBA FLEXTOUCH) 100 UNIT/ML SOPN Inject 40 Units into the skin nightly 6 pen 5    insulin lispro (HUMALOG KWIKPEN) 200 UNIT/ML SOPN pen TID with meals Glucose [id number] No Insulin 140-199 2 Units 200-249 4 Units 250-299 6 Units 300-349 8 Units 350-400 10 Units over 400 12 Units 5 pen 5    losartan (COZAAR) 100 MG tablet Take 1 tablet by mouth daily 90 tablet 1    meloxicam (MOBIC) 7.5 MG tablet Take 1 tablet by mouth daily 90 tablet 1    metFORMIN (GLUCOPHAGE) 1000 MG tablet Take 1 tablet by mouth 2 times daily (with meals) 180 tablet 1    potassium chloride (KLOR-CON M) 10 MEQ extended release tablet Take 1 tablet by mouth daily OTC 90 tablet 1    Ertugliflozin L-PyroglutamicAc (STEGLATRO) 15 MG TABS Take 15 mg by mouth daily 30 tablet 3    tiZANidine (ZANAFLEX) 4 MG tablet Take 1 tablet by mouth at bedtime 30 tablet 3    escitalopram (LEXAPRO) 10 MG tablet Take 1 tablet by mouth daily 30 tablet 3    omeprazole (PRILOSEC) 20 MG delayed release capsule Take 1 capsule by mouth daily 30 capsule 3    vitamin B-12 (CYANOCOBALAMIN) 1000 MCG tablet Take 1 tablet by mouth daily (Patient taking differently: Take 1,000 mcg by mouth daily OTC) 90 tablet 1    Cholecalciferol (VITAMIN D3) 50 MCG (2000 UT) TABS Take one tablet by mouth once a day (Patient taking differently: Take one tablet by mouth once a day OTC) 30 tablet 3     No current facility-administered medications for this visit.       Allergies   Allergen Reactions    Bee Venom Anaphylaxis    Hornet Venom     Tramadol Other (See Comments)     Night terrors and sweats       Review of Systems:         Review of Systems   Constitutional: Negative for chills, fatigue, fever and unexpected weight change. HENT: Negative for sore throat and trouble swallowing. Respiratory: Negative for cough, choking, shortness of breath and stridor. Cardiovascular: Negative for chest pain, palpitations and leg swelling. Gastrointestinal: Positive for abdominal pain and blood in stool. Negative for abdominal distention, nausea and vomiting. Reports hematemesis, melena   Musculoskeletal: Negative for back pain, gait problem and joint swelling. Skin: Negative for color change, rash and wound. Allergic/Immunologic: Negative for immunocompromised state. Neurological: Negative for dizziness, speech difficulty, weakness and light-headedness. Hematological: Negative for adenopathy. Does not bruise/bleed easily. Psychiatric/Behavioral: Negative for agitation and confusion. The patient is not nervous/anxious. OBJECTIVE:  Physical Exam:    BP (!) 152/98 (Site: Left Upper Arm, Position: Sitting, Cuff Size: Medium Adult)   Pulse 69   Ht 6' 1\" (1.854 m)   Wt 221 lb (100.2 kg)   SpO2 98%   BMI 29.16 kg/m²      Physical Exam  General: No acute respiratory distress  Neck: No JVD, supple  Lungs: Clear to auscultation, chest rise equal bilaterally  Cardiac: RRR  Abdomen: Soft, nontender, nondistended, no rebound or guarding  Extremities, no edema, cyanosis, or clubbing  Skin: No rashes or breakdown  Neurologic: cranial nerves II - XII grossly intact    ASSESSMENT:  1. Incisional hernia, without obstruction or gangrene    2. Gastrointestinal hemorrhage, unspecified gastrointestinal hemorrhage type          PLAN:    We will plan for colonoscopy and EGD. Gatorade/MiraLAX prep. Discussed risks and benefits of colonoscopy.   Risks discussed include, but not limited to, risk of anesthesia including cardiopulmonary compromise, bleeding, perforation, need for additional surgeries. Patient states understanding and agrees to proceed with colonoscopy. No orders of the defined types were placed in this encounter. No orders of the defined types were placed in this encounter. Follow Up:  No follow-ups on file.       Rosalia Jacobs DO

## 2022-04-13 NOTE — H&P (VIEW-ONLY)
SUBJECTIVE:  HPI:     Patient recently seen for incisional hernia. He had cardiac work-up pending which is now been completed. He also had GI work-up pending for GI bleed. Plan was to have this done first.  He was scheduled fairly far out for his GI work-up and they were unable to move the closure therefore he elected to have this done sooner as hernia significant limiting daily activity. He states that he had a couple months with on and off hematemesis. He also had occasional melena. This was ongoing until approximately 3 weeks ago and since then has not had recurrence. We will plan to proceed with EGD and colonoscopy prior to discussing hernia repair. Prior abdominal surgeries include anterior posterior spine surgery and ventral hernia x2. Denies family history of colon cancer or IBD. No recent unexplained weight loss, no change in caliber stool.     Past Surgical History:   Procedure Laterality Date    ELBOW SURGERY Right     HERNIA REPAIR      SPINE SURGERY  1990, 2010    Fusion of L3, L4, L5    UMBILICAL HERNIA REPAIR  11/4/15    repair incarcerated supra umbilical hernia     Past Medical History:   Diagnosis Date    Depression     Hyperlipidemia     Hypertension     Sleep disorder     Ventral hernia without obstruction or gangrene 11/4/2015     Family History   Problem Relation Age of Onset    Breast Cancer Mother      Social History     Socioeconomic History    Marital status: Single     Spouse name: Not on file    Number of children: Not on file    Years of education: Not on file    Highest education level: Not on file   Occupational History    Not on file   Tobacco Use    Smoking status: Never Smoker    Smokeless tobacco: Never Used   Vaping Use    Vaping Use: Never used   Substance and Sexual Activity    Alcohol use: Not Currently     Alcohol/week: 2.0 standard drinks     Types: 2 Standard drinks or equivalent per week    Drug use: Not Currently     Types: Marijuana Son Shi Comment: marijuana card    Sexual activity: Not on file   Other Topics Concern    Not on file   Social History Narrative    Not on file     Social Determinants of Health     Financial Resource Strain: Low Risk     Difficulty of Paying Living Expenses: Not hard at all   Food Insecurity: No Food Insecurity    Worried About Running Out of Food in the Last Year: Never true    [address] in the Last Year: Never true   Transportation Needs:     Lack of Transportation (Medical): Not on file    Lack of Transportation (Non-Medical):  Not on file   Physical Activity:     Days of Exercise per Week: Not on file    Minutes of Exercise per Session: Not on file   Stress:     Feeling of Stress : Not on file   Social Connections:     Frequency of Communication with Friends and Family: Not on file    Frequency of Social Gatherings with Friends and Family: Not on file    Attends [employer] Services: Not on file    Active Member of [address] [place] [address] or Organizations: Not on file    Attends Club or Organization Meetings: Not on file    Marital Status: Not on file   Intimate Partner Violence:     Fear of Current or Ex-Partner: Not on file    Emotionally Abused: Not on file    Physically Abused: Not on file    Sexually Abused: Not on file   Housing Stability:     Unable to Pay for Housing in the Last Year: Not on file    Number of [facility] in the Last Year: Not on file    Unstable Housing in the Last Year: Not on file       Current Outpatient Medications   Medication Sig Dispense Refill    sildenafil (VIAGRA) 50 MG tablet take 1 tablet by mouth daily as needed for erectile dysfunction 10 tablet 0    buPROPion (WELLBUTRIN SR) 100 MG extended release tablet Take 1 tablet by mouth 2 times daily 60 tablet 3    butalbital-acetaminophen-caffeine (FIORICET, ESGIC) -40 MG per tablet Take 1 tablet by mouth every 4 hours as needed for Headaches 180 tablet 3    bisacodyl (BISACODYL) 5 MG EC tablet Take 4 tablets once for colonoscopy prep 4 tablet 0    amitriptyline (ELAVIL) 50 MG tablet Take 1 tablet by mouth nightly 90 tablet 1    atorvastatin (LIPITOR) 40 MG tablet Take 1 tablet by mouth daily 90 tablet 1    gabapentin (NEURONTIN) 400 MG capsule Take 1 capsule by mouth 3 times daily for 90 days. Intended supply: 90 days 270 capsule 1    hydroCHLOROthiazide (MICROZIDE) 12.5 MG capsule Take 1 capsule by mouth every morning 90 capsule 1    Insulin Degludec (TRESIBA FLEXTOUCH) 100 UNIT/ML SOPN Inject 40 Units into the skin nightly 6 pen 5    insulin lispro (HUMALOG KWIKPEN) 200 UNIT/ML SOPN pen TID with meals Glucose [id number] No Insulin 140-199 2 Units 200-249 4 Units 250-299 6 Units 300-349 8 Units 350-400 10 Units over 400 12 Units 5 pen 5    losartan (COZAAR) 100 MG tablet Take 1 tablet by mouth daily 90 tablet 1    meloxicam (MOBIC) 7.5 MG tablet Take 1 tablet by mouth daily 90 tablet 1    metFORMIN (GLUCOPHAGE) 1000 MG tablet Take 1 tablet by mouth 2 times daily (with meals) 180 tablet 1    potassium chloride (KLOR-CON M) 10 MEQ extended release tablet Take 1 tablet by mouth daily OTC 90 tablet 1    Ertugliflozin L-PyroglutamicAc (STEGLATRO) 15 MG TABS Take 15 mg by mouth daily 30 tablet 3    tiZANidine (ZANAFLEX) 4 MG tablet Take 1 tablet by mouth at bedtime 30 tablet 3    escitalopram (LEXAPRO) 10 MG tablet Take 1 tablet by mouth daily 30 tablet 3    omeprazole (PRILOSEC) 20 MG delayed release capsule Take 1 capsule by mouth daily 30 capsule 3    vitamin B-12 (CYANOCOBALAMIN) 1000 MCG tablet Take 1 tablet by mouth daily (Patient taking differently: Take 1,000 mcg by mouth daily OTC) 90 tablet 1    Cholecalciferol (VITAMIN D3) 50 MCG (2000 UT) TABS Take one tablet by mouth once a day (Patient taking differently: Take one tablet by mouth once a day OTC) 30 tablet 3     No current facility-administered medications for this visit.       Allergies   Allergen Reactions    Bee Venom Anaphylaxis    Hornet Venom     Tramadol Other (See Comments)     Night terrors and sweats       Review of Systems:         Review of Systems   Constitutional: Negative for chills, fatigue, fever and unexpected weight change. HENT: Negative for sore throat and trouble swallowing. Respiratory: Negative for cough, choking, shortness of breath and stridor. Cardiovascular: Negative for chest pain, palpitations and leg swelling. Gastrointestinal: Positive for abdominal pain and blood in stool. Negative for abdominal distention, nausea and vomiting. Reports hematemesis, melena   Musculoskeletal: Negative for back pain, gait problem and joint swelling. Skin: Negative for color change, rash and wound. Allergic/Immunologic: Negative for immunocompromised state. Neurological: Negative for dizziness, speech difficulty, weakness and light-headedness. Hematological: Negative for adenopathy. Does not bruise/bleed easily. Psychiatric/Behavioral: Negative for agitation and confusion. The patient is not nervous/anxious. OBJECTIVE:  Physical Exam:    BP (!) 152/98 (Site: Left Upper Arm, Position: Sitting, Cuff Size: Medium Adult)   Pulse 69   Ht 6' 1\" (1.854 m)   Wt 221 lb (100.2 kg)   SpO2 98%   BMI 29.16 kg/m²      Physical Exam  General: No acute respiratory distress  Neck: No JVD, supple  Lungs: Clear to auscultation, chest rise equal bilaterally  Cardiac: RRR  Abdomen: Soft, nontender, nondistended, no rebound or guarding  Extremities, no edema, cyanosis, or clubbing  Skin: No rashes or breakdown  Neurologic: cranial nerves II - XII grossly intact    ASSESSMENT:  1. Incisional hernia, without obstruction or gangrene    2. Gastrointestinal hemorrhage, unspecified gastrointestinal hemorrhage type          PLAN:    We will plan for colonoscopy and EGD. Gatorade/MiraLAX prep. Discussed risks and benefits of colonoscopy.   Risks discussed include, but not limited to, risk of anesthesia including cardiopulmonary compromise, bleeding, perforation, need for additional surgeries. Patient states understanding and agrees to proceed with colonoscopy. No orders of the defined types were placed in this encounter. No orders of the defined types were placed in this encounter. Follow Up:  No follow-ups on file.       Orlando Gonzales DO

## 2022-04-13 NOTE — PROGRESS NOTES
Patient Name:  Dania Narayan  Patient :  1970  Patient MRN:  [id number]     Primary Oncologist: Marilou Ontiveros MD  Referring Provider: Marco Anna MD     Date of Service: 2022      Reason for Consult: To evaluate the patient with erythrocytosis. Chief Complaint:    Chief Complaint   Patient presents with    New Patient     Patient Active Problem List:     Ventral hernia without obstruction or gangrene     Essential hypertension     Gastroesophageal reflux disease without esophagitis     Dupuytren's contracture     Chronic skin ulcer, limited to breakdown of skin ([address]     Chronic bilateral low back pain without sciatica     Type 2 diabetes mellitus with diabetic polyneuropathy, with long-term current use of insulin ([address]     Erectile dysfunction     [place] pattern seen on electrocardiogram     Fatty liver     Neuropathy     Dorsalgia     Pure hypercholesterolemia     Pain in both feet     Chronic pain of left knee     Chronic depression     Anxiety     Polycythemia     History of COVID-19     Left upper quadrant abdominal swelling, mass and lump    HPI:   Mehul Cooley. \"KJ\" Tang Salvador is a 80-year-old very pleasant gentleman with medical history significant for hypertension, hyperlipidemia, diabetes mellitus, depression/anxiety, GERD, WPW syndrome with paroxysmal atrial fibrillation, referred to me on 2022 for evaluation of his erythrocytosis. He stated that he was noted to have mild erythrocytosis by primary care physician since 2020. It has been quite stable since then. He denies smoking, high altitude living or family history of erythrocytosis. He is in the process of having umbilical hernia repair. He complained of pain, discomfort and swelling in his left side of abdomen. Besides that, he does not have any other significant symptoms at today visit. Past Medical History:     Significant for  1. Hypertension  2. Hyperlipidemia  3. Diabetes mellitus  4. Depression/anxiety  5. GERD  6. [address] [place] with proxysmal atrial fibrillation    Past Surgery History:    Significant for  1. Elbow surgery  2. Umbilical hernia repair  3. L3, L4, L5 spine fusion surgery    Social History:   He denies cigarette smoking or illicit drug abuse. He socially drinks alcohol. Family History:    Significant for breast cancer in his mother. No other pertinent family history. Allergies   Allergen Reactions    Bee Venom Anaphylaxis    Hornet Venom     Tramadol Other (See Comments)     Night terrors and sweats       Current Outpatient Medications on File Prior to Visit   Medication Sig Dispense Refill    ondansetron (ZOFRAN) 4 MG tablet Take 4 mg by mouth every 8 hours as needed for Nausea or Vomiting      sildenafil (VIAGRA) 50 MG tablet take 1 tablet by mouth daily as needed for erectile dysfunction 10 tablet 0    buPROPion (WELLBUTRIN SR) 100 MG extended release tablet Take 1 tablet by mouth 2 times daily 60 tablet 3    butalbital-acetaminophen-caffeine (FIORICET, ESGIC) -40 MG per tablet Take 1 tablet by mouth every 4 hours as needed for Headaches 180 tablet 3    amitriptyline (ELAVIL) 50 MG tablet Take 1 tablet by mouth nightly 90 tablet 1    atorvastatin (LIPITOR) 40 MG tablet Take 1 tablet by mouth daily 90 tablet 1    gabapentin (NEURONTIN) 400 MG capsule Take 1 capsule by mouth 3 times daily for 90 days.  Intended supply: 90 days 270 capsule 1    hydroCHLOROthiazide (MICROZIDE) 12.5 MG capsule Take 1 capsule by mouth every morning 90 capsule 1    Insulin Degludec (TRESIBA FLEXTOUCH) 100 UNIT/ML SOPN Inject 40 Units into the skin nightly 6 pen 5    insulin lispro (HUMALOG KWIKPEN) 200 UNIT/ML SOPN pen TID with meals Glucose [id number] No Insulin 140-199 2 Units 200-249 4 Units 250-299 6 Units 300-349 8 Units 350-400 10 Units over 400 12 Units 5 pen 5    losartan (COZAAR) 100 MG tablet Take 1 tablet by mouth daily 90 tablet 1    meloxicam (MOBIC) 7.5 MG tablet Take 1 tablet by mouth daily 90 tablet 1    metFORMIN (GLUCOPHAGE) 1000 MG tablet Take 1 tablet by mouth 2 times daily (with meals) 180 tablet 1    potassium chloride (KLOR-CON M) 10 MEQ extended release tablet Take 1 tablet by mouth daily OTC 90 tablet 1    Ertugliflozin L-PyroglutamicAc (STEGLATRO) 15 MG TABS Take 15 mg by mouth daily 30 tablet 3    tiZANidine (ZANAFLEX) 4 MG tablet Take 1 tablet by mouth at bedtime 30 tablet 3    escitalopram (LEXAPRO) 10 MG tablet Take 1 tablet by mouth daily 30 tablet 3    omeprazole (PRILOSEC) 20 MG delayed release capsule Take 1 capsule by mouth daily 30 capsule 3    vitamin B-12 (CYANOCOBALAMIN) 1000 MCG tablet Take 1 tablet by mouth daily (Patient taking differently: Take 1,000 mcg by mouth daily OTC) 90 tablet 1    Cholecalciferol (VITAMIN D3) 50 MCG (2000 UT) TABS Take one tablet by mouth once a day (Patient taking differently: Take one tablet by mouth once a day OTC) 30 tablet 3     No current facility-administered medications on file prior to visit. Review of Systems:  Constitutional:  No weight loss, No fever, No chills, No night sweats. Energy level is fair. Eyes:  No diplopia, No transient or permanent loss of vision, No scotomata. ENT / Mouth:  No epistaxis, No dysphagia, No hoarseness, No oral ulcers, No gingival bleeding. No sore throat, No postnasal drip, No nasal drip, No mouth pain, No sinus pain, No tinnitus, Normal hearing. Cardiovascular:  No chest pain, No palpitations, No syncope, No upper extremity edema, No lower extremity edema, No calf discomfort. Respiratory:  No cough. No hemoptysis, No pleurisy, No wheezing, No dyspnea. Gastrointestinal:  Left upper quadrant abdominal pain +, No nausea, No vomiting, No constipation, No diarrhea, No hematochezia, No melena, No jaundice, No dyspepsia, No dysphagia. Urinary:  No dysuria, No hematuria, No urinary incontinence.   Musculoskeletal:  No muscle pain, No swollen joints, No joint redness, No bone pain, No spine tenderness. Skin:  No rash, No nodules, No pruritus, No lesions. Neurologic:  No confusion, No seizures, No syncope, No tremor, No speech change, No headache, No hiccups, No abnormal gait, No sensory changes, No weakness. Psychiatric:  No depression, No anxiety, Concentration normal.  Endocrine:  No polyuria, No polydipsia, No hot flashes, No thyroid symptoms. Hematologic:  No epistaxis, No gingival bleeding, No petechiae, No ecchymosis. Lymphatic:  No lymphadenopathy, No lymphedema. Allergy / Immunologic:  No eczema, No frequent mucous infections, No frequent respiratory infections, No recurrent urticarial, No frequent skin infections. Vital Signs: /81 (Site: Right Upper Arm, Position: Sitting, Cuff Size: Medium Adult)   Pulse 84   Temp 96.6 °F (35.9 °C) (Infrared)   Resp 16   Ht 6' 2\" (1.88 m)   Wt 220 lb (99.8 kg)   SpO2 98%   BMI 28.25 kg/m²      Physical Exam:  CONSTITUTIONAL: awake, alert, cooperative, no apparent distress   EYES: pupils equal, round and reactive to light, sclera clear, normal conjunctiva  ENT: Normocephalic, without obvious abnormality, atraumatic  NECK: supple, symmetrical, no jugular venous distension, no carotid bruits   HEMATOLOGIC/LYMPHATIC: no cervical, supraclavicular or axillary lymphadenopathy   LUNGS: VBS, no wheezes, no increased work of breathing, no rhonchi, clear to auscultation, no crackles,    CARDIOVASCULAR: regular rate and rhythm, normal S1 and S2, no murmur noted  ABDOMEN: normal bowel sounds x 4, soft, non-distended, non-tender, no masses palpated, no hepatosplenomegaly   MUSCULOSKELETAL: full range of motion noted, tone is normal  NEUROLOGIC: awake, alert, oriented to name, place and time. Motor skills grossly intact. SKIN: appears intact, normal skin color, normal texture, normal turgor, no jaundice.    EXTREMITIES: no LE edema, no leg swelling, no cyanosis, no clubbing,       Labs:  Hematology:  Lab Results Component Value Date    WBC 5.3 04/13/2022    RBC 5.88 04/13/2022    HGB 17.7 04/13/2022    HCT 49.6 04/13/2022    MCV 84.4 04/13/2022    MCH 30.1 04/13/2022    MCHC 35.7 04/13/2022    RDW 14.0 04/13/2022     04/13/2022    MPV 10.2 04/13/2022    SEGSPCT 70.3 (H) 04/13/2022    EOSRELPCT 3.6 (H) 04/13/2022    BASOPCT 0.4 04/13/2022    LYMPHOPCT 19.2 (L) 04/13/2022    MONOPCT 6.5 (H) 04/13/2022    SEGSABS 3.7 04/13/2022    EOSABS 0.2 04/13/2022    BASOSABS 0.0 04/13/2022    LYMPHSABS 1.0 04/13/2022    MONOSABS 0.3 04/13/2022    DIFFTYPE AUTOMATED DIFFERENTIAL 04/13/2022     Lab Results   Component Value Date    ESR 2 04/13/2022     Chemistry:  Lab Results   Component Value Date     04/13/2022    K 4.6 04/13/2022     04/13/2022    CO2 22 04/13/2022    BUN 26 (H) 04/13/2022    CREATININE 0.8 (L) 04/13/2022    GLUCOSE 329 (H) 04/13/2022    CALCIUM 9.7 04/13/2022    PROT 7.4 04/13/2022    LABALBU 4.9 04/13/2022    BILITOT 0.3 04/13/2022    ALKPHOS 103 04/13/2022    AST 18 04/13/2022    ALT 20 04/13/2022    LABGLOM >60 04/13/2022    GFRAA >60 04/13/2022    AGRATIO 1.8 03/15/2022    GLOB 2.8 07/21/2021    MG 2.0 09/04/2020    POCGLU 349 (H) 08/15/2017     Lab Results   Component Value Date     04/13/2022     No components found for: LD  Lab Results   Component Value Date    TSHHS 1.534 12/28/2011    T4FREE 1.0 09/12/2019    FT3 3.6 12/28/2011     Immunology:  Lab Results   Component Value Date    PROT 7.4 04/13/2022     No results found for: Alon Luis, DEBLCR  No results found for: B2M  Coagulation Panel:  No results found for: PROTIME, INR, APTT, DDIMER  Anemia Panel:  No results found for: [id number], FOLATE  Tumor Markers:  No results found for: , CEA, , LABCA2, PSA     Observations:  PHQ-9 Total Score: 6 (4/13/2022  1:45 PM)  Thoughts that you would be better off dead, or of hurting yourself in some way: 0 (4/13/2022  1:45 PM)     Assessment Erythrocytosis    Plan:  Daisy Rachel. \"KJ\" Genia Hernandez is a 46-year-old very pleasant gentleman who was noted to have mild erythrocytosis since 9/4/2020. It has been quite stable since then. He denies smoking, high altitude living or family history of erythrocytosis. I recommend to send proper work-ups to rule out primary polycythemia vera today, including serum erythropoietin level, JAK2 V6 17F, JAK2 exon 12-13, BCR-ABL 1, MPL and CALR. In view of his non specific abdominal symptoms and swelling, I recommend to have CT scan of abdomen/pelvis to rule out erythropoietin producing intra abdominal tumor. Further recommendations will be based on findings on above test results. If he is found to have primary polycythemia, I will initiate therapeutic phlebotomy. He doesn't have hyperviscosity sign or symptom on today visit. I answered all his questions and concerns for today. I asked him to follow up with primary care physician on regular basis. I will continue to keep you updated on his progress. Thank you for allowing me to participate in the care of this very pleasant patient. Recent imaging and labs were reviewed and discussed with the patient.

## 2022-04-13 NOTE — PATIENT INSTRUCTIONS
Patient Education        Learning About Colonoscopy  What is a colonoscopy? A colonoscopy is a test (also called a procedure) that lets a doctor look inside your large intestine. The doctor uses a thin, lighted tube called a colonoscope. The doctor uses it to look for small growths called polyps, colonor rectal cancer (colorectal cancer), or other problems like bleeding. During the procedure, the doctor can take samples of tissue. The samples can then be checked for cancer or other conditions. The doctor can also take outpolyps. How is a colonoscopy done? This procedure is done in a doctor's office or a clinic or hospital. You will get medicine to help you relax and not feel pain. Some people find that theydon't remember having the test because of the medicine. The doctor gently moves the colonoscope, or scope, through the colon. The scope is also a small video camera. It lets the doctor see the colon and takepictures. How do you prepare for the procedure? You need to clean out your colon before the procedure so the doctor can seeyour colon. This depends on which \"colon prep\" your doctor recommends. To clean out your colon, you'll do a \"colon prep\" before the test. This means you stop eating solid foods and drink only clear liquids. You can have water, tea, coffee, clear juices, clear broths, flavored ice pops, and gelatin (suchas Jell-O). Do not drink anything red or purple. The day or night before the procedure, you drink a large amount of a special liquid. This causes loose, frequent stools. You will go to the bathroom a lot. Your doctor may have you drink part of the liquid the evening before and the rest on the day of the test. It's very important to drink all of the liquid. Ifyou have problems drinking it, call your doctor. Arrange to have someone take you home after the test.  What can you expect after a colonoscopy? Your doctor will tell you when you can eat and do your usual activities.   Drink a lot of fluid after the test to replace the fluids you may have lostduring the colon prep. But don't drink alcohol. Your doctor will talk to you about when you'll need your next colonoscopy. The results of your test and your risk for colorectal cancer will help your doctordecide how often you need to be checked. After the test, you may be bloated or have gas pains. You may need to pass gas. If a biopsy was done or a polyp was removed, you may have streaks of blood in your stool (feces) for a few days. Check with your doctor to see when it issafe to take aspirin and nonsteroidal anti-inflammatory drugs (NSAIDs) again. Problems such as heavy rectal bleeding may not occur until several weeks afterthe test. This isn't common. But it can happen after polyps are removed. Follow-up care is a key part of your treatment and safety. Be sure to make and go to all appointments, and call your doctor if you are having problems. It's also a good idea to know your test results and keep alist of the medicines you take. Where can you learn more? Go to https://Peerz."[employer]. org and sign in to your [employer] account. Enter G809 in the [facility] box to learn more about \"Learning About Colonoscopy. \"     If you do not have an account, please click on the \"Sign Up Now\" link. Current as of: September 8, 2021               Content Version: 13.2  © 3268-2365 [employer]. Care instructions adapted under license by [facility] ([facility]). If you have questions about a medical condition or this instruction, always ask your healthcare professional. [address] any warranty or liability for your use of this information.

## 2022-04-14 ENCOUNTER — TELEPHONE (OUTPATIENT)
Dept: SURGERY | Age: 52
End: 2022-04-14

## 2022-04-14 NOTE — TELEPHONE ENCOUNTER
SPOKE TO  [address] (CSCOPE, EGD) SCHEDULED @ [facility].  NOTIFIED OF DATES, TIMES AND LOCATION    PHONE ASSESSMENT   SURGERY - 4/25/22 @ 930  P/O -  5/4/22 @ 1100    NPO AFTER MIDNIGHT  MIRALAX PREP  CLEAR LIQUIDS ALL DAY STARTING 4/24/22    HOLD BLOOD THINNERS

## 2022-04-15 ENCOUNTER — TELEPHONE (OUTPATIENT)
Dept: ONCOLOGY | Age: 52
End: 2022-04-15

## 2022-04-15 DIAGNOSIS — N52.9 ERECTILE DYSFUNCTION, UNSPECIFIED ERECTILE DYSFUNCTION TYPE: ICD-10-CM

## 2022-04-15 NOTE — TELEPHONE ENCOUNTER
Called to let patient know he has a CT SCAN on 4/27 @ the imaging center arriving @11:30am nothing by mouth at least 4 hour before testing and he is also to hold metformin for 48 hours after testing.

## 2022-04-15 NOTE — PROGRESS NOTES
Patient will arrive at 0730 at [facility] on 4/25/2022 for his procedure at 0930.               1. Do not eat or drink anything after midnight - unless instructed by your doctor prior to surgery. This includes                   no water, chewing gum or mints. 2. Follow your directions as prescribed by the doctor for your procedure and medications. 3. Check with your Doctor regarding stopping vitamins, supplements, blood thinners (Plavix, Coumadin, Lovenox, Effient, Pradaxa, Xarelto, Fragmin or                   other blood thinners) and follow their instructions. 4. Do not smoke, and do not drink any alcoholic beverages 24 hours prior to surgery. This includes NA Beer. 5. You may brush your teeth and gargle the morning of surgery. DO NOT SWALLOW WATER   6. You MUST make arrangements for a responsible adult to take you home after your surgery and be able to check on you every couple                   hours for the day. You will not be allowed to leave alone or drive yourself home. It is strongly suggested someone stay with you the first 24                   hrs. Your surgery will be cancelled if you do not have a ride home. 7. Please wear simple, loose fitting clothing to the hospital.  Mary Kate Shingles not bring valuables (money, credit cards, checkbooks, etc.) Do not wear any                   makeup (including no eye makeup) or nail polish on your fingers or toes. 8. DO NOT wear any jewelry or piercings on day of surgery. All body piercing jewelry must be removed. 9. If you have dentures, they will be removed before going to the OR; we will provide you a container. If you wear contact lenses or glasses,                  they will be removed; please bring a case for them. 10. If you  have a Living Will and Durable Power of  for Healthcare, please bring in a copy.            11. Please bring picture ID,  insurance card, paperwork from the doctors office    (H & P, Consent, & card for implantable devices). 12. Take a shower the morning of your procedure with Hibiclens or an anti-bacterial soap. Do not apply any deodorant, lotion, oil or powder. 13.  Enter thru the main entrance wearing a mask on the day of surgery. Patient will take his wellbutrin, lexapro, gabapentin, prilosec and metformin the morning of his procedure. Covid screening done. Cardiac clearance in [facility] 4/6/2022.

## 2022-04-16 DIAGNOSIS — N52.9 ERECTILE DYSFUNCTION, UNSPECIFIED ERECTILE DYSFUNCTION TYPE: ICD-10-CM

## 2022-04-16 LAB — ERYTHROPOIETIN: 7 MU/ML (ref 4–27)

## 2022-04-19 RX ORDER — SILDENAFIL 50 MG/1
TABLET, FILM COATED ORAL
Qty: 10 TABLET | Refills: 0 | Status: SHIPPED | OUTPATIENT
Start: 2022-04-19 | End: 2022-05-17

## 2022-04-19 RX ORDER — SILDENAFIL 50 MG/1
TABLET, FILM COATED ORAL
Qty: 10 TABLET | Refills: 0 | OUTPATIENT
Start: 2022-04-19

## 2022-04-22 ENCOUNTER — ANESTHESIA EVENT (OUTPATIENT)
Dept: ENDOSCOPY | Age: 52
End: 2022-04-22
Payer: COMMERCIAL

## 2022-04-22 NOTE — ANESTHESIA PRE PROCEDURE
Department of Anesthesiology  Preprocedure Note       Name:  Huang Morrow   Age:  54 y.o.  :  1970                                          MRN:  [id number]         Date:  2022      Surgeon: Franco Chicas):  Andrew Diaz DO    Procedure: Procedure(s):  COLONOSCOPY DIAGNOSTIC  EGD ESOPHAGOGASTRODUODENOSCOPY    Medications prior to admission:   Prior to Admission medications    Medication Sig Start Date End Date Taking? Authorizing Provider   sildenafil (VIAGRA) 50 MG tablet TAKE 1 TABLET BY MOUTH AS NEEDED FOR ERECTILE DYSFUNCTION 22   Stewart Sky MD   ondansetron (ZOFRAN) 4 MG tablet Take 4 mg by mouth every 8 hours as needed for Nausea or Vomiting    Historical Provider, MD   buPROPion [facility]) 100 MG extended release tablet Take 1 tablet by mouth 2 times daily 3/23/22   Stewart Sky MD   butalbital-acetaminophen-caffeine (FIORICET, ESGIC) -24 MG per tablet Take 1 tablet by mouth every 4 hours as needed for Headaches 3/15/22   Stewart Sky MD   amitriptyline (ELAVIL) 50 MG tablet Take 1 tablet by mouth nightly 22   Stewart Sky MD   atorvastatin (LIPITOR) 40 MG tablet Take 1 tablet by mouth daily 22   Stewart Sky MD   gabapentin (NEURONTIN) 400 MG capsule Take 1 capsule by mouth 3 times daily for 90 days.  Intended supply: 90 days 22  Stewart Sky MD   hydroCHLOROthiazide (MICROZIDE) 12.5 MG capsule Take 1 capsule by mouth every morning 22   Stewart Sky MD   Insulin Degludec (TRESIBA FLEXTOUCH) 100 UNIT/ML SOPN Inject 40 Units into the skin nightly 22   Stewart Sky MD   insulin lispro (HUMALOG KWIKPEN) 200 UNIT/ML SOPN pen TID with meals Glucose [id number] No Insulin 140-199 2 Units 200-249 4 Units 250-299 6 Units 300-349 8 Units 350-400 10 Units over 400 12 Units 22   Stewart Sky MD   losartan (COZAAR) 100 MG tablet Take 1 tablet by mouth daily 22   Stewart Sky MD   meloxicam (MOBIC) 7.5 MG tablet Take 1 tablet by mouth daily 1/20/22   Felipe Ames MD   metFORMIN (GLUCOPHAGE) 1000 MG tablet Take 1 tablet by mouth 2 times daily (with meals) 1/20/22   Felipe Ames MD   potassium chloride (KLOR-CON M) 10 MEQ extended release tablet Take 1 tablet by mouth daily OTC 1/20/22   Felipe Ames MD   Ertugliflozin L-PyroglutamicAc (STEGLATRO) 15 MG TABS Take 15 mg by mouth daily 1/20/22   Felipe Ames MD   tiZANidine (ZANAFLEX) 4 MG tablet Take 1 tablet by mouth at bedtime 1/20/22   Felipe Ames MD   escitalopram (LEXAPRO) 10 MG tablet Take 1 tablet by mouth daily 1/20/22   Felipe Ames MD   omeprazole (PRILOSEC) 20 MG delayed release capsule Take 1 capsule by mouth daily 1/13/22   Felipe Ames MD   vitamin B-12 (CYANOCOBALAMIN) 1000 MCG tablet Take 1 tablet by mouth daily  Patient taking differently: Take 1,000 mcg by mouth daily OTC 6/30/20   Sami Elora Eisenmenger, DO   Cholecalciferol (VITAMIN D3) 50 MCG (2000 UT) TABS Take one tablet by mouth once a day  Patient taking differently: Take one tablet by mouth once a day OTC 6/30/20   Sami Elora Eisenmenger, DO       Current medications:    No current facility-administered medications for this encounter. Current Outpatient Medications   Medication Sig Dispense Refill    sildenafil (VIAGRA) 50 MG tablet TAKE 1 TABLET BY MOUTH AS NEEDED FOR ERECTILE DYSFUNCTION 10 tablet 0    ondansetron (ZOFRAN) 4 MG tablet Take 4 mg by mouth every 8 hours as needed for Nausea or Vomiting      buPROPion (WELLBUTRIN SR) 100 MG extended release tablet Take 1 tablet by mouth 2 times daily 60 tablet 3    butalbital-acetaminophen-caffeine (FIORICET, ESGIC) -40 MG per tablet Take 1 tablet by mouth every 4 hours as needed for Headaches 180 tablet 3    amitriptyline (ELAVIL) 50 MG tablet Take 1 tablet by mouth nightly 90 tablet 1    atorvastatin (LIPITOR) 40 MG tablet Take 1 tablet by mouth daily 90 tablet 1    gabapentin (NEURONTIN) 400 MG capsule Take 1 capsule by mouth 3 times daily for 90 days.  Intended supply: 90 days 270 capsule 1    hydroCHLOROthiazide (MICROZIDE) 12.5 MG capsule Take 1 capsule by mouth every morning 90 capsule 1    Insulin Degludec (TRESIBA FLEXTOUCH) 100 UNIT/ML SOPN Inject 40 Units into the skin nightly 6 pen 5    insulin lispro (HUMALOG KWIKPEN) 200 UNIT/ML SOPN pen TID with meals Glucose [id number] No Insulin 140-199 2 Units 200-249 4 Units 250-299 6 Units 300-349 8 Units 350-400 10 Units over 400 12 Units 5 pen 5    losartan (COZAAR) 100 MG tablet Take 1 tablet by mouth daily 90 tablet 1    meloxicam (MOBIC) 7.5 MG tablet Take 1 tablet by mouth daily 90 tablet 1    metFORMIN (GLUCOPHAGE) 1000 MG tablet Take 1 tablet by mouth 2 times daily (with meals) 180 tablet 1    potassium chloride (KLOR-CON M) 10 MEQ extended release tablet Take 1 tablet by mouth daily OTC 90 tablet 1    Ertugliflozin L-PyroglutamicAc (STEGLATRO) 15 MG TABS Take 15 mg by mouth daily 30 tablet 3    tiZANidine (ZANAFLEX) 4 MG tablet Take 1 tablet by mouth at bedtime 30 tablet 3    escitalopram (LEXAPRO) 10 MG tablet Take 1 tablet by mouth daily 30 tablet 3    omeprazole (PRILOSEC) 20 MG delayed release capsule Take 1 capsule by mouth daily 30 capsule 3    vitamin B-12 (CYANOCOBALAMIN) 1000 MCG tablet Take 1 tablet by mouth daily (Patient taking differently: Take 1,000 mcg by mouth daily OTC) 90 tablet 1    Cholecalciferol (VITAMIN D3) 50 MCG (2000 UT) TABS Take one tablet by mouth once a day (Patient taking differently: Take one tablet by mouth once a day OTC) 30 tablet 3       Allergies:     Allergies   Allergen Reactions    Bee Venom Anaphylaxis    Hornet Venom     Tramadol Other (See Comments)     Night terrors and sweats       Problem List:    Patient Active Problem List   Diagnosis Code    Ventral hernia without obstruction or gangrene K43.9    Essential hypertension I10    Gastroesophageal reflux disease without esophagitis K21.9    Dupuytren's contracture M72.0    Chronic skin ulcer, limited to breakdown of skin ([facility]) L98.491    Chronic bilateral low back pain without sciatica M54.50, G89.29    Type 2 diabetes mellitus with diabetic polyneuropathy, with long-term current use of insulin ([facility]) E11.42, Z79.4    Erectile dysfunction N52.9    Chest pain R07.9    Sigrid Parkinson White pattern seen on electrocardiogram I45.6    Fatty liver K76.0    Neuropathy G62.9    Dorsalgia M54.9    Pure hypercholesterolemia E78.00    Pain in both feet M79.671, M79.672    Chronic pain of left knee M25.562, G89.29    Chronic depression F32. A    Anxiety F41.9    Polycythemia D75.1    History of COVID-19 Z86.16    Left upper quadrant abdominal swelling, mass and lump R19.02       Past Medical History:        Diagnosis Date    Asthma     Depression     Diabetes mellitus ([address]     Hyperlipidemia     Hypertension     Sleep disorder     Ventral hernia without obstruction or gangrene 11/4/2015    WPW (Sigrid-Parkinson-White syndrome)     Jose Walters       Past Surgical History:        Procedure Laterality Date    ABDOMEN SURGERY      ELBOW SURGERY Right     HERNIA REPAIR      SPINE SURGERY  1990, 2010    Fusion of L3, L4, L5    UMBILICAL HERNIA REPAIR  11/4/15    repair incarcerated supra umbilical hernia       Social History:    Social History     Tobacco Use    Smoking status: Never Smoker    Smokeless tobacco: Never Used   Substance Use Topics    Alcohol use: Not Currently     Alcohol/week: 2.0 standard drinks     Types: 2 Standard drinks or equivalent per week                                Counseling given: Not Answered      Vital Signs (Current):   Vitals:    04/15/22 1332   Weight: 220 lb (99.8 kg)   Height: 6' 2\" (1.88 m)                                              BP Readings from Last 3 Encounters:   04/13/22 134/81   04/13/22 (!) 152/98   03/31/22 132/86       NPO Status:                                                                                 BMI:   Wt Readings from Last 3 Encounters:   04/13/22 220 lb (99.8 kg)   04/13/22 221 lb (100.2 kg)   03/31/22 221 lb 6.4 oz (100.4 kg)     Body mass index is 28.25 kg/m². CBC:   Lab Results   Component Value Date    WBC 5.3 04/13/2022    RBC 5.88 04/13/2022    HGB 17.7 04/13/2022    HCT 49.6 04/13/2022    MCV 84.4 04/13/2022    RDW 14.0 04/13/2022     04/13/2022       CMP:   Lab Results   Component Value Date     04/13/2022    K 4.6 04/13/2022     04/13/2022    CO2 22 04/13/2022    BUN 26 04/13/2022    CREATININE 0.8 04/13/2022    GFRAA >60 04/13/2022    AGRATIO 1.8 03/15/2022    LABGLOM >60 04/13/2022    GLUCOSE 329 04/13/2022    PROT 7.4 04/13/2022    PROT 6.7 12/28/2011    CALCIUM 9.7 04/13/2022    BILITOT 0.3 04/13/2022    ALKPHOS 103 04/13/2022    AST 18 04/13/2022    ALT 20 04/13/2022       POC Tests: No results for input(s): POCGLU, POCNA, POCK, POCCL, POCBUN, POCHEMO, POCHCT in the last 72 hours.     Coags: No results found for: PROTIME, INR, APTT    HCG (If Applicable): No results found for: PREGTESTUR, PREGSERUM, HCG, HCGQUANT     ABGs: No results found for: PHART, PO2ART, EHH0OPW, WGZ6ADH, BEART, C8RDNXAL     Type & Screen (If Applicable):  No results found for: LABABO, LABRH    Drug/Infectious Status (If Applicable):  No results found for: HIV, HEPCAB    COVID-19 Screening (If Applicable):   Lab Results   Component Value Date    COVID19 NOT DETECTED 03/22/2021           Anesthesia Evaluation  Patient summary reviewed no history of anesthetic complications:   Airway: Mallampati: I  TM distance: >3 FB   Neck ROM: full  Mouth opening: > = 3 FB Dental: normal exam         Pulmonary: breath sounds clear to auscultation  (+) asthma:                            Cardiovascular:  Exercise tolerance: good (>4 METS),   (+) hypertension:, hyperlipidemia        Rhythm: regular  Rate: normal           Beta Blocker:  Not on Beta Blocker         Neuro/Psych:   (+) depression/anxiety             GI/Hepatic/Renal:   (+) GERD:, liver disease (MTZ):, bowel prep,           Endo/Other:    (+) DiabetesType II DM, well controlled, , .                 Abdominal:             Vascular: negative vascular ROS. Other Findings:           Anesthesia Plan      MAC     ASA 2       Induction: intravenous. Anesthetic plan and risks discussed with patient. Plan discussed with CRNA. Pre Anesthesia Evaluation complete. Anesthesia plan, risks, benefits, alternatives, and personal involved discussed with patient. Patients and/or legal guardian verbalized an understanding  and agreed to proceed.   Mini Rodriguez,   4/25/2022

## 2022-04-25 ENCOUNTER — ANESTHESIA (OUTPATIENT)
Dept: ENDOSCOPY | Age: 52
End: 2022-04-25
Payer: COMMERCIAL

## 2022-04-25 ENCOUNTER — HOSPITAL ENCOUNTER (OUTPATIENT)
Age: 52
Setting detail: OUTPATIENT SURGERY
Discharge: HOME | End: 2022-04-25
Attending: SURGERY | Admitting: SURGERY
Payer: COMMERCIAL

## 2022-04-25 VITALS
DIASTOLIC BLOOD PRESSURE: 73 MMHG | BODY MASS INDEX: 28.23 KG/M2 | RESPIRATION RATE: 16 BRPM | OXYGEN SATURATION: 98 % | HEART RATE: 63 BPM | WEIGHT: 220 LBS | SYSTOLIC BLOOD PRESSURE: 123 MMHG | HEIGHT: 74 IN | TEMPERATURE: 97.6 F

## 2022-04-25 VITALS — DIASTOLIC BLOOD PRESSURE: 83 MMHG | SYSTOLIC BLOOD PRESSURE: 112 MMHG | OXYGEN SATURATION: 97 %

## 2022-04-25 DIAGNOSIS — K92.2 GASTROINTESTINAL HEMORRHAGE, UNSPECIFIED GASTROINTESTINAL HEMORRHAGE TYPE: ICD-10-CM

## 2022-04-25 LAB — GLUCOSE BLD-MCNC: 107 MG/DL (ref 70–99)

## 2022-04-25 PROCEDURE — 7100000010 HC PHASE II RECOVERY - FIRST 15 MIN: Performed by: SURGERY

## 2022-04-25 PROCEDURE — 82962 GLUCOSE BLOOD TEST: CPT

## 2022-04-25 PROCEDURE — 3609017100 HC EGD: Performed by: SURGERY

## 2022-04-25 PROCEDURE — 2580000003 HC RX 258: Performed by: SURGERY

## 2022-04-25 PROCEDURE — 88305 TISSUE EXAM BY PATHOLOGIST: CPT | Performed by: PATHOLOGY

## 2022-04-25 PROCEDURE — 3700000000 HC ANESTHESIA ATTENDED CARE: Performed by: SURGERY

## 2022-04-25 PROCEDURE — 88342 IMHCHEM/IMCYTCHM 1ST ANTB: CPT | Performed by: PATHOLOGY

## 2022-04-25 PROCEDURE — 2709999900 HC NON-CHARGEABLE SUPPLY: Performed by: SURGERY

## 2022-04-25 PROCEDURE — 7100000011 HC PHASE II RECOVERY - ADDTL 15 MIN: Performed by: SURGERY

## 2022-04-25 PROCEDURE — 3609010600 HC COLONOSCOPY POLYPECTOMY SNARE/COLD BIOPSY: Performed by: SURGERY

## 2022-04-25 PROCEDURE — 6360000002 HC RX W HCPCS: Performed by: NURSE ANESTHETIST, CERTIFIED REGISTERED

## 2022-04-25 PROCEDURE — 3700000001 HC ADD 15 MINUTES (ANESTHESIA): Performed by: SURGERY

## 2022-04-25 PROCEDURE — 43239 EGD BIOPSY SINGLE/MULTIPLE: CPT | Performed by: SURGERY

## 2022-04-25 PROCEDURE — 45380 COLONOSCOPY AND BIOPSY: CPT | Performed by: SURGERY

## 2022-04-25 RX ORDER — SODIUM CHLORIDE, SODIUM LACTATE, POTASSIUM CHLORIDE, CALCIUM CHLORIDE 600; 310; 30; 20 MG/100ML; MG/100ML; MG/100ML; MG/100ML
INJECTION, SOLUTION INTRAVENOUS CONTINUOUS
Status: DISCONTINUED | OUTPATIENT
Start: 2022-04-25 | End: 2022-04-25 | Stop reason: HOSPADM

## 2022-04-25 RX ORDER — LIDOCAINE HYDROCHLORIDE 20 MG/ML
INJECTION, SOLUTION INTRAVENOUS PRN
Status: DISCONTINUED | OUTPATIENT
Start: 2022-04-25 | End: 2022-04-25 | Stop reason: SDUPTHER

## 2022-04-25 RX ORDER — PROPOFOL 10 MG/ML
INJECTION, EMULSION INTRAVENOUS PRN
Status: DISCONTINUED | OUTPATIENT
Start: 2022-04-25 | End: 2022-04-25 | Stop reason: SDUPTHER

## 2022-04-25 RX ADMIN — PROPOFOL 200 MG: 10 INJECTION, EMULSION INTRAVENOUS at 10:36

## 2022-04-25 RX ADMIN — SODIUM CHLORIDE, POTASSIUM CHLORIDE, SODIUM LACTATE AND CALCIUM CHLORIDE: 600; 310; 30; 20 INJECTION, SOLUTION INTRAVENOUS at 08:21

## 2022-04-25 RX ADMIN — PROPOFOL 200 MG: 10 INJECTION, EMULSION INTRAVENOUS at 10:26

## 2022-04-25 RX ADMIN — PROPOFOL 200 MG: 10 INJECTION, EMULSION INTRAVENOUS at 10:11

## 2022-04-25 RX ADMIN — PROPOFOL 200 MG: 10 INJECTION, EMULSION INTRAVENOUS at 10:03

## 2022-04-25 RX ADMIN — PROPOFOL 200 MG: 10 INJECTION, EMULSION INTRAVENOUS at 10:06

## 2022-04-25 RX ADMIN — PROPOFOL 200 MG: 10 INJECTION, EMULSION INTRAVENOUS at 10:16

## 2022-04-25 RX ADMIN — LIDOCAINE HYDROCHLORIDE 100 MG: 20 INJECTION, SOLUTION INTRAVENOUS at 10:03

## 2022-04-25 ASSESSMENT — PAIN SCALES - GENERAL
PAINLEVEL_OUTOF10: 0
PAINLEVEL_OUTOF10: 0

## 2022-04-25 NOTE — PROGRESS NOTES
Patient is awake, alert and oriented. Preop questions reviewed and verified. H&P and consent completed. Report received from [address] ([facility]). Mom, Catarina  at bedside.

## 2022-04-25 NOTE — PROGRESS NOTES
1103 Pt back to Same Day from Endo per cart. Pt is sleepy but arousable--skin W&D. Report received from [facility] VS rechecked and stable. Pt's mother at bedside. Given po soda to drink. 1125  Pt much more awake now--states is ready to go home. Drinking w/o nausea. VS remain stable. 1130  Pt up to bathroom with steady gait. 1135 IV dc'd and pt up in room to get dressed. 0  Pt and mother instructed for discharge care and follow-up with understanding voiced. Pt ambulatory to exit with steady gait after declining to ride in wheelchair.

## 2022-04-25 NOTE — INTERVAL H&P NOTE
Update History & Physical    The patient's History and Physical of April 13, 22 was reviewed with the patient and I examined the patient. There was no change. The surgical site was confirmed by the patient and me. Plan: The risks, benefits, expected outcome, and alternative to the recommended procedure have been discussed with the patient. Patient understands and wants to proceed with the procedure.      Electronically signed by Cristofer Banegas DO on 4/25/2022 at 8:20 AM

## 2022-04-25 NOTE — ANESTHESIA POSTPROCEDURE EVALUATION
Department of Anesthesiology  Postprocedure Note    Patient: Dania Narayan  MRN: [id number]  YOB: 1970  Date of evaluation: 4/25/2022  Time:  10:59 AM     Procedure Summary     Date: 04/25/22 Room / Location: [address]    Anesthesia Start: 8630 Anesthesia Stop: 1059    Procedures:       COLONOSCOPY POLYPECTOMY SNARE/COLD BIOPSY (N/A )      EGD ESOPHAGOGASTRODUODENOSCOPY (N/A ) Diagnosis:       Gastrointestinal hemorrhage, unspecified gastrointestinal hemorrhage type      (Gastrointestinal hemorrhage, unspecified gastrointestinal hemorrhage type [K92.2])    Surgeons: Danyell Avila DO Responsible Provider: Jesus Schwartz DO    Anesthesia Type: MAC ASA Status: 2          Anesthesia Type: MAC    Michelle Phase I:      Michelle Phase II:      Last vitals: Reviewed and per EMR flowsheets.        Anesthesia Post Evaluation    Patient participation: complete - patient participated  Level of consciousness: responsive to verbal stimuli  Pain score: 0  Airway patency: patent  Nausea & Vomiting: no vomiting and no nausea  Complications: no  Cardiovascular status: blood pressure returned to baseline and hemodynamically stable  Respiratory status: nonlabored ventilation, spontaneous ventilation and room air  Hydration status: stable

## 2022-04-27 ENCOUNTER — OFFICE VISIT (OUTPATIENT)
Dept: INTERNAL MEDICINE CLINIC | Age: 52
End: 2022-04-27
Payer: COMMERCIAL

## 2022-04-27 ENCOUNTER — HOSPITAL ENCOUNTER (OUTPATIENT)
Dept: CT IMAGING | Age: 52
Discharge: HOME | End: 2022-04-27
Payer: COMMERCIAL

## 2022-04-27 VITALS
HEART RATE: 80 BPM | OXYGEN SATURATION: 99 % | WEIGHT: 220.6 LBS | DIASTOLIC BLOOD PRESSURE: 86 MMHG | BODY MASS INDEX: 28.32 KG/M2 | SYSTOLIC BLOOD PRESSURE: 132 MMHG

## 2022-04-27 DIAGNOSIS — K43.9 VENTRAL HERNIA WITHOUT OBSTRUCTION OR GANGRENE: ICD-10-CM

## 2022-04-27 DIAGNOSIS — I45.6 WOLFF PARKINSON WHITE PATTERN SEEN ON ELECTROCARDIOGRAM: ICD-10-CM

## 2022-04-27 DIAGNOSIS — R51.9 CHRONIC NONINTRACTABLE HEADACHE, UNSPECIFIED HEADACHE TYPE: ICD-10-CM

## 2022-04-27 DIAGNOSIS — F41.9 ANXIETY: ICD-10-CM

## 2022-04-27 DIAGNOSIS — G89.29 CHRONIC NONINTRACTABLE HEADACHE, UNSPECIFIED HEADACHE TYPE: ICD-10-CM

## 2022-04-27 DIAGNOSIS — K21.9 GASTROESOPHAGEAL REFLUX DISEASE WITHOUT ESOPHAGITIS: ICD-10-CM

## 2022-04-27 DIAGNOSIS — G89.29 CHRONIC BILATERAL LOW BACK PAIN WITHOUT SCIATICA: ICD-10-CM

## 2022-04-27 DIAGNOSIS — D75.1 POLYCYTHEMIA: ICD-10-CM

## 2022-04-27 DIAGNOSIS — E78.00 PURE HYPERCHOLESTEROLEMIA: ICD-10-CM

## 2022-04-27 DIAGNOSIS — E11.42 TYPE 2 DIABETES MELLITUS WITH DIABETIC POLYNEUROPATHY, WITH LONG-TERM CURRENT USE OF INSULIN (HCC): Primary | ICD-10-CM

## 2022-04-27 DIAGNOSIS — R19.02 LEFT UPPER QUADRANT ABDOMINAL SWELLING, MASS AND LUMP: ICD-10-CM

## 2022-04-27 DIAGNOSIS — I10 ESSENTIAL HYPERTENSION: ICD-10-CM

## 2022-04-27 DIAGNOSIS — N52.9 ERECTILE DYSFUNCTION, UNSPECIFIED ERECTILE DYSFUNCTION TYPE: ICD-10-CM

## 2022-04-27 DIAGNOSIS — Z79.4 TYPE 2 DIABETES MELLITUS WITH DIABETIC POLYNEUROPATHY, WITH LONG-TERM CURRENT USE OF INSULIN (HCC): Primary | ICD-10-CM

## 2022-04-27 DIAGNOSIS — M54.50 CHRONIC BILATERAL LOW BACK PAIN WITHOUT SCIATICA: ICD-10-CM

## 2022-04-27 LAB — HBA1C MFR BLD: 7.7 %

## 2022-04-27 PROCEDURE — 2580000003 HC RX 258: Performed by: INTERNAL MEDICINE

## 2022-04-27 PROCEDURE — 6360000004 HC RX CONTRAST MEDICATION: Performed by: INTERNAL MEDICINE

## 2022-04-27 PROCEDURE — 99214 OFFICE O/P EST MOD 30 MIN: CPT | Performed by: INTERNAL MEDICINE

## 2022-04-27 PROCEDURE — 74177 CT ABD & PELVIS W/CONTRAST: CPT

## 2022-04-27 PROCEDURE — 83036 HEMOGLOBIN GLYCOSYLATED A1C: CPT | Performed by: INTERNAL MEDICINE

## 2022-04-27 PROCEDURE — 3051F HG A1C>EQUAL 7.0%<8.0%: CPT | Performed by: INTERNAL MEDICINE

## 2022-04-27 RX ORDER — SODIUM CHLORIDE 0.9 % (FLUSH) 0.9 %
5-40 SYRINGE (ML) INJECTION 2 TIMES DAILY
Status: DISCONTINUED | OUTPATIENT
Start: 2022-04-27 | End: 2022-04-28 | Stop reason: HOSPADM

## 2022-04-27 RX ADMIN — SODIUM CHLORIDE, PRESERVATIVE FREE 10 ML: 5 INJECTION INTRAVENOUS at 13:00

## 2022-04-27 RX ADMIN — IOPAMIDOL 75 ML: 755 INJECTION, SOLUTION INTRAVENOUS at 13:00

## 2022-04-27 RX ADMIN — IOHEXOL 50 ML: 240 INJECTION, SOLUTION INTRATHECAL; INTRAVASCULAR; INTRAVENOUS; ORAL at 11:50

## 2022-04-27 NOTE — LETTER
[address] [place] [address] Internal and Family Medicine  [facility] [place] [address]  Phone: [phone]  Fax: [phone]    Gabriela Marinelli MD        April 27, 2022     Patient: Haylee Reyes   YOB: 1970   Date of Visit: 4/27/2022       To Whom it May Concern:    Dileep Mancuso was seen in my clinic on 4/27/2022. It is in my medical opinion that he should continue to remain off work for 2 weeks for hernia and cardiac workup. His return to work date of 5/11/2022. .  If you have any questions or concerns, please don't hesitate to call.   Sincerely,         Gabriela Marinelli MD

## 2022-04-27 NOTE — LETTER
[address] [place] [address] Internal and Family Medicine  [address] [place] [address]  Phone: [phone]  Fax: [phone]    Wesley Whalen MD        April 27, 2022     Patient: Simin Myers   YOB: 1970   Date of Visit: 4/27/2022       To Whom It May Concern: It is my medical opinion that Debbie Mcnair should continue to remain off work for 2 weeks for hernia and cardiac workup. If you have any questions or concerns, please don't hesitate to call.     Sincerely,        Wesley Whalen MD

## 2022-04-27 NOTE — PROGRESS NOTES
Name: Keon Mott  Age: 54 y.o. YOB: 1970  Sex: male    CHIEF COMPLAINT:    Chief Complaint   Patient presents with    Diabetes     glucose this morning was 167 per pt.  Hypertension    ADHD       HISTORY OF PRESENT ILLNESS:     This is a pleasant  54 y.o. male  is seen today for management of chronic medical problems and medications refills. Previous records reviewed . Continues to have significant lower abdominal discomfort. Also complains of frequent headaches. He did see Dr. Max Huang recently for abdominal pain and incisional hernia. He had EGD and Colonoscopy by Dr. Max Huang ans they were ok. For incisional hernia he is going to do surgery soon. Patient had a CT abdomen done today and the results are pending. Also C/O left lower quadrant abdominal mass. And he concerned about it. No significant pain around that site. He is waiting for Dr. Max Huang to repair his hernia. Dr. Max Huang apparently waiting for Dr. Reid Shetty and Dr. Marbin Tran to clear him for his surgery. He was diagnosed with WPW and Dr. Ru Fuentes referred him to Dr. Marbin Tran. Dr. Marbin Tran is going too see him on 4/29/2022 for recommendations. Patient thinks that his abdominal pain and hernia will get worse if goes to Job before fixing his hernia. He wants to take off from his work until the work-up is done and his hernia is fixed. Patient also complains of headaches on and off. He is taking Fioricet but is not helping him much. He had a CT head on 3/30/2022 and it was ok. He is going to see Dr. Drew Watson, neurologist on 6/1/2022. He denies any chest pain or shortness of breath. He claims his sugars are usually between 130-160 now. Today his A1c was 7.7. Patient complains of erectile dysfunction despite taking Viagra 50 mg as needed. Labs from 4/13/2022 reviewed and explained to the patient. Dr. Lars Diop has seen him for polycythemia recently and work-up is in progress.     Past Medical History:    Patient Active Problem List   Diagnosis    Ventral hernia without obstruction or gangrene    Essential hypertension    Gastroesophageal reflux disease without esophagitis    Dupuytren's contracture    Chronic skin ulcer, limited to breakdown of skin (HCC)    Chronic bilateral low back pain without sciatica    Type 2 diabetes mellitus with diabetic polyneuropathy, with long-term current use of insulin ([address]    Erectile dysfunction    Chest pain    Sigrid Parkinson White pattern seen on electrocardiogram    Fatty liver    Neuropathy    Dorsalgia    Pure hypercholesterolemia    Pain in both feet    Chronic pain of left knee    Chronic depression    Anxiety    Polycythemia    History of COVID-19    Left upper quadrant abdominal swelling, mass and lump        Past Surgical History:        Procedure Laterality Date    ABDOMEN SURGERY      COLONOSCOPY N/A 4/25/2022    COLONOSCOPY POLYPECTOMY SNARE/COLD BIOPSY performed by Roaslia Jacobs DO at [address], [facility]    Fusion of L3, L4, L5    UMBILICAL HERNIA REPAIR  11/4/15    repair incarcerated supra umbilical hernia    UPPER GASTROINTESTINAL ENDOSCOPY N/A 4/25/2022    EGD DIAGNOSTIC ONLY performed by Rosalia Jacobs DO at [address] [facility] ENDOSCOPY       Social History:   Social History     Tobacco Use    Smoking status: Never Smoker    Smokeless tobacco: Never Used   Substance Use Topics    Alcohol use: Not Currently     Alcohol/week: 2.0 standard drinks     Types: 2 Standard drinks or equivalent per week       Family History:       Problem Relation Age of Onset    Breast Cancer Mother        Allergies:  Bee venom, Hornet venom, and Tramadol    Current Medications :      Prior to Admission medications    Medication Sig Start Date End Date Taking?  Authorizing Provider   sildenafil (VIAGRA) 50 MG tablet TAKE 1 TABLET BY MOUTH AS NEEDED FOR ERECTILE DYSFUNCTION 4/19/22  Yes Adryan Alicia MD   ondansetron (ZOFRAN) 4 MG tablet Take 4 mg by mouth every 8 hours as needed for Nausea or Vomiting   Yes Soraida Alexander MD   buPROPion (WELLBUTRIN SR) 100 MG extended release tablet Take 1 tablet by mouth 2 times daily 3/23/22  Yes Madi Rodriguez MD   butalbital-acetaminophen-caffeine (FIORICET, ESGIC) -64 MG per tablet Take 1 tablet by mouth every 4 hours as needed for Headaches 3/15/22  Yes Madi Rodriguez MD   amitriptyline (ELAVIL) 50 MG tablet Take 1 tablet by mouth nightly 1/20/22  Yes Madi Rodriguez MD   atorvastatin (LIPITOR) 40 MG tablet Take 1 tablet by mouth daily 1/20/22  Yes Madi Rodriguez MD   gabapentin (NEURONTIN) 400 MG capsule Take 1 capsule by mouth 3 times daily for 90 days.  Intended supply: 90 days 1/20/22 4/27/22 Yes Madi Rodriguez MD   hydroCHLOROthiazide (MICROZIDE) 12.5 MG capsule Take 1 capsule by mouth every morning 1/20/22  Yes Madi Rodriguez MD   Insulin Degludec (TRESIBA FLEXTOUCH) 100 UNIT/ML SOPN Inject 40 Units into the skin nightly 1/20/22  Yes Madi Rodriguez MD   insulin lispro (HUMALOG KWIKPEN) 200 UNIT/ML SOPN pen TID with meals Glucose [id number] No Insulin 140-199 2 Units 200-249 4 Units 250-299 6 Units 300-349 8 Units 350-400 10 Units over 400 12 Units 1/20/22  Yes Madi Rodriguez MD   losartan (COZAAR) 100 MG tablet Take 1 tablet by mouth daily 1/20/22  Yes Madi Rodriguez MD   meloxicam (MOBIC) 7.5 MG tablet Take 1 tablet by mouth daily 1/20/22  Yes Madi Rodriguez MD   metFORMIN (GLUCOPHAGE) 1000 MG tablet Take 1 tablet by mouth 2 times daily (with meals) 1/20/22  Yes Madi Rodriguez MD   potassium chloride (KLOR-CON M) 10 MEQ extended release tablet Take 1 tablet by mouth daily OTC 1/20/22  Yes Madi Rodriguez MD   Ertugliflozin L-PyroglutamicAc (STEGLATRO) 15 MG TABS Take 15 mg by mouth daily 1/20/22  Yes Madi Rodriguez MD   tiZANidine (ZANAFLEX) 4 MG tablet Take 1 tablet by mouth at bedtime 1/20/22  Yes Madi Rodriguez MD   escitalopram (LEXAPRO) 10 MG tablet Take 1 tablet by mouth daily 1/20/22  Yes Keon Elder MD   omeprazole (PRILOSEC) 20 MG delayed release capsule Take 1 capsule by mouth daily 1/13/22  Yes Keon Elder MD   vitamin B-12 (CYANOCOBALAMIN) 1000 MCG tablet Take 1 tablet by mouth daily  Patient taking differently: Take 1,000 mcg by mouth daily OTC 6/30/20  Yes Matthew Negron DO   Cholecalciferol (VITAMIN D3) 50 MCG (2000 UT) TABS Take one tablet by mouth once a day  Patient taking differently: Take one tablet by mouth once a day OTC 6/30/20  Yes Matthew Negron DO       LAB DATA: Reviewed. REVIEW OF SYSTEMS:   see HPI/ Comprehensive review of systems negative except for the ones mentioned in HPI. PHYSICAL EXAMINATION:   /86   Pulse 80   Wt 220 lb 9.6 oz (100.1 kg)   SpO2 99%   BMI 28.32 kg/m²      GENERAL APPEARANCE:    Alert, oriented x 3, well developed, cooperative, not in any distress, appears stated age. HEAD:   Normocephalic, atraumatic   EYES:   PERRLA, EOMI, lids normal, conjuctivea clear, sclera anicteric. NECK:    Supple, symmetrical,  trachea midline, no thyromegaly, no JVD, no lymphadenopathy. LUNGS:    Clear to auscultation bilaterally, respirations unlabored, accessory muscles are not used. HEART:     Regular rate and rhythm, S1 and S2 normal, no murmur, rub or gallop. PMI in MCL. ABDOMEN:    Soft, moderate tenderness in the periumbilical area. Mild swelling in the left lower quadrant area, bowel sounds are normoactive, no masses, no hepatospleenomegaly. EXTREMITY:   no bipedal edema  NEURO:  Alert, oriented to person, place and time. Grossly intact. Musculoskeletal:         No kyphosis or scoliosis, no deformity in any extremity noted, muscle strength and tone are normal.  Skin:                            Warm and dry. No rash or obvious suspicious lesions. Pedro 90 mg PSYCH:  Mood euthymic, insight and judgement good. ASSESSMENT/PLAN:    1.  Type 2 diabetes mellitus with diabetic polyneuropathy, with long-term current use of insulin (HCC)  Advised low sugar diet and exercise. Continue Glucophage, Tresiba and Humalog and Steglatro. Hemoglobin A1c is better today. - POCT glycosylated hemoglobin (Hb A1C)    2. Chronic nonintractable headache, unspecified headache type  Continue Fioricet for now and advised to keep appointment Dr. Marques Davis, neurologist.    3. Polycythemia  Work-up in progress by Dr. Aaliyah Champagne to keep following up with Dr. Marino Lopez as per his recommendation    4. Rudolm Manus Parkinson White pattern seen on electrocardiogram  He is seeing Dr. Anneliese Carcamo for further recommendation  Continue to follow with his audiologist also. 5. Essential hypertension  Stable,Continue current medications, denies side effect with medicationss. Low salt diet and exercise advised. Continue losartan and hydrochlorothiazide    6. Pure hypercholesterolemia  Patient is taking cholesterol medications regularly. Denies any side effects. Diet and exercise advised. Continue Lipitor    7. Gastroesophageal reflux disease without esophagitis  Patient on Prilosec    8. Ventral hernia without obstruction or gangrene  Work-up in progress by Dr. Giancarlo Fuentes, CTA of the abdomen results are pending. Advised to keep following with him. 9. Chronic bilateral low back pain without sciatica  Continue Tylenol and Zanaflex and Mobic and Neurontin as prescribed    10. Anxiety  Continue Lexapro for now    11. Erectile dysfunction, unspecified erectile dysfunction type  Continue Viagra but advised him to take 100 mg, 2 tablets if needed. Will give him excuse for job as he is complaining of significant lower abdominal pain and wants a surgical correction done soon    Advised to continue to follow COVID-19 precautions. Care discussed with patient. Questions answered and patient verbalizes understanding and agrees with plan. Medications reviewed and reconciled. Continue current medications. Appropriate prescriptions are ordered.  Risks and benefits of meds are discussed. After visit summary provided. Advised to call for any problems, questions, or concerns. If symptoms worsen or don't improve as expected, to call us or go to ER. Follow up as directed, sooner if needed. Return in about 2 months (around 6/27/2022). This dictation was performed with a verbal recognition program and it was checked for errors. It is possible that there are still dictated errors within this office note. Any errors should be brought immediately to my attention for correction. All efforts were made to ensure that this office note is accurate.      Briana Mcdaniels MD MD

## 2022-04-29 ENCOUNTER — INITIAL CONSULT (OUTPATIENT)
Dept: CARDIOLOGY CLINIC | Age: 52
End: 2022-04-29
Payer: COMMERCIAL

## 2022-04-29 VITALS
SYSTOLIC BLOOD PRESSURE: 130 MMHG | WEIGHT: 219 LBS | HEIGHT: 74 IN | BODY MASS INDEX: 28.11 KG/M2 | HEART RATE: 70 BPM | DIASTOLIC BLOOD PRESSURE: 86 MMHG

## 2022-04-29 DIAGNOSIS — I10 ESSENTIAL HYPERTENSION: ICD-10-CM

## 2022-04-29 DIAGNOSIS — I45.6 WOLFF PARKINSON WHITE PATTERN SEEN ON ELECTROCARDIOGRAM: Primary | ICD-10-CM

## 2022-04-29 PROCEDURE — 93000 ELECTROCARDIOGRAM COMPLETE: CPT | Performed by: INTERNAL MEDICINE

## 2022-04-29 PROCEDURE — 99204 OFFICE O/P NEW MOD 45 MIN: CPT | Performed by: INTERNAL MEDICINE

## 2022-04-29 NOTE — PATIENT INSTRUCTIONS
Covid test, procedure paperwork, and pre-testing scheduled on 5/4/2022 at 2:30 pm. Procedure scheduled with Dr. Felipe Jacques on 5/10/2022 at [facility].

## 2022-04-29 NOTE — PROGRESS NOTES
Electrophysiology Consult Note      Reason for consultation: WPW    Chief complaint : Shortness of breath    Referring physician:       Primary care physician: Meche Collins MD      History of Present Illness:     Patient is a 63-year-old male with history of hypertension, hyperlipidemia, diabetes mellitus, WPW referred by Dr. Jennifer Sawyer for WPW management. Patient reports shortness of breath with exertion. Patient has on and off palpitations but nothing recently. Patient denies chest pains. Patient reports occasional dizziness but no syncopal episodes. Patient does not drink alcohol or caffeine. Patient does not smoke. Patient does try to exercise when he can. Patient has WPW and appears to be left lateral pathway on the EKG. Patient did have some wide-complex rhythm which could be from the WPW. Patient has hernia sent he is thinking of surgery. Patient has multiple other issues. Patient had some question of mass on the abdomen but CT scan did not show any mass.       Pastmedical history:   Past Medical History:   Diagnosis Date    Asthma     Depression     Diabetes mellitus ([address]     Hyperlipidemia     Hypertension     Sleep disorder     Ventral hernia without obstruction or gangrene 11/4/2015    WPW (Sigrid-Parkinson-White syndrome)     [facility]       Surgical history :   Past Surgical History:   Procedure Laterality Date    ABDOMEN SURGERY      COLONOSCOPY N/A 4/25/2022    COLONOSCOPY POLYPECTOMY SNARE/COLD BIOPSY performed by Israel Hartmann DO at [place] [id number] [facility], [facility]    Fusion of L3, L4, L5    UMBILICAL HERNIA REPAIR  11/4/15    repair incarcerated supra umbilical hernia    UPPER GASTROINTESTINAL ENDOSCOPY N/A 4/25/2022    EGD DIAGNOSTIC ONLY performed by Israel Hartmann DO at [address] [facility] ENDOSCOPY       Family history:   Family History   Problem Relation Age of Onset    Breast Cancer Mother        Social history :  reports that he has never smoked. He has never used smokeless tobacco. He reports previous alcohol use of about 2.0 standard drinks of alcohol per week. He reports previous drug use. Drug: Marijuana Mildred Edgar). Allergies   Allergen Reactions    Bee Venom Anaphylaxis    Hornet Venom     Tramadol Other (See Comments)     Night terrors and sweats       Current Outpatient Medications on File Prior to Visit   Medication Sig Dispense Refill    sildenafil (VIAGRA) 50 MG tablet TAKE 1 TABLET BY MOUTH AS NEEDED FOR ERECTILE DYSFUNCTION 10 tablet 0    ondansetron (ZOFRAN) 4 MG tablet Take 4 mg by mouth every 8 hours as needed for Nausea or Vomiting      buPROPion (WELLBUTRIN SR) 100 MG extended release tablet Take 1 tablet by mouth 2 times daily 60 tablet 3    butalbital-acetaminophen-caffeine (FIORICET, ESGIC) -40 MG per tablet Take 1 tablet by mouth every 4 hours as needed for Headaches 180 tablet 3    amitriptyline (ELAVIL) 50 MG tablet Take 1 tablet by mouth nightly 90 tablet 1    atorvastatin (LIPITOR) 40 MG tablet Take 1 tablet by mouth daily 90 tablet 1    gabapentin (NEURONTIN) 400 MG capsule Take 1 capsule by mouth 3 times daily for 90 days.  Intended supply: 90 days 270 capsule 1    hydroCHLOROthiazide (MICROZIDE) 12.5 MG capsule Take 1 capsule by mouth every morning 90 capsule 1    Insulin Degludec (TRESIBA FLEXTOUCH) 100 UNIT/ML SOPN Inject 40 Units into the skin nightly 6 pen 5    insulin lispro (HUMALOG KWIKPEN) 200 UNIT/ML SOPN pen TID with meals Glucose [id number] No Insulin 140-199 2 Units 200-249 4 Units 250-299 6 Units 300-349 8 Units 350-400 10 Units over 400 12 Units 5 pen 5    losartan (COZAAR) 100 MG tablet Take 1 tablet by mouth daily 90 tablet 1    meloxicam (MOBIC) 7.5 MG tablet Take 1 tablet by mouth daily 90 tablet 1    metFORMIN (GLUCOPHAGE) 1000 MG tablet Take 1 tablet by mouth 2 times daily (with meals) 180 tablet 1    potassium chloride (KLOR-CON M) 10 MEQ extended release tablet Take 1 tablet by mouth daily OTC 90 tablet 1    Ertugliflozin L-PyroglutamicAc (STEGLATRO) 15 MG TABS Take 15 mg by mouth daily 30 tablet 3    tiZANidine (ZANAFLEX) 4 MG tablet Take 1 tablet by mouth at bedtime 30 tablet 3    escitalopram (LEXAPRO) 10 MG tablet Take 1 tablet by mouth daily 30 tablet 3    omeprazole (PRILOSEC) 20 MG delayed release capsule Take 1 capsule by mouth daily 30 capsule 3    vitamin B-12 (CYANOCOBALAMIN) 1000 MCG tablet Take 1 tablet by mouth daily (Patient taking differently: Take 1,000 mcg by mouth daily OTC) 90 tablet 1    Cholecalciferol (VITAMIN D3) 50 MCG (2000 UT) TABS Take one tablet by mouth once a day (Patient taking differently: Take one tablet by mouth once a day OTC) 30 tablet 3     No current facility-administered medications on file prior to visit. Review of Systems:   Review of Systems   Constitutional: Negative for activity change, chills, fatigue and fever. HENT: Negative for congestion, ear pain and tinnitus. Eyes: Negative for photophobia, pain and visual disturbance. Respiratory: Positive for shortness of breath. Negative for cough, chest tightness and wheezing. Cardiovascular: Positive for palpitations. Negative for chest pain and leg swelling. Gastrointestinal: Negative for abdominal pain, blood in stool, constipation, diarrhea, nausea and vomiting. Endocrine: Negative for cold intolerance and heat intolerance. Genitourinary: Negative for dysuria, flank pain and hematuria. Musculoskeletal: Positive for arthralgias. Negative for back pain, myalgias and neck stiffness. Skin: Negative for color change and rash. Allergic/Immunologic: Negative for food allergies. Neurological: Positive for dizziness. Negative for light-headedness, numbness and headaches. Hematological: Does not bruise/bleed easily.    Psychiatric/Behavioral: Negative for agitation, behavioral problems and confusion. Examination:      Vitals:    04/29/22 1155   BP: 130/86   Pulse: 70   Weight: 219 lb (99.3 kg)   Height: 6' 2\" (1.88 m)        Body mass index is 28.12 kg/m². Physical Exam  Constitutional:       Appearance: Normal appearance. He is not ill-appearing. HENT:      Head: Normocephalic and atraumatic. Mouth/Throat:      Mouth: Mucous membranes are moist.   Eyes:      Conjunctiva/sclera: Conjunctivae normal.   Cardiovascular:      Rate and Rhythm: Normal rate and regular rhythm. Heart sounds: No murmur heard. Pulmonary:      Effort: Pulmonary effort is normal.      Breath sounds: No rales. Abdominal:      General: Abdomen is flat. Palpations: Abdomen is soft. Musculoskeletal:         General: No tenderness. Normal range of motion. Cervical back: Normal range of motion. Right lower leg: No edema. Left lower leg: No edema. Skin:     General: Skin is warm and dry. Neurological:      General: No focal deficit present. Mental Status: He is alert and oriented to person, place, and time.            CBC:   Lab Results   Component Value Date    WBC 5.3 04/13/2022    HGB 17.7 04/13/2022    HCT 49.6 04/13/2022     04/13/2022     Lipids:  Lab Results   Component Value Date    CHOL 206 (H) 09/12/2019    TRIG 84 09/12/2019    HDL 56 01/20/2022    LDLCALC 86 01/20/2022    LDLDIRECT 126 (H) 12/28/2011     PT/INR: No results found for: INR     BMP:    Lab Results   Component Value Date     04/13/2022    K 4.6 04/13/2022     04/13/2022    CO2 22 04/13/2022    BUN 26 (H) 04/13/2022     CMP:   Lab Results   Component Value Date    AST 18 04/13/2022    PROT 7.4 04/13/2022    BILITOT 0.3 04/13/2022    ALKPHOS 103 04/13/2022     TSH:    Lab Results   Component Value Date    TSH 2.22 09/12/2019       EKGINTERPRETATION - EKG Interpretation:  Sinus rhythm, WPW      Echo 9/4/2020     Left ventricular systolic function is normal.   Ejection fraction is visually estimated at 50-55%. Small left ventricular cavity. No significant valvular disease noted. No evidence of any pericardial effusion. IMPRESSION / RECOMMENDATIONS:     WPW  HTN - Losartan, HCTZ  HLD - On atorvastatin  DM-2   ? Lone atrial fibrillation - On August 14 2017 patient had episode of anaphylactic shock from bee stings and during that time patient had atrial fibrillation episode. No other documentation of atrial fibrillation. Patient denies any episodes after that. At that time patient did not conduct through the pathway suggesting that probably patient does not have a very robust pathway. Patient is not on anticoagulation. Patient has WPW and appears to be left lateral pathway on the EKG. Patient did have some wide-complex rhythm which could be from the WPW. Patient has hernia sent he is thinking of surgery. Patient has multiple other issues. Patient had some question of mass on the abdomen but CT scan did not show any mass. Patient had a prolonged stay in the hospital during [facility] x2 years ago. Discussed with the patient about pathophysiology of WPW and also the nature of WPW. Discussed about if pathway is benign there is no need for ablation but if he has arrhythmia then we can consider ablation. Patient VOICED understanding and wants to schedule for ablation    The risks including, but not limited to, vascular injury, bleeding, infection, radiation exposure, injury to cardiac and surrounding structures (including esophageal and pulmonary vein injury), injury to the normal conduction system of the heart (possibly requiring a pacemaker), stroke, myocardial infarction and death were all discussed. The patient considered the risks, benefits and alternatives; decided to proceed with the procedure. Patient works for [employer] and is very physically demanding job and he has been having shortness of breath with exertion and he cannot understand why is he having that.   I told him he is short of breath could be from other causes too may not entirely relate with WPW. He should not have come from that unless he is having arrhythmia with exertion. Patient though reports that he has never been told he had WPW until recently when he had multiple surgeries before that. Thanks again for allowing me to participate in care of this patient. Please call me if you have any questions. With best regards. Cecille Roberto MD, 4/29/2022 12:20 PM     Please note this report has been partially produced using speech recognition software and may contain errors related to that system including errors in grammar, punctuation, and spelling, as well as words and phrases that may be inappropriate. If there are any questions or concerns please feel free to contact the dictating provider for clarification.

## 2022-05-01 NOTE — PROGRESS NOTES
Patient Name:  Jason Louis  Patient :  1970  Patient MRN:  [id number]     Primary Oncologist: Td Johnson MD  Referring Provider: Blayne Yates MD     Date of Service: 2022      Reason for Consult: To evaluate the patient with erythrocytosis. Chief Complaint:    No chief complaint on file. Patient Active Problem List:     Ventral hernia without obstruction or gangrene     Essential hypertension     Gastroesophageal reflux disease without esophagitis     Dupuytren's contracture     Chronic skin ulcer, limited to breakdown of skin (HCC)     Chronic bilateral low back pain without sciatica     Type 2 diabetes mellitus with diabetic polyneuropathy, with long-term current use of insulin (HCC)     Erectile dysfunction     [place] Mill[place] Parkinson White pattern seen on electrocardiogram     Fatty liver     Neuropathy     Dorsalgia     Pure hypercholesterolemia     Pain in both feet     Chronic pain of left knee     Chronic depression     Anxiety     Polycythemia     History of COVID-19     Left upper quadrant abdominal swelling, mass and lump    HPI:   Haley Holder. \"KJ\" Alicia Alaniz is a 81-year-old very pleasant gentleman with medical history significant for hypertension, hyperlipidemia, diabetes mellitus, depression/anxiety, GERD, WPW syndrome with paroxysmal atrial fibrillation, initially referred to me on 2022 for evaluation of his erythrocytosis. He stated that he was noted to have mild erythrocytosis by primary care physician since 2020. It has been quite stable since then. He denies smoking, high altitude living or family history of erythrocytosis. In view of his non specific abdominal symptoms and swelling, I requested CT scan of abdomen/pelvis to rule out erythropoietin producing intra abdominal tumor. CT abdomen pelvis was done on 2022 and it showed no acute abnormality identified. No abdominal mass or splenomegaly. Layering small stones or sludge in the gallbladder.   Findings suggestive of hepatic steatosis. Laboratory work-ups done on 4/13/2022 showed high normal range hemoglobin and hematocrit [hemoglobin 17.7, hematocrit 49.6], low normal range erythropoietin level [erythropoietin 7 mU/ml]. The rest of the blood tests, including JAK2 V6 17F, JAK2 exon 12-13, BCR-ABL 1, MPL and CALR, were within normal range. On May 4, 2022, he presented to me for follow-up. He was referred to me for evaluation of erythrocytosis. On today visit, I reviewed with him findings on CT scan and laboratory test.  CT scan does not show any sign of intra-abdominal mass or tumor (erythropoietin producing tumor). Molecular (NGS) study for myeloproliferative neoplasm well within normal range. However, he is found to have low normal range erythropoietin level. In view of low normal range erythropoietin level, I would recommend to continue with observation for his erythrocytosis since I cannot rule out atypical (JAK2, MPL and CALR negative) myeloproliferative neoplasm. He does not have any new significant symptoms at today visit. Past Medical History:     Significant for  1. Hypertension  2. Hyperlipidemia  3. Diabetes mellitus  4. Depression/anxiety  5. GERD  6. [address] White with proxysmal atrial fibrillation    Past Surgery History:    Significant for  1. Elbow surgery  2. Umbilical hernia repair  3. L3, L4, L5 spine fusion surgery    Social History:   He denies cigarette smoking or illicit drug abuse. He socially drinks alcohol. Family History:    Significant for breast cancer in his mother. No other pertinent family history. Allergies:  Bee Venom  Hornet Venom  Tramadol    Review of Systems: \"Per interval history; otherwise 10 point ROS is negative. \"  His energy level is good and his sleep is fine. He denies fever, chills, night sweats, cough, shortness of breath, chest pain, hemoptysis or palpitations.  His bowel and bladder functions are normal. He doesn't have nausea, vomiting, abdominal pain, diarrhea, constipation, dysuria, loss of appetite or weight loss. He denies neuropathy and he doesn't have bleeding or clotting issues. He denies any pain in his body. No anxiety or depression. The rest of the systems are unremarkable. Vital Signs: There were no vitals taken for this visit. Physical Exam:  CONSTITUTIONAL: awake, alert, cooperative, no apparent distress   EYES: pupils equal, round and reactive to light, sclera clear, normal conjunctiva  ENT: Normocephalic, without obvious abnormality, atraumatic  NECK: supple, symmetrical, no jugular venous distension, no carotid bruits   HEMATOLOGIC/LYMPHATIC: no cervical, supraclavicular or axillary lymphadenopathy   LUNGS: VBS, no wheezes, clear to auscultation, no crackles, no increased work of breathing, no rhonchi,    CARDIOVASCULAR: regular rate and rhythm, normal S1 and S2, no murmur noted  ABDOMEN: normal bowel sounds x 4, soft, non-distended, non-tender, no masses palpated, no hepatosplenomegaly   MUSCULOSKELETAL: full range of motion noted, tone is normal  NEUROLOGIC: awake, alert, oriented to name, place and time. Motor skills grossly intact. SKIN: appears intact, normal skin color, normal texture, normal turgor, no jaundice.    EXTREMITIES: no LE edema, no clubbing, no leg swelling, no cyanosis,       Labs:  Hematology:  Lab Results   Component Value Date    WBC 5.3 04/13/2022    RBC 5.88 04/13/2022    HGB 17.7 04/13/2022    HCT 49.6 04/13/2022    MCV 84.4 04/13/2022    MCH 30.1 04/13/2022    MCHC 35.7 04/13/2022    RDW 14.0 04/13/2022     04/13/2022    MPV 10.2 04/13/2022    SEGSPCT 70.3 (H) 04/13/2022    EOSRELPCT 3.6 (H) 04/13/2022    BASOPCT 0.4 04/13/2022    LYMPHOPCT 19.2 (L) 04/13/2022    MONOPCT 6.5 (H) 04/13/2022    SEGSABS 3.7 04/13/2022    EOSABS 0.2 04/13/2022    BASOSABS 0.0 04/13/2022    LYMPHSABS 1.0 04/13/2022    MONOSABS 0.3 04/13/2022    DIFFTYPE AUTOMATED DIFFERENTIAL 04/13/2022     Lab Results   Component Value Date    ESR 2 04/13/2022     Chemistry:  Lab Results   Component Value Date     04/13/2022    K 4.6 04/13/2022     04/13/2022    CO2 22 04/13/2022    BUN 26 (H) 04/13/2022    CREATININE 0.8 (L) 04/13/2022    GLUCOSE 329 (H) 04/13/2022    CALCIUM 9.7 04/13/2022    PROT 7.4 04/13/2022    LABALBU 4.9 04/13/2022    BILITOT 0.3 04/13/2022    ALKPHOS 103 04/13/2022    AST 18 04/13/2022    ALT 20 04/13/2022    LABGLOM >60 04/13/2022    GFRAA >60 04/13/2022    AGRATIO 1.8 03/15/2022    GLOB 2.8 07/21/2021    MG 2.0 09/04/2020    POCGLU 107 (H) 04/25/2022     Lab Results   Component Value Date     04/13/2022     No components found for: LD  Lab Results   Component Value Date    TSHHS 1.534 12/28/2011    T4FREE 1.0 09/12/2019    FT3 3.6 12/28/2011     Immunology:  Lab Results   Component Value Date    PROT 7.4 04/13/2022     No results found for: Emelina Rylan, KLFLCR  No results found for: B2M  Coagulation Panel:  No results found for: PROTIME, INR, APTT, DDIMER  Anemia Panel:  No results found for: [id number], FOLATE  Tumor Markers:  No results found for: , CEA, , LABCA2, PSA     Observations:  No data recorded     Assessment   Erythrocytosis    Plan:  Haley Holder. \"KJ\" Alicia Alaniz is a 72-year-old very pleasant gentleman who was noted to have mild erythrocytosis since 9/4/2020. It has been quite stable since then. He denies smoking, high altitude living or family history of erythrocytosis. In view of his non specific abdominal symptoms and swelling, I requested CT scan of abdomen/pelvis to rule out erythropoietin producing intra abdominal tumor. CT abdomen pelvis was done on 4/27/2022 and it showed no acute abnormality identified. No abdominal mass or splenomegaly. Layering small stones or sludge in the gallbladder. Findings suggestive of hepatic steatosis.     Laboratory work-ups done on 4/13/2022 showed high normal range hemoglobin and hematocrit [hemoglobin 17.7, hematocrit 49.6], low normal range erythropoietin level [erythropoietin 7 mU/ml]. The rest of the blood tests, including JAK2 V6 17F, JAK2 exon 12-13, BCR-ABL 1, MPL and CALR, were within normal range. On May 4, 2022, he presented to me for follow-up. He was referred to me for evaluation of erythrocytosis. On today visit, I reviewed with him findings on CT scan and laboratory test.  CT scan does not show any sign of intra-abdominal mass or tumor (erythropoietin producing tumor). Molecular (NGS) study for myeloproliferative neoplasm well within normal range. However, he is found to have low normal range erythropoietin level. In view of low normal range erythropoietin level, I would recommend to continue with observation for his erythrocytosis since I cannot rule out atypical (JAK2, MPL and CALR negative) myeloproliferative neoplasm. He doesn't have hyperviscosity sign or symptom on today visit. I answered all his questions and concerns for today. I asked him to follow up with primary care physician on regular basis. Recent imaging and labs were reviewed and discussed with the patient.

## 2022-05-02 DIAGNOSIS — I45.6 WOLFF PARKINSON WHITE PATTERN SEEN ON ELECTROCARDIOGRAM: Primary | ICD-10-CM

## 2022-05-04 ENCOUNTER — HOSPITAL ENCOUNTER (OUTPATIENT)
Age: 52
Setting detail: SPECIMEN
Discharge: HOME | End: 2022-05-04
Payer: COMMERCIAL

## 2022-05-04 ENCOUNTER — HOSPITAL ENCOUNTER (OUTPATIENT)
Dept: INFUSION THERAPY | Age: 52
Discharge: HOME | End: 2022-05-04

## 2022-05-04 ENCOUNTER — HOSPITAL ENCOUNTER (OUTPATIENT)
Age: 52
Discharge: HOME | End: 2022-05-04
Payer: COMMERCIAL

## 2022-05-04 ENCOUNTER — NURSE ONLY (OUTPATIENT)
Dept: CARDIOLOGY CLINIC | Age: 52
End: 2022-05-04

## 2022-05-04 ENCOUNTER — OFFICE VISIT (OUTPATIENT)
Dept: ONCOLOGY | Age: 52
End: 2022-05-04
Payer: COMMERCIAL

## 2022-05-04 ENCOUNTER — HOSPITAL ENCOUNTER (OUTPATIENT)
Dept: GENERAL RADIOLOGY | Age: 52
Discharge: HOME | End: 2022-05-04
Payer: COMMERCIAL

## 2022-05-04 ENCOUNTER — OFFICE VISIT (OUTPATIENT)
Dept: SURGERY | Age: 52
End: 2022-05-04
Payer: COMMERCIAL

## 2022-05-04 VITALS
HEIGHT: 74 IN | BODY MASS INDEX: 28.23 KG/M2 | HEART RATE: 80 BPM | SYSTOLIC BLOOD PRESSURE: 140 MMHG | DIASTOLIC BLOOD PRESSURE: 82 MMHG | WEIGHT: 220 LBS

## 2022-05-04 VITALS
RESPIRATION RATE: 16 BRPM | WEIGHT: 220 LBS | DIASTOLIC BLOOD PRESSURE: 71 MMHG | BODY MASS INDEX: 28.23 KG/M2 | OXYGEN SATURATION: 98 % | HEART RATE: 67 BPM | TEMPERATURE: 95.8 F | HEIGHT: 74 IN | SYSTOLIC BLOOD PRESSURE: 146 MMHG

## 2022-05-04 DIAGNOSIS — Z01.818 PRE-PROCEDURAL EXAMINATION: ICD-10-CM

## 2022-05-04 DIAGNOSIS — D75.1 POLYCYTHEMIA: Primary | ICD-10-CM

## 2022-05-04 DIAGNOSIS — K29.50 CHRONIC GASTRITIS WITHOUT BLEEDING, UNSPECIFIED GASTRITIS TYPE: Primary | ICD-10-CM

## 2022-05-04 DIAGNOSIS — Z01.818 PREOPERATIVE CLEARANCE: ICD-10-CM

## 2022-05-04 DIAGNOSIS — K43.2 INCISIONAL HERNIA, WITHOUT OBSTRUCTION OR GANGRENE: ICD-10-CM

## 2022-05-04 DIAGNOSIS — K52.9 COLITIS: ICD-10-CM

## 2022-05-04 LAB
ANION GAP SERPL CALCULATED.3IONS-SCNC: 12 MMOL/L (ref 4–16)
APTT: 24.7 SECONDS (ref 25.1–37.1)
BASOPHILS ABSOLUTE: 0 K/CU MM
BASOPHILS RELATIVE PERCENT: 0.8 % (ref 0–1)
BUN BLDV-MCNC: 20 MG/DL (ref 6–23)
CALCIUM SERPL-MCNC: 9.5 MG/DL (ref 8.3–10.6)
CHLORIDE BLD-SCNC: 101 MMOL/L (ref 99–110)
CO2: 25 MMOL/L (ref 21–32)
CREAT SERPL-MCNC: 0.8 MG/DL (ref 0.9–1.3)
DIFFERENTIAL TYPE: ABNORMAL
EOSINOPHILS ABSOLUTE: 0.1 K/CU MM
EOSINOPHILS RELATIVE PERCENT: 3.1 % (ref 0–3)
GFR AFRICAN AMERICAN: >60 ML/MIN/1.73M2
GFR NON-AFRICAN AMERICAN: >60 ML/MIN/1.73M2
GLUCOSE BLD-MCNC: 321 MG/DL (ref 70–99)
HCT VFR BLD CALC: 50.3 % (ref 42–52)
HEMOGLOBIN: 17.5 GM/DL (ref 13.5–18)
IMMATURE NEUTROPHIL %: 0.3 % (ref 0–0.43)
INR BLD: 0.88 INDEX
LYMPHOCYTES ABSOLUTE: 1.1 K/CU MM
LYMPHOCYTES RELATIVE PERCENT: 28.6 % (ref 24–44)
MAGNESIUM: 2.1 MG/DL (ref 1.8–2.4)
MCH RBC QN AUTO: 30.8 PG (ref 27–31)
MCHC RBC AUTO-ENTMCNC: 34.8 % (ref 32–36)
MCV RBC AUTO: 88.6 FL (ref 78–100)
MONOCYTES ABSOLUTE: 0.4 K/CU MM
MONOCYTES RELATIVE PERCENT: 9 % (ref 0–4)
NUCLEATED RBC %: 0 %
PDW BLD-RTO: 12.7 % (ref 11.7–14.9)
PHOSPHORUS: 2.6 MG/DL (ref 2.5–4.9)
PLATELET # BLD: 210 K/CU MM (ref 140–440)
PMV BLD AUTO: 9.6 FL (ref 7.5–11.1)
POTASSIUM SERPL-SCNC: 4.8 MMOL/L (ref 3.5–5.1)
PROTHROMBIN TIME: 11.3 SECONDS (ref 11.7–14.5)
RBC # BLD: 5.68 M/CU MM (ref 4.6–6.2)
SEGMENTED NEUTROPHILS ABSOLUTE COUNT: 2.3 K/CU MM
SEGMENTED NEUTROPHILS RELATIVE PERCENT: 58.2 % (ref 36–66)
SODIUM BLD-SCNC: 138 MMOL/L (ref 135–145)
TOTAL IMMATURE NEUTOROPHIL: 0.01 K/CU MM
TOTAL NUCLEATED RBC: 0 K/CU MM
WBC # BLD: 3.9 K/CU MM (ref 4–10.5)

## 2022-05-04 PROCEDURE — 85610 PROTHROMBIN TIME: CPT

## 2022-05-04 PROCEDURE — U0003 INFECTIOUS AGENT DETECTION BY NUCLEIC ACID (DNA OR RNA); SEVERE ACUTE RESPIRATORY SYNDROME CORONAVIRUS 2 (SARS-COV-2) (CORONAVIRUS DISEASE [COVID-19]), AMPLIFIED PROBE TECHNIQUE, MAKING USE OF HIGH THROUGHPUT TECHNOLOGIES AS DESCRIBED BY CMS-2020-01-R: HCPCS

## 2022-05-04 PROCEDURE — 99213 OFFICE O/P EST LOW 20 MIN: CPT | Performed by: INTERNAL MEDICINE

## 2022-05-04 PROCEDURE — 71046 X-RAY EXAM CHEST 2 VIEWS: CPT

## 2022-05-04 PROCEDURE — 85025 COMPLETE CBC W/AUTO DIFF WBC: CPT

## 2022-05-04 PROCEDURE — 84100 ASSAY OF PHOSPHORUS: CPT

## 2022-05-04 PROCEDURE — 83735 ASSAY OF MAGNESIUM: CPT

## 2022-05-04 PROCEDURE — U0005 INFEC AGEN DETEC AMPLI PROBE: HCPCS

## 2022-05-04 PROCEDURE — 36415 COLL VENOUS BLD VENIPUNCTURE: CPT

## 2022-05-04 PROCEDURE — 80048 BASIC METABOLIC PNL TOTAL CA: CPT

## 2022-05-04 PROCEDURE — 99213 OFFICE O/P EST LOW 20 MIN: CPT | Performed by: SURGERY

## 2022-05-04 PROCEDURE — 85730 THROMBOPLASTIN TIME PARTIAL: CPT

## 2022-05-04 ASSESSMENT — ENCOUNTER SYMPTOMS
COUGH: 0
ABDOMINAL PAIN: 1
BACK PAIN: 0
CHOKING: 0
ABDOMINAL DISTENTION: 0
STRIDOR: 0
BLOOD IN STOOL: 0
VOMITING: 0
TROUBLE SWALLOWING: 0
COLOR CHANGE: 0
SHORTNESS OF BREATH: 0
SORE THROAT: 0
NAUSEA: 0

## 2022-05-04 NOTE — PROGRESS NOTES
MA Rooming Questions  Patient: Dania Narayan  MRN: [id number]    Date: 5/4/2022        1. Do you have any new issues?   no         2. Do you need any refills on medications?    no    3. Have you had any imaging done since your last visit?   no    4. Have you been hospitalized or seen in the emergency room since your last visit here?   no    5. Did the patient have a depression screening completed today?  No    No data recorded     PHQ-9 Given to (if applicable):               PHQ-9 Score (if applicable):                     [] Positive     []  Negative              Does question #9 need addressed (if applicable)                     [] Yes    []  No               Jael Galdamez MA

## 2022-05-04 NOTE — H&P
SUBJECTIVE:  HPI:     Patient here to plan for incisional hernia repair. Colonoscopy showed area of colitis in cecum. Mild gastritis. No H. Pylori. Incisional hernia reduces easily but does cause pain/discomfort especially during work. Today we discussed risk and benefits of hernia repair.     Past Surgical History:   Procedure Laterality Date    ABDOMEN SURGERY      COLONOSCOPY N/A 4/25/2022    COLONOSCOPY POLYPECTOMY SNARE/COLD BIOPSY performed by Amairani Martinez DO at [address], [facility]    Fusion of L3, L4, L5    UMBILICAL HERNIA REPAIR  11/4/15    repair incarcerated supra umbilical hernia    UPPER GASTROINTESTINAL ENDOSCOPY N/A 4/25/2022    EGD DIAGNOSTIC ONLY performed by Amairani Martinez DO at [address] [facility] ENDOSCOPY     Past Medical History:   Diagnosis Date    Asthma     Depression     Diabetes mellitus ([facility] [address]     Hyperlipidemia     Hypertension     Sleep disorder     Ventral hernia without obstruction or gangrene 11/4/2015    WPW (Sigrid-Parkinson-White syndrome)     Irena Pollard     Family History   Problem Relation Age of Onset    Breast Cancer Mother      Social History     Socioeconomic History    Marital status: Single     Spouse name: Not on file    Number of children: Not on file    Years of education: Not on file    Highest education level: Not on file   Occupational History    Not on file   Tobacco Use    Smoking status: Never Smoker    Smokeless tobacco: Never Used   Vaping Use    Vaping Use: Never used   Substance and Sexual Activity    Alcohol use: Not Currently     Alcohol/week: 2.0 standard drinks     Types: 2 Standard drinks or equivalent per week    Drug use: Not Currently     Types: Marijuana Son Shi     Comment: marijuana card    Sexual activity: Not on file   Other Topics Concern    Not on file   Social History Narrative    Not on file     Social Determinants of Health     Financial Resource Strain: Low Risk     Difficulty of Paying Living Expenses: Not hard at all   Food Insecurity: No Food Insecurity    Worried About Running Out of Food in the Last Year: Never true    Tonja of Food in the Last Year: Never true   Transportation Needs:     Lack of Transportation (Medical): Not on file    Lack of Transportation (Non-Medical):  Not on file   Physical Activity:     Days of Exercise per Week: Not on file    Minutes of Exercise per Session: Not on file   Stress:     Feeling of Stress : Not on file   Social Connections:     Frequency of Communication with Friends and Family: Not on file    Frequency of Social Gatherings with Friends and Family: Not on file    Attends [employer] Services: Not on file    Active Member of [address] [place] [address] [employer] or Organizations: Not on file    Attends Club or Organization Meetings: Not on file    Marital Status: Not on file   Intimate Partner Violence:     Fear of Current or Ex-Partner: Not on file    Emotionally Abused: Not on file    Physically Abused: Not on file    Sexually Abused: Not on file   Housing Stability:     Unable to Pay for Housing in the Last Year: Not on file    Number of [place] in the Last Year: Not on file    Unstable Housing in the Last Year: Not on file       Current Outpatient Medications   Medication Sig Dispense Refill    sildenafil (VIAGRA) 50 MG tablet TAKE 1 TABLET BY MOUTH AS NEEDED FOR ERECTILE DYSFUNCTION 10 tablet 0    ondansetron (ZOFRAN) 4 MG tablet Take 4 mg by mouth every 8 hours as needed for Nausea or Vomiting      buPROPion (WELLBUTRIN SR) 100 MG extended release tablet Take 1 tablet by mouth 2 times daily 60 tablet 3    butalbital-acetaminophen-caffeine (FIORICET, ESGIC) -40 MG per tablet Take 1 tablet by mouth every 4 hours as needed for Headaches 180 tablet 3    amitriptyline (ELAVIL) 50 MG tablet Take 1 tablet by mouth nightly 90 tablet 1    atorvastatin (LIPITOR) 40 MG tablet Take 1 tablet by mouth daily 90 tablet 1    gabapentin (NEURONTIN) 400 MG capsule Take 1 capsule by mouth 3 times daily for 90 days. Intended supply: 90 days 270 capsule 1    hydroCHLOROthiazide (MICROZIDE) 12.5 MG capsule Take 1 capsule by mouth every morning 90 capsule 1    Insulin Degludec (TRESIBA FLEXTOUCH) 100 UNIT/ML SOPN Inject 40 Units into the skin nightly 6 pen 5    insulin lispro (HUMALOG KWIKPEN) 200 UNIT/ML SOPN pen TID with meals Glucose [id number] No Insulin 140-199 2 Units 200-249 4 Units 250-299 6 Units 300-349 8 Units 350-400 10 Units over 400 12 Units 5 pen 5    losartan (COZAAR) 100 MG tablet Take 1 tablet by mouth daily 90 tablet 1    meloxicam (MOBIC) 7.5 MG tablet Take 1 tablet by mouth daily 90 tablet 1    metFORMIN (GLUCOPHAGE) 1000 MG tablet Take 1 tablet by mouth 2 times daily (with meals) 180 tablet 1    potassium chloride (KLOR-CON M) 10 MEQ extended release tablet Take 1 tablet by mouth daily OTC 90 tablet 1    Ertugliflozin L-PyroglutamicAc (STEGLATRO) 15 MG TABS Take 15 mg by mouth daily 30 tablet 3    tiZANidine (ZANAFLEX) 4 MG tablet Take 1 tablet by mouth at bedtime 30 tablet 3    escitalopram (LEXAPRO) 10 MG tablet Take 1 tablet by mouth daily 30 tablet 3    omeprazole (PRILOSEC) 20 MG delayed release capsule Take 1 capsule by mouth daily 30 capsule 3    vitamin B-12 (CYANOCOBALAMIN) 1000 MCG tablet Take 1 tablet by mouth daily (Patient taking differently: Take 1,000 mcg by mouth daily OTC) 90 tablet 1    Cholecalciferol (VITAMIN D3) 50 MCG (2000 UT) TABS Take one tablet by mouth once a day (Patient taking differently: Take one tablet by mouth once a day OTC) 30 tablet 3     No current facility-administered medications for this visit. Allergies   Allergen Reactions    Bee Venom Anaphylaxis    Hornet Venom     Tramadol Other (See Comments)     Night terrors and sweats       Review of Systems:         Review of Systems   Constitutional: Negative for chills, fatigue, fever and unexpected weight change. HENT: Negative for sore throat and trouble swallowing. Respiratory: Negative for cough, choking, shortness of breath and stridor. Cardiovascular: Negative for chest pain, palpitations and leg swelling. Gastrointestinal: Positive for abdominal pain (periumilical with hernia ). Negative for abdominal distention, blood in stool, nausea and vomiting. Musculoskeletal: Negative for back pain, gait problem and joint swelling. Skin: Negative for color change, rash and wound. Allergic/Immunologic: Negative for immunocompromised state. Neurological: Negative for dizziness, speech difficulty, weakness and light-headedness. Hematological: Negative for adenopathy. Does not bruise/bleed easily. Psychiatric/Behavioral: Negative for agitation and confusion. The patient is not nervous/anxious. OBJECTIVE:  Physical Exam:    BP (!) 140/82 (Site: Left Upper Arm, Position: Sitting, Cuff Size: Medium Adult)   Pulse 80   Ht 6' 2\" (1.88 m)   Wt 220 lb (99.8 kg)   BMI 28.25 kg/m²      Physical Exam    General: No acute distress  Neck: No JVD, supple  Lungs: Chest rise equal bilaterally  Cardiac: Appears well perfused   Abdomen: Soft, nontender, nondistended, no rebound or guarding. Reducible periumbilical incisional hernia. Extremities, no edema, cyanosis, or clubbing  Skin: No rashes or breakdown  Neurologic: cranial nerves II - XII grossly intact    ASSESSMENT:  1. Chronic gastritis without bleeding, unspecified gastritis type    2. Colitis    3. Preoperative clearance    4. Incisional hernia, without obstruction or gangrene          PLAN:      1. Chronic colitis/gastritis-we will refer back to GI for follow-up for this. Continue omeprazole daily. 2.  Cardiac clearance for hernia repair. 3.  Discussed risk and benefits of hernia repair. Discussed open versus laparoscopic/robotic repair. Patient elected proceed with a robotic assisted repair of incisional hernia with mesh.   Risk discussed include, but not limited to risk of anesthesia including cardiopulmonary compromise, bleeding, infection, injury to intra-abdominal structure, need for additional procedure. Patient states understanding and agrees to proceed with her surgery. Orders Placed This Encounter   Procedures   [address], [facility], Gastroenterology, Lyle Ceja MD, Cardiology, Camille Tate        No orders of the defined types were placed in this encounter. Follow Up:  No follow-ups on file.       Amairani Martinez DO

## 2022-05-04 NOTE — H&P (VIEW-ONLY)
SUBJECTIVE:  HPI:     Patient here to plan for incisional hernia repair. Colonoscopy showed area of colitis in cecum. Mild gastritis. No H. Pylori. Incisional hernia reduces easily but does cause pain/discomfort especially during work. Today we discussed risk and benefits of hernia repair.     Past Surgical History:   Procedure Laterality Date    ABDOMEN SURGERY      COLONOSCOPY N/A 4/25/2022    COLONOSCOPY POLYPECTOMY SNARE/COLD BIOPSY performed by Dayan Olson DO at [address], [facility]    Fusion of L3, L4, L5    UMBILICAL HERNIA REPAIR  11/4/15    repair incarcerated supra umbilical hernia    UPPER GASTROINTESTINAL ENDOSCOPY N/A 4/25/2022    EGD DIAGNOSTIC ONLY performed by Dayan Olson DO at [facility] ENDOSCOPY     Past Medical History:   Diagnosis Date    Asthma     Depression     Diabetes mellitus ([facility] [address]     Hyperlipidemia     Hypertension     Sleep disorder     Ventral hernia without obstruction or gangrene 11/4/2015    WPW (Sigrid-Parkinson-White syndrome)     Hali Lazcano     Family History   Problem Relation Age of Onset    Breast Cancer Mother      Social History     Socioeconomic History    Marital status: Single     Spouse name: Not on file    Number of children: Not on file    Years of education: Not on file    Highest education level: Not on file   Occupational History    Not on file   Tobacco Use    Smoking status: Never Smoker    Smokeless tobacco: Never Used   Vaping Use    Vaping Use: Never used   Substance and Sexual Activity    Alcohol use: Not Currently     Alcohol/week: 2.0 standard drinks     Types: 2 Standard drinks or equivalent per week    Drug use: Not Currently     Types: Marijuana Cresencio Bocanegra)     Comment: marijuana card    Sexual activity: Not on file   Other Topics Concern    Not on file   Social History Narrative    Not on file     Social Determinants of Health     Financial Resource Strain: Low Risk     Difficulty of Paying Living Expenses: Not hard at all   Food Insecurity: No Food Insecurity    Worried About Running Out of Food in the Last Year: Never true    Tonja of Food in the Last Year: Never true   Transportation Needs:     Lack of Transportation (Medical): Not on file    Lack of Transportation (Non-Medical):  Not on file   Physical Activity:     Days of Exercise per Week: Not on file    Minutes of Exercise per Session: Not on file   Stress:     Feeling of Stress : Not on file   Social Connections:     Frequency of Communication with Friends and Family: Not on file    Frequency of Social Gatherings with Friends and Family: Not on file    Attends Yarsanism Services: Not on file    Active Member of [address] [place] [address] [employer] or Organizations: Not on file    Attends Club or Organization Meetings: Not on file    Marital Status: Not on file   Intimate Partner Violence:     Fear of Current or Ex-Partner: Not on file    Emotionally Abused: Not on file    Physically Abused: Not on file    Sexually Abused: Not on file   Housing Stability:     Unable to Pay for Housing in the Last Year: Not on file    Number of [place] in the Last Year: Not on file    Unstable Housing in the Last Year: Not on file       Current Outpatient Medications   Medication Sig Dispense Refill    sildenafil (VIAGRA) 50 MG tablet TAKE 1 TABLET BY MOUTH AS NEEDED FOR ERECTILE DYSFUNCTION 10 tablet 0    ondansetron (ZOFRAN) 4 MG tablet Take 4 mg by mouth every 8 hours as needed for Nausea or Vomiting      buPROPion (WELLBUTRIN SR) 100 MG extended release tablet Take 1 tablet by mouth 2 times daily 60 tablet 3    butalbital-acetaminophen-caffeine (FIORICET, ESGIC) -40 MG per tablet Take 1 tablet by mouth every 4 hours as needed for Headaches 180 tablet 3    amitriptyline (ELAVIL) 50 MG tablet Take 1 tablet by mouth nightly 90 tablet 1    atorvastatin (LIPITOR) 40 MG tablet Take 1 tablet by mouth daily 90 tablet 1    gabapentin (NEURONTIN) 400 MG capsule Take 1 capsule by mouth 3 times daily for 90 days. Intended supply: 90 days 270 capsule 1    hydroCHLOROthiazide (MICROZIDE) 12.5 MG capsule Take 1 capsule by mouth every morning 90 capsule 1    Insulin Degludec (TRESIBA FLEXTOUCH) 100 UNIT/ML SOPN Inject 40 Units into the skin nightly 6 pen 5    insulin lispro (HUMALOG KWIKPEN) 200 UNIT/ML SOPN pen TID with meals Glucose [id number] No Insulin 140-199 2 Units 200-249 4 Units 250-299 6 Units 300-349 8 Units 350-400 10 Units over 400 12 Units 5 pen 5    losartan (COZAAR) 100 MG tablet Take 1 tablet by mouth daily 90 tablet 1    meloxicam (MOBIC) 7.5 MG tablet Take 1 tablet by mouth daily 90 tablet 1    metFORMIN (GLUCOPHAGE) 1000 MG tablet Take 1 tablet by mouth 2 times daily (with meals) 180 tablet 1    potassium chloride (KLOR-CON M) 10 MEQ extended release tablet Take 1 tablet by mouth daily OTC 90 tablet 1    Ertugliflozin L-PyroglutamicAc (STEGLATRO) 15 MG TABS Take 15 mg by mouth daily 30 tablet 3    tiZANidine (ZANAFLEX) 4 MG tablet Take 1 tablet by mouth at bedtime 30 tablet 3    escitalopram (LEXAPRO) 10 MG tablet Take 1 tablet by mouth daily 30 tablet 3    omeprazole (PRILOSEC) 20 MG delayed release capsule Take 1 capsule by mouth daily 30 capsule 3    vitamin B-12 (CYANOCOBALAMIN) 1000 MCG tablet Take 1 tablet by mouth daily (Patient taking differently: Take 1,000 mcg by mouth daily OTC) 90 tablet 1    Cholecalciferol (VITAMIN D3) 50 MCG (2000 UT) TABS Take one tablet by mouth once a day (Patient taking differently: Take one tablet by mouth once a day OTC) 30 tablet 3     No current facility-administered medications for this visit. Allergies   Allergen Reactions    Bee Venom Anaphylaxis    Hornet Venom     Tramadol Other (See Comments)     Night terrors and sweats       Review of Systems:         Review of Systems   Constitutional: Negative for chills, fatigue, fever and unexpected weight change. HENT: Negative for sore throat and trouble swallowing. Respiratory: Negative for cough, choking, shortness of breath and stridor. Cardiovascular: Negative for chest pain, palpitations and leg swelling. Gastrointestinal: Positive for abdominal pain (periumilical with hernia ). Negative for abdominal distention, blood in stool, nausea and vomiting. Musculoskeletal: Negative for back pain, gait problem and joint swelling. Skin: Negative for color change, rash and wound. Allergic/Immunologic: Negative for immunocompromised state. Neurological: Negative for dizziness, speech difficulty, weakness and light-headedness. Hematological: Negative for adenopathy. Does not bruise/bleed easily. Psychiatric/Behavioral: Negative for agitation and confusion. The patient is not nervous/anxious. OBJECTIVE:  Physical Exam:    BP (!) 140/82 (Site: Left Upper Arm, Position: Sitting, Cuff Size: Medium Adult)   Pulse 80   Ht 6' 2\" (1.88 m)   Wt 220 lb (99.8 kg)   BMI 28.25 kg/m²      Physical Exam    General: No acute distress  Neck: No JVD, supple  Lungs: Chest rise equal bilaterally  Cardiac: Appears well perfused   Abdomen: Soft, nontender, nondistended, no rebound or guarding. Reducible periumbilical incisional hernia. Extremities, no edema, cyanosis, or clubbing  Skin: No rashes or breakdown  Neurologic: cranial nerves II - XII grossly intact    ASSESSMENT:  1. Chronic gastritis without bleeding, unspecified gastritis type    2. Colitis    3. Preoperative clearance    4. Incisional hernia, without obstruction or gangrene          PLAN:      1. Chronic colitis/gastritis-we will refer back to GI for follow-up for this. Continue omeprazole daily. 2.  Cardiac clearance for hernia repair. 3.  Discussed risk and benefits of hernia repair. Discussed open versus laparoscopic/robotic repair. Patient elected proceed with a robotic assisted repair of incisional hernia with mesh.   Risk discussed include, but not limited to risk of anesthesia including cardiopulmonary compromise, bleeding, infection, injury to intra-abdominal structure, need for additional procedure. Patient states understanding and agrees to proceed with her surgery. Orders Placed This Encounter   Procedures   [address] [facility] [address] Lucas Worthy CNP, Gastroenterology, Kellie Guillen MD, Cardiology, Camille Tate        No orders of the defined types were placed in this encounter. Follow Up:  No follow-ups on file.       Carmen Jerry, DO

## 2022-05-04 NOTE — H&P
SUBJECTIVE:  HPI:     ***      Past Surgical History:   Procedure Laterality Date    ABDOMEN SURGERY      COLONOSCOPY N/A 4/25/2022    COLONOSCOPY POLYPECTOMY SNARE/COLD BIOPSY [address]    Fusion of L3, L4, L5    UMBILICAL HERNIA REPAIR  11/4/15    repair incarcerated supra umbilical hernia    UPPER GASTROINTESTINAL ENDOSCOPY N/A 4/25/2022    EGD DIAGNOSTIC ONLY performed by Radha [facility]zano DO [facility] END[facility]COPY     Past Medical History:   Diagnosis Date    Asthma     Depression     Diabetes mellitus ([facility] [address]     Hyperlipidemia     Hypertension     Sleep disorder     Ventral hernia without obstruction or gangrene 11/4/2015    WPW (Sigrid-Parkinson-White syndrome)     Samantha Santos     Family History   Problem Relation Age of Onset    Breast Cancer Mother      Social History     Socioeconomic History    Marital status: Single     Spouse name: Not on file    Number of children: Not on file    Years of education: Not on file    Highest education level: Not on file   Occupational History    Not on file   Tobacco Use    Smoking status: Never Smoker    Smokeless tobacco: Never Used   Vaping Use    Vaping Use: Never used   Substance and Sexual Activity    Alcohol use: Not Currently     Alcohol/week: 2.0 standard drinks     Types: 2 Standard drinks or equivalent per week    Drug use: Not Currently     Types: Marijuana Jonah Bilberry)     Comment: marijuana card    Sexual activity: Not on file   Other Topics Concern    Not on file   Social History Narrative    Not on file     Social Determinants of Health     Financial Resource Strain: Low Risk     Difficulty of Paying Living Expenses: Not hard at all   Food Insecurity: No Food Insecurity    Worried About Running Out of Food in the Last Year: Never true    Tonja of Food in the Last Year: Never true   Transportation Needs:     Lack of Transportation (Medical): Not on file    Lack of Transportation (Non-Medical): Not on file   Physical Activity:     Days of Exercise per Week: Not on file    Minutes of Exercise per Session: Not on file   Stress:     Feeling of Stress : Not on file   Social Connections:     Frequency of Communication with Friends and Family: Not on file    Frequency of Social Gatherings with Friends and Family: Not on file    Attends Church Services: Not on file    Active Member of [address] [place] [address] or Organizations: Not on file    Attends Club or Organization Meetings: Not on file    Marital Status: Not on file   Intimate Partner Violence:     Fear of Current or Ex-Partner: Not on file    Emotionally Abused: Not on file    Physically Abused: Not on file    Sexually Abused: Not on file   Housing Stability:     Unable to Pay for Housing in the Last Year: Not on file    Number of [place] in the Last Year: Not on file    Unstable Housing in the Last Year: Not on file       Current Outpatient Medications   Medication Sig Dispense Refill    sildenafil (VIAGRA) 50 MG tablet TAKE 1 TABLET BY MOUTH AS NEEDED FOR ERECTILE DYSFUNCTION 10 tablet 0    ondansetron (ZOFRAN) 4 MG tablet Take 4 mg by mouth every 8 hours as needed for Nausea or Vomiting      buPROPion (WELLBUTRIN SR) 100 MG extended release tablet Take 1 tablet by mouth 2 times daily 60 tablet 3    butalbital-acetaminophen-caffeine (FIORICET, ESGIC) -40 MG per tablet Take 1 tablet by mouth every 4 hours as needed for Headaches 180 tablet 3    amitriptyline (ELAVIL) 50 MG tablet Take 1 tablet by mouth nightly 90 tablet 1    atorvastatin (LIPITOR) 40 MG tablet Take 1 tablet by mouth daily 90 tablet 1    gabapentin (NEURONTIN) 400 MG capsule Take 1 capsule by mouth 3 times daily for 90 days.  Intended supply: 90 days 270 capsule 1    hydroCHLOROthiazide (MICROZIDE) 12.5 MG capsule Take 1 capsule by mouth every morning 90 capsule 1    Insulin Degludec (TRESIBA FLEXTOUCH) 100 UNIT/ML SOPN Inject 40 Units into the skin nightly 6 pen 5    insulin lispro (HUMALOG KWIKPEN) 200 UNIT/ML SOPN pen TID with meals Glucose [id number] No Insulin 140-199 2 Units 200-249 4 Units 250-299 6 Units 300-349 8 Units 350-400 10 Units over 400 12 Units 5 pen 5    losartan (COZAAR) 100 MG tablet Take 1 tablet by mouth daily 90 tablet 1    meloxicam (MOBIC) 7.5 MG tablet Take 1 tablet by mouth daily 90 tablet 1    metFORMIN (GLUCOPHAGE) 1000 MG tablet Take 1 tablet by mouth 2 times daily (with meals) 180 tablet 1    potassium chloride (KLOR-CON M) 10 MEQ extended release tablet Take 1 tablet by mouth daily OTC 90 tablet 1    Ertugliflozin L-PyroglutamicAc (STEGLATRO) 15 MG TABS Take 15 mg by mouth daily 30 tablet 3    tiZANidine (ZANAFLEX) 4 MG tablet Take 1 tablet by mouth at bedtime 30 tablet 3    escitalopram (LEXAPRO) 10 MG tablet Take 1 tablet by mouth daily 30 tablet 3    omeprazole (PRILOSEC) 20 MG delayed release capsule Take 1 capsule by mouth daily 30 capsule 3    vitamin B-12 (CYANOCOBALAMIN) 1000 MCG tablet Take 1 tablet by mouth daily (Patient taking differently: Take 1,000 mcg by mouth daily OTC) 90 tablet 1    Cholecalciferol (VITAMIN D3) 50 MCG (2000 UT) TABS Take one tablet by mouth once a day (Patient taking differently: Take one tablet by mouth once a day OTC) 30 tablet 3     No current facility-administered medications for this visit. Allergies   Allergen Reactions    Bee Venom Anaphylaxis    Hornet Venom     Tramadol Other (See Comments)     Night terrors and sweats       Review of Systems:         Review of Systems      OBJECTIVE:  Physical Exam:    BP (!) 140/82 (Site: Left Upper Arm, Position: Sitting, Cuff Size: Medium Adult)   Pulse 80   Ht 6' 2\" (1.88 m)   Wt 220 lb (99.8 kg)   BMI 28.25 kg/m²      Physical Exam      Final Pathologic Diagnosis:   A. Cecum, biopsy:   -     Mild active colitis.      B. Stomach, antrum, biopsy:   -     Mild chronic gastritis. -     No definitive Helicobacter organisms are identified   (H.pylori immunostain examined). ASSESSMENT:  No diagnosis found. PLAN:    ***    No orders of the defined types were placed in this encounter. No orders of the defined types were placed in this encounter. Follow Up:  No follow-ups on file.       Sharyle Genre, DO

## 2022-05-04 NOTE — PROGRESS NOTES
Patient here in office and educated on Electrophysiology Study/Suptraventricular Abaltion, schedule for 5/10/2022 @ 1330, with arrival @ 1130, @ [facility]; risk explained; and consents signed. Also copy of orders given for labs and CXR due listed at [facility]. Instruction given to patient to :  NPO after midnight the night before procedure; call hospital at [phone] to pre-register. May take rest of morning meds of procedure except listed. Hold Metformin the day before, the day of and two days after procedure. Hold Anti Anxiety the morning before procedure. Take insulin 1/2 dose on the night before. Do not take regular insulin dose the morning of the procedure. Patient advised to start 81 mg aspirin daily starting 3 days prior to procedure and to continue taking 1 month following procedure. Patient voiced understanding. Copies of consent & info scanned in chart. Patient informed of instructions and guidance for performing test.  Throat swab performed. Swab placed into labeled collection tube. Collection tube and lab order placed in plastic bag. Bag placed in biohazard bag and placed in fridge.  called for . Patient instructed to self quarantine until procedure.

## 2022-05-04 NOTE — PATIENT INSTRUCTIONS
Follow-up with GI for gastritis/colitis. Continue omeprazole daily. We will schedule surgery once we have cardiac clearance.

## 2022-05-05 LAB
SARS-COV-2: NOT DETECTED
SOURCE: NORMAL

## 2022-05-06 ENCOUNTER — TELEPHONE (OUTPATIENT)
Dept: BARIATRICS/WEIGHT MGMT | Age: 52
End: 2022-05-06

## 2022-05-06 ENCOUNTER — TELEPHONE (OUTPATIENT)
Dept: SURGERY | Age: 52
End: 2022-05-06

## 2022-05-06 LAB
CALR MUTATION: NORMAL
JAK2 EXONS 12-15 MUTATION: NORMAL
JAK2 GENE MUTATION QUAL: NORMAL
MPL 515 MUTATION: NORMAL

## 2022-05-06 NOTE — TELEPHONE ENCOUNTER
LEFT MESSAGE FOR [facility] REGARDING SURGE thelma inc hernia repair w/mesh  SCHEDULED @ [facility].  NOTIFIED OF DATES, TIMES AND LOCATION    PHONE ASSESSMENT   COVID -   SURGERY - 5/17/22 730   P/O - 6/1/22 1115    NPO AFTER MIDNIGHT

## 2022-05-06 NOTE — TELEPHONE ENCOUNTER
Prior authorization requirements  Code Description Covered member benefit Out-of-network covered benefit Action needed Will be reviewed for Documentation required & policies  22 Place of service: Hospital (Outpatient)       [address] LAPAROSCOPY, SURGICAL, REPAIR, INCISIONAL HERNIA (INCLUDES MESH INSERTION, WHEN PERFORMED); REDUCIBLE Yes Yes No Prior Authorization Required N/A   N/A      Member plan referral details  This Member's health plan has no referral requirements. No further review is required, continuing to the remaining screens is not needed.

## 2022-05-09 ENCOUNTER — TELEPHONE (OUTPATIENT)
Dept: SURGERY | Age: 52
End: 2022-05-09

## 2022-05-09 NOTE — PROGRESS NOTES
5/9/22 - . LM concerning  surgery @ [facility] on  5/17/22. Please call the PAT Nurse for a phone assessment and surgery instructions.

## 2022-05-10 ENCOUNTER — HOSPITAL ENCOUNTER (OUTPATIENT)
Dept: CARDIAC CATH/INVASIVE PROCEDURES | Age: 52
Setting detail: OBSERVATION
Discharge: HOME | End: 2022-05-11
Attending: INTERNAL MEDICINE | Admitting: INTERNAL MEDICINE
Payer: COMMERCIAL

## 2022-05-10 PROBLEM — I48.0 PAROXYSMAL ATRIAL FIBRILLATION (HCC): Status: ACTIVE | Noted: 2022-05-10

## 2022-05-10 LAB
ABO/RH: NORMAL
ACTIVATED CLOTTING TIME, LOW RANGE: 126 SEC
ANTIBODY SCREEN: NEGATIVE
GLUCOSE BLD-MCNC: 177 MG/DL (ref 70–99)
GLUCOSE BLD-MCNC: 179 MG/DL (ref 70–99)
GLUCOSE BLD-MCNC: 185 MG/DL (ref 70–99)
GLUCOSE BLD-MCNC: 189 MG/DL (ref 70–99)
GLUCOSE BLD-MCNC: 201 MG/DL (ref 70–99)
T4 FREE: 1.04 NG/DL (ref 0.9–1.8)
TSH HIGH SENSITIVITY: 2.81 UIU/ML (ref 0.27–4.2)

## 2022-05-10 PROCEDURE — 93620 COMP EP EVL R AT VEN PAC&REC: CPT | Performed by: INTERNAL MEDICINE

## 2022-05-10 PROCEDURE — 86850 RBC ANTIBODY SCREEN: CPT

## 2022-05-10 PROCEDURE — 36415 COLL VENOUS BLD VENIPUNCTURE: CPT

## 2022-05-10 PROCEDURE — 6360000002 HC RX W HCPCS

## 2022-05-10 PROCEDURE — 2709999900 HC NON-CHARGEABLE SUPPLY

## 2022-05-10 PROCEDURE — C1733 CATH, EP, OTHR THAN COOL-TIP: HCPCS

## 2022-05-10 PROCEDURE — 86900 BLOOD TYPING SEROLOGIC ABO: CPT

## 2022-05-10 PROCEDURE — 6370000000 HC RX 637 (ALT 250 FOR IP): Performed by: NURSE PRACTITIONER

## 2022-05-10 PROCEDURE — C1730 CATH, EP, 19 OR FEW ELECT: HCPCS

## 2022-05-10 PROCEDURE — 84439 ASSAY OF FREE THYROXINE: CPT

## 2022-05-10 PROCEDURE — 82962 GLUCOSE BLOOD TEST: CPT

## 2022-05-10 PROCEDURE — 2580000003 HC RX 258

## 2022-05-10 PROCEDURE — 2580000003 HC RX 258: Performed by: NURSE PRACTITIONER

## 2022-05-10 PROCEDURE — 93620 COMP EP EVL R AT VEN PAC&REC: CPT

## 2022-05-10 PROCEDURE — G0378 HOSPITAL OBSERVATION PER HR: HCPCS

## 2022-05-10 PROCEDURE — C1894 INTRO/SHEATH, NON-LASER: HCPCS

## 2022-05-10 PROCEDURE — 86901 BLOOD TYPING SEROLOGIC RH(D): CPT

## 2022-05-10 PROCEDURE — 99152 MOD SED SAME PHYS/QHP 5/>YRS: CPT

## 2022-05-10 PROCEDURE — 85347 COAGULATION TIME ACTIVATED: CPT

## 2022-05-10 PROCEDURE — 2500000003 HC RX 250 WO HCPCS

## 2022-05-10 PROCEDURE — 84443 ASSAY THYROID STIM HORMONE: CPT

## 2022-05-10 RX ORDER — SODIUM CHLORIDE 9 MG/ML
INJECTION, SOLUTION INTRAVENOUS PRN
Status: DISCONTINUED | OUTPATIENT
Start: 2022-05-10 | End: 2022-05-11 | Stop reason: HOSPADM

## 2022-05-10 RX ORDER — DEXTROSE MONOHYDRATE 50 MG/ML
100 INJECTION, SOLUTION INTRAVENOUS PRN
Status: DISCONTINUED | OUTPATIENT
Start: 2022-05-10 | End: 2022-05-11 | Stop reason: HOSPADM

## 2022-05-10 RX ORDER — AMITRIPTYLINE HYDROCHLORIDE 50 MG/1
50 TABLET, FILM COATED ORAL NIGHTLY
Status: DISCONTINUED | OUTPATIENT
Start: 2022-05-10 | End: 2022-05-11 | Stop reason: HOSPADM

## 2022-05-10 RX ORDER — LOSARTAN POTASSIUM 100 MG/1
100 TABLET ORAL DAILY
Status: DISCONTINUED | OUTPATIENT
Start: 2022-05-10 | End: 2022-05-11 | Stop reason: HOSPADM

## 2022-05-10 RX ORDER — INSULIN LISPRO 100 [IU]/ML
0-6 INJECTION, SOLUTION INTRAVENOUS; SUBCUTANEOUS
Status: DISCONTINUED | OUTPATIENT
Start: 2022-05-10 | End: 2022-05-11 | Stop reason: HOSPADM

## 2022-05-10 RX ORDER — TIZANIDINE 4 MG/1
4 TABLET ORAL NIGHTLY
Status: DISCONTINUED | OUTPATIENT
Start: 2022-05-10 | End: 2022-05-11 | Stop reason: HOSPADM

## 2022-05-10 RX ORDER — PANTOPRAZOLE SODIUM 40 MG/1
40 TABLET, DELAYED RELEASE ORAL
Status: DISCONTINUED | OUTPATIENT
Start: 2022-05-11 | End: 2022-05-11 | Stop reason: HOSPADM

## 2022-05-10 RX ORDER — GABAPENTIN 400 MG/1
400 CAPSULE ORAL 3 TIMES DAILY
Status: DISCONTINUED | OUTPATIENT
Start: 2022-05-10 | End: 2022-05-11 | Stop reason: HOSPADM

## 2022-05-10 RX ORDER — ACETAMINOPHEN 325 MG/1
650 TABLET ORAL EVERY 4 HOURS PRN
Status: DISCONTINUED | OUTPATIENT
Start: 2022-05-10 | End: 2022-05-11 | Stop reason: HOSPADM

## 2022-05-10 RX ORDER — BUTALBITAL, ACETAMINOPHEN AND CAFFEINE 50; 325; 40 MG/1; MG/1; MG/1
1 TABLET ORAL EVERY 4 HOURS PRN
Status: DISCONTINUED | OUTPATIENT
Start: 2022-05-10 | End: 2022-05-11 | Stop reason: HOSPADM

## 2022-05-10 RX ORDER — INSULIN GLARGINE 100 [IU]/ML
40 INJECTION, SOLUTION SUBCUTANEOUS NIGHTLY
Status: DISCONTINUED | OUTPATIENT
Start: 2022-05-10 | End: 2022-05-11 | Stop reason: HOSPADM

## 2022-05-10 RX ORDER — SODIUM CHLORIDE 0.9 % (FLUSH) 0.9 %
5-40 SYRINGE (ML) INJECTION EVERY 12 HOURS SCHEDULED
Status: DISCONTINUED | OUTPATIENT
Start: 2022-05-10 | End: 2022-05-11 | Stop reason: HOSPADM

## 2022-05-10 RX ORDER — SODIUM CHLORIDE 0.9 % (FLUSH) 0.9 %
5-40 SYRINGE (ML) INJECTION PRN
Status: DISCONTINUED | OUTPATIENT
Start: 2022-05-10 | End: 2022-05-11 | Stop reason: HOSPADM

## 2022-05-10 RX ORDER — BUPROPION HYDROCHLORIDE 100 MG/1
100 TABLET, EXTENDED RELEASE ORAL 2 TIMES DAILY
Status: DISCONTINUED | OUTPATIENT
Start: 2022-05-10 | End: 2022-05-11 | Stop reason: HOSPADM

## 2022-05-10 RX ORDER — POTASSIUM CHLORIDE 20 MEQ/1
10 TABLET, EXTENDED RELEASE ORAL DAILY
Status: DISCONTINUED | OUTPATIENT
Start: 2022-05-10 | End: 2022-05-11 | Stop reason: HOSPADM

## 2022-05-10 RX ORDER — ATORVASTATIN CALCIUM 40 MG/1
40 TABLET, FILM COATED ORAL DAILY
Status: DISCONTINUED | OUTPATIENT
Start: 2022-05-10 | End: 2022-05-11 | Stop reason: HOSPADM

## 2022-05-10 RX ORDER — MELOXICAM 7.5 MG/1
7.5 TABLET ORAL DAILY
Status: DISCONTINUED | OUTPATIENT
Start: 2022-05-10 | End: 2022-05-11 | Stop reason: HOSPADM

## 2022-05-10 RX ORDER — INSULIN LISPRO 100 [IU]/ML
0-3 INJECTION, SOLUTION INTRAVENOUS; SUBCUTANEOUS NIGHTLY
Status: DISCONTINUED | OUTPATIENT
Start: 2022-05-10 | End: 2022-05-11 | Stop reason: HOSPADM

## 2022-05-10 RX ORDER — HYDROCHLOROTHIAZIDE 25 MG/1
12.5 TABLET ORAL EVERY MORNING
Status: DISCONTINUED | OUTPATIENT
Start: 2022-05-11 | End: 2022-05-11 | Stop reason: HOSPADM

## 2022-05-10 RX ORDER — ESCITALOPRAM OXALATE 10 MG/1
10 TABLET ORAL DAILY
Status: DISCONTINUED | OUTPATIENT
Start: 2022-05-10 | End: 2022-05-11 | Stop reason: HOSPADM

## 2022-05-10 RX ADMIN — INSULIN GLARGINE 40 UNITS: 100 INJECTION, SOLUTION SUBCUTANEOUS at 22:15

## 2022-05-10 RX ADMIN — BUPROPION HYDROCHLORIDE 100 MG: 100 TABLET, FILM COATED, EXTENDED RELEASE ORAL at 22:07

## 2022-05-10 RX ADMIN — MELOXICAM 7.5 MG: 7.5 TABLET ORAL at 18:35

## 2022-05-10 RX ADMIN — INSULIN LISPRO 1 UNITS: 100 INJECTION, SOLUTION INTRAVENOUS; SUBCUTANEOUS at 22:15

## 2022-05-10 RX ADMIN — POTASSIUM CHLORIDE 10 MEQ: 20 TABLET, EXTENDED RELEASE ORAL at 18:35

## 2022-05-10 RX ADMIN — GABAPENTIN 400 MG: 400 CAPSULE ORAL at 22:07

## 2022-05-10 RX ADMIN — TIZANIDINE 4 MG: 4 TABLET ORAL at 22:07

## 2022-05-10 RX ADMIN — SODIUM CHLORIDE, PRESERVATIVE FREE 10 ML: 5 INJECTION INTRAVENOUS at 22:08

## 2022-05-10 RX ADMIN — LOSARTAN POTASSIUM 100 MG: 100 TABLET, FILM COATED ORAL at 18:35

## 2022-05-10 RX ADMIN — ESCITALOPRAM OXALATE 10 MG: 10 TABLET ORAL at 18:34

## 2022-05-10 RX ADMIN — AMITRIPTYLINE HYDROCHLORIDE 50 MG: 50 TABLET, FILM COATED ORAL at 22:07

## 2022-05-10 RX ADMIN — RIVAROXABAN 20 MG: 20 TABLET, FILM COATED ORAL at 18:34

## 2022-05-10 RX ADMIN — ATORVASTATIN CALCIUM 40 MG: 40 TABLET, FILM COATED ORAL at 18:34

## 2022-05-10 RX ADMIN — DRONEDARONE 400 MG: 400 TABLET, FILM COATED ORAL at 18:35

## 2022-05-10 RX ADMIN — INSULIN LISPRO 1 UNITS: 100 INJECTION, SOLUTION INTRAVENOUS; SUBCUTANEOUS at 18:30

## 2022-05-10 ASSESSMENT — ENCOUNTER SYMPTOMS
BLOOD IN STOOL: 0
NAUSEA: 0
VOMITING: 0
COUGH: 0
CONSTIPATION: 0
EYE PAIN: 0
SHORTNESS OF BREATH: 1
PHOTOPHOBIA: 0
VOMITING: 0
ABDOMINAL PAIN: 0
BLOOD IN STOOL: 0
CHEST TIGHTNESS: 0
CONSTIPATION: 0
WHEEZING: 0
NAUSEA: 0
BACK PAIN: 0
WHEEZING: 0
COUGH: 0
DIARRHEA: 0
DIARRHEA: 0
EYE PAIN: 0
ABDOMINAL PAIN: 0
CHEST TIGHTNESS: 0
SHORTNESS OF BREATH: 1
BACK PAIN: 0
COLOR CHANGE: 0
COLOR CHANGE: 0
PHOTOPHOBIA: 0

## 2022-05-10 ASSESSMENT — PAIN SCALES - GENERAL
PAINLEVEL_OUTOF10: 0

## 2022-05-10 NOTE — H&P
Electrophysiology H&p  Note      Reason for consultation: WPW    Chief complaint : Shortness of breath    Referring physician:       Primary care physician: Ra Dimas MD      History of Present Illness: Today visit (05/10/22)    Patient here for Ep study and possible ablation. No change in H&P noted from previous clinic visit. Previous visit:     Patient is a 79-year-old male with history of hypertension, hyperlipidemia, diabetes mellitus, WPW referred by Dr. Rosalia Law for WPW management. Patient reports shortness of breath with exertion. Patient has on and off palpitations but nothing recently. Patient denies chest pains. Patient reports occasional dizziness but no syncopal episodes. Patient does not drink alcohol or caffeine. Patient does not smoke. Patient does try to exercise when he can. Patient has WPW and appears to be left lateral pathway on the EKG. Patient did have some wide-complex rhythm which could be from the WPW. Patient has hernia sent he is thinking of surgery. Patient has multiple other issues. Patient had some question of mass on the abdomen but CT scan did not show any mass.       Pastmedical history:   Past Medical History:   Diagnosis Date    Asthma     Depression     Diabetes mellitus ([facility] [address]     Hyperlipidemia     Hypertension     Sleep disorder     Ventral hernia without obstruction or gangrene 11/4/2015    WPW (Sigrid-Parkinson-White syndrome)     Quin Abraham       Surgical history :   Past Surgical History:   Procedure Laterality Date    ABDOMEN SURGERY      COLONOSCOPY N/A 4/25/2022    COLONOSCOPY POLYPECTOMY SNARE/COLD BIOPSY performed by Thalia Proctor DO at [facility]    [address], [facility]    Fusion of L3, L4, L5    UMBILICAL HERNIA REPAIR  11/4/15    repair incarcerated supra umbilical hernia    UPPER GASTROINTESTINAL ENDOSCOPY N/A 4/25/2022    EGD DIAGNOSTIC ONLY performed by Dayan Olson DO at [facility] ENDOSCOPY       Family history:   Family History   Problem Relation Age of Onset    Breast Cancer Mother        Social history :  reports that he has never smoked. He has never used smokeless tobacco. He reports previous alcohol use of about 2.0 standard drinks of alcohol per week. He reports previous drug use. Drug: Marijuana Cresencio Pare). Allergies   Allergen Reactions    Bee Venom Anaphylaxis    Hornet Venom     Tramadol Other (See Comments)     Night terrors and sweats       No current facility-administered medications on file prior to encounter. Current Outpatient Medications on File Prior to Encounter   Medication Sig Dispense Refill    sildenafil (VIAGRA) 50 MG tablet TAKE 1 TABLET BY MOUTH AS NEEDED FOR ERECTILE DYSFUNCTION 10 tablet 0    ondansetron (ZOFRAN) 4 MG tablet Take 4 mg by mouth every 8 hours as needed for Nausea or Vomiting      buPROPion (WELLBUTRIN SR) 100 MG extended release tablet Take 1 tablet by mouth 2 times daily 60 tablet 3    butalbital-acetaminophen-caffeine (FIORICET, ESGIC) -40 MG per tablet Take 1 tablet by mouth every 4 hours as needed for Headaches 180 tablet 3    amitriptyline (ELAVIL) 50 MG tablet Take 1 tablet by mouth nightly 90 tablet 1    atorvastatin (LIPITOR) 40 MG tablet Take 1 tablet by mouth daily 90 tablet 1    gabapentin (NEURONTIN) 400 MG capsule Take 1 capsule by mouth 3 times daily for 90 days.  Intended supply: 90 days 270 capsule 1    hydroCHLOROthiazide (MICROZIDE) 12.5 MG capsule Take 1 capsule by mouth every morning 90 capsule 1    Insulin Degludec (TRESIBA FLEXTOUCH) 100 UNIT/ML SOPN Inject 40 Units into the skin nightly 6 pen 5    insulin lispro (HUMALOG KWIKPEN) 200 UNIT/ML SOPN pen TID with meals Glucose [id number] No Insulin 140-199 2 Units 200-249 4 Units 250-299 6 Units 300-349 8 Units 350-400 10 Units over 400 12 Units 5 pen 5    losartan (COZAAR) 100 MG tablet Take 1 tablet by mouth daily 90 tablet 1    meloxicam (MOBIC) 7.5 MG tablet Take 1 tablet by mouth daily 90 tablet 1    metFORMIN (GLUCOPHAGE) 1000 MG tablet Take 1 tablet by mouth 2 times daily (with meals) 180 tablet 1    potassium chloride (KLOR-CON M) 10 MEQ extended release tablet Take 1 tablet by mouth daily OTC 90 tablet 1    Ertugliflozin L-PyroglutamicAc (STEGLATRO) 15 MG TABS Take 15 mg by mouth daily 30 tablet 3    tiZANidine (ZANAFLEX) 4 MG tablet Take 1 tablet by mouth at bedtime 30 tablet 3    escitalopram (LEXAPRO) 10 MG tablet Take 1 tablet by mouth daily 30 tablet 3    omeprazole (PRILOSEC) 20 MG delayed release capsule Take 1 capsule by mouth daily 30 capsule 3    vitamin B-12 (CYANOCOBALAMIN) 1000 MCG tablet Take 1 tablet by mouth daily (Patient taking differently: Take 1,000 mcg by mouth daily OTC) 90 tablet 1    Cholecalciferol (VITAMIN D3) 50 MCG (2000 UT) TABS Take one tablet by mouth once a day (Patient taking differently: Take one tablet by mouth once a day OTC) 30 tablet 3       Review of Systems:   Review of Systems   Constitutional: Negative for activity change, chills, fatigue and fever. HENT: Negative for congestion, ear pain and tinnitus. Eyes: Negative for photophobia, pain and visual disturbance. Respiratory: Positive for shortness of breath. Negative for cough, chest tightness and wheezing. Cardiovascular: Positive for palpitations. Negative for chest pain and leg swelling. Gastrointestinal: Negative for abdominal pain, blood in stool, constipation, diarrhea, nausea and vomiting. Endocrine: Negative for cold intolerance and heat intolerance. Genitourinary: Negative for dysuria, flank pain and hematuria. Musculoskeletal: Positive for arthralgias. Negative for back pain, myalgias and neck stiffness. Skin: Negative for color change and rash. Allergic/Immunologic: Negative for food allergies. Neurological: Positive for dizziness.  Negative for light-headedness, numbness and headaches. Hematological: Does not bruise/bleed easily. Psychiatric/Behavioral: Negative for agitation, behavioral problems and confusion. Examination:      Vitals:    05/10/22 1140   BP: (!) 143/89   Pulse: 64   Resp: 18   Temp: 96.2 °F (35.7 °C)   TempSrc: Temporal   SpO2: 98%   Weight: 220 lb (99.8 kg)   Height: 6' 2\" (1.88 m)        Body mass index is 28.25 kg/m². Physical Exam  Constitutional:       Appearance: Normal appearance. He is not ill-appearing. HENT:      Head: Normocephalic and atraumatic. Mouth/Throat:      Mouth: Mucous membranes are moist.   Eyes:      Conjunctiva/sclera: Conjunctivae normal.   Cardiovascular:      Rate and Rhythm: Normal rate and regular rhythm. Heart sounds: No murmur heard. Pulmonary:      Effort: Pulmonary effort is normal.      Breath sounds: No rales. Abdominal:      General: Abdomen is flat. Palpations: Abdomen is soft. Musculoskeletal:         General: No tenderness. Normal range of motion. Cervical back: Normal range of motion. Right lower leg: No edema. Left lower leg: No edema. Skin:     General: Skin is warm and dry. Neurological:      General: No focal deficit present. Mental Status: He is alert and oriented to person, place, and time.            CBC:   Lab Results   Component Value Date    WBC 3.9 05/04/2022    HGB 17.5 05/04/2022    HCT 50.3 05/04/2022     05/04/2022     Lipids:  Lab Results   Component Value Date    CHOL 206 (H) 09/12/2019    TRIG 84 09/12/2019    HDL 56 01/20/2022    LDLCALC 86 01/20/2022    LDLDIRECT 126 (H) 12/28/2011     PT/INR:   Lab Results   Component Value Date    INR 0.88 05/04/2022        BMP:    Lab Results   Component Value Date     05/04/2022    K 4.8 05/04/2022     05/04/2022    CO2 25 05/04/2022    BUN 20 05/04/2022     CMP:   Lab Results   Component Value Date    AST 18 04/13/2022    PROT 7.4 04/13/2022    BILITOT 0.3 04/13/2022    ALKPHOS 103 04/13/2022     TSH:    Lab Results   Component Value Date    TSH 2.22 09/12/2019       EKGINTERPRETATION - EKG Interpretation:  Sinus rhythm, WPW      Echo 9/4/2020     Left ventricular systolic function is normal.   Ejection fraction is visually estimated at 50-55%. Small left ventricular cavity. No significant valvular disease noted. No evidence of any pericardial effusion. IMPRESSION / RECOMMENDATIONS:     WPW  HTN - Losartan, HCTZ  HLD - On atorvastatin  DM-2   ? Lone atrial fibrillation - On August 14 2017 patient had episode of anaphylactic shock from bee stings and during that time patient had atrial fibrillation episode. No other documentation of atrial fibrillation. Patient denies any episodes after that. At that time patient did not conduct through the pathway suggesting that probably patient does not have a very robust pathway. Patient is not on anticoagulation. Patient has WPW and appears to be left lateral pathway on the EKG. Patient did have some wide-complex rhythm which could be from the WPW. Patient has hernia sent he is thinking of surgery. Patient has multiple other issues. Patient had some question of mass on the abdomen but CT scan did not show any mass. Patient had a prolonged stay in the hospital during [facility]. Discussed with the patient about pathophysiology of WPW and also the nature of WPW. Discussed about if pathway is benign there is no need for ablation but if he has arrhythmia then we can consider ablation. Patient VOICED understanding and wants to schedule for ablation    The risks including, but not limited to, vascular injury, bleeding, infection, radiation exposure, injury to cardiac and surrounding structures (including esophageal and pulmonary vein injury), injury to the normal conduction system of the heart (possibly requiring a pacemaker), stroke, myocardial infarction and death were all discussed.  The patient considered the risks, benefits and alternatives; decided to proceed with the procedure. Patient works for [employer] [address] and is very physically demanding job and he has been having shortness of breath with exertion and he cannot understand why is he having that. I told him he is short of breath could be from other causes too may not entirely relate with WPW. He should not have come from that unless he is having arrhythmia with exertion. Patient though reports that he has never been told he had WPW until recently when he had multiple surgeries before that. Thanks again for allowing me to participate in care of this patient. Please call me if you have any questions. With best regards. Laz Richards MD, 5/10/2022    Please note this report has been partially produced using speech recognition software and may contain errors related to that system including errors in grammar, punctuation, and spelling, as well as words and phrases that may be inappropriate. If there are any questions or concerns please feel free to contact the dictating provider for clarification.

## 2022-05-10 NOTE — OP NOTE
Procedure:   1) Comprehensive electrophysiologic evaluation with right atrial pacing  and recording, right ventricular pacing and recording, His bundle  recording, including insertion and repositioning of multiple electrode  catheters, with attempted induction of arrhythmia  2) left atrial pacing and recording from coronary sinus or left  Atrium    Attending: Blessing Ramos M.D. Complications: None     Estimated blood loss: 10 cc     Anesthesia: No sedation, Local anesthesia with lidocaine    Medications used for induction/testing: none    Other medications: None     History: Patient with history of  WPW here for EP study and possible ablation    Preoperative Diagnosis: Palpitation    Postoperative Diagnosis: easily inducible atrial fibrillation and benign accessory pathway    Procedure in detail:   The patient was brought to the electrophysiology laboratory in stable condition. Patient was in a fasting, non-sedated state. The risks, benefits and alternatives of the procedure were discussed with the patient in details. The risks including, but not limited to, vascular injury, bleeding, infection, radiation exposure, injury to cardiac and surrounding structures, stroke, myocardial infarction and death were all discussed. The patient considered the various treatment options and decided to proceed with the EP study. Written informed consent was obtained and placed on the chart. A time-out protocol was completed to confirm the identity of the patient and the procedure to be performed. The patient was prepped and draped in a sterile fashion. Lidocaine was administered to the right and left groin. Using a modified Seldinger technique, venous (and arterial) access was obtained and sheaths were placed as detailed below. Catheters were then placed under fluoroscopic guidance as detailed below. Sheath and Catheter Placement:   1.  Right femoral vein: Sheath size: 11F (ICE catheter if needed Catheter type: Quadripolar Catheter Location: His   2. Left femoral vein: Sheath size: 6F Catheter type: Quadripolar Catheter Location: Right ventricular apex   3. Left femoral vein: Sheath size: 7F Catheter type: Decapolar catheter Catheter Location: Coronary sinus   4. Right femoral vein: Sheath size: 6F Catheter type: Quadripolar Catheter Location: Right atrium    Baseline parameters (ms):     Baseline cycle length: 1048 ms    NM: 108  QRS: 136  QT: 443  AH interval: 82  HV interval: 22    Patient and conduction with eccentric activation on antegrade during sinus rhythm with CS 1 to being earliest we and on retrograde conduction was the AV node and not through the pathway    Patient is antegrade conducting left lateral pathway most likely based on this will she is consistent with his delta wave on the EKG    Sinus node function was normal    Atrioventricular juan carlos function:   Incremental pacing was performed from the proximal CS and right ventricular apex and the antegrade and retrograde atrioventricular (AV) Wenckebach cycle length was determined. Antegrade pathway Wenckebach was 440ms  Antegrade AV Wenckebach was 410 ms. Retrograde AV Wenckebach was  at 380 ms only through the AV node    Single atrial extrastimuli were delivered following drivetrain of 806GT and 500ms from the proximal CS and the AV juan carlos effective refractory period (ERP) was determined. Evidence of dual AV juan carlos pathways was not seen. Drive train (ms)  941  500    AVNERP   320 330      AERP  270 260        Ventricular function   Single ventricular extrastimuli were delivered following drivetrain of 073PQ and 500ms from the right ventricular apex and the ventricular ERP was determined.      Ventricular ERP 600ms/230ms; 500/less than 230  VAERP : 600ms/less than 230ms; 500/230ms    Attempted Arrhythmia Induction       Patient accessory pathway appeared to be benign with pathway Wenckebach was at 440 ms on also effective refractory period was also at S2 over 440 ms. Patient was easily inducible into atrial fibrillation with a simple S2  Patient continued to maintain in atrial fibrillation after that. Interestingly patient was not symptomatic. In atrial fibrillation patient conduction was through AV node. Patient ventricular rate was around 90s to 110. I gave initially metoprolol 5 mg IV and then give amiodarone 150 mg IV and then patient still continues to be in arrhythmia so I gave another dose of amiodarone 150 to see if he spontaneously converts. Patient did not convert. Patient was then given Versed and fentanyl and patient was cardioverted after that into sinus rhythm. Patient had infiltration on the right arm with amiodarone dose and also Versed and fentanyl so we will observe him overnight to make sure there is no delayed absorption and cause any side effects with that    At this time we decided to abort the procedure and catheters were removed. Sheaths were removed and hemostasis achieved. The patient was transported to the holding area in stable condition.        Summary:   Comprehensive electrophysiologiy study with easily induced atrial fibrillation      Recommendations:   multaq for now  We will start him on Xarelto given his [id number] of but we can discontinue 48 to 72 hours prior to the surgical procedure patient will be having for his hernia repair

## 2022-05-11 ENCOUNTER — TELEPHONE (OUTPATIENT)
Dept: CARDIOLOGY CLINIC | Age: 52
End: 2022-05-11

## 2022-05-11 VITALS
HEIGHT: 74 IN | DIASTOLIC BLOOD PRESSURE: 85 MMHG | WEIGHT: 220 LBS | BODY MASS INDEX: 28.23 KG/M2 | RESPIRATION RATE: 17 BRPM | OXYGEN SATURATION: 98 % | TEMPERATURE: 97.5 F | SYSTOLIC BLOOD PRESSURE: 153 MMHG | HEART RATE: 62 BPM

## 2022-05-11 PROCEDURE — 99153 MOD SED SAME PHYS/QHP EA: CPT

## 2022-05-11 PROCEDURE — 94761 N-INVAS EAR/PLS OXIMETRY MLT: CPT

## 2022-05-11 PROCEDURE — 6370000000 HC RX 637 (ALT 250 FOR IP): Performed by: NURSE PRACTITIONER

## 2022-05-11 PROCEDURE — 99217 PR OBSERVATION CARE DISCHARGE MANAGEMENT: CPT | Performed by: NURSE PRACTITIONER

## 2022-05-11 PROCEDURE — 2580000003 HC RX 258: Performed by: NURSE PRACTITIONER

## 2022-05-11 PROCEDURE — G0378 HOSPITAL OBSERVATION PER HR: HCPCS

## 2022-05-11 RX ADMIN — PANTOPRAZOLE SODIUM 40 MG: 40 TABLET, DELAYED RELEASE ORAL at 05:04

## 2022-05-11 RX ADMIN — GABAPENTIN 400 MG: 400 CAPSULE ORAL at 09:25

## 2022-05-11 RX ADMIN — POTASSIUM CHLORIDE 10 MEQ: 20 TABLET, EXTENDED RELEASE ORAL at 09:23

## 2022-05-11 RX ADMIN — MELOXICAM 7.5 MG: 7.5 TABLET ORAL at 09:26

## 2022-05-11 RX ADMIN — SODIUM CHLORIDE, PRESERVATIVE FREE 10 ML: 5 INJECTION INTRAVENOUS at 09:32

## 2022-05-11 RX ADMIN — HYDROCHLOROTHIAZIDE 12.5 MG: 25 TABLET ORAL at 09:23

## 2022-05-11 RX ADMIN — ESCITALOPRAM OXALATE 10 MG: 10 TABLET ORAL at 09:25

## 2022-05-11 RX ADMIN — BUPROPION HYDROCHLORIDE 100 MG: 100 TABLET, FILM COATED, EXTENDED RELEASE ORAL at 09:25

## 2022-05-11 RX ADMIN — ATORVASTATIN CALCIUM 40 MG: 40 TABLET, FILM COATED ORAL at 09:24

## 2022-05-11 RX ADMIN — DRONEDARONE 400 MG: 400 TABLET, FILM COATED ORAL at 09:25

## 2022-05-11 RX ADMIN — LOSARTAN POTASSIUM 100 MG: 100 TABLET, FILM COATED ORAL at 09:26

## 2022-05-11 ASSESSMENT — PAIN SCALES - GENERAL: PAINLEVEL_OUTOF10: 2

## 2022-05-11 ASSESSMENT — PAIN DESCRIPTION - DESCRIPTORS: DESCRIPTORS: ACHING

## 2022-05-11 ASSESSMENT — PAIN DESCRIPTION - LOCATION: LOCATION: BACK

## 2022-05-11 NOTE — PLAN OF CARE
Problem: Chronic Conditions and Co-morbidities  Goal: Patient's chronic conditions and co-morbidity symptoms are monitored and maintained or improved  5/11/2022 0515 by Candy Mayorga RN  Outcome: Progressing  5/10/2022 1854 by Ellie Snider LPN  Outcome: Progressing     Problem: Discharge Planning  Goal: Discharge to home or other facility with appropriate resources  5/11/2022 0515 by Candy Mayorga RN  Outcome: Progressing  5/10/2022 1854 by Ellie Snider LPN  Outcome: Progressing

## 2022-05-11 NOTE — TELEPHONE ENCOUNTER
Samples of Multaq and Xarelto along with copay discount cards placed at front counter for patient . Patient aware and will be in to  once discharged from [facility]. Will pend sample order to Juan Alberto Contreras CNP.

## 2022-05-11 NOTE — TELEPHONE ENCOUNTER
Patient recently prescribed Multaq and new prescription sent to patients pharmacy. Samples along with copay card placed at  for patient  upon discharge from [facility]. Will alert BRENDA Scales, as Multaq may need prior authorization.

## 2022-05-11 NOTE — DISCHARGE SUMMARY
[facility]  Discharge Summary    Glenn Montes  :  1970  MRN:  [id number]    Admit date:  5/10/2022  Discharge date:      Admitting Physician:  Nataliia Orlando MD    Discharge Diagnoses:      1. S/P comprehensive electrophysiology study with induced atrial fibrillation  2. Infiltration of IV      Admission Condition:  fair    Discharged Condition:  good    Hospital Course:   Patient admitted post EP study for induced atrial fibrillation for observation. Patient tolerated the procedure. Patient noted to have infiltrated IV post procedure. Patient had infiltration on the right arm with amiodarone, versed and fentanyl. Patient was observed overnight to make sure there is no delayed absorption and side effects. Patient did well overnight without complaints or complication.  Patient was stable for discharge to be followed as an out paitent    Current Discharge Medication List           Details   dronedarone hcl (MULTAQ) 400 MG TABS Take 1 tablet by mouth 2 times daily (with meals)  Qty: 12 tablet, Refills: 0    Comments: 2022-Lot [id number] exp  2 boxes=12 tablets      rivaroxaban (XARELTO) 20 MG TABS tablet Take 1 tablet by mouth daily  Qty: 14 tablet, Refills: 0    Comments: 2022-Lot [id number] exp  2 bottles=14 tablets              Details   sildenafil (VIAGRA) 50 MG tablet TAKE 1 TABLET BY MOUTH AS NEEDED FOR ERECTILE DYSFUNCTION  Qty: 10 tablet, Refills: 0    Associated Diagnoses: Erectile dysfunction, unspecified erectile dysfunction type      ondansetron (ZOFRAN) 4 MG tablet Take 4 mg by mouth every 8 hours as needed for Nausea or Vomiting      buPROPion (WELLBUTRIN SR) 100 MG extended release tablet Take 1 tablet by mouth 2 times daily  Qty: 60 tablet, Refills: 3    Associated Diagnoses: Attention deficit hyperactivity disorder (ADHD), unspecified ADHD type      butalbital-acetaminophen-caffeine (FIORICET, ESGIC) -40 MG per tablet Take 1 tablet by mouth every 4 hours as needed for Headaches  Qty: 180 tablet, Refills: 3    Associated Diagnoses: Severe headache      amitriptyline (ELAVIL) 50 MG tablet Take 1 tablet by mouth nightly  Qty: 90 tablet, Refills: 1    Associated Diagnoses: Neuropathy      atorvastatin (LIPITOR) 40 MG tablet Take 1 tablet by mouth daily  Qty: 90 tablet, Refills: 1    Associated Diagnoses: Mixed hyperlipidemia      gabapentin (NEURONTIN) 400 MG capsule Take 1 capsule by mouth 3 times daily for 90 days.  Intended supply: 90 days  Qty: 270 capsule, Refills: 1    Associated Diagnoses: Neuropathy      hydroCHLOROthiazide (MICROZIDE) 12.5 MG capsule Take 1 capsule by mouth every morning  Qty: 90 capsule, Refills: 1      Insulin Degludec (TRESIBA FLEXTOUCH) 100 UNIT/ML SOPN Inject 40 Units into the skin nightly  Qty: 6 pen, Refills: 5    Associated Diagnoses: Type 2 diabetes mellitus with diabetic polyneuropathy, with long-term current use of insulin ([facility])      insulin lispro (HUMALOG KWIKPEN) 200 UNIT/ML SOPN pen TID with meals Glucose [id number] No Insulin 140-199 2 Units 200-249 4 Units 250-299 6 Units 300-349 8 Units 350-400 10 Units over 400 12 Units  Qty: 5 pen, Refills: 5    Associated Diagnoses: Type 2 diabetes mellitus with diabetic polyneuropathy, with long-term current use of insulin ([facility])      losartan (COZAAR) 100 MG tablet Take 1 tablet by mouth daily  Qty: 90 tablet, Refills: 1      meloxicam (MOBIC) 7.5 MG tablet Take 1 tablet by mouth daily  Qty: 90 tablet, Refills: 1    Associated Diagnoses: Pain in both feet; Chronic pain of left knee      metFORMIN (GLUCOPHAGE) 1000 MG tablet Take 1 tablet by mouth 2 times daily (with meals)  Qty: 180 tablet, Refills: 1    Associated Diagnoses: Type 2 diabetes mellitus with diabetic polyneuropathy, with long-term current use of insulin ([facility])      potassium chloride (KLOR-CON M) 10 MEQ extended release tablet Take 1 tablet by mouth daily OTC  Qty: 90 tablet, Refills: 1      Ertugliflozin L-PyroglutamicAc (STEGLATRO) 15 MG TABS Take 15 mg by mouth daily  Qty: 30 tablet, Refills: 3    Associated Diagnoses: Type 2 diabetes mellitus with diabetic polyneuropathy, with long-term current use of insulin ([facility])      tiZANidine (ZANAFLEX) 4 MG tablet Take 1 tablet by mouth at bedtime  Qty: 30 tablet, Refills: 3    Associated Diagnoses: Pain in both feet      escitalopram (LEXAPRO) 10 MG tablet Take 1 tablet by mouth daily  Qty: 30 tablet, Refills: 3    Associated Diagnoses: Chronic depression      omeprazole (PRILOSEC) 20 MG delayed release capsule Take 1 capsule by mouth daily  Qty: 30 capsule, Refills: 3    Associated Diagnoses: Hematemesis with nausea      vitamin B-12 (CYANOCOBALAMIN) 1000 MCG tablet Take 1 tablet by mouth daily  Qty: 90 tablet, Refills: 1    Associated Diagnoses: Muscle cramping      Cholecalciferol (VITAMIN D3) 50 MCG (2000 UT) TABS Take one tablet by mouth once a day  Qty: 30 tablet, Refills: 3    Associated Diagnoses: Muscle cramping             Discharge Exam:  BP (!) 153/85   Pulse 62   Temp 97.5 °F (36.4 °C) (Oral)   Resp 17   Ht 6' 2\" (1.88 m)   Wt 220 lb (99.8 kg)   SpO2 98%   BMI 28.25 kg/m²   General appearance: alert, appears stated age and cooperative  Head: Normocephalic, without obvious abnormality, atraumatic  Lungs: clear to auscultation bilaterally  Heart: regular rate and rhythm, S1, S2 normal, no murmur, click, rub or gallop  Extremities: extremities normal, atraumatic, no cyanosis or edema  Pulses: 2+ and symmetric  Skin: Skin color, texture, turgor normal. No rashes or lesions. Bilateral femoral groin sites soft nontender. No hematoma. Right arm is not edematous. No signes of irritation.    Neurologic: Grossly normal    Disposition:   home    Signed:  EDGAR Duong CNP  5/11/2022, 9:45 AM

## 2022-05-12 NOTE — TELEPHONE ENCOUNTER
PA submitted for Xarelto via [email or web address]. Awaiting response. Prior Authorization approval received for  for dates 4/12/22-5/12/23.

## 2022-05-12 NOTE — TELEPHONE ENCOUNTER
PA submitted for Multaq via [email or web address]. No PA needed for this medication. Covered by plan.

## 2022-05-13 NOTE — PROGRESS NOTES
5/13/22 - . LM on patient & spouse's phones concerning  surgery @ [facility] on  5/17/22. Please call the PAT Nurse for a phone assessment and surgery instructions.

## 2022-05-16 ENCOUNTER — ANESTHESIA EVENT (OUTPATIENT)
Dept: OPERATING ROOM | Age: 52
End: 2022-05-16
Payer: COMMERCIAL

## 2022-05-16 NOTE — ANESTHESIA PRE PROCEDURE
Department of Anesthesiology  Preprocedure Note       Name:  Herlinda Craft   Age:  54 y.o.  :  1970                                          MRN:  [id number]         Date:  2022      Surgeon: Wilma Bauer):  Dayan Olson DO    Procedure: Procedure(s): HERNIA INCISIONAL REPAIR LAPAROSCOPIC ROBOTIC WITH MESH    Medications prior to admission:   Prior to Admission medications    Medication Sig Start Date End Date Taking? Authorizing Provider   dronedarone hcl (MULTAQ) 400 MG TABS Take 1 tablet by mouth 2 times daily (with meals) 22   EDGAR Roy CNP   rivaroxaban (XARELTO) 20 MG TABS tablet Take 1 tablet by mouth daily  Patient not taking: Reported on 2022   EDGAR Roy CNP   sildenafil (VIAGRA) 50 MG tablet TAKE 1 TABLET BY MOUTH AS NEEDED FOR ERECTILE DYSFUNCTION 22   Vince Dinero MD   ondansetron (ZOFRAN) 4 MG tablet Take 4 mg by mouth every 8 hours as needed for Nausea or Vomiting    Historical Provider, MD   buPROPion [facility] SR) 100 MG extended release tablet Take 1 tablet by mouth 2 times daily 3/23/22   Vince Dinreo MD   butalbital-acetaminophen-caffeine (FIORICET, ESGIC) -66 MG per tablet Take 1 tablet by mouth every 4 hours as needed for Headaches 3/15/22   Vince Dinero MD   amitriptyline (ELAVIL) 50 MG tablet Take 1 tablet by mouth nightly 22   Vince Dinero MD   atorvastatin (LIPITOR) 40 MG tablet Take 1 tablet by mouth daily 22   Vince Dinero MD   gabapentin (NEURONTIN) 400 MG capsule Take 1 capsule by mouth 3 times daily for 90 days.  Intended supply: 90 days 22  Vince Dinero MD   hydroCHLOROthiazide (MICROZIDE) 12.5 MG capsule Take 1 capsule by mouth every morning 22   Vince Dinero MD   Insulin Degludec (TRESIBA FLEXTOUCH) 100 UNIT/ML SOPN Inject 40 Units into the skin nightly 22   Vince Dinero MD   insulin lispro (HUMALOG KWIKPEN) 200 UNIT/ML SOPN pen TID with meals Glucose [id number] No Insulin 140-199 2 Units 200-249 4 Units 250-299 6 Units 300-349 8 Units 350-400 10 Units over 400 12 Units 1/20/22   Zaheer Hannon MD   losartan (COZAAR) 100 MG tablet Take 1 tablet by mouth daily 1/20/22   Zaheer Hannon MD   meloxicam (MOBIC) 7.5 MG tablet Take 1 tablet by mouth daily 1/20/22   Zaheer Hannon MD   metFORMIN (GLUCOPHAGE) 1000 MG tablet Take 1 tablet by mouth 2 times daily (with meals) 1/20/22   Zaheer Hannon MD   potassium chloride (KLOR-CON M) 10 MEQ extended release tablet Take 1 tablet by mouth daily OTC 1/20/22   Zaheer Hannon MD   Ertugliflozin L-PyroglutamicAc (STEGLATRO) 15 MG TABS Take 15 mg by mouth daily 1/20/22   Zaheer Hannon MD   tiZANidine (ZANAFLEX) 4 MG tablet Take 1 tablet by mouth at bedtime 1/20/22   Zaheer Hannon MD   escitalopram (LEXAPRO) 10 MG tablet Take 1 tablet by mouth daily 1/20/22   Zaheer Hannon MD   omeprazole (PRILOSEC) 20 MG delayed release capsule Take 1 capsule by mouth daily 1/13/22   Zaheer Hannon MD   vitamin B-12 (CYANOCOBALAMIN) 1000 MCG tablet Take 1 tablet by mouth daily  Patient taking differently: Take 1,000 mcg by mouth daily OTC 6/30/20   Matthew Sellers DO   Cholecalciferol (VITAMIN D3) 50 MCG (2000 UT) TABS Take one tablet by mouth once a day  Patient taking differently: Take one tablet by mouth once a day OTC 6/30/20   Matthew Sellers DO       Current medications:    No current outpatient medications on file. No current facility-administered medications for this visit. Allergies:     Allergies   Allergen Reactions    Bee Venom Anaphylaxis    Hornet Venom     Tramadol Other (See Comments)     Night terrors and sweats       Problem List:    Patient Active Problem List   Diagnosis Code    Ventral hernia without obstruction or gangrene K43.9    Essential hypertension I10    Gastroesophageal reflux disease without esophagitis K21.9    Dupuytren's contracture M72.0    Chronic skin ulcer, limited to breakdown of skin ([facility] [place] [address] L98.491    Chronic bilateral low back pain without sciatica M54.50, G89.29    Type 2 diabetes mellitus with diabetic polyneuropathy, with long-term current use of insulin (HCC) E11.42, Z79.4    Erectile dysfunction N52.9    Chest pain R07.9    Sigrid Parkinson White pattern seen on electrocardiogram I45.6    Fatty liver K76.0    Neuropathy G62.9    Dorsalgia M54.9    Pure hypercholesterolemia E78.00    Pain in both feet M79.671, M79.672    Chronic pain of left knee M25.562, G89.29    Chronic depression F32. A    Anxiety F41.9    Polycythemia D75.1    History of COVID-19 Z86.16    Left upper quadrant abdominal swelling, mass and lump R19.02    Paroxysmal atrial fibrillation (HCC) I48.0       Past Medical History:        Diagnosis Date    Asthma     Depression     Diabetes mellitus ([facility] [address]     Hyperlipidemia     Hypertension     Sleep disorder     Ventral hernia without obstruction or gangrene 11/4/2015    WPW (Sigrid-Parkinson-White syndrome)     Filiberto Dixon       Past Surgical History:        Procedure Laterality Date    ABDOMEN SURGERY      COLONOSCOPY N/A 04/25/2022    COLONOSCOPY POLYPECTOMY SNARE/COLD BIOPSY performed by Beckie Ring DO at [facility]     HERNIA REPAIR      OTHER SURGICAL HISTORY N/A 05/10/2022    Electrophysiology study and cardioversion performed by Dr. Fausto Barlow.    [facility], 2010    Fusion of L3, L4, L5    UMBILICAL HERNIA REPAIR  11/04/2015    repair incarcerated supra umbilical hernia    UPPER GASTROINTESTINAL ENDOSCOPY N/A 04/25/2022    EGD DIAGNOSTIC ONLY performed by Beckie Ring DO at [address] [place] [address] ENDOSCOPY       Social History:    Social History     Tobacco Use    Smoking status: Never Smoker    Smokeless tobacco: Never Used   Substance Use Topics    Alcohol use: Not Currently     Alcohol/week: 2.0 standard drinks     Types: 2 Standard drinks or equivalent per week                                Counseling given: Not Answered      Vital Signs (Current): There were no vitals filed for this visit. BP Readings from Last 3 Encounters:   05/17/22 (!) 165/93   05/11/22 (!) 153/85   05/04/22 (!) 140/82       NPO Status:                                                                                 BMI:   Wt Readings from Last 3 Encounters:   05/10/22 220 lb (99.8 kg)   05/04/22 220 lb (99.8 kg)   05/04/22 220 lb (99.8 kg)     There is no height or weight on file to calculate BMI.    CBC:   Lab Results   Component Value Date    WBC 3.9 05/04/2022    RBC 5.68 05/04/2022    HGB 17.5 05/04/2022    HCT 50.3 05/04/2022    MCV 88.6 05/04/2022    RDW 12.7 05/04/2022     05/04/2022       CMP:   Lab Results   Component Value Date     05/04/2022    K 4.8 05/04/2022     05/04/2022    CO2 25 05/04/2022    BUN 20 05/04/2022    CREATININE 0.8 05/04/2022    GFRAA >60 05/04/2022    AGRATIO 1.8 03/15/2022    LABGLOM >60 05/04/2022    GLUCOSE 321 05/04/2022    PROT 7.4 04/13/2022    PROT 6.7 12/28/2011    CALCIUM 9.5 05/04/2022    BILITOT 0.3 04/13/2022    ALKPHOS 103 04/13/2022    AST 18 04/13/2022    ALT 20 04/13/2022       POC Tests: No results for input(s): POCGLU, POCNA, POCK, POCCL, POCBUN, POCHEMO, POCHCT in the last 72 hours.     Coags:   Lab Results   Component Value Date    PROTIME 11.3 05/04/2022    INR 0.88 05/04/2022    APTT 24.7 05/04/2022       HCG (If Applicable): No results found for: PREGTESTUR, PREGSERUM, HCG, HCGQUANT     ABGs: No results found for: PHART, PO2ART, JUS4NCH, OAQ2BPH, BEART, [id number]     Type & Screen (If Applicable):  No results found for: LABABO, LABRH    Drug/Infectious Status (If Applicable):  No results found for: HIV, HEPCAB    COVID-19 Screening (If Applicable):   Lab Results   Component Value Date    COVID19 NOT DETECTED 05/04/2022           Anesthesia Evaluation  Patient summary reviewed no history of anesthetic complications:   Airway: Mallampati: I  TM distance: >3 FB Neck ROM: full  Mouth opening: > = 3 FB Dental: normal exam         Pulmonary: breath sounds clear to auscultation  (+) asthma:                            Cardiovascular:  Exercise tolerance: good (>4 METS),   (+) hypertension:, hyperlipidemia        Rhythm: regular  Rate: normal           Beta Blocker:  Not on Beta Blocker         Neuro/Psych:   (+) depression/anxiety             GI/Hepatic/Renal:   (+) GERD:, liver disease (MTZ):, bowel prep,           Endo/Other:    (+) DiabetesType II DM, well controlled, , .                 Abdominal:             Vascular: negative vascular ROS. Other Findings:           Anesthesia Plan      MAC     ASA 2       Induction: intravenous. Anesthetic plan and risks discussed with patient. Plan discussed with CRNA. Pre Anesthesia Evaluation complete. Anesthesia plan, risks, benefits, alternatives, and personal involved discussed with patient. Patients and/or legal guardian verbalized an understanding  and agreed to proceed. Simi Stahl MD  2022  Department of Anesthesiology  Preprocedure Note       Name:  Kaleb Zavaleta   Age:  54 y.o.  :  1970                                          MRN:  [id number]         Date:  2022      Surgeon: Pastora Carnes):  Evaristo Leonard DO    Procedure: Procedure(s): HERNIA INCISIONAL REPAIR LAPAROSCOPIC ROBOTIC WITH MESH    Medications prior to admission:   Prior to Admission medications    Medication Sig Start Date End Date Taking?  Authorizing Provider   dronedarone hcl (MULTAQ) 400 MG TABS Take 1 tablet by mouth 2 times daily (with meals) 22   EDGAR Doll CNP   rivaroxaban (XARELTO) 20 MG TABS tablet Take 1 tablet by mouth daily 22   EDGAR Doll CNP   sildenafil (VIAGRA) 50 MG tablet TAKE 1 TABLET BY MOUTH AS NEEDED FOR ERECTILE DYSFUNCTION 22   Ermias Diehl MD   ondansetron (ZOFRAN) 4 MG tablet Take 4 mg by mouth every 8 hours as needed for Nausea or Vomiting    Historical Provider, MD   buPROPion [facility] SR) 100 MG extended release tablet Take 1 tablet by mouth 2 times daily 3/23/22   Shira Mccann MD   butalbital-acetaminophen-caffeine (FIORICET, ESGIC) 02948-89 MG per tablet Take 1 tablet by mouth every 4 hours as needed for Headaches 3/15/22   Shira Mccann MD   amitriptyline (ELAVIL) 50 MG tablet Take 1 tablet by mouth nightly 1/20/22   Shira Mccann MD   atorvastatin (LIPITOR) 40 MG tablet Take 1 tablet by mouth daily 1/20/22   Shira Mccann MD   gabapentin (NEURONTIN) 400 MG capsule Take 1 capsule by mouth 3 times daily for 90 days.  Intended supply: 90 days 1/20/22 5/4/22  Shira Mccann MD   hydroCHLOROthiazide (MICROZIDE) 12.5 MG capsule Take 1 capsule by mouth every morning 1/20/22   Shira Mccann MD   Insulin Degludec (TRESIBA FLEXTOUCH) 100 UNIT/ML SOPN Inject 40 Units into the skin nightly 1/20/22   Shira Mccann MD   insulin lispro (HUMALOG KWIKPEN) 200 UNIT/ML SOPN pen TID with meals Glucose [id number] No Insulin 140-199 2 Units 200-249 4 Units 250-299 6 Units 300-349 8 Units 350-400 10 Units over 400 12 Units 1/20/22   Shira Mccann MD   losartan (COZAAR) 100 MG tablet Take 1 tablet by mouth daily 1/20/22   Shira Mccann MD   meloxicam (MOBIC) 7.5 MG tablet Take 1 tablet by mouth daily 1/20/22   Shira Mccann MD   metFORMIN (GLUCOPHAGE) 1000 MG tablet Take 1 tablet by mouth 2 times daily (with meals) 1/20/22   Shira Mccann MD   potassium chloride (KLOR-CON M) 10 MEQ extended release tablet Take 1 tablet by mouth daily OTC 1/20/22   Shira Mccann MD   Ertugliflozin L-PyroglutamicAc (STEGLATRO) 15 MG TABS Take 15 mg by mouth daily 1/20/22   Shira Mccann MD   tiZANidine (ZANAFLEX) 4 MG tablet Take 1 tablet by mouth at bedtime 1/20/22   Shira Mccann MD   escitalopram (LEXAPRO) 10 MG tablet Take 1 tablet by mouth daily 1/20/22   Shira Mccann MD   omeprazole (PRILOSEC) 20 MG delayed release capsule Take 1 capsule by mouth daily 1/13/22   Dejuan Astorga MD Janey   vitamin B-12 (CYANOCOBALAMIN) 1000 MCG tablet Take 1 tablet by mouth daily  Patient taking differently: Take 1,000 mcg by mouth daily OTC 6/30/20   Matthew Riley DO   Cholecalciferol (VITAMIN D3) 50 MCG (2000 UT) TABS Take one tablet by mouth once a day  Patient taking differently: Take one tablet by mouth once a day OTC 6/30/20   Matthew Riley DO       Current medications:    No current facility-administered medications for this encounter. Current Outpatient Medications   Medication Sig Dispense Refill    dronedarone hcl (MULTAQ) 400 MG TABS Take 1 tablet by mouth 2 times daily (with meals) 12 tablet 0    rivaroxaban (XARELTO) 20 MG TABS tablet Take 1 tablet by mouth daily 14 tablet 0    sildenafil (VIAGRA) 50 MG tablet TAKE 1 TABLET BY MOUTH AS NEEDED FOR ERECTILE DYSFUNCTION 10 tablet 0    ondansetron (ZOFRAN) 4 MG tablet Take 4 mg by mouth every 8 hours as needed for Nausea or Vomiting      buPROPion ([facility]LL[facility] SR) 100 MG extended release tablet Take 1 tablet by mouth 2 times daily 60 tablet 3    butalbital-acetaminophen-caffeine (FIORICET, ESGIC) -40 MG per tablet Take 1 tablet by mouth every 4 hours as needed for Headaches 180 tablet 3    amitriptyline (ELAVIL) 50 MG tablet Take 1 tablet by mouth nightly 90 tablet 1    atorvastatin (LIPITOR) 40 MG tablet Take 1 tablet by mouth daily 90 tablet 1    gabapentin (NEURONTIN) 400 MG capsule Take 1 capsule by mouth 3 times daily for 90 days.  Intended supply: 90 days 270 capsule 1    hydroCHLOROthiazide (MICROZIDE) 12.5 MG capsule Take 1 capsule by mouth every morning 90 capsule 1    Insulin Degludec (TRESIBA FLEXTOUCH) 100 UNIT/ML SOPN Inject 40 Units into the skin nightly 6 pen 5    insulin lispro (HUMALOG KWIKPEN) 200 UNIT/ML SOPN pen TID with meals Glucose [id number] No Insulin 140-199 2 Units 200-249 4 Units 250-299 6 Units 300-349 8 Units 350-400 10 Units over 400 12 Units 5 pen 5    losartan (COZAAR) 100 MG tablet Take 1 tablet by mouth daily 90 tablet 1    meloxicam (MOBIC) 7.5 MG tablet Take 1 tablet by mouth daily 90 tablet 1    metFORMIN (GLUCOPHAGE) 1000 MG tablet Take 1 tablet by mouth 2 times daily (with meals) 180 tablet 1    potassium chloride (KLOR-CON M) 10 MEQ extended release tablet Take 1 tablet by mouth daily OTC 90 tablet 1    Ertugliflozin L-PyroglutamicAc (STEGLATRO) 15 MG TABS Take 15 mg by mouth daily 30 tablet 3    tiZANidine (ZANAFLEX) 4 MG tablet Take 1 tablet by mouth at bedtime 30 tablet 3    escitalopram (LEXAPRO) 10 MG tablet Take 1 tablet by mouth daily 30 tablet 3    omeprazole (PRILOSEC) 20 MG delayed release capsule Take 1 capsule by mouth daily 30 capsule 3    vitamin B-12 (CYANOCOBALAMIN) 1000 MCG tablet Take 1 tablet by mouth daily (Patient taking differently: Take 1,000 mcg by mouth daily OTC) 90 tablet 1    Cholecalciferol (VITAMIN D3) 50 MCG (2000 UT) TABS Take one tablet by mouth once a day (Patient taking differently: Take one tablet by mouth once a day OTC) 30 tablet 3       Allergies:     Allergies   Allergen Reactions    Bee Venom Anaphylaxis    Hornet Venom     Tramadol Other (See Comments)     Night terrors and sweats       Problem List:    Patient Active Problem List   Diagnosis Code    Ventral hernia without obstruction or gangrene K43.9    Essential hypertension I10    Gastroesophageal reflux disease without esophagitis K21.9    Dupuytren's contracture M72.0    Chronic skin ulcer, limited to breakdown of skin ([facility] [address] L98.491    Chronic bilateral low back pain without sciatica M54.50, G89.29    Type 2 diabetes mellitus with diabetic polyneuropathy, with long-term current use of insulin (HCC) E11.42, Z79.4    Erectile dysfunction N52.9    Chest pain R07.9    Sigrid Parkinson White pattern seen on electrocardiogram I45.6    Fatty liver K76.0    Neuropathy G62.9    Dorsalgia M54.9    Pure hypercholesterolemia E78.00    Pain in both feet M79.671, M79.672    There is no height or weight on file to calculate BMI.    CBC:   Lab Results   Component Value Date    WBC 3.9 05/04/2022    RBC 5.68 05/04/2022    HGB 17.5 05/04/2022    HCT 50.3 05/04/2022    MCV 88.6 05/04/2022    RDW 12.7 05/04/2022     05/04/2022       CMP:   Lab Results   Component Value Date     05/04/2022    K 4.8 05/04/2022     05/04/2022    CO2 25 05/04/2022    BUN 20 05/04/2022    CREATININE 0.8 05/04/2022    GFRAA >60 05/04/2022    AGRATIO 1.8 03/15/2022    LABGLOM >60 05/04/2022    GLUCOSE 321 05/04/2022    PROT 7.4 04/13/2022    PROT 6.7 12/28/2011    CALCIUM 9.5 05/04/2022    BILITOT 0.3 04/13/2022    ALKPHOS 103 04/13/2022    AST 18 04/13/2022    ALT 20 04/13/2022       POC Tests: No results for input(s): POCGLU, POCNA, POCK, POCCL, POCBUN, POCHEMO, POCHCT in the last 72 hours.     Coags:   Lab Results   Component Value Date    PROTIME 11.3 05/04/2022    INR 0.88 05/04/2022    APTT 24.7 05/04/2022       HCG (If Applicable): No results found for: PREGTESTUR, PREGSERUM, HCG, HCGQUANT     ABGs: No results found for: PHART, PO2ART, ONV8GLW, GUH1ULZ, BEART, H6KHZWBN     Type & Screen (If Applicable):  No results found for: LABABO, LABRH    Drug/Infectious Status (If Applicable):  No results found for: HIV, HEPCAB    COVID-19 Screening (If Applicable):   Lab Results   Component Value Date    COVID19 NOT DETECTED 05/04/2022           Anesthesia Evaluation  Patient summary reviewed  Airway: Mallampati: I  TM distance: >3 FB   Neck ROM: full  Mouth opening: > = 3 FB Dental: normal exam         Pulmonary:normal exam    (+) asthma:                            Cardiovascular:    (+) hypertension:, dysrhythmias: atrial fibrillation, hyperlipidemia        Rhythm: regular  Rate: normal    Stress test reviewed       Beta Blocker:  Not on Beta Blocker      ROS comment: Conclusions        Summary    Normal near maximal study negative for angina or ECG evidence of ischemia.    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    Exercise tolerance is good. Patient exercised for 9:30 minutes on Noe    protocol.    Appropriate hemodynamic response to exercise is noted.        Signature        ------------------------------------------------------------------    Electronically signed by Gabino Avelar MD    (Interpreting physician) on 03/02/2022 at 10:46 AM      Neuro/Psych:   (+) neuromuscular disease:, depression/anxiety             GI/Hepatic/Renal:   (+) GERD: well controlled, liver disease:,           Endo/Other:    (+) DiabetesType II DM, using insulin, blood dyscrasia: anticoagulation therapy:., electrolyte abnormalities, . Abdominal:             Vascular:   + PVD, aortic or cerebral, . Other Findings:           Anesthesia Plan      general     ASA 3       Induction: intravenous. MIPS: Postoperative opioids intended and Prophylactic antiemetics administered. Anesthetic plan and risks discussed with patient. Plan discussed with CRNA. EDGAR Walker CRNA   5/16/2022    Pre Anesthesia Evaluation complete. Anesthesia plan, risks, benefits, alternatives, and personnel discussed with patient and/or legal guardian. Patient and/or legal guardian verbalized an understanding and agreed to proceed. Anesthesia plan discussed with care team members and agreed upon.   EDGAR Askew CRNA  5/17/2022

## 2022-05-17 ENCOUNTER — ANESTHESIA (OUTPATIENT)
Dept: OPERATING ROOM | Age: 52
End: 2022-05-17
Payer: COMMERCIAL

## 2022-05-17 ENCOUNTER — HOSPITAL ENCOUNTER (OUTPATIENT)
Age: 52
Setting detail: OUTPATIENT SURGERY
Discharge: HOME | End: 2022-05-17
Attending: SURGERY | Admitting: SURGERY
Payer: COMMERCIAL

## 2022-05-17 VITALS
DIASTOLIC BLOOD PRESSURE: 71 MMHG | RESPIRATION RATE: 16 BRPM | TEMPERATURE: 98.1 F | OXYGEN SATURATION: 94 % | SYSTOLIC BLOOD PRESSURE: 176 MMHG | HEART RATE: 75 BPM

## 2022-05-17 DIAGNOSIS — K43.9 VENTRAL HERNIA WITHOUT OBSTRUCTION OR GANGRENE: Primary | ICD-10-CM

## 2022-05-17 DIAGNOSIS — N52.9 ERECTILE DYSFUNCTION, UNSPECIFIED ERECTILE DYSFUNCTION TYPE: ICD-10-CM

## 2022-05-17 LAB — GLUCOSE BLD-MCNC: 216 MG/DL (ref 70–99)

## 2022-05-17 PROCEDURE — 2500000003 HC RX 250 WO HCPCS: Performed by: NURSE ANESTHETIST, CERTIFIED REGISTERED

## 2022-05-17 PROCEDURE — 7100000010 HC PHASE II RECOVERY - FIRST 15 MIN: Performed by: SURGERY

## 2022-05-17 PROCEDURE — 6360000002 HC RX W HCPCS: Performed by: NURSE ANESTHETIST, CERTIFIED REGISTERED

## 2022-05-17 PROCEDURE — 3600000019 HC SURGERY ROBOT ADDTL 15MIN: Performed by: SURGERY

## 2022-05-17 PROCEDURE — 82962 GLUCOSE BLOOD TEST: CPT

## 2022-05-17 PROCEDURE — 3700000000 HC ANESTHESIA ATTENDED CARE: Performed by: SURGERY

## 2022-05-17 PROCEDURE — C1781 MESH (IMPLANTABLE): HCPCS | Performed by: SURGERY

## 2022-05-17 PROCEDURE — 7100000001 HC PACU RECOVERY - ADDTL 15 MIN: Performed by: SURGERY

## 2022-05-17 PROCEDURE — 3700000001 HC ADD 15 MINUTES (ANESTHESIA): Performed by: SURGERY

## 2022-05-17 PROCEDURE — 3600000009 HC SURGERY ROBOT BASE: Performed by: SURGERY

## 2022-05-17 PROCEDURE — 2580000003 HC RX 258: Performed by: NURSE ANESTHETIST, CERTIFIED REGISTERED

## 2022-05-17 PROCEDURE — 6370000000 HC RX 637 (ALT 250 FOR IP): Performed by: ANESTHESIOLOGY

## 2022-05-17 PROCEDURE — 6360000002 HC RX W HCPCS: Performed by: ANESTHESIOLOGY

## 2022-05-17 PROCEDURE — S2900 ROBOTIC SURGICAL SYSTEM: HCPCS | Performed by: SURGERY

## 2022-05-17 PROCEDURE — 7100000000 HC PACU RECOVERY - FIRST 15 MIN: Performed by: SURGERY

## 2022-05-17 PROCEDURE — 7100000011 HC PHASE II RECOVERY - ADDTL 15 MIN: Performed by: SURGERY

## 2022-05-17 PROCEDURE — 2709999900 HC NON-CHARGEABLE SUPPLY: Performed by: SURGERY

## 2022-05-17 PROCEDURE — 6360000002 HC RX W HCPCS: Performed by: SURGERY

## 2022-05-17 PROCEDURE — 2500000003 HC RX 250 WO HCPCS: Performed by: SURGERY

## 2022-05-17 PROCEDURE — 49656 PR LAP, RECURRENT INCISIONAL HERNIA REPAIR,REDUCIBLE: CPT | Performed by: SURGERY

## 2022-05-17 DEVICE — MESH SURG W30XL30CM DIA5MM POLYPR SQ FLAT FOR SFT TISS REP: Type: IMPLANTABLE DEVICE | Site: ABDOMEN | Status: FUNCTIONAL

## 2022-05-17 RX ORDER — IPRATROPIUM BROMIDE AND ALBUTEROL SULFATE 2.5; .5 MG/3ML; MG/3ML
1 SOLUTION RESPIRATORY (INHALATION)
Status: DISCONTINUED | OUTPATIENT
Start: 2022-05-17 | End: 2022-05-17 | Stop reason: HOSPADM

## 2022-05-17 RX ORDER — PROPOFOL 10 MG/ML
INJECTION, EMULSION INTRAVENOUS PRN
Status: DISCONTINUED | OUTPATIENT
Start: 2022-05-17 | End: 2022-05-17 | Stop reason: SDUPTHER

## 2022-05-17 RX ORDER — ONDANSETRON 2 MG/ML
4 INJECTION INTRAMUSCULAR; INTRAVENOUS
Status: DISCONTINUED | OUTPATIENT
Start: 2022-05-17 | End: 2022-05-17 | Stop reason: HOSPADM

## 2022-05-17 RX ORDER — OXYCODONE HYDROCHLORIDE 5 MG/1
5 TABLET ORAL PRN
Status: COMPLETED | OUTPATIENT
Start: 2022-05-17 | End: 2022-05-17

## 2022-05-17 RX ORDER — SODIUM CHLORIDE 9 MG/ML
25 INJECTION, SOLUTION INTRAVENOUS PRN
Status: DISCONTINUED | OUTPATIENT
Start: 2022-05-17 | End: 2022-05-17 | Stop reason: HOSPADM

## 2022-05-17 RX ORDER — SODIUM CHLORIDE 9 MG/ML
INJECTION, SOLUTION INTRAVENOUS PRN
Status: DISCONTINUED | OUTPATIENT
Start: 2022-05-17 | End: 2022-05-17 | Stop reason: HOSPADM

## 2022-05-17 RX ORDER — HYDROCODONE BITARTRATE AND ACETAMINOPHEN 5; 325 MG/1; MG/1
1 TABLET ORAL EVERY 6 HOURS PRN
Qty: 20 TABLET | Refills: 0 | Status: SHIPPED | OUTPATIENT
Start: 2022-05-17 | End: 2022-05-22

## 2022-05-17 RX ORDER — SILDENAFIL 50 MG/1
TABLET, FILM COATED ORAL
Qty: 10 TABLET | Refills: 0 | Status: SHIPPED | OUTPATIENT
Start: 2022-05-17 | End: 2022-06-16

## 2022-05-17 RX ORDER — OXYCODONE HYDROCHLORIDE 5 MG/1
10 TABLET ORAL PRN
Status: COMPLETED | OUTPATIENT
Start: 2022-05-17 | End: 2022-05-17

## 2022-05-17 RX ORDER — FENTANYL CITRATE 50 UG/ML
INJECTION, SOLUTION INTRAMUSCULAR; INTRAVENOUS PRN
Status: DISCONTINUED | OUTPATIENT
Start: 2022-05-17 | End: 2022-05-17 | Stop reason: SDUPTHER

## 2022-05-17 RX ORDER — HYDRALAZINE HYDROCHLORIDE 20 MG/ML
10 INJECTION INTRAMUSCULAR; INTRAVENOUS
Status: DISCONTINUED | OUTPATIENT
Start: 2022-05-17 | End: 2022-05-17 | Stop reason: HOSPADM

## 2022-05-17 RX ORDER — BUPIVACAINE HYDROCHLORIDE 5 MG/ML
INJECTION, SOLUTION EPIDURAL; INTRACAUDAL
Status: COMPLETED | OUTPATIENT
Start: 2022-05-17 | End: 2022-05-17

## 2022-05-17 RX ORDER — SODIUM CHLORIDE, SODIUM LACTATE, POTASSIUM CHLORIDE, CALCIUM CHLORIDE 600; 310; 30; 20 MG/100ML; MG/100ML; MG/100ML; MG/100ML
INJECTION, SOLUTION INTRAVENOUS CONTINUOUS PRN
Status: DISCONTINUED | OUTPATIENT
Start: 2022-05-17 | End: 2022-05-17 | Stop reason: SDUPTHER

## 2022-05-17 RX ORDER — SODIUM CHLORIDE 0.9 % (FLUSH) 0.9 %
5-40 SYRINGE (ML) INJECTION PRN
Status: DISCONTINUED | OUTPATIENT
Start: 2022-05-17 | End: 2022-05-17 | Stop reason: HOSPADM

## 2022-05-17 RX ORDER — SODIUM CHLORIDE 0.9 % (FLUSH) 0.9 %
5-40 SYRINGE (ML) INJECTION EVERY 12 HOURS SCHEDULED
Status: DISCONTINUED | OUTPATIENT
Start: 2022-05-17 | End: 2022-05-17 | Stop reason: HOSPADM

## 2022-05-17 RX ORDER — SODIUM CHLORIDE 9 MG/ML
INJECTION, SOLUTION INTRAVENOUS CONTINUOUS
Status: DISCONTINUED | OUTPATIENT
Start: 2022-05-17 | End: 2022-05-17 | Stop reason: HOSPADM

## 2022-05-17 RX ORDER — DEXAMETHASONE SODIUM PHOSPHATE 4 MG/ML
INJECTION, SOLUTION INTRA-ARTICULAR; INTRALESIONAL; INTRAMUSCULAR; INTRAVENOUS; SOFT TISSUE PRN
Status: DISCONTINUED | OUTPATIENT
Start: 2022-05-17 | End: 2022-05-17 | Stop reason: SDUPTHER

## 2022-05-17 RX ORDER — CEFAZOLIN SODIUM 2 G/100ML
2000 INJECTION, SOLUTION INTRAVENOUS
Status: COMPLETED | OUTPATIENT
Start: 2022-05-17 | End: 2022-05-17

## 2022-05-17 RX ORDER — DROPERIDOL 2.5 MG/ML
0.62 INJECTION, SOLUTION INTRAMUSCULAR; INTRAVENOUS
Status: DISCONTINUED | OUTPATIENT
Start: 2022-05-17 | End: 2022-05-17 | Stop reason: HOSPADM

## 2022-05-17 RX ORDER — LABETALOL HYDROCHLORIDE 5 MG/ML
10 INJECTION, SOLUTION INTRAVENOUS
Status: DISCONTINUED | OUTPATIENT
Start: 2022-05-17 | End: 2022-05-17 | Stop reason: HOSPADM

## 2022-05-17 RX ORDER — FENTANYL CITRATE 50 UG/ML
25 INJECTION, SOLUTION INTRAMUSCULAR; INTRAVENOUS EVERY 5 MIN PRN
Status: COMPLETED | OUTPATIENT
Start: 2022-05-17 | End: 2022-05-17

## 2022-05-17 RX ORDER — ROCURONIUM BROMIDE 10 MG/ML
INJECTION, SOLUTION INTRAVENOUS PRN
Status: DISCONTINUED | OUTPATIENT
Start: 2022-05-17 | End: 2022-05-17 | Stop reason: SDUPTHER

## 2022-05-17 RX ORDER — LIDOCAINE HYDROCHLORIDE 20 MG/ML
INJECTION, SOLUTION EPIDURAL; INFILTRATION; INTRACAUDAL; PERINEURAL PRN
Status: DISCONTINUED | OUTPATIENT
Start: 2022-05-17 | End: 2022-05-17 | Stop reason: SDUPTHER

## 2022-05-17 RX ORDER — ONDANSETRON 2 MG/ML
INJECTION INTRAMUSCULAR; INTRAVENOUS PRN
Status: DISCONTINUED | OUTPATIENT
Start: 2022-05-17 | End: 2022-05-17 | Stop reason: SDUPTHER

## 2022-05-17 RX ADMIN — HYDROMORPHONE HYDROCHLORIDE 0.5 MG: 1 INJECTION, SOLUTION INTRAMUSCULAR; INTRAVENOUS; SUBCUTANEOUS at 10:00

## 2022-05-17 RX ADMIN — FENTANYL CITRATE 50 MCG: 50 INJECTION, SOLUTION INTRAMUSCULAR; INTRAVENOUS at 08:20

## 2022-05-17 RX ADMIN — HYDROMORPHONE HYDROCHLORIDE 0.5 MG: 1 INJECTION, SOLUTION INTRAMUSCULAR; INTRAVENOUS; SUBCUTANEOUS at 11:18

## 2022-05-17 RX ADMIN — ROCURONIUM BROMIDE 20 MG: 50 INJECTION, SOLUTION INTRAVENOUS at 08:12

## 2022-05-17 RX ADMIN — DEXAMETHASONE SODIUM PHOSPHATE 8 MG: 4 INJECTION, SOLUTION INTRAMUSCULAR; INTRAVENOUS at 07:50

## 2022-05-17 RX ADMIN — FENTANYL CITRATE 25 MCG: 50 INJECTION, SOLUTION INTRAMUSCULAR; INTRAVENOUS at 11:02

## 2022-05-17 RX ADMIN — SODIUM CHLORIDE, POTASSIUM CHLORIDE, SODIUM LACTATE AND CALCIUM CHLORIDE: 600; 310; 30; 20 INJECTION, SOLUTION INTRAVENOUS at 07:35

## 2022-05-17 RX ADMIN — ROCURONIUM BROMIDE 20 MG: 50 INJECTION, SOLUTION INTRAVENOUS at 08:36

## 2022-05-17 RX ADMIN — ROCURONIUM BROMIDE 20 MG: 50 INJECTION, SOLUTION INTRAVENOUS at 08:58

## 2022-05-17 RX ADMIN — ROCURONIUM BROMIDE 20 MG: 50 INJECTION, SOLUTION INTRAVENOUS at 09:36

## 2022-05-17 RX ADMIN — FENTANYL CITRATE 25 MCG: 50 INJECTION, SOLUTION INTRAMUSCULAR; INTRAVENOUS at 10:55

## 2022-05-17 RX ADMIN — FENTANYL CITRATE 25 MCG: 50 INJECTION, SOLUTION INTRAMUSCULAR; INTRAVENOUS at 10:42

## 2022-05-17 RX ADMIN — OXYCODONE HYDROCHLORIDE 5 MG: 5 TABLET ORAL at 12:05

## 2022-05-17 RX ADMIN — FENTANYL CITRATE 100 MCG: 50 INJECTION, SOLUTION INTRAMUSCULAR; INTRAVENOUS at 07:41

## 2022-05-17 RX ADMIN — LIDOCAINE HYDROCHLORIDE 50 MG: 20 INJECTION, SOLUTION EPIDURAL; INFILTRATION; INTRACAUDAL; PERINEURAL at 10:00

## 2022-05-17 RX ADMIN — ONDANSETRON 4 MG: 2 INJECTION INTRAMUSCULAR; INTRAVENOUS at 10:00

## 2022-05-17 RX ADMIN — FENTANYL CITRATE 50 MCG: 50 INJECTION, SOLUTION INTRAMUSCULAR; INTRAVENOUS at 08:50

## 2022-05-17 RX ADMIN — LIDOCAINE HYDROCHLORIDE 50 MG: 20 INJECTION, SOLUTION EPIDURAL; INFILTRATION; INTRACAUDAL; PERINEURAL at 07:41

## 2022-05-17 RX ADMIN — FENTANYL CITRATE 25 MCG: 50 INJECTION, SOLUTION INTRAMUSCULAR; INTRAVENOUS at 10:48

## 2022-05-17 RX ADMIN — ROCURONIUM BROMIDE 50 MG: 50 INJECTION, SOLUTION INTRAVENOUS at 07:42

## 2022-05-17 RX ADMIN — SUGAMMADEX 200 MG: 100 INJECTION, SOLUTION INTRAVENOUS at 10:00

## 2022-05-17 RX ADMIN — ROCURONIUM BROMIDE 10 MG: 50 INJECTION, SOLUTION INTRAVENOUS at 09:20

## 2022-05-17 RX ADMIN — CEFAZOLIN SODIUM 2000 MG: 2 INJECTION, SOLUTION INTRAVENOUS at 07:32

## 2022-05-17 RX ADMIN — PROPOFOL 200 MG: 10 INJECTION, EMULSION INTRAVENOUS at 07:41

## 2022-05-17 RX ADMIN — SODIUM CHLORIDE, POTASSIUM CHLORIDE, SODIUM LACTATE AND CALCIUM CHLORIDE: 600; 310; 30; 20 INJECTION, SOLUTION INTRAVENOUS at 08:59

## 2022-05-17 ASSESSMENT — PAIN SCALES - GENERAL
PAINLEVEL_OUTOF10: 6
PAINLEVEL_OUTOF10: 8
PAINLEVEL_OUTOF10: 9
PAINLEVEL_OUTOF10: 7
PAINLEVEL_OUTOF10: 9
PAINLEVEL_OUTOF10: 7

## 2022-05-17 ASSESSMENT — PAIN DESCRIPTION - FREQUENCY
FREQUENCY: CONTINUOUS

## 2022-05-17 ASSESSMENT — PAIN DESCRIPTION - DESCRIPTORS
DESCRIPTORS: ACHING;DISCOMFORT
DESCRIPTORS: DISCOMFORT
DESCRIPTORS: SORE;DISCOMFORT
DESCRIPTORS: DISCOMFORT;PRESSURE
DESCRIPTORS: ACHING;DISCOMFORT
DESCRIPTORS: DISCOMFORT;PRESSURE
DESCRIPTORS: DISCOMFORT

## 2022-05-17 ASSESSMENT — PAIN - FUNCTIONAL ASSESSMENT
PAIN_FUNCTIONAL_ASSESSMENT: ACTIVITIES ARE NOT PREVENTED
PAIN_FUNCTIONAL_ASSESSMENT: 0-10

## 2022-05-17 ASSESSMENT — PAIN DESCRIPTION - PAIN TYPE
TYPE: SURGICAL PAIN

## 2022-05-17 ASSESSMENT — PAIN DESCRIPTION - LOCATION
LOCATION: ABDOMEN

## 2022-05-17 ASSESSMENT — PAIN DESCRIPTION - ONSET
ONSET: ON-GOING

## 2022-05-17 NOTE — OP NOTE
Operative Note      Patient: Gabrielle Sanderson  YOB: 1970  MRN: [id number]    Date of Procedure: 5/17/2022    Pre-Op Diagnosis: Incisional hernia, without obstruction or gangrene [K43.2]    Post-Op Diagnosis: Same       Procedure(s): HERNIA INCISIONAL REPAIR LAPAROSCOPIC ROBOTIC WITH MESH    Surgeon(s):  Nidhi Pan DO    Assistant:   First Assistant: Alise Iyer    Anesthesia: General    Estimated Blood Loss (mL): Minimal    Complications: None    Specimens:   * No specimens in log *    Implants:  Implant Name Type Inv. Item Serial No.  Lot No. LRB No. Used Action   MESH SURG [id number] DIA5MM POLYPR SQ FLAT FOR SFT TISS REP - [id number]  MESH SURG [id number] DIA5MM POLYPR SQ FLAT FOR SFT TISS REP  [employer] [id number] N/A 1 Implanted         Drains: * No LDAs found *    Findings: incisional hernia     Detailed Description of Procedure: Indication:   Symptomatic incisional hernia. Procedure:  Patient was taken to the OR and laid in supine position. After induction of general endotracheal anesthesia patient was prepped and draped in usual sterile fashion. A right upper quadrant incision was created with an 11 blade scalpel. Camera and 5 mm Visiport were used to enter abdomen. Abdomen was insufflated to 15 mmHg. A right mid and right lower quadrant 8 mm robotic trocar were placed under visualization. The 5 mm port was replaced with an 8 mm robotic trocar under visualization. Local anesthetic was infiltrated at each port site prior to incision. Patient was then flexed slightly and robot docked to these ports. The hernia was located in the periumbilical area. The peritoneum was divided started on the right side and a preperitoneal plane was created towards midline and then towards the left side. Once adequate preperitoneal space was created.   A #1 STRATAFIX suture was used to close the hernia defect in the immediate supraumbilical area and imbricate linea alba above and below this. The space was measured and a Prolene mesh was trimmed to an oval 15 x 10 cm and brought into the abdomen. This was tacked along midline and around the edges with interrupted 2-0 Vicryl sutures. The peritoneum along the left side was reapproximated with a running 3 oh strata fix suture. There was a small defect in the middle of the peritoneum from area of the hernia which was closed with 2 figure-of-eight 2-0 Vicryl sutures. All needles/suture were passed out of the abdomen. The robot was then undocked releasing pneumoperitoneum. Incisions were then closed with 3-0 Vicryl followed by 4-0 Monocryl suture. Dermabond placed over incisions. All counts correct x2 at end of case. Patient tolerated the procedure well, without complication, and at end of case was transported to the recovery area in stable condition.     Electronically signed by Dayan Olson DO on 5/17/2022 at 10:11 AM

## 2022-05-17 NOTE — PROGRESS NOTES
1015: Patient arrived to PACU from OR. Monitors applied, alarms on. Surgical sites are clean, dry and intact x3 sites. Abdominal binder placed on arrival to PACU. Oral airway remains in place. Report obtained from [facility] and [address] [place] [address]. 1018: Oral airway removed, pt tolerated well. 1042: Patient complains of pain/discomfort. Pt medicated at this time. 1102: Patient tolerating ice chips at this time. Pt continue to state pain with surgical sites. Pt medicated, then rest well. 1118: Patient medicated again for pain. Rests well inbetween being medicated. Tolerating room air well. Breathing is even and unlabored. 1134: Phase 1 care complete. Patient transferred to [facility] room 22. Pt had been resting well tolerating ice chips and verbalized no complaints when leaving PACU. Stated 8/10 pain once received in SDS. Report given bedside to [facility].

## 2022-05-17 NOTE — ANESTHESIA POSTPROCEDURE EVALUATION
Department of Anesthesiology  Postprocedure Note    Patient: Lauro Colmenares  MRN: [id number]  YOB: 1970  Date of evaluation: 5/17/2022  Time:  1:37 PM     Procedure Summary     Date: 05/17/22 Room / Location: [address] [place] [address]    Anesthesia Start: 1393 Anesthesia Stop: 8367    Procedure: HERNIA INCISIONAL REPAIR LAPAROSCOPIC ROBOTIC WITH MESH (N/A Abdomen) Diagnosis:       Incisional hernia, without obstruction or gangrene      (Incisional hernia, without obstruction or gangrene [K43.2])    Surgeons: Howard Albright DO Responsible Provider: Radha Pennington MD    Anesthesia Type: general ASA Status: 3          Anesthesia Type: No value filed. Michelle Phase I: Michelle Score: 10    Michelle Phase II: Michelle Score: 10    Last vitals: Reviewed and per EMR flowsheets.        Anesthesia Post Evaluation    Patient location during evaluation: PACU  Patient participation: complete - patient participated  Level of consciousness: awake and alert  Pain score: 3  Airway patency: patent  Nausea & Vomiting: no nausea and no vomiting  Complications: no  Cardiovascular status: blood pressure returned to baseline  Respiratory status: acceptable  Hydration status: euvolemic

## 2022-05-17 NOTE — PROGRESS NOTES
1136 Pt. Brought back to [facility], bedside report received from BRENDA Frances. Pt is A&O, VSS, pt. Eating crackers and drinking water. Pt. C/o pain 10/10, pt resting at this time. Education provided on use of call light, call light in reach. 1205   This nurse medicated pt  Per MD order. [address] instructions reviewed with pt and visitor, all parties express understanding.

## 2022-05-17 NOTE — INTERVAL H&P NOTE
Update History & Physical    The patient's History and Physical of May 4, 2022 was reviewed with the patient and I examined the patient. There was no change. The surgical site was confirmed by the patient and me. Plan: The risks, benefits, expected outcome, and alternative to the recommended procedure have been discussed with the patient. Patient understands and wants to proceed with the procedure.      Electronically signed by Janell Wallace DO on 5/17/2022 at 7:25 AM

## 2022-06-01 ENCOUNTER — OFFICE VISIT (OUTPATIENT)
Dept: SURGERY | Age: 52
End: 2022-06-01

## 2022-06-01 VITALS
DIASTOLIC BLOOD PRESSURE: 82 MMHG | BODY MASS INDEX: 27.86 KG/M2 | HEART RATE: 83 BPM | SYSTOLIC BLOOD PRESSURE: 134 MMHG | WEIGHT: 217.1 LBS | HEIGHT: 74 IN | OXYGEN SATURATION: 97 %

## 2022-06-01 DIAGNOSIS — Z48.89 POSTOPERATIVE VISIT: Primary | ICD-10-CM

## 2022-06-01 PROCEDURE — 99024 POSTOP FOLLOW-UP VISIT: CPT | Performed by: SURGERY

## 2022-06-01 ASSESSMENT — PATIENT HEALTH QUESTIONNAIRE - PHQ9
SUM OF ALL RESPONSES TO PHQ QUESTIONS 1-9: 0
2. FEELING DOWN, DEPRESSED OR HOPELESS: 0
SUM OF ALL RESPONSES TO PHQ QUESTIONS 1-9: 0
SUM OF ALL RESPONSES TO PHQ9 QUESTIONS 1 & 2: 0
1. LITTLE INTEREST OR PLEASURE IN DOING THINGS: 0

## 2022-06-01 NOTE — LETTER
[address] [facility]  [address] [place]  Phone: [phone]  Fax: [phone]    Sharyle Genre, DO        June 1, 2022     Patient: Chapo Monae   YOB: 1970   Date of Visit: 6/1/2022       To Whom It May Concern: It is my medical opinion that Paulino Burgos may return to work on 6/16/22, with no restrictions. If you have any questions or concerns, please don't hesitate to call.     Sincerely,        Sharyle Genre, DO

## 2022-06-01 NOTE — PROGRESS NOTES
Chief Complaint   Patient presents with    Post-Op Check     1st p/o thelma inc hernia repair with mesh @[facility] 05/17/22 op in chart         SUBJECTIVE:  Patient here for post op visit. Overall doing well. Incisions healing well. Umbilicus he sneezed and noticed a small lump under her umbilicus. Having normal bowel functions. Denies shortness of breath, chest pain, fevers/chills, nausea/vomiting. Past Surgical History:   Procedure Laterality Date    ABDOMEN SURGERY      COLONOSCOPY N/A 04/25/2022    COLONOSCOPY POLYPECTOMY SNARE/COLD BIOPSY performed by Shobha Macedo DO at [facility]    [facility] N/A 5/17/2022    HERNIA INCISIONAL REPAIR LAPAROSCOPIC ROBOTIC WITH MESH performed by Shobha Macedo DO at [facility] N/A 05/10/2022    Electrophysiology study and cardioversion performed by Dr. Viktoriya Ramirez.    [facility], 2010    Fusion of L3, L4, L5    UMBILICAL HERNIA REPAIR  11/04/2015    repair incarcerated supra umbilical hernia    UPPER GASTROINTESTINAL ENDOSCOPY N/A 04/25/2022    EGD DIAGNOSTIC ONLY performed by Shobha Macedo DO at [facility] ENDOSCOPY     Past Medical History:   Diagnosis Date    Asthma     Depression     Diabetes mellitus ([address]     Hyperlipidemia     Hypertension     Sleep disorder     Ventral hernia without obstruction or gangrene 11/4/2015    WPW (Sigrid-Parkinson-White syndrome)     Yareli Muñoz     Family History   Problem Relation Age of Onset    Breast Cancer Mother      Social History     Socioeconomic History    Marital status: Single     Spouse name: Not on file    Number of children: Not on file    Years of education: Not on file    Highest education level: Not on file   Occupational History    Not on file   Tobacco Use    Smoking status: Never Smoker    Smokeless tobacco: Never Used   Vaping Use    Vaping Use: Never used   Substance and Sexual Activity    Alcohol use: Not Currently     Alcohol/week: 2.0 standard drinks     Types: 2 Standard drinks or equivalent per week    Drug use: Not Currently     Types: Marijuana Zollie Axadriana)     Comment: marijuana card    Sexual activity: Not on file   Other Topics Concern    Not on file   Social History Narrative    Not on file     Social Determinants of Health     Financial Resource Strain: Low Risk     Difficulty of Paying Living Expenses: Not hard at all   Food Insecurity: No Food Insecurity    Worried About [address] in the Last Year: Never true    [facility] in the Last Year: Never true   Transportation Needs:     Lack of Transportation (Medical): Not on file    Lack of Transportation (Non-Medical): Not on file   Physical Activity:     Days of Exercise per Week: Not on file    Minutes of Exercise per Session: Not on file   Stress:     Feeling of Stress : Not on file   Social Connections:     Frequency of Communication with Friends and Family: Not on file    Frequency of Social Gatherings with Friends and Family: Not on file    Attends Hindu Services: Not on file    Active Member of [address] [place] [address] or Organizations: Not on file    Attends Club or Organization Meetings: Not on file    Marital Status: Not on file   Intimate Partner Violence:     Fear of Current or Ex-Partner: Not on file    Emotionally Abused: Not on file    Physically Abused: Not on file    Sexually Abused: Not on file   Housing Stability:     Unable to Pay for Housing in the Last Year: Not on file    Number of [place] in the Last Year: Not on file    Unstable Housing in the Last Year: Not on file       OBJECTIVE:    General: A&O x3  Respiratory: Chest rise equal bilaterally  CV: Regular rate and rhythm  Abdomen: Soft, nontender, nondistended, no rebound or guarding. Ambulate as there is a small firm nodule which is tender. Somewhat mobile does not reduce. ASSESSMENT:    1.  Postoperative visit          PLAN:    Continue to wear binder for 4 more weeks  No lifting greater than 20 pounds for total of 6 weeks following surgery  Patient has a 10 x 15 preperitoneal mesh therefore doubt recurrence but we will follow-up in 6 weeks to reassess area umbilicus of concern. Suspect small hematoma or seroma. No orders of the defined types were placed in this encounter. No orders of the defined types were placed in this encounter. Follow Up: Return in about 6 weeks (around 7/13/2022).     Eldon Lozoya DO

## 2022-06-02 ENCOUNTER — TELEPHONE (OUTPATIENT)
Dept: NEUROLOGY | Age: 52
End: 2022-06-02

## 2022-06-02 NOTE — TELEPHONE ENCOUNTER
Pt was unable to stay for appt yesterday 6/1 with Dr. Douglas Bates due to having another appointment right after. Called pt to get him rescheduled. No answer, LM for pt to call back.

## 2022-06-15 ENCOUNTER — OFFICE VISIT (OUTPATIENT)
Dept: CARDIOLOGY CLINIC | Age: 52
End: 2022-06-15
Payer: COMMERCIAL

## 2022-06-15 VITALS
HEART RATE: 76 BPM | BODY MASS INDEX: 28.26 KG/M2 | WEIGHT: 220.2 LBS | DIASTOLIC BLOOD PRESSURE: 94 MMHG | SYSTOLIC BLOOD PRESSURE: 146 MMHG | HEIGHT: 74 IN

## 2022-06-15 DIAGNOSIS — Z79.4 TYPE 2 DIABETES MELLITUS WITH DIABETIC POLYNEUROPATHY, WITH LONG-TERM CURRENT USE OF INSULIN (HCC): ICD-10-CM

## 2022-06-15 DIAGNOSIS — E11.42 TYPE 2 DIABETES MELLITUS WITH DIABETIC POLYNEUROPATHY, WITH LONG-TERM CURRENT USE OF INSULIN (HCC): ICD-10-CM

## 2022-06-15 DIAGNOSIS — I48.0 PAROXYSMAL ATRIAL FIBRILLATION (HCC): ICD-10-CM

## 2022-06-15 DIAGNOSIS — Z86.16 HISTORY OF COVID-19: ICD-10-CM

## 2022-06-15 DIAGNOSIS — D75.1 POLYCYTHEMIA: ICD-10-CM

## 2022-06-15 DIAGNOSIS — I10 ESSENTIAL HYPERTENSION: Primary | ICD-10-CM

## 2022-06-15 DIAGNOSIS — E78.00 PURE HYPERCHOLESTEROLEMIA: ICD-10-CM

## 2022-06-15 DIAGNOSIS — N52.9 ERECTILE DYSFUNCTION, UNSPECIFIED ERECTILE DYSFUNCTION TYPE: ICD-10-CM

## 2022-06-15 DIAGNOSIS — I45.6 WOLFF PARKINSON WHITE PATTERN SEEN ON ELECTROCARDIOGRAM: ICD-10-CM

## 2022-06-15 PROCEDURE — 3051F HG A1C>EQUAL 7.0%<8.0%: CPT | Performed by: INTERNAL MEDICINE

## 2022-06-15 PROCEDURE — 99214 OFFICE O/P EST MOD 30 MIN: CPT | Performed by: INTERNAL MEDICINE

## 2022-06-15 NOTE — LETTER
[address]  [address] [address]  Phone: [phone]  Fax: [phone]    Sp Noonan MD    Celena 15, 2022     Deon Jeans, MD  [place] [phone]    Patient: Gabrielle Sanderson   MR Number: [id number]   YOB: 1970   Date of Visit: 6/15/2022       Dear Deon Jeans:    Thank you for referring Zeke Munoz to me for evaluation/treatment. Below are the relevant portions of my assessment and plan of care. If you have questions, please do not hesitate to call me. I look forward to following Ludwig Shrestha along with you.     Sincerely,      Sp Noonan MD

## 2022-06-15 NOTE — PROGRESS NOTES
OFFICE PROGRESS NOTES      Jenice Duane is a 54 y.o. male who has    CHIEF COMPLAINT AS FOLLOWS:  CHEST PAIN: Patient denies any C/O chest pains at this time.      SOB: No C/O SOB at this time.               LEG EDEMA: No leg edema   PALPITATIONS:  C/O Palpitations, specially with any physical activity.   DIZZINESS: No C/O Dizziness   SYNCOPE: None   OTHER/ ADDITIONAL COMPLAINTS:                                     HPI: Patient is here for F/U on his   Arrhythmia, HTN & Dyslipidemia problems. Arrhythmia: Patient has known WPW / PAF. HTN: Patient has known essential HTN. Has been treated with guideline recommended medical / physical/ diet therapy as stated below. Dyslipidemia: Patient has known mixed dyslipidemia. Has been treated with guideline recommended medical / physical/ diet therapy as stated below.                 Current Outpatient Medications   Medication Sig Dispense Refill    potassium chloride (KLOR-CON) 10 MEQ extended release tablet TAKE 1 TABLET BY MOUTH EVERY DAY      sildenafil (VIAGRA) 50 MG tablet TAKE 1 TABLET BY MOUTH AS NEEDED FOR ERECTILE DYSFUNCTION 10 tablet 0    dronedarone hcl (MULTAQ) 400 MG TABS Take 1 tablet by mouth 2 times daily (with meals) 12 tablet 0    rivaroxaban (XARELTO) 20 MG TABS tablet Take 1 tablet by mouth daily 14 tablet 0    ondansetron (ZOFRAN) 4 MG tablet Take 4 mg by mouth every 8 hours as needed for Nausea or Vomiting      buPROPion (WELLBUTRIN SR) 100 MG extended release tablet Take 1 tablet by mouth 2 times daily 60 tablet 3    butalbital-acetaminophen-caffeine (FIORICET, ESGIC) -40 MG per tablet Take 1 tablet by mouth every 4 hours as needed for Headaches 180 tablet 3    amitriptyline (ELAVIL) 50 MG tablet Take 1 tablet by mouth nightly 90 tablet 1    atorvastatin (LIPITOR) 40 MG tablet Take 1 tablet by mouth daily 90 tablet 1    hydroCHLOROthiazide (MICROZIDE) 12.5 MG capsule Take 1 capsule by mouth every morning 90 capsule 1    Insulin Degludec (TRESIBA FLEXTOUCH) 100 UNIT/ML SOPN Inject 40 Units into the skin nightly 6 pen 5    insulin lispro (HUMALOG KWIKPEN) 200 UNIT/ML SOPN pen TID with meals Glucose [id number] No Insulin 140-199 2 Units 200-249 4 Units 250-299 6 Units 300-349 8 Units 350-400 10 Units over 400 12 Units 5 pen 5    losartan (COZAAR) 100 MG tablet Take 1 tablet by mouth daily 90 tablet 1    meloxicam (MOBIC) 7.5 MG tablet Take 1 tablet by mouth daily 90 tablet 1    metFORMIN (GLUCOPHAGE) 1000 MG tablet Take 1 tablet by mouth 2 times daily (with meals) 180 tablet 1    potassium chloride (KLOR-CON M) 10 MEQ extended release tablet Take 1 tablet by mouth daily OTC 90 tablet 1    Ertugliflozin L-PyroglutamicAc (STEGLATRO) 15 MG TABS Take 15 mg by mouth daily 30 tablet 3    tiZANidine (ZANAFLEX) 4 MG tablet Take 1 tablet by mouth at bedtime 30 tablet 3    escitalopram (LEXAPRO) 10 MG tablet Take 1 tablet by mouth daily 30 tablet 3    omeprazole (PRILOSEC) 20 MG delayed release capsule Take 1 capsule by mouth daily 30 capsule 3    vitamin B-12 (CYANOCOBALAMIN) 1000 MCG tablet Take 1 tablet by mouth daily (Patient taking differently: Take 1,000 mcg by mouth daily OTC) 90 tablet 1    Cholecalciferol (VITAMIN D3) 50 MCG (2000 UT) TABS Take one tablet by mouth once a day (Patient taking differently: Take one tablet by mouth once a day OTC) 30 tablet 3    gabapentin (NEURONTIN) 400 MG capsule Take 1 capsule by mouth 3 times daily for 90 days. Intended supply: 90 days 270 capsule 1     No current facility-administered medications for this visit.      Allergies: Bee venom, Hornet venom, and Tramadol  Review of Systems:    Constitutional: Negative for diaphoresis and fatigue  Respiratory: Negative for shortness of breath  Cardiovascular: Negative for chest pain, dyspnea on exertion, claudication, edema, irregular heartbeat, murmur, palpitations or shortness of breath  Musculoskeletal: Negative for muscle pain, muscular weakness, negative for pain in arm and leg or swelling in foot and leg    Objective:  BP (!) 146/94   Pulse 76   Ht 6' 2\" (1.88 m)   Wt 220 lb 3.2 oz (99.9 kg)   BMI 28.27 kg/m²   Wt Readings from Last 3 Encounters:   06/15/22 220 lb 3.2 oz (99.9 kg)   06/01/22 217 lb 1.6 oz (98.5 kg)   06/01/22 215 lb 9.6 oz (97.8 kg)     Body mass index is 28.27 kg/m². GENERAL - Alert, oriented, pleasant, in no apparent distress. EYES: No jaundice, no conjunctival pallor. Neck - Supple. No jugular venous distention noted. No carotid bruits. Cardiovascular - Normal S1 and S2 without obvious murmur or gallop. Extremities - No cyanosis, clubbing, or significant edema. Pulmonary - No respiratory distress. No wheezes or rales.       MEDICAL DECISION MAKING & DATA REVIEW:    Lab Review   Lab Results   Component Value Date    TROPONINT <0.010 09/04/2020    TROPONINT <0.010 09/04/2020     Lab Results   Component Value Date    PROBNP 15.33 09/04/2020    PROBNP 93.09 08/14/2017     Lab Results   Component Value Date    INR 0.88 05/04/2022     Lab Results   Component Value Date    LABA1C 7.7 04/27/2022    LABA1C 8.3 01/20/2022     Lab Results   Component Value Date    WBC 3.9 (L) 05/04/2022    WBC 5.3 04/13/2022    HCT 50.3 05/04/2022    HCT 49.6 04/13/2022    MCV 88.6 05/04/2022    MCV 84.4 04/13/2022     05/04/2022     04/13/2022     Lab Results   Component Value Date    CHOL 206 (H) 09/12/2019    CHOL 171 12/28/2011    TRIG 84 09/12/2019    TRIG 73 12/28/2011    HDL 56 01/20/2022    HDL 63 (H) 07/21/2021    LDLCALC 86 01/20/2022    LDLCALC 75 07/21/2021    LDLDIRECT 126 (H) 12/28/2011     Lab Results   Component Value Date    ALT 20 04/13/2022    ALT 26 03/15/2022    AST 18 04/13/2022    AST 22 03/15/2022     BMP:    Lab Results   Component Value Date     05/04/2022     04/13/2022    K 4.8 05/04/2022    K 4.6 04/13/2022     05/04/2022     04/13/2022    CO2 25 05/04/2022    CO2 22 04/13/2022    BUN 20 05/04/2022    BUN 26 04/13/2022    CREATININE 0.8 05/04/2022    CREATININE 0.8 04/13/2022     CMP:   Lab Results   Component Value Date     05/04/2022     04/13/2022    K 4.8 05/04/2022    K 4.6 04/13/2022     05/04/2022     04/13/2022    CO2 25 05/04/2022    CO2 22 04/13/2022    BUN 20 05/04/2022    BUN 26 04/13/2022    CREATININE 0.8 05/04/2022    CREATININE 0.8 04/13/2022    PROT 7.4 04/13/2022    PROT 7.9 03/15/2022    PROT 6.7 12/28/2011     Lab Results   Component Value Date    TSH 2.22 09/12/2019    TSHHS 2.810 05/10/2022    TSHHS 1.534 12/28/2011       QUALITY MEASURES REVIEWED:  1.CAD:Patient is taking anti platelet agent:No  Patient does not have Hx of documented CAD  2. DYSLIPIDEMIA: Patient is on cholesterol lowering medication:Yes   3. Beta-Blocker therapy for CAD, if prior Myocardial Infarction:No   4. Counselled regarding smoking cessation. No   Patient does not Smoke. 5.Anticoagulation therapy (for A.Fib) No   Does Not have A.Fib.  6.Discussed weight management strategies. Assessment & Plan:  Primary / Secondary prevention is the goal by aggressive risk modification, healthy and therapeutic life style changes for cardiovascular risk reduction. CORONARY ARTERY DISEASE:None known. Stress test 3/2/2022   Normal near maximal study negative for angina or ECG evidence of ischemia.    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    Exercise tolerance is good. Patient exercised for 9:30 minutes on Noe    protocol.    Appropriate hemodynamic response to exercise is noted.      HYPERTENSION:for more than 10 years.  Controlled on current management. Takes HCTZ & Cozaar.   Counseled regarding low salt diet, exercise & weight control.   May have to add meds if remains high      VALVULAR HEART DISEASE:no              No significant VHD noted  Echo: 9/2020    Left ventricular systolic function is normal.   Ejection fraction is visually estimated at 50-55%.   Small left ventricular cavity.   No significant valvular disease noted.   No evidence of any pericardial effusion.     DYSLIPIDEMIA: yes, Good profile on current medication. Takes Lipitor  Patient's profile is at / near [employer]  HDL is [place] current medical regimen wellyes. Takes Lipitor  See most recent Lab values:( Reviewed Labs from family MD, Dr. Chirag Bowser)  LDL is 86  HDL is 56  Diabetes mellitis:yes, For > 10 years, still not well control, but says A1c is down from 15 to 8. 3.     ARRHYTHMIAS:yes, WPW with PAF. Still C/O exertional Palpitations. Patient accessory pathway appeared to be benign with pathway Wenckebach was at 440 ms on also effective refractory period was also at S2 over 440 ms. Patient was easily inducible into atrial fibrillation                  His CHADS score is high. He is on Xarelto now & Multaq. TESTS ORDERED: Event Monitor X 1 week. PREVIOUSLY ORDERED TESTS REVIEWED & DISCUSSED WITH THE PATIENT:     I personally reviewed & interpreted, all previously ordered tests as copied above. Latest Labs are pulled in to the note with dates. Labs, specially in Reference to Lipid profile, Cardiac testing in the form of Echo ( dated: ), stress tests ( dated: ) & other relevant cardiac testing reviewed with patient & recommendations made based on assessment of the results. Discussed role of Cardiac risk factors & effects + treatment of co morbidities with patient & advised accordingly. MEDICATIONS: List of medications patient is currently taking is reviewed in detail with the patient . Discussed any side effects or problems taking the medication. Recommend Continue present management & medications as listed. Patient to see [facility] Plan ASAP    AFFIRMATION: I  review patient's history, previous & current medical problems & all Labs + testing. This includes chart prep even prior to the vosit. Various goals are discussed and multiple questions answered. Relevant concelling performed.      Office follow up in 3 months.

## 2022-06-15 NOTE — PATIENT INSTRUCTIONS
CORONARY ARTERY DISEASE:None known. Stress test 3/2/2022   Normal near maximal study negative for angina or ECG evidence of ischemia.    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    Exercise tolerance is good. Patient exercised for 9:30 minutes on Noe    protocol.    Appropriate hemodynamic response to exercise is noted.      HYPERTENSION:for more than 10 years.  Controlled on current management. Takes HCTZ & Cozaar.   Counseled regarding low salt diet, exercise & weight control. May have to add meds if remains high      VALVULAR HEART DISEASE:no              No significant VHD noted  Echo: 9/2020    Left ventricular systolic function is normal.   Ejection fraction is visually estimated at 50-55%.   Small left ventricular cavity.   No significant valvular disease noted.   No evidence of any pericardial effusion.     DYSLIPIDEMIA: yes, Good profile on current medication. Takes Lipitor  Patient's profile is at / near [employer]  HDL is [employer] current medical regimen wellyes. Takes Lipitor  See most recent Lab values:( Reviewed Labs from family MD, Dr. Cody Patel)  LDL is 86  HDL is 56  Diabetes mellitis:yes, For > 10 years, still not well control, but says A1c is down from 15 to 8. 3.     ARRHYTHMIAS:yes, WPW with PAF. Still C/O exertional Palpitations. Patient accessory pathway appeared to be benign with pathway Wenckebach was at 440 ms on also effective refractory period was also at S2 over 440 ms. Patient was easily inducible into atrial fibrillation                  His CHADS score is high. He is on Xarelto now & Multaq. TESTS ORDERED: Event Monitor X 1 week. PREVIOUSLY ORDERED TESTS REVIEWED & DISCUSSED WITH THE PATIENT:     I personally reviewed & interpreted, all previously ordered tests as copied above. Latest Labs are pulled in to the note with dates.    Labs, specially in Reference to Lipid profile, Cardiac testing in the form of Echo ( dated: ), stress tests ( dated: ) & other relevant

## 2022-06-16 RX ORDER — SILDENAFIL 50 MG/1
TABLET, FILM COATED ORAL
Qty: 10 TABLET | Refills: 0 | Status: SHIPPED | OUTPATIENT
Start: 2022-06-16 | End: 2022-06-29

## 2022-06-17 ENCOUNTER — TELEPHONE (OUTPATIENT)
Dept: INTERNAL MEDICINE CLINIC | Age: 52
End: 2022-06-17

## 2022-06-17 NOTE — TELEPHONE ENCOUNTER
----- Message from Zayda Culver sent at 6/17/2022  3:43 PM EDT -----  Subject: Message to Provider    QUESTIONS  Information for Provider? pt called in stated he was in alot of pain on   the left side of his lower stomach , he explained he recently had surgery   and is in alot of pain and thinks he pulled it again . asking to be seen   on 6/20  ---------------------------------------------------------------------------  --------------  CALL BACK INFO  What is the best way for the office to contact you? OK to leave message on   voicemail, OK to respond with electronic message via [employer] portal (only   for patients who have registered [employer] account)  Preferred Call Back Phone Number? [phone]  ---------------------------------------------------------------------------  --------------  SCRIPT ANSWERS  Relationship to Patient? Self  Do you have pain that has started or worsened within the past 24 hours? No  Are you vomiting blood or have bloody or black stool? No  Have you recently (14 days) seen a provider for this pain?  Yes

## 2022-06-17 NOTE — TELEPHONE ENCOUNTER
Spoke to the patient and he no longer needs an appointment here on 6/20/22. He got back into the surgeon's office instead. He will keep his scheduled appointment here on 6/29/22.

## 2022-06-22 ENCOUNTER — OFFICE VISIT (OUTPATIENT)
Dept: SURGERY | Age: 52
End: 2022-06-22

## 2022-06-22 VITALS
HEART RATE: 73 BPM | WEIGHT: 219.2 LBS | SYSTOLIC BLOOD PRESSURE: 146 MMHG | HEIGHT: 74 IN | OXYGEN SATURATION: 99 % | BODY MASS INDEX: 28.13 KG/M2 | DIASTOLIC BLOOD PRESSURE: 86 MMHG

## 2022-06-22 DIAGNOSIS — Z48.89 POSTOPERATIVE VISIT: Primary | ICD-10-CM

## 2022-06-22 PROCEDURE — 99024 POSTOP FOLLOW-UP VISIT: CPT | Performed by: SURGERY

## 2022-06-22 ASSESSMENT — PATIENT HEALTH QUESTIONNAIRE - PHQ9
10. IF YOU CHECKED OFF ANY PROBLEMS, HOW DIFFICULT HAVE THESE PROBLEMS MADE IT FOR YOU TO DO YOUR WORK, TAKE CARE OF THINGS AT HOME, OR GET ALONG WITH OTHER PEOPLE: 0
7. TROUBLE CONCENTRATING ON THINGS, SUCH AS READING THE NEWSPAPER OR WATCHING TELEVISION: 0
8. MOVING OR SPEAKING SO SLOWLY THAT OTHER PEOPLE COULD HAVE NOTICED. OR THE OPPOSITE, BEING SO FIGETY OR RESTLESS THAT YOU HAVE BEEN MOVING AROUND A LOT MORE THAN USUAL: 0
3. TROUBLE FALLING OR STAYING ASLEEP: 0
4. FEELING TIRED OR HAVING LITTLE ENERGY: 0
6. FEELING BAD ABOUT YOURSELF - OR THAT YOU ARE A FAILURE OR HAVE LET YOURSELF OR YOUR FAMILY DOWN: 0
2. FEELING DOWN, DEPRESSED OR HOPELESS: 0
SUM OF ALL RESPONSES TO PHQ QUESTIONS 1-9: 0
9. THOUGHTS THAT YOU WOULD BE BETTER OFF DEAD, OR OF HURTING YOURSELF: 0
5. POOR APPETITE OR OVEREATING: 0
SUM OF ALL RESPONSES TO PHQ QUESTIONS 1-9: 0

## 2022-06-22 NOTE — PROGRESS NOTES
Chief Complaint   Patient presents with    Post-Op Check     2nd PO XU INC Hernia repair with Mesh @ [facility] 5/17/22 CC Having piercing pain feeling pain after lifting and coughing @ other site         SUBJECTIVE:  Patient here for post op visit. Status post robotic assisted repair of incisional hernia with preperitoneal mesh. Had small firmness over umbilicus which is suspected seroma and has improved. He does have pain on the right lateral abdomen. Small pinpoint area with twinges of pain in certain positions. Past Surgical History:   Procedure Laterality Date    ABDOMEN SURGERY      COLONOSCOPY N/A 04/25/2022    COLONOSCOPY POLYPECTOMY SNARE/COLD BIOPSY performed by Stacia Calderón DO at [address] N/A 5/17/2022    HERNIA INCISIONAL REPAIR LAPAROSCOPIC ROBOTIC WITH MESH performed by Stacia Calderón DO at [facility] N/A 05/10/2022    Electrophysiology study and cardioversion performed by Dr. Geovanna Sorto.    [facility], 2010    Fusion of L3, L4, L5    UMBILICAL HERNIA REPAIR  11/04/2015    repair incarcerated supra umbilical hernia    UPPER GASTROINTESTINAL ENDOSCOPY N/A 04/25/2022    EGD DIAGNOSTIC ONLY performed by Stacia Calderón DO at [facility] ENDOSCOPY     Past Medical History:   Diagnosis Date    Asthma     Depression     Diabetes mellitus ([address]     Hyperlipidemia     Hypertension     Sleep disorder     Ventral hernia without obstruction or gangrene 11/4/2015    WPW (Sigrid-Parkinson-White syndrome)     Nayla Sher     Family History   Problem Relation Age of Onset    Breast Cancer Mother      Social History     Socioeconomic History    Marital status: Single     Spouse name: Not on file    Number of children: Not on file    Years of education: Not on file    Highest education level: Not on file   Occupational History    Not on file   Tobacco Use    Smoking status: Never Smoker    Smokeless tobacco: Never Used   Vaping Use    Vaping Use: Never used   Substance and Sexual Activity    Alcohol use: Not Currently     Alcohol/week: 2.0 standard drinks     Types: 2 Standard drinks or equivalent per week    Drug use: Not Currently     Types: Marijuana Jameylindsay Rivas)     Comment: marijuana card    Sexual activity: Not on file   Other Topics Concern    Not on file   Social History Narrative    Not on file     Social Determinants of Health     Financial Resource Strain: Low Risk     Difficulty of Paying Living Expenses: Not hard at all   Food Insecurity: No Food Insecurity    Worried About Running Out of Food in the Last Year: Never true    Tonja of Food in the Last Year: Never true   Transportation Needs:     Lack of Transportation (Medical): Not on file    Lack of Transportation (Non-Medical): Not on file   Physical Activity:     Days of Exercise per Week: Not on file    Minutes of Exercise per Session: Not on file   Stress:     Feeling of Stress : Not on file   Social Connections:     Frequency of Communication with Friends and Family: Not on file    Frequency of Social Gatherings with Friends and Family: Not on file    Attends Rastafari Services: Not on file    Active Member of [address] [place] [address] or Organizations: Not on file    Attends Club or Organization Meetings: Not on file    Marital Status: Not on file   Intimate Partner Violence:     Fear of Current or Ex-Partner: Not on file    Emotionally Abused: Not on file    Physically Abused: Not on file    Sexually Abused: Not on file   Housing Stability:     Unable to Pay for Housing in the Last Year: Not on file    Number of [place] in the Last Year: Not on file    Unstable Housing in the Last Year: Not on file       OBJECTIVE:    General: A&O x3  Respiratory: Chest rise equal bilaterally  CV: Regular rate and rhythm  Abdomen: Soft, nontender, nondistended, no rebound or guarding. Incisions well-healed.   Small firmness over umbilicus is significantly reduced in size. No evidence of hernia recurrence. Suspected seroma. No evidence of hernia along left lateral abdomen scar. ASSESSMENT:    1. Postoperative visit          PLAN:    Evidence of hernia recurrence. Expect pain to improve. Follow-up as needed  No further lifting restrictions. No orders of the defined types were placed in this encounter. No orders of the defined types were placed in this encounter. Follow Up: No follow-ups on file.     Evaristo Leonard DO

## 2022-06-29 ENCOUNTER — OFFICE VISIT (OUTPATIENT)
Dept: INTERNAL MEDICINE CLINIC | Age: 52
End: 2022-06-29
Payer: COMMERCIAL

## 2022-06-29 VITALS
WEIGHT: 216 LBS | SYSTOLIC BLOOD PRESSURE: 122 MMHG | BODY MASS INDEX: 27.72 KG/M2 | HEART RATE: 75 BPM | DIASTOLIC BLOOD PRESSURE: 80 MMHG | HEIGHT: 74 IN | OXYGEN SATURATION: 97 %

## 2022-06-29 DIAGNOSIS — D75.1 POLYCYTHEMIA: ICD-10-CM

## 2022-06-29 DIAGNOSIS — K21.9 GASTROESOPHAGEAL REFLUX DISEASE WITHOUT ESOPHAGITIS: ICD-10-CM

## 2022-06-29 DIAGNOSIS — G47.00 INSOMNIA, UNSPECIFIED TYPE: ICD-10-CM

## 2022-06-29 DIAGNOSIS — N52.9 ERECTILE DYSFUNCTION, UNSPECIFIED ERECTILE DYSFUNCTION TYPE: ICD-10-CM

## 2022-06-29 DIAGNOSIS — F41.9 ANXIETY: ICD-10-CM

## 2022-06-29 DIAGNOSIS — H92.03 CHRONIC PAIN OF BOTH EARS: ICD-10-CM

## 2022-06-29 DIAGNOSIS — I45.6 WOLFF PARKINSON WHITE PATTERN SEEN ON ELECTROCARDIOGRAM: ICD-10-CM

## 2022-06-29 DIAGNOSIS — Z86.16 HISTORY OF COVID-19: ICD-10-CM

## 2022-06-29 DIAGNOSIS — I48.0 PAROXYSMAL ATRIAL FIBRILLATION (HCC): ICD-10-CM

## 2022-06-29 DIAGNOSIS — F32.A CHRONIC DEPRESSION: ICD-10-CM

## 2022-06-29 DIAGNOSIS — K43.9 VENTRAL HERNIA WITHOUT OBSTRUCTION OR GANGRENE: ICD-10-CM

## 2022-06-29 DIAGNOSIS — E78.00 PURE HYPERCHOLESTEROLEMIA: ICD-10-CM

## 2022-06-29 DIAGNOSIS — Z79.4 TYPE 2 DIABETES MELLITUS WITH DIABETIC POLYNEUROPATHY, WITH LONG-TERM CURRENT USE OF INSULIN (HCC): Primary | ICD-10-CM

## 2022-06-29 DIAGNOSIS — E11.42 TYPE 2 DIABETES MELLITUS WITH DIABETIC POLYNEUROPATHY, WITH LONG-TERM CURRENT USE OF INSULIN (HCC): Primary | ICD-10-CM

## 2022-06-29 DIAGNOSIS — G89.29 CHRONIC PAIN OF BOTH EARS: ICD-10-CM

## 2022-06-29 DIAGNOSIS — I10 ESSENTIAL HYPERTENSION: ICD-10-CM

## 2022-06-29 LAB
CHP ED QC CHECK: NORMAL
GLUCOSE BLD-MCNC: 220 MG/DL

## 2022-06-29 PROCEDURE — 82962 GLUCOSE BLOOD TEST: CPT | Performed by: INTERNAL MEDICINE

## 2022-06-29 PROCEDURE — 3051F HG A1C>EQUAL 7.0%<8.0%: CPT | Performed by: INTERNAL MEDICINE

## 2022-06-29 PROCEDURE — 99214 OFFICE O/P EST MOD 30 MIN: CPT | Performed by: INTERNAL MEDICINE

## 2022-06-29 RX ORDER — TADALAFIL 20 MG/1
20 TABLET ORAL PRN
Qty: 10 TABLET | Refills: 3 | Status: SHIPPED | OUTPATIENT
Start: 2022-06-29 | End: 2022-10-12 | Stop reason: SDUPTHER

## 2022-06-29 RX ORDER — TRAZODONE HYDROCHLORIDE 50 MG/1
50 TABLET ORAL NIGHTLY PRN
Qty: 30 TABLET | Refills: 5 | Status: SHIPPED | OUTPATIENT
Start: 2022-06-29 | End: 2022-07-22 | Stop reason: ALTCHOICE

## 2022-06-29 NOTE — PROGRESS NOTES
Name: Gayle Mott  Age: 54 y.o. YOB: 1970  Sex: male    CHIEF COMPLAINT:    Chief Complaint   Patient presents with    Diabetes    Hypertension     still waiting on the results from his second heart montior    Erectile Dysfunction     patient would like switched to cialis he is not using the viagra    Insomnia     still having trouble sleeping and wants restarted on ambien    Ear Problem     ears have dry skin and bleed. he would like an ENT referral    Other     other chronic conditions       HISTORY OF PRESENT ILLNESS:     This is a pleasant  54 y.o. male  is seen today for management of chronic medical problems and medications refills. Previous records reviewed . Patient with multiple complaints. Complains of chronic pressure in his ears bilaterally and wants to see an ENT  He recently had a incisional hernia repair, laparoscopic robotic repair with mesh by Dr. Israel Hartmann on 5/17/2022. He claimed that he still has a feeling of some fullness on the left lower quadrant area and he claimed that he talked to Dr. Kobe Jones but he did not get a satisfactory explanation for it. Also complains of insomnia. He wants Ambien as he has taken Ambien for long period of time in the past.  Patient also complains that Viagra is not working for him and he wants Cialis 20 mg as needed for his erectile dysfunction. Patient complains of palpitations on and off especially exertional palpitations. He has a known WP W/ PAF. He had a EP study done by Dr. María Horowitz and the diagnosis was easily inducible atrial fibrillation and benign accessory pathway. Dr. María Horowitz recommended Xarelto and Multaq for now. Zane Heath He is going to follow with him soon as he continues to have exertional palpitations. Denies any chest pain. Still continues to have migraine headaches but rare. He is taking Fioricet as needed. Appetite is good. Bowels are moving okay. Gastritis symptoms are better.   No urinary symptoms. His sugars are better and he is watching his diet and taking medications regularly. His A1c today is 7.7. He has been fully vaccinated for COVID-19 including the booster dose. Labs from 5/4/2022 reviewed and explained to the patient. Past Medical History:    Patient Active Problem List   Diagnosis    Ventral hernia without obstruction or gangrene    Essential hypertension    Gastroesophageal reflux disease without esophagitis    Dupuytren's contracture    Chronic skin ulcer, limited to breakdown of skin (HCC)    Chronic bilateral low back pain without sciatica    Type 2 diabetes mellitus with diabetic polyneuropathy, with long-term current use of insulin (HCC)    Erectile dysfunction    Chest pain    Sigrid Parkinson White pattern seen on electrocardiogram    Fatty liver    Neuropathy    Dorsalgia    Pure hypercholesterolemia    Pain in both feet    Chronic pain of left knee    Chronic depression    Anxiety    Polycythemia    History of COVID-19    Left upper quadrant abdominal swelling, mass and lump    Paroxysmal atrial fibrillation ([address]        Past Surgical History:        Procedure Laterality Date    ABDOMEN SURGERY      COLONOSCOPY N/A 04/25/2022    COLONOSCOPY POLYPECTOMY SNARE/COLD BIOPSY performed by Evaristo Leonard DO at [address] N/A 5/17/2022    HERNIA INCISIONAL REPAIR LAPAROSCOPIC ROBOTIC WITH MESH performed by Evaristo Leonard DO at [place] [address] N/A 05/10/2022    Electrophysiology study and cardioversion performed by Dr. Concetta Hernández.    [address] [facility] [address], 2010    Fusion of L3, L4, L5    UMBILICAL HERNIA REPAIR  11/04/2015    repair incarcerated supra umbilical hernia    UPPER GASTROINTESTINAL ENDOSCOPY N/A 04/25/2022    EGD DIAGNOSTIC ONLY performed by Evaristo Leonard DO at [facility] ENDOSCOPY       Social History:   Social History     Tobacco Use    Smoking status: Never Smoker    Smokeless tobacco: Never Used   Substance Use Topics    Alcohol use: Not Currently     Alcohol/week: 2.0 standard drinks     Types: 2 Standard drinks or equivalent per week       Family History:       Problem Relation Age of Onset    Breast Cancer Mother        Allergies:  Bee venom, Hornet venom, and Tramadol    Current Medications :      Prior to Admission medications    Medication Sig Start Date End Date Taking? Authorizing Provider   traZODone (DESYREL) 50 MG tablet Take 1 tablet by mouth nightly as needed for Sleep 6/29/22  Yes Gerry Godinez MD   tadalafil (CIALIS) 20 MG tablet Take 1 tablet by mouth as needed for Erectile Dysfunction 6/29/22  Yes Gerry Godinez MD   potassium chloride (KLOR-CON) 10 MEQ extended release tablet TAKE 1 TABLET BY MOUTH EVERY DAY 5/17/22  Yes Historical Provider, MD   rivaroxaban (XARELTO) 20 MG TABS tablet Take 1 tablet by mouth daily 5/11/22  Yes Musa Parnell APRN - CNP   buPROPion [facility]) 100 MG extended release tablet Take 1 tablet by mouth 2 times daily 3/23/22  Yes Gerry Godinez MD   butalbital-acetaminophen-caffeine (FIORICET, ESGIC) -13 MG per tablet Take 1 tablet by mouth every 4 hours as needed for Headaches 3/15/22  Yes Gerry Godinez MD   amitriptyline (ELAVIL) 50 MG tablet Take 1 tablet by mouth nightly 1/20/22  Yes Gerry Godinez MD   atorvastatin (LIPITOR) 40 MG tablet Take 1 tablet by mouth daily 1/20/22  Yes Gerry Godinez MD   gabapentin (NEURONTIN) 400 MG capsule Take 1 capsule by mouth 3 times daily for 90 days.  Intended supply: 90 days 1/20/22 6/29/22 Yes Gerry Godinez MD   hydroCHLOROthiazide (MICROZIDE) 12.5 MG capsule Take 1 capsule by mouth every morning 1/20/22  Yes Gerry Godinez MD   Insulin Degludec (TRESIBA FLEXTOUCH) 100 UNIT/ML SOPN Inject 40 Units into the skin nightly 1/20/22  Yes Gerry Godinez MD   insulin lispro (HUMALOG KWIKPEN) 200 UNIT/ML SOPN pen TID with meals Glucose [id number] No Insulin 140-199 2 Units 200-249 4 Units 250-299 6 Units 300-349 8 Units 350-400 10 Units over 400 12 Units 1/20/22  Yes Sabrina Johnson MD   losartan (COZAAR) 100 MG tablet Take 1 tablet by mouth daily 1/20/22  Yes Sabrina Johnson MD   meloxicam (MOBIC) 7.5 MG tablet Take 1 tablet by mouth daily 1/20/22  Yes Sabrina Johnson MD   metFORMIN (GLUCOPHAGE) 1000 MG tablet Take 1 tablet by mouth 2 times daily (with meals) 1/20/22  Yes Sabrina Johnson MD   potassium chloride (KLOR-CON M) 10 MEQ extended release tablet Take 1 tablet by mouth daily OTC 1/20/22  Yes Sabrina Johnson MD   Ertugliflozin L-PyroglutamicAc (STEGLATRO) 15 MG TABS Take 15 mg by mouth daily 1/20/22  Yes Sabrina Johnson MD   tiZANidine (ZANAFLEX) 4 MG tablet Take 1 tablet by mouth at bedtime 1/20/22  Yes Sabrina Johnson MD   escitalopram (LEXAPRO) 10 MG tablet Take 1 tablet by mouth daily 1/20/22  Yes Sabrina Johnson MD   omeprazole (PRILOSEC) 20 MG delayed release capsule Take 1 capsule by mouth daily 1/13/22  Yes Sabrina Johnson MD   vitamin B-12 (CYANOCOBALAMIN) 1000 MCG tablet Take 1 tablet by mouth daily  Patient taking differently: Take 1,000 mcg by mouth daily OTC 6/30/20  Yes Matthew Valladares DO   Cholecalciferol (VITAMIN D3) 50 MCG (2000 UT) TABS Take one tablet by mouth once a day  Patient taking differently: Take one tablet by mouth once a day OTC 6/30/20  Yes Matthew Valladares DO       LAB DATA: Reviewed. REVIEW OF SYSTEMS:   see HPI/ Comprehensive review of systems negative except for the ones mentioned in HPI. PHYSICAL EXAMINATION:   /80 (Site: Left Upper Arm, Position: Sitting, Cuff Size: Medium Adult)   Pulse 75   Ht 6' 2\" (1.88 m)   Wt 216 lb (98 kg)   SpO2 97%   BMI 27.73 kg/m²      GENERAL APPEARANCE:      Alert, oriented x 3, well developed, cooperative, not in any distress, appears stated age. HEAD:                         Normocephalic, atraumatic   EYES:                          PERRLA, EOMI, lids normal, conjuctivea clear, sclera anicteric.    NECK: gangrene  Status post surgical repair recently. 8. Polycythemia  Being followed by Dr. Brittany Renee periodically. His hemoglobin is stable. 9. Anxiety  Continue Lexapro    10. Chronic depression  Continue Lexapro    11. Erectile dysfunction, unspecified erectile dysfunction type  DC sildenafil and start a new Cialis 20 mg as needed  - tadalafil (CIALIS) 20 MG tablet; Take 1 tablet by mouth as needed for Erectile Dysfunction  Dispense: 10 tablet; Refill: 3    12. History of COVID-19  Improved. 13. Insomnia, unspecified type  We will start him on trazodone. Advised patient that Ambien is habit-forming and should be avoided as much as possible  - traZODone (DESYREL) 50 MG tablet; Take 1 tablet by mouth nightly as needed for Sleep  Dispense: 30 tablet; Refill: 5    14. Chronic pain of both ears  Will refer to ENT. - Amb External Referral To ENT    Advised to continue to follow COVID-19 precautions    Care discussed with patient. Questions answered and patient verbalizes understanding and agrees with plan. Medications reviewed and reconciled. Continue current medications. Appropriate prescriptions are ordered. Risks and benefits of meds are discussed. After visit summary provided. Advised to call for any problems, questions, or concerns. If symptoms worsen or don't improve as expected, to call us or go to ER. Follow up as directed, sooner if needed. Return in about 3 months (around 9/29/2022). This dictation was performed with a verbal recognition program and it was checked for errors. It is possible that there are still dictated errors within this office note. Any errors should be brought immediately to my attention for correction. All efforts were made to ensure that this office note is accurate.      Mack Boas, MD MD

## 2022-06-29 NOTE — PATIENT INSTRUCTIONS
We are committed to providing you the best care possible. If you receive a survey after visiting one of our offices, please take time to share your experience concerning your physician office visit. These surveys are confidential and no health information about you is shared. We are eager to improve for you and we are counting on your feedback to help make that happen. [Follow-Up] : a follow-up visit

## 2022-06-30 RX ORDER — ESCITALOPRAM OXALATE 10 MG/1
TABLET ORAL
Qty: 30 TABLET | Refills: 3 | Status: SHIPPED | OUTPATIENT
Start: 2022-06-30 | End: 2022-10-12 | Stop reason: SDUPTHER

## 2022-07-05 ENCOUNTER — TELEPHONE (OUTPATIENT)
Dept: INTERNAL MEDICINE CLINIC | Age: 52
End: 2022-07-05

## 2022-07-05 ENCOUNTER — TELEPHONE (OUTPATIENT)
Dept: CARDIOLOGY CLINIC | Age: 52
End: 2022-07-05

## 2022-07-05 DIAGNOSIS — R94.31 ABNORMAL HOLTER MONITOR FINDING: Primary | ICD-10-CM

## 2022-07-07 DIAGNOSIS — K92.0 HEMATEMESIS WITH NAUSEA: ICD-10-CM

## 2022-07-07 NOTE — TELEPHONE ENCOUNTER
Patient returned call and was given monitor results.  Patient advised that he would get a call to schedule stress test. Patient agreed

## 2022-07-08 RX ORDER — OMEPRAZOLE 20 MG/1
CAPSULE, DELAYED RELEASE ORAL
Qty: 30 CAPSULE | Refills: 0 | Status: SHIPPED | OUTPATIENT
Start: 2022-07-08 | End: 2022-07-22 | Stop reason: ALTCHOICE

## 2022-07-13 ENCOUNTER — PROCEDURE VISIT (OUTPATIENT)
Dept: CARDIOLOGY CLINIC | Age: 52
End: 2022-07-13
Payer: COMMERCIAL

## 2022-07-13 VITALS
SYSTOLIC BLOOD PRESSURE: 140 MMHG | BODY MASS INDEX: 28.23 KG/M2 | WEIGHT: 220 LBS | DIASTOLIC BLOOD PRESSURE: 80 MMHG | HEART RATE: 65 BPM | HEIGHT: 74 IN

## 2022-07-13 DIAGNOSIS — R00.0 TACHYCARDIA: ICD-10-CM

## 2022-07-13 DIAGNOSIS — I48.0 PAROXYSMAL ATRIAL FIBRILLATION (HCC): ICD-10-CM

## 2022-07-13 DIAGNOSIS — R00.2 PALPITATIONS: Primary | ICD-10-CM

## 2022-07-13 DIAGNOSIS — Z86.16 HISTORY OF COVID-19: ICD-10-CM

## 2022-07-13 DIAGNOSIS — I45.6 WOLFF PARKINSON WHITE PATTERN SEEN ON ELECTROCARDIOGRAM: ICD-10-CM

## 2022-07-13 DIAGNOSIS — R94.31 ABNORMAL HOLTER MONITOR FINDING: ICD-10-CM

## 2022-07-13 PROCEDURE — 93015 CV STRESS TEST SUPVJ I&R: CPT | Performed by: INTERNAL MEDICINE

## 2022-07-21 ENCOUNTER — TELEPHONE (OUTPATIENT)
Dept: GASTROENTEROLOGY | Age: 52
End: 2022-07-21

## 2022-07-21 NOTE — TELEPHONE ENCOUNTER
Pt. PCP called stating pt told them he has tried to call our office numerous times and has not been contacted. I called pt. In attempts to make an appt for pt. No answer, LM for pt to call back.

## 2022-07-22 ENCOUNTER — OFFICE VISIT (OUTPATIENT)
Dept: GASTROENTEROLOGY | Age: 52
End: 2022-07-22
Payer: COMMERCIAL

## 2022-07-22 VITALS
DIASTOLIC BLOOD PRESSURE: 90 MMHG | HEART RATE: 66 BPM | BODY MASS INDEX: 28.16 KG/M2 | TEMPERATURE: 97.4 F | OXYGEN SATURATION: 96 % | WEIGHT: 219.4 LBS | SYSTOLIC BLOOD PRESSURE: 142 MMHG | HEIGHT: 74 IN

## 2022-07-22 DIAGNOSIS — R19.5 LOOSE STOOLS: ICD-10-CM

## 2022-07-22 DIAGNOSIS — K92.1 BLACK STOOL: Primary | ICD-10-CM

## 2022-07-22 DIAGNOSIS — Z87.19 HISTORY OF COLITIS: ICD-10-CM

## 2022-07-22 DIAGNOSIS — R11.0 NAUSEA: ICD-10-CM

## 2022-07-22 PROCEDURE — 99214 OFFICE O/P EST MOD 30 MIN: CPT | Performed by: NURSE PRACTITIONER

## 2022-07-22 RX ORDER — ONDANSETRON 4 MG/1
4 TABLET, FILM COATED ORAL DAILY PRN
Qty: 30 TABLET | Refills: 3 | Status: SHIPPED | OUTPATIENT
Start: 2022-07-22 | End: 2022-09-28

## 2022-07-22 RX ORDER — DICYCLOMINE HYDROCHLORIDE 10 MG/1
10 CAPSULE ORAL 4 TIMES DAILY
Qty: 120 CAPSULE | Refills: 3 | Status: SHIPPED | OUTPATIENT
Start: 2022-07-22

## 2022-07-22 RX ORDER — OMEPRAZOLE 20 MG/1
20 CAPSULE, DELAYED RELEASE ORAL DAILY
Qty: 30 CAPSULE | Refills: 1 | Status: SHIPPED | OUTPATIENT
Start: 2022-07-22 | End: 2022-11-19

## 2022-07-22 NOTE — PROGRESS NOTES
Fabrice Vasquez 54 y.o. male was seen by MARIA ELENA Holt on 7/22/2022     Wt Readings from Last 3 Encounters:   07/22/22 219 lb 6.4 oz (99.5 kg)   07/13/22 220 lb (99.8 kg)   06/29/22 216 lb (98 kg)       HPI  Fabrice Vasquez is a pleasant 54 y.o.  male who presents today for follow-up on acid reflux, bloating, dark stools and hematemesis. He is on Xarelto for WPW monitored by  Dr Erick Plunkett and Dr. Wilma Renteria. He denies NSAID or alcohol use. He had a incisional hernia repair by Dr. Kennedy Van on 6-. His EGD/colonoscopy were done by Dr. Kennedy Van on 4-. His EGD was normal with no peptic ulcer disease. His stomach biopsies showed mild chronic gastritis with no evidence of H. Pylori infection. His colonoscopy showed normal appearing colon with cecal biopsy showing mild active colitis. His appetite is fair and weight is stable. No abdominal pain, bloating or distention. He has intermittent nausea with dry heaving this morning with noted blood tinged sputum. No heartburn or acid reflux symptoms. No nocturnal awakenings with acid reflux. No dysphagia or pain with swallowing. No excess belching or flatulance. His typical bowel pattern is six to seven times daily with soft blobs. Diarrhea in the morning two to three times. No constipation. No rectal bleeding. He had a dark black stool noted on Monday and Tuesday but mentioned none since and currently having normal stools. No family history of stomach or colon cancer. ROS  Review of Systems  Constitutional: Positive for fatigue and unexpected weight change. Negative for chills, diaphoresis and fever. HENT: Positive for ear pain (both ears). Negative for hearing loss and tinnitus. Eyes: Negative for pain, discharge and visual disturbance. Respiratory: Negative for cough, shortness of breath and wheezing. Cardiovascular: Negative for chest pain, palpitations and leg swelling. Gastrointestinal: Positive for nausea.  Negative for abdominal pain, constipation, diarrhea and vomiting. Endocrine: Negative for cold intolerance and heat intolerance. Genitourinary: Negative for dysuria, frequency, hematuria and urgency. Musculoskeletal: Positive for back pain. Negative for myalgias and neck pain. Skin: Negative for color change, pallor and rash. Allergic/Immunologic: Negative for environmental allergies and food allergies. Neurological: Positive for dizziness and headaches. Negative for seizures and weakness. Hematological: Does not bruise/bleed easily. Psychiatric/Behavioral: Positive for dysphoric mood and sleep disturbance. The patient is nervous/anxious. Allergies  Allergies   Allergen Reactions    Bee Venom Anaphylaxis    Hornet Venom     Tramadol Other (See Comments)     Night terrors and sweats       Medications  Current Outpatient Medications   Medication Sig Dispense Refill    omeprazole (PRILOSEC) 20 MG delayed release capsule TAKE 1 CAPSULE BY MOUTH EVERY DAY 30 capsule 0    escitalopram (LEXAPRO) 10 MG tablet TAKE 1 TABLET BY MOUTH EVERY DAY 30 tablet 3    tadalafil (CIALIS) 20 MG tablet Take 1 tablet by mouth as needed for Erectile Dysfunction 10 tablet 3    rivaroxaban (XARELTO) 20 MG TABS tablet Take 1 tablet by mouth daily 14 tablet 0    buPROPion (WELLBUTRIN SR) 100 MG extended release tablet Take 1 tablet by mouth 2 times daily 60 tablet 3    butalbital-acetaminophen-caffeine (FIORICET, ESGIC) -40 MG per tablet Take 1 tablet by mouth every 4 hours as needed for Headaches 180 tablet 3    amitriptyline (ELAVIL) 50 MG tablet Take 1 tablet by mouth nightly 90 tablet 1    atorvastatin (LIPITOR) 40 MG tablet Take 1 tablet by mouth daily 90 tablet 1    gabapentin (NEURONTIN) 400 MG capsule Take 1 capsule by mouth 3 times daily for 90 days.  Intended supply: 90 days 270 capsule 1    hydroCHLOROthiazide (MICROZIDE) 12.5 MG capsule Take 1 capsule by mouth every morning 90 capsule 1    Insulin Degludec (TRESIBA FLEXTOUCH) 100 UNIT/ML SOPN Inject 40 Units into the skin nightly 6 pen 5    insulin lispro (HUMALOG KWIKPEN) 200 UNIT/ML SOPN pen TID with meals Glucose [id number] No Insulin 140-199 2 Units 200-249 4 Units 250-299 6 Units 300-349 8 Units 350-400 10 Units over 400 12 Units 5 pen 5    losartan (COZAAR) 100 MG tablet Take 1 tablet by mouth daily 90 tablet 1    meloxicam (MOBIC) 7.5 MG tablet Take 1 tablet by mouth daily 90 tablet 1    metFORMIN (GLUCOPHAGE) 1000 MG tablet Take 1 tablet by mouth 2 times daily (with meals) 180 tablet 1    potassium chloride (KLOR-CON M) 10 MEQ extended release tablet Take 1 tablet by mouth daily OTC 90 tablet 1    Ertugliflozin L-PyroglutamicAc (STEGLATRO) 15 MG TABS Take 15 mg by mouth daily 30 tablet 3    tiZANidine (ZANAFLEX) 4 MG tablet Take 1 tablet by mouth at bedtime 30 tablet 3    vitamin B-12 (CYANOCOBALAMIN) 1000 MCG tablet Take 1 tablet by mouth daily (Patient taking differently: Take 1,000 mcg by mouth in the morning. OTC.) 90 tablet 1    Cholecalciferol (VITAMIN D3) 50 MCG (2000 UT) TABS Take one tablet by mouth once a day (Patient taking differently: Take one tablet by mouth once a day OTC) 30 tablet 3     No current facility-administered medications for this visit. Past medical history:   He has a past medical history of Asthma, Depression, Diabetes mellitus ([facility] [address] Hyperlipidemia, Hypertension, Palpitations, Sleep disorder, Tachycardia, Ventral hernia without obstruction or gangrene, and WPW (Sigrid-Parkinson-White syndrome). Past surgical history:  He has a past surgical history that includes hernia repair; Spine surgery (1990, 2010); Umbilical hernia repair (11/04/2015); Elbow surgery (Right); Abdomen surgery; Colonoscopy (N/A, 04/25/2022); Upper gastrointestinal endoscopy (N/A, 04/25/2022); other surgical history (N/A, 05/10/2022); and hernia repair (N/A, 5/17/2022). Social History:  He reports that he has never smoked.  He has never used smokeless tobacco. He reports that he does not currently use alcohol after a past usage of about 2.0 standard drinks per week. He reports that he does not currently use drugs after having used the following drugs: Marijuana Jacinto Carlos). Family history:  His family history includes Breast Cancer in his mother. Objective    Vitals:    07/22/22 1130   BP: (!) 152/82   Pulse: 66   Temp: 97.4 °F (36.3 °C)   SpO2: 96%        Physical exam    Physical Exam  Constitutional:       General: He is not in acute distress. Appearance: Normal appearance. He is well-developed. He is not ill-appearing, toxic-appearing or diaphoretic. HENT:      Head: Normocephalic and atraumatic. Nose: Nose normal.      Mouth/Throat:      Mouth: Mucous membranes are moist.   Cardiovascular:      Rate and Rhythm: Normal rate and regular rhythm. Pulses: Normal pulses. Heart sounds: Normal heart sounds. No murmur heard. No gallop. Pulmonary:      Effort: Pulmonary effort is normal. No respiratory distress. Breath sounds: Normal breath sounds. No stridor. No wheezing or rhonchi. Abdominal:      General: Bowel sounds are normal. There is no distension. Palpations: Abdomen is soft. There is no mass. Tenderness: There is no abdominal tenderness. Hernia: No hernia is present. Musculoskeletal:         General: Normal range of motion. Cervical back: Neck supple. Skin:     General: Skin is warm and dry. Neurological:      Mental Status: He is alert and oriented to person, place, and time. Psychiatric:         Mood and Affect: Mood normal.       Office Visit on 06/29/2022   Component Date Value Ref Range Status    Glucose 06/29/2022 220  mg/dL Final       Assessment and Plan:  1. Hematemesis with intermittent dark stool most likely due to [employer] tear from dry heaving. No active bright red bleeding and current stools are normal.  Recommend avoidance of NSAID's.   His recent EGD/colonoscopy showed no evidence of GI bleeding. Continue Prilosec. Will order lab work to evaluate for anemia. His lab work done in May showed no evidence of anemia. 2.  Episodic nausea and bloating most likely due to gastritis or functional dyspepsia with anxiety component. The patient was encouraged to take Zofran as needed. Continue taking Elavil. 3. Loose stools with intermittent diarrhea most likely due to mild colitis. Will order Bentyl take as needed. May take antidiarrheals if this worsens may trial Entocort. 4.  The patient was encouraged to follow-up in 3 months. Total time:  25 minutes.

## 2022-07-26 LAB — BCR/ABL T(9,22): NORMAL

## 2022-08-04 ENCOUNTER — APPOINTMENT (OUTPATIENT)
Dept: CT IMAGING | Age: 52
End: 2022-08-04
Payer: COMMERCIAL

## 2022-08-04 ENCOUNTER — HOSPITAL ENCOUNTER (EMERGENCY)
Age: 52
Discharge: HOME | End: 2022-08-05
Payer: COMMERCIAL

## 2022-08-04 VITALS
HEART RATE: 54 BPM | DIASTOLIC BLOOD PRESSURE: 94 MMHG | RESPIRATION RATE: 18 BRPM | TEMPERATURE: 98.4 F | OXYGEN SATURATION: 96 % | SYSTOLIC BLOOD PRESSURE: 154 MMHG

## 2022-08-04 DIAGNOSIS — R10.9 LEFT SIDED ABDOMINAL PAIN: Primary | ICD-10-CM

## 2022-08-04 LAB
ALBUMIN SERPL-MCNC: 4.7 GM/DL (ref 3.4–5)
ALP BLD-CCNC: 82 IU/L (ref 40–129)
ALT SERPL-CCNC: 47 U/L (ref 10–40)
ANION GAP SERPL CALCULATED.3IONS-SCNC: 12 MMOL/L (ref 4–16)
AST SERPL-CCNC: 32 IU/L (ref 15–37)
BASOPHILS ABSOLUTE: 0 K/CU MM
BASOPHILS RELATIVE PERCENT: 0.6 % (ref 0–1)
BILIRUB SERPL-MCNC: 0.3 MG/DL (ref 0–1)
BUN BLDV-MCNC: 22 MG/DL (ref 6–23)
CALCIUM SERPL-MCNC: 9.6 MG/DL (ref 8.3–10.6)
CHLORIDE BLD-SCNC: 101 MMOL/L (ref 99–110)
CO2: 24 MMOL/L (ref 21–32)
CREAT SERPL-MCNC: 0.8 MG/DL (ref 0.9–1.3)
DIFFERENTIAL TYPE: ABNORMAL
EOSINOPHILS ABSOLUTE: 0.2 K/CU MM
EOSINOPHILS RELATIVE PERCENT: 3.6 % (ref 0–3)
GFR AFRICAN AMERICAN: >60 ML/MIN/1.73M2
GFR NON-AFRICAN AMERICAN: >60 ML/MIN/1.73M2
GLUCOSE BLD-MCNC: 147 MG/DL (ref 70–99)
HCT VFR BLD CALC: 46.8 % (ref 42–52)
HEMOGLOBIN: 16.7 GM/DL (ref 13.5–18)
IMMATURE NEUTROPHIL %: 0.2 % (ref 0–0.43)
LACTATE: 0.8 MMOL/L (ref 0.4–2)
LIPASE: 26 IU/L (ref 13–60)
LYMPHOCYTES ABSOLUTE: 1.8 K/CU MM
LYMPHOCYTES RELATIVE PERCENT: 33.8 % (ref 24–44)
MCH RBC QN AUTO: 30.7 PG (ref 27–31)
MCHC RBC AUTO-ENTMCNC: 35.7 % (ref 32–36)
MCV RBC AUTO: 86 FL (ref 78–100)
MONOCYTES ABSOLUTE: 0.5 K/CU MM
MONOCYTES RELATIVE PERCENT: 9.4 % (ref 0–4)
NUCLEATED RBC %: 0 %
PDW BLD-RTO: 12.1 % (ref 11.7–14.9)
PLATELET # BLD: 214 K/CU MM (ref 140–440)
PMV BLD AUTO: 10.1 FL (ref 7.5–11.1)
POTASSIUM SERPL-SCNC: 4.3 MMOL/L (ref 3.5–5.1)
RBC # BLD: 5.44 M/CU MM (ref 4.6–6.2)
SEGMENTED NEUTROPHILS ABSOLUTE COUNT: 2.8 K/CU MM
SEGMENTED NEUTROPHILS RELATIVE PERCENT: 52.4 % (ref 36–66)
SODIUM BLD-SCNC: 137 MMOL/L (ref 135–145)
TOTAL IMMATURE NEUTOROPHIL: 0.01 K/CU MM
TOTAL NUCLEATED RBC: 0 K/CU MM
TOTAL PROTEIN: 7.2 GM/DL (ref 6.4–8.2)
WBC # BLD: 5.3 K/CU MM (ref 4–10.5)

## 2022-08-04 PROCEDURE — 83690 ASSAY OF LIPASE: CPT

## 2022-08-04 PROCEDURE — 83605 ASSAY OF LACTIC ACID: CPT

## 2022-08-04 PROCEDURE — 99285 EMERGENCY DEPT VISIT HI MDM: CPT

## 2022-08-04 PROCEDURE — 6360000004 HC RX CONTRAST MEDICATION: Performed by: PHYSICIAN ASSISTANT

## 2022-08-04 PROCEDURE — 80053 COMPREHEN METABOLIC PANEL: CPT

## 2022-08-04 PROCEDURE — 85025 COMPLETE CBC W/AUTO DIFF WBC: CPT

## 2022-08-04 PROCEDURE — 74177 CT ABD & PELVIS W/CONTRAST: CPT

## 2022-08-04 RX ADMIN — IOPAMIDOL 75 ML: 755 INJECTION, SOLUTION INTRAVENOUS at 23:16

## 2022-08-04 ASSESSMENT — PAIN SCALES - GENERAL: PAINLEVEL_OUTOF10: 4

## 2022-08-04 ASSESSMENT — PAIN DESCRIPTION - ORIENTATION: ORIENTATION: LEFT;LOWER

## 2022-08-04 ASSESSMENT — PAIN DESCRIPTION - LOCATION: LOCATION: ABDOMEN

## 2022-08-04 ASSESSMENT — PAIN DESCRIPTION - PAIN TYPE: TYPE: ACUTE PAIN

## 2022-08-04 ASSESSMENT — PAIN DESCRIPTION - FREQUENCY: FREQUENCY: INTERMITTENT

## 2022-08-04 ASSESSMENT — PAIN DESCRIPTION - DESCRIPTORS: DESCRIPTORS: SHARP;SHOOTING

## 2022-08-05 ENCOUNTER — TELEPHONE (OUTPATIENT)
Dept: CARDIOLOGY CLINIC | Age: 52
End: 2022-08-05

## 2022-08-05 NOTE — ED PROVIDER NOTES
Elza      PCP: Rangel Fraire MD    [address] [place] [address]    Chief Complaint   Patient presents with    Abdominal Pain     LLQ, started at 1815 today       This patient was not evaluated by the attending physician. I have independently evaluated this patient. ELOISA Cabrera is a 54 y.o. male who presents with left-sided abdominal pain. Onset today. Patient describes pain as stabbing. Patient states he has had \"problems\" with his left side of his abdomen for the past few months, has been following with gastroenterology. Patient states he had hernia repairs with general surgeon Dr. Yeison Mclaughlin. Patient denies chest pain, shortness of breath, fever, vomiting or diarrhea. REVIEW OF SYSTEMS    Constitutional:  Denies fever   HENT:  Denies sore throat or ear pain   Cardiovascular:  Denies chest pain,   Respiratory:  Denies cough or shortness of breath   GI:  See HPI above  : No hematuria or dysuria. Musculoskeletal:  No pain or swelling of extremities.   Skin:  Denies rash  Neurologic:  Denies focal weakness or sensory changes   Lymphatic:  Denies swollen glands     All other review of systems are negative  See HPI and nursing notes for additional information     PAST MEDICAL & SURGICAL HISTORY    Past Medical History:   Diagnosis Date    Asthma     Depression     Diabetes mellitus ([facility] [address]     Hyperlipidemia     Hypertension     Palpitations     Sleep disorder     Tachycardia     Ventral hernia without obstruction or gangrene 11/04/2015    WPW (Sigrid-Parkinson-White syndrome)     Jose Shirley     Past Surgical History:   Procedure Laterality Date    ABDOMEN SURGERY      COLONOSCOPY N/A 04/25/2022    COLONOSCOPY POLYPECTOMY SNARE/COLD BIOPSY performed by Ruperto Vee DO at [address] N/A 5/17/2022    HERNIA INCISIONAL REPAIR LAPAROSCOPIC ROBOTIC WITH MESH performed by Ruperto Vee DO at [address] SURGICAL HISTORY N/A 05/10/2022    Electrophysiology study and cardioversion performed by Dr. Madelyn Kimball. [address] [facility], [facility]    Fusion of L3, L4, L5    UMBILICAL HERNIA REPAIR  11/04/2015    repair incarcerated supra umbilical hernia    UPPER GASTROINTESTINAL ENDOSCOPY N/A 04/25/2022    EGD DIAGNOSTIC ONLY performed by Yuri Mendosa DO at [address] [place] [address]    Current Outpatient Rx   Medication Sig Dispense Refill    omeprazole (PRILOSEC) 20 MG delayed release capsule Take 1 capsule by mouth in the morning. Take on an empty stomach before breakfast. 30 capsule 1    ondansetron (ZOFRAN) 4 MG tablet Take 1 tablet by mouth daily as needed for Nausea or Vomiting 30 tablet 3    dicyclomine (BENTYL) 10 MG capsule Take 1 capsule by mouth in the morning and 1 capsule at noon and 1 capsule in the evening and 1 capsule before bedtime. 120 capsule 3    escitalopram (LEXAPRO) 10 MG tablet TAKE 1 TABLET BY MOUTH EVERY DAY 30 tablet 3    tadalafil (CIALIS) 20 MG tablet Take 1 tablet by mouth as needed for Erectile Dysfunction 10 tablet 3    rivaroxaban (XARELTO) 20 MG TABS tablet Take 1 tablet by mouth daily 14 tablet 0    buPROPion (WELLBUTRIN SR) 100 MG extended release tablet Take 1 tablet by mouth 2 times daily 60 tablet 3    butalbital-acetaminophen-caffeine (FIORICET, ESGIC) -40 MG per tablet Take 1 tablet by mouth every 4 hours as needed for Headaches 180 tablet 3    amitriptyline (ELAVIL) 50 MG tablet Take 1 tablet by mouth nightly 90 tablet 1    atorvastatin (LIPITOR) 40 MG tablet Take 1 tablet by mouth daily 90 tablet 1    gabapentin (NEURONTIN) 400 MG capsule Take 1 capsule by mouth 3 times daily for 90 days.  Intended supply: 90 days 270 capsule 1    hydroCHLOROthiazide (MICROZIDE) 12.5 MG capsule Take 1 capsule by mouth every morning 90 capsule 1    Insulin Degludec (TRESIBA FLEXTOUCH) 100 UNIT/ML SOPN Inject 40 Units into the skin nightly 6 pen 5    insulin lispro (HUMALOG KWIKPEN) 96%   Constitutional:  Well developed, well nourished  Eyes:  Sclera nonicteric, conjunctiva moist  HENT:  Atraumatic. PERRL. Neck/Lymphatics: supple, no JVD, no swollen nodes  Respiratory:  No retractions, no accessory muscle use, normal breath sounds   Cardiovascular: Irregular rate and rhythm  GI:    No gross discoloration. Bowel sounds present, no audible bruits. Soft,  no guarding,   + Mild left mid abdominal tenderness, no rebound  Back:   No CVA tenderness to percussion.   Musculoskeletal:  No edema, no deformity  Integument: No rash, dry skin  Neurologic:  Alert & oriented, normal speech  Psychiatric: Cooperative, pleasant affect       LABS:  Results for orders placed or performed during the hospital encounter of 08/04/22   CBC with Auto Differential   Result Value Ref Range    WBC 5.3 4.0 - 10.5 K/CU MM    RBC 5.44 4.6 - 6.2 M/CU MM    Hemoglobin 16.7 13.5 - 18.0 GM/DL    Hematocrit 46.8 42 - 52 %    MCV 86.0 78 - 100 FL    MCH 30.7 27 - 31 PG    MCHC 35.7 32.0 - 36.0 %    RDW 12.1 11.7 - 14.9 %    Platelets 896 878 - 871 K/CU MM    MPV 10.1 7.5 - 11.1 FL    Differential Type AUTOMATED DIFFERENTIAL     Segs Relative 52.4 36 - 66 %    Lymphocytes % 33.8 24 - 44 %    Monocytes % 9.4 (H) 0 - 4 %    Eosinophils % 3.6 (H) 0 - 3 %    Basophils % 0.6 0 - 1 %    Segs Absolute 2.8 K/CU MM    Lymphocytes Absolute 1.8 K/CU MM    Monocytes Absolute 0.5 K/CU MM    Eosinophils Absolute 0.2 K/CU MM    Basophils Absolute 0.0 K/CU MM    Nucleated RBC % 0.0 %    Total Nucleated RBC 0.0 K/CU MM    Total Immature Neutrophil 0.01 K/CU MM    Immature Neutrophil % 0.2 0 - 0.43 %   Comprehensive Metabolic Panel   Result Value Ref Range    Sodium 137 135 - 145 MMOL/L    Potassium 4.3 3.5 - 5.1 MMOL/L    Chloride 101 99 - 110 mMol/L    CO2 24 21 - 32 MMOL/L    BUN 22 6 - 23 MG/DL    Creatinine 0.8 (L) 0.9 - 1.3 MG/DL    Glucose 147 (H) 70 - 99 MG/DL    Calcium 9.6 8.3 - 10.6 MG/DL    Albumin 4.7 3.4 - 5.0 GM/DL    Total Protein 7.2 6.4 - 8.2 GM/DL    Total Bilirubin 0.3 0.0 - 1.0 MG/DL    ALT 47 (H) 10 - 40 U/L    AST 32 15 - 37 IU/L    Alkaline Phosphatase 82 40 - 129 IU/L    GFR Non-African American >60 >60 mL/min/1.73m2    GFR African American >60 >60 mL/min/1.73m2    Anion Gap 12 4 - 16   Lipase   Result Value Ref Range    Lipase 26 13 - 60 IU/L   Lactic Acid   Result Value Ref Range    Lactate 0.8 0.4 - 2.0 mMOL/L           RADIOLOGY/PROCEDURES    CT ABDOMEN PELVIS W IV CONTRAST Additional Contrast? None   Final Result   No acute finding in the abdomen or pelvis. Cholelithiasis with multiple tiny calcified gallstones. No findings to   suggest acute cholecystitis. Diffuse fatty infiltration of the liver. ED COURSE & MEDICAL DECISION MAKING      Patient presents as above. Patient denies chest pain, shortness of breath, urinary symptoms. Patient declines pain medication while in emergency department. CBC is within normal limits. CMP shows ALT of 47 otherwise within normal limits. Lipase 26. Lactic acid is 0.8. CT abdomen pelvis with IV contrast shows no acute abnormality. I discussed labs and imaging with patient today. I discussed unclear etiology of left-sided abdominal pain. I recommend continued outpatient follow-up with his gastroenterologist, general surgeon and primary care. I recommend over-the-counter ibuprofen as needed for pain. Recommend follow-up with primary care in 2 days for recheck. Clinical  IMPRESSION    1. Left sided abdominal pain        I discussed with patient the importance of return to the emergency department immediately if new or worsening symptoms develop. Comment: Please note this report has been produced using speech recognition software and may contain errors related to that system including errors in grammar, punctuation, and spelling, as well as words and phrases that may be inappropriate.  If there are any questions or concerns please feel free to contact the dictating provider for clarification.        Ortega Hughes PA-C  08/05/22 2147

## 2022-08-07 ENCOUNTER — HOSPITAL ENCOUNTER (EMERGENCY)
Age: 52
Discharge: HOME | End: 2022-08-07
Attending: EMERGENCY MEDICINE
Payer: COMMERCIAL

## 2022-08-07 VITALS
OXYGEN SATURATION: 94 % | HEART RATE: 74 BPM | TEMPERATURE: 98.7 F | RESPIRATION RATE: 22 BRPM | DIASTOLIC BLOOD PRESSURE: 71 MMHG | SYSTOLIC BLOOD PRESSURE: 135 MMHG | WEIGHT: 220 LBS | BODY MASS INDEX: 28.23 KG/M2 | HEIGHT: 74 IN

## 2022-08-07 DIAGNOSIS — T63.441A BEE STING, ACCIDENTAL OR UNINTENTIONAL, INITIAL ENCOUNTER: ICD-10-CM

## 2022-08-07 DIAGNOSIS — T78.2XXA ANAPHYLAXIS, INITIAL ENCOUNTER: Primary | ICD-10-CM

## 2022-08-07 PROCEDURE — A4216 STERILE WATER/SALINE, 10 ML: HCPCS | Performed by: EMERGENCY MEDICINE

## 2022-08-07 PROCEDURE — 99284 EMERGENCY DEPT VISIT MOD MDM: CPT

## 2022-08-07 PROCEDURE — 96375 TX/PRO/DX INJ NEW DRUG ADDON: CPT

## 2022-08-07 PROCEDURE — 6360000002 HC RX W HCPCS: Performed by: EMERGENCY MEDICINE

## 2022-08-07 PROCEDURE — 96374 THER/PROPH/DIAG INJ IV PUSH: CPT

## 2022-08-07 PROCEDURE — 2500000003 HC RX 250 WO HCPCS: Performed by: EMERGENCY MEDICINE

## 2022-08-07 PROCEDURE — 93005 ELECTROCARDIOGRAM TRACING: CPT | Performed by: EMERGENCY MEDICINE

## 2022-08-07 PROCEDURE — 2580000003 HC RX 258: Performed by: EMERGENCY MEDICINE

## 2022-08-07 PROCEDURE — 96372 THER/PROPH/DIAG INJ SC/IM: CPT

## 2022-08-07 RX ORDER — EPINEPHRINE 0.3 MG/.3ML
0.3 INJECTION SUBCUTANEOUS
Qty: 1 EACH | Refills: 0 | Status: SHIPPED | OUTPATIENT
Start: 2022-08-07 | End: 2022-11-04

## 2022-08-07 RX ORDER — METHYLPREDNISOLONE SODIUM SUCCINATE 40 MG/ML
40 INJECTION, POWDER, LYOPHILIZED, FOR SOLUTION INTRAMUSCULAR; INTRAVENOUS ONCE
Status: COMPLETED | OUTPATIENT
Start: 2022-08-07 | End: 2022-08-07

## 2022-08-07 RX ORDER — DIPHENHYDRAMINE HYDROCHLORIDE 50 MG/ML
25 INJECTION INTRAMUSCULAR; INTRAVENOUS ONCE
Status: COMPLETED | OUTPATIENT
Start: 2022-08-07 | End: 2022-08-07

## 2022-08-07 RX ORDER — EPINEPHRINE 1 MG/ML
0.3 INJECTION, SOLUTION, CONCENTRATE INTRAVENOUS ONCE
Status: COMPLETED | OUTPATIENT
Start: 2022-08-07 | End: 2022-08-07

## 2022-08-07 RX ADMIN — EPINEPHRINE 0.3 MG: 1 INJECTION, SOLUTION, CONCENTRATE INTRAVENOUS at 14:10

## 2022-08-07 RX ADMIN — METHYLPREDNISOLONE SODIUM SUCCINATE 40 MG: 40 INJECTION, POWDER, FOR SOLUTION INTRAMUSCULAR; INTRAVENOUS at 14:24

## 2022-08-07 RX ADMIN — FAMOTIDINE 20 MG: 10 INJECTION, SOLUTION INTRAVENOUS at 14:23

## 2022-08-07 RX ADMIN — DIPHENHYDRAMINE HYDROCHLORIDE 25 MG: 50 INJECTION, SOLUTION INTRAMUSCULAR; INTRAVENOUS at 14:23

## 2022-08-07 NOTE — ED PROVIDER NOTES
EGD DIAGNOSTIC ONLY performed by Dayanna Greco DO at [address] [facility] ENDOSCOPY     Family History   Problem Relation Age of Onset    Breast Cancer Mother      Social History     Socioeconomic History    Marital status: Single     Spouse name: Not on file    Number of children: Not on file    Years of education: Not on file    Highest education level: Not on file   Occupational History    Not on file   Tobacco Use    Smoking status: Never    Smokeless tobacco: Never   Vaping Use    Vaping Use: Never used   Substance and Sexual Activity    Alcohol use: Not Currently     Alcohol/week: 2.0 standard drinks     Types: 2 Standard drinks or equivalent per week    Drug use: Not Currently     Types: Marijuana Pardeep Blow)     Comment: marijuana card    Sexual activity: Not on file   Other Topics Concern    Not on file   Social History Narrative    Not on file     Social Determinants of Health     Financial Resource Strain: Not on file   Food Insecurity: Not on file   Transportation Needs: Not on file   Physical Activity: Not on file   Stress: Not on file   Social Connections: Not on file   Intimate Partner Violence: Not on file   Housing Stability: Not on file     No current facility-administered medications for this encounter. Current Outpatient Medications   Medication Sig Dispense Refill    EPINEPHrine (EPIPEN 2-RAVINDER) 0.3 MG/0.3ML SOAJ injection Inject 0.3 mLs into the muscle once as needed (severe allergic reaction) Inject into lateral thigh muscle. 1 each 0    omeprazole (PRILOSEC) 20 MG delayed release capsule Take 1 capsule by mouth in the morning. Take on an empty stomach before breakfast. 30 capsule 1    ondansetron (ZOFRAN) 4 MG tablet Take 1 tablet by mouth daily as needed for Nausea or Vomiting 30 tablet 3    dicyclomine (BENTYL) 10 MG capsule Take 1 capsule by mouth in the morning and 1 capsule at noon and 1 capsule in the evening and 1 capsule before bedtime.  120 capsule 3    escitalopram (LEXAPRO) 10 MG tablet TAKE 1 TABLET BY MOUTH EVERY DAY 30 tablet 3    tadalafil (CIALIS) 20 MG tablet Take 1 tablet by mouth as needed for Erectile Dysfunction 10 tablet 3    rivaroxaban (XARELTO) 20 MG TABS tablet Take 1 tablet by mouth daily 14 tablet 0    buPROPion (WELLBUTRIN SR) 100 MG extended release tablet Take 1 tablet by mouth 2 times daily 60 tablet 3    butalbital-acetaminophen-caffeine (FIORICET, ESGIC) -40 MG per tablet Take 1 tablet by mouth every 4 hours as needed for Headaches 180 tablet 3    amitriptyline (ELAVIL) 50 MG tablet Take 1 tablet by mouth nightly 90 tablet 1    atorvastatin (LIPITOR) 40 MG tablet Take 1 tablet by mouth daily 90 tablet 1    gabapentin (NEURONTIN) 400 MG capsule Take 1 capsule by mouth 3 times daily for 90 days. Intended supply: 90 days 270 capsule 1    hydroCHLOROthiazide (MICROZIDE) 12.5 MG capsule Take 1 capsule by mouth every morning 90 capsule 1    Insulin Degludec (TRESIBA FLEXTOUCH) 100 UNIT/ML SOPN Inject 40 Units into the skin nightly 6 pen 5    insulin lispro (HUMALOG KWIKPEN) 200 UNIT/ML SOPN pen TID with meals Glucose [id number] No Insulin 140-199 2 Units 200-249 4 Units 250-299 6 Units 300-349 8 Units 350-400 10 Units over 400 12 Units 5 pen 5    losartan (COZAAR) 100 MG tablet Take 1 tablet by mouth daily 90 tablet 1    meloxicam (MOBIC) 7.5 MG tablet Take 1 tablet by mouth daily 90 tablet 1    metFORMIN (GLUCOPHAGE) 1000 MG tablet Take 1 tablet by mouth 2 times daily (with meals) 180 tablet 1    potassium chloride (KLOR-CON M) 10 MEQ extended release tablet Take 1 tablet by mouth daily OTC 90 tablet 1    Ertugliflozin L-PyroglutamicAc (STEGLATRO) 15 MG TABS Take 15 mg by mouth daily 30 tablet 3    tiZANidine (ZANAFLEX) 4 MG tablet Take 1 tablet by mouth at bedtime 30 tablet 3    vitamin B-12 (CYANOCOBALAMIN) 1000 MCG tablet Take 1 tablet by mouth daily (Patient taking differently: Take 1,000 mcg by mouth in the morning.  OTC.) 90 tablet 1    Cholecalciferol (VITAMIN D3) 50 MCG (2000 UT) TABS Take one tablet by mouth once a day (Patient taking differently: Take one tablet by mouth once a day OTC) 30 tablet 3     Allergies   Allergen Reactions    Bee Venom Anaphylaxis    Hornet Venom     Tramadol Other (See Comments)     Night terrors and sweats       Nursing Notes Reviewed    Physical Exam:  Triage VS:    ED Triage Vitals [08/07/22 1405]   Enc Vitals Group      /86      Heart Rate 91      Resp 24      Temp       Temp src       SpO2 98 %      Weight 220 lb (99.8 kg)      Height 6' 2\" (1.88 m)      Head Circumference       Peak Flow       Pain Score       Pain Loc       Pain Edu? Excl. in [address]? My pulse ox interpretation is - normal    General appearance:  No acute distress. Skin:  Warm. Dry. Right lower quadrant there is a half a centimeter in diameter welt with a sting site at the center. No other hives or rashes noted  Eye:  Extraocular movements intact. Ears, nose, mouth and throat:  Oral mucosa moist, tonsillar pillars visible, no tonsillar swelling or oropharyngeal swelling, no tongue or lip swelling, normal voice. Neck:  Trachea midline. Extremity:  No swelling. Normal ROM     Heart:  Regular rate and rhythm, normal S1 & S2, no extra heart sounds. Perfusion:  intact  Respiratory:  Lungs clear to auscultation bilaterally. Respirations nonlabored. Abdominal:    Soft. Nontender. Non distended.   Neurological:  Alert and oriented             Psychiatric:  Appropriate    I have reviewed and interpreted all of the currently available lab results from this visit (if applicable):  Results for orders placed or performed during the hospital encounter of 08/07/22   EKG 12 Lead   Result Value Ref Range    Ventricular Rate 65 BPM    Atrial Rate 65 BPM    P-R Interval 116 ms    QRS Duration 118 ms    Q-T Interval 432 ms    QTc Calculation (Bazett) 449 ms    P Axis 43 degrees    R Axis 79 degrees    T Axis 35 degrees    Diagnosis       Normal sinus rhythm  Sigrid-Parkinson-White  Abnormal ECG  When compared with ECG of 04-SEP-2020 10:37,  No significant change was found        Radiographs (if obtained):  Radiologist's Report Reviewed:  No results found. EKG (if obtained): (All EKG's are interpreted by myself in the absence of a cardiologist)  Normal sinus rhythm with rate of 65bpm, no ST elevation, WPW, no previous to compare. MDM:  68-year-old male with history as above presents with concern for allergic reaction. He has had severe anaphylaxis to stings in the past, we will plan for EpiPen, IV Solu-Medrol, Pepcid and Benadryl, given severity of previous reactions. He is feeling tingling in his tongue but no swelling at this time. Right after epi continuing to do well, states he thinks some of it was \"paranoia\". At 1440 continues to do well, states is just tired and cold. 32 61 16- continuing to do well, just sleepy and stings at the RLQ where he was stung. 1625- no complaints, ready to go home after the three hour observation. Continues to do well, on frequent rechecks by myself and the nurse he had had no recurrence of any symptoms. After 3 hours of monitoring he was discharged in stable condition. Did give him a prescription for EpiPen, and we did discuss that if he uses the EpiPen he is still to call 911 and come in to be evaluated and he did express understanding. Total critical care time today provided was 36minutes. This excludes seperately billable procedure. Critical care time provided for anaphylaxis that required close evaluation and/or intervention with concern for patient decompensation. Clinical Impression:  1. Anaphylaxis, initial encounter    2.  Bee sting, accidental or unintentional, initial encounter      Disposition referral (if applicable):  MD Don Feldman  [address] [phone] [phone]    Schedule an appointment as soon as possible for a visit       [facility] [address] [facility] Emergency Department  [address]  [phone]    If symptoms worsen  Disposition medications (if applicable):  Discharge Medication List as of 8/7/2022  5:38 PM        START taking these medications    Details   EPINEPHrine (EPIPEN 2-RAVINDER) 0.3 MG/0.3ML SOAJ injection Inject 0.3 mLs into the muscle once as needed (severe allergic reaction) Inject into lateral thigh muscle., Disp-1 each, R-0Normal           ED Provider Disposition Time  DISPOSITION Decision To Discharge 08/07/2022 05:28:32 PM      Comment: Please note this report has been produced using speech recognition software and may contain errors related to that system including errors in grammar, punctuation, and spelling, as well as words and phrases that may be inappropriate. Efforts were made to edit the dictations.        Tadeo Rosario MD  08/07/22 2088       Tadeo Rosario MD  08/07/22 7851       Tadeo Rosario MD  08/07/22 5863

## 2022-08-08 LAB
EKG ATRIAL RATE: 65 BPM
EKG DIAGNOSIS: NORMAL
EKG P AXIS: 43 DEGREES
EKG P-R INTERVAL: 116 MS
EKG Q-T INTERVAL: 432 MS
EKG QRS DURATION: 118 MS
EKG QTC CALCULATION (BAZETT): 449 MS
EKG R AXIS: 79 DEGREES
EKG T AXIS: 35 DEGREES
EKG VENTRICULAR RATE: 65 BPM

## 2022-08-08 PROCEDURE — 93010 ELECTROCARDIOGRAM REPORT: CPT | Performed by: INTERNAL MEDICINE

## 2022-08-10 ENCOUNTER — HOSPITAL ENCOUNTER (OUTPATIENT)
Age: 52
Discharge: HOME | End: 2022-08-10
Payer: COMMERCIAL

## 2022-08-10 DIAGNOSIS — K92.1 BLACK STOOL: ICD-10-CM

## 2022-08-10 LAB
ALBUMIN SERPL-MCNC: 5 GM/DL (ref 3.4–5)
ALP BLD-CCNC: 104 IU/L (ref 40–129)
ALT SERPL-CCNC: 39 U/L (ref 10–40)
ANION GAP SERPL CALCULATED.3IONS-SCNC: 13 MMOL/L (ref 4–16)
AST SERPL-CCNC: 24 IU/L (ref 15–37)
BASOPHILS ABSOLUTE: 0 K/CU MM
BASOPHILS RELATIVE PERCENT: 0.7 % (ref 0–1)
BILIRUB SERPL-MCNC: 0.3 MG/DL (ref 0–1)
BUN BLDV-MCNC: 27 MG/DL (ref 6–23)
CALCIUM SERPL-MCNC: 9.6 MG/DL (ref 8.3–10.6)
CHLORIDE BLD-SCNC: 102 MMOL/L (ref 99–110)
CO2: 26 MMOL/L (ref 21–32)
CREAT SERPL-MCNC: 0.8 MG/DL (ref 0.9–1.3)
DIFFERENTIAL TYPE: ABNORMAL
EOSINOPHILS ABSOLUTE: 0.2 K/CU MM
EOSINOPHILS RELATIVE PERCENT: 3.5 % (ref 0–3)
FERRITIN: 113 NG/ML (ref 30–400)
GFR AFRICAN AMERICAN: >60 ML/MIN/1.73M2
GFR NON-AFRICAN AMERICAN: >60 ML/MIN/1.73M2
GLUCOSE BLD-MCNC: 313 MG/DL (ref 70–99)
HCT VFR BLD CALC: 48.8 % (ref 42–52)
HEMOGLOBIN: 16.3 GM/DL (ref 13.5–18)
IMMATURE NEUTROPHIL %: 0 % (ref 0–0.43)
IRON: 62 UG/DL (ref 59–158)
LYMPHOCYTES ABSOLUTE: 1.3 K/CU MM
LYMPHOCYTES RELATIVE PERCENT: 30.9 % (ref 24–44)
MCH RBC QN AUTO: 30.5 PG (ref 27–31)
MCHC RBC AUTO-ENTMCNC: 33.4 % (ref 32–36)
MCV RBC AUTO: 91.2 FL (ref 78–100)
MONOCYTES ABSOLUTE: 0.5 K/CU MM
MONOCYTES RELATIVE PERCENT: 10.4 % (ref 0–4)
NUCLEATED RBC %: 0 %
PCT TRANSFERRIN: 19 % (ref 10–44)
PDW BLD-RTO: 12.5 % (ref 11.7–14.9)
PLATELET # BLD: 195 K/CU MM (ref 140–440)
PMV BLD AUTO: 10.3 FL (ref 7.5–11.1)
POTASSIUM SERPL-SCNC: 4.8 MMOL/L (ref 3.5–5.1)
RBC # BLD: 5.35 M/CU MM (ref 4.6–6.2)
SEGMENTED NEUTROPHILS ABSOLUTE COUNT: 2.4 K/CU MM
SEGMENTED NEUTROPHILS RELATIVE PERCENT: 54.5 % (ref 36–66)
SODIUM BLD-SCNC: 141 MMOL/L (ref 135–145)
TOTAL IMMATURE NEUTOROPHIL: 0 K/CU MM
TOTAL IRON BINDING CAPACITY: 330 UG/DL (ref 250–450)
TOTAL NUCLEATED RBC: 0 K/CU MM
TOTAL PROTEIN: 7.1 GM/DL (ref 6.4–8.2)
UNSATURATED IRON BINDING CAPACITY: 268 UG/DL (ref 110–370)
WBC # BLD: 4.3 K/CU MM (ref 4–10.5)

## 2022-08-10 PROCEDURE — 82728 ASSAY OF FERRITIN: CPT

## 2022-08-10 PROCEDURE — 83550 IRON BINDING TEST: CPT

## 2022-08-10 PROCEDURE — 83540 ASSAY OF IRON: CPT

## 2022-08-10 PROCEDURE — 36415 COLL VENOUS BLD VENIPUNCTURE: CPT

## 2022-08-10 PROCEDURE — 80053 COMPREHEN METABOLIC PANEL: CPT

## 2022-08-10 PROCEDURE — 85025 COMPLETE CBC W/AUTO DIFF WBC: CPT

## 2022-08-12 DIAGNOSIS — E11.42 TYPE 2 DIABETES MELLITUS WITH DIABETIC POLYNEUROPATHY, WITH LONG-TERM CURRENT USE OF INSULIN (HCC): ICD-10-CM

## 2022-08-12 DIAGNOSIS — Z79.4 TYPE 2 DIABETES MELLITUS WITH DIABETIC POLYNEUROPATHY, WITH LONG-TERM CURRENT USE OF INSULIN (HCC): ICD-10-CM

## 2022-08-12 DIAGNOSIS — M79.672 PAIN IN BOTH FEET: ICD-10-CM

## 2022-08-12 DIAGNOSIS — M79.671 PAIN IN BOTH FEET: ICD-10-CM

## 2022-08-14 DIAGNOSIS — N52.9 ERECTILE DYSFUNCTION, UNSPECIFIED ERECTILE DYSFUNCTION TYPE: ICD-10-CM

## 2022-08-15 ENCOUNTER — TELEPHONE (OUTPATIENT)
Dept: INTERNAL MEDICINE CLINIC | Age: 52
End: 2022-08-15

## 2022-08-15 ENCOUNTER — HOSPITAL ENCOUNTER (OUTPATIENT)
Age: 52
Setting detail: SPECIMEN
Discharge: HOME | End: 2022-08-15
Payer: COMMERCIAL

## 2022-08-15 ENCOUNTER — TELEMEDICINE (OUTPATIENT)
Dept: INTERNAL MEDICINE CLINIC | Age: 52
End: 2022-08-15
Payer: COMMERCIAL

## 2022-08-15 DIAGNOSIS — R53.83 FATIGUE, UNSPECIFIED TYPE: ICD-10-CM

## 2022-08-15 DIAGNOSIS — J20.9 ACUTE BRONCHITIS, UNSPECIFIED ORGANISM: Primary | ICD-10-CM

## 2022-08-15 PROCEDURE — 99213 OFFICE O/P EST LOW 20 MIN: CPT | Performed by: INTERNAL MEDICINE

## 2022-08-15 PROCEDURE — U0005 INFEC AGEN DETEC AMPLI PROBE: HCPCS

## 2022-08-15 PROCEDURE — U0003 INFECTIOUS AGENT DETECTION BY NUCLEIC ACID (DNA OR RNA); SEVERE ACUTE RESPIRATORY SYNDROME CORONAVIRUS 2 (SARS-COV-2) (CORONAVIRUS DISEASE [COVID-19]), AMPLIFIED PROBE TECHNIQUE, MAKING USE OF HIGH THROUGHPUT TECHNOLOGIES AS DESCRIBED BY CMS-2020-01-R: HCPCS

## 2022-08-15 RX ORDER — SILDENAFIL 50 MG/1
50 TABLET, FILM COATED ORAL PRN
Qty: 10 TABLET | Refills: 0 | Status: SHIPPED | OUTPATIENT
Start: 2022-08-15 | End: 2022-10-12 | Stop reason: SDUPTHER

## 2022-08-15 RX ORDER — BENZONATATE 100 MG/1
100 CAPSULE ORAL 3 TIMES DAILY PRN
Qty: 30 CAPSULE | Refills: 0 | Status: SHIPPED | OUTPATIENT
Start: 2022-08-15 | End: 2022-08-22

## 2022-08-15 RX ORDER — AZITHROMYCIN 250 MG/1
250 TABLET, FILM COATED ORAL SEE ADMIN INSTRUCTIONS
Qty: 6 TABLET | Refills: 0 | Status: SHIPPED | OUTPATIENT
Start: 2022-08-15 | End: 2022-08-20

## 2022-08-15 RX ORDER — TIZANIDINE 4 MG/1
4 TABLET ORAL NIGHTLY
Qty: 30 TABLET | Refills: 0 | Status: SHIPPED | OUTPATIENT
Start: 2022-08-15 | End: 2022-10-12 | Stop reason: SDUPTHER

## 2022-08-15 RX ORDER — ERTUGLIFLOZIN 15 MG/1
TABLET, FILM COATED ORAL
Qty: 30 TABLET | Refills: 0 | Status: SHIPPED | OUTPATIENT
Start: 2022-08-15 | End: 2022-10-12 | Stop reason: SDUPTHER

## 2022-08-15 RX ORDER — GUAIFENESIN 600 MG/1
600 TABLET, EXTENDED RELEASE ORAL 2 TIMES DAILY
Qty: 30 TABLET | Refills: 0 | Status: SHIPPED | OUTPATIENT
Start: 2022-08-15 | End: 2022-08-30

## 2022-08-15 SDOH — ECONOMIC STABILITY: FOOD INSECURITY: WITHIN THE PAST 12 MONTHS, THE FOOD YOU BOUGHT JUST DIDN'T LAST AND YOU DIDN'T HAVE MONEY TO GET MORE.: NEVER TRUE

## 2022-08-15 SDOH — ECONOMIC STABILITY: FOOD INSECURITY: WITHIN THE PAST 12 MONTHS, YOU WORRIED THAT YOUR FOOD WOULD RUN OUT BEFORE YOU GOT MONEY TO BUY MORE.: NEVER TRUE

## 2022-08-15 ASSESSMENT — SOCIAL DETERMINANTS OF HEALTH (SDOH): HOW HARD IS IT FOR YOU TO PAY FOR THE VERY BASICS LIKE FOOD, HOUSING, MEDICAL CARE, AND HEATING?: NOT HARD AT ALL

## 2022-08-15 NOTE — PROGRESS NOTES
Name: Chris Mott  Age: 54 y.o. YOB: 1970  Sex: male    CHIEF COMPLAINT:    Chief Complaint   Patient presents with    Headache     Going on for 3 days    Concern For COVID-19     Fever on and off about . Dry cough, chills, and sweats going on for about 3 days       HISTORY OF PRESENT ILLNESS:      Chris Abdul, was evaluated through a synchronous (real-time) audio-video encounter. The patient (or guardian if applicable) is aware that this is a billable service, which includes applicable co-pays. This Virtual Visit was conducted with patient's (and/or legal guardian's) consent. The visit was conducted pursuant to the emergency declaration under the [address] [place] [address] authority and the [employer] and [email or web address] General Act. Patient identification was verified, and a caregiver was present when appropriate. The patient was located in a state where the provider was licensed to provide care. This is a pleasant  54 y.o. male  is seen today for management of chronic medical problems and medications refills. Previous records reviewed . Patient claimed that he developed sore throat dry hacking cough generalized body ache, fatigue, low-grade fever around 100 °F, chest heaviness and shortness of breath for the last couple of days. He claims that he is not able to get up from the bed. He recently went to the emergency room for abdominal pain and also for insect/Wasp bite. Was given EpiPen injection. Labs done for abdominal pain was essentially normal except for hyperglycemia. He claimed that his sugars are usually around 1 20-1 50 at home but that day it was little high. Denies any chest pain but as mentioned, he has some chest heaviness. Has occasional palpitations. He is seeing Dr. Randalyn Scheuermann and Dr. Miguel Natarajan for his heart problems. Complains of headaches but better.   He has been fully vaccinated for COVID-19 including the booster dose. 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    Fatty liver    Neuropathy    Dorsalgia    Pure hypercholesterolemia    Pain in both feet    Chronic pain of left knee    Chronic depression    Anxiety    Polycythemia    History of COVID-19    Left upper quadrant abdominal swelling, mass and lump    Paroxysmal atrial fibrillation (HCC)    Palpitations    Tachycardia        Past Surgical History:        Procedure Laterality Date    ABDOMEN SURGERY      COLONOSCOPY N/A 04/25/2022    COLONOSCOPY POLYPECTOMY SNARE/COLD BIOPSY performed by Morales Shafer DO at [address] [place] [address] N/A 5/17/2022    HERNIA INCISIONAL REPAIR LAPAROSCOPIC ROBOTIC WITH MESH performed by Morales Shafer DO at [facility] N/A 05/10/2022    Electrophysiology study and cardioversion performed by Dr. Sabiha Smart.     [address] [place] [address], [facility]    Fusion of L3, L4, L5    UMBILICAL HERNIA REPAIR  11/04/2015    repair incarcerated supra umbilical hernia    UPPER GASTROINTESTINAL ENDOSCOPY N/A 04/25/2022    EGD DIAGNOSTIC ONLY performed by Morales Shafer DO at [facility] ENDOSCOPY       Social History:   Social History     Tobacco Use    Smoking status: Never    Smokeless tobacco: Never   Substance Use Topics    Alcohol use: Not Currently     Alcohol/week: 2.0 standard drinks     Types: 2 Standard drinks or equivalent per week       Family History:       Problem Relation Age of Onset    Breast Cancer Mother        Allergies:  Bee venom, Hornet venom, and Tramadol    Current Medications :      Prior to Admission medications    Medication Sig Start Date End Date Taking? Authorizing Provider   guaiFENesin (MUCINEX) 600 MG extended release tablet Take 1 tablet by mouth in the morning and 1 tablet before bedtime. Do all this for 15 days. 8/15/22 8/30/22 Yes Kaleb Klein MD   benzonatate (TESSALON) 100 MG capsule Take 1 capsule by mouth 3 times daily as needed for Cough 8/15/22 8/22/22 Yes Kaleb Klein MD   azithromycin (ZITHROMAX) 250 MG tablet Take 1 tablet by mouth See Admin Instructions for 5 days 500mg on day 1 followed by 250mg on days 2 - 5 8/15/22 8/20/22 Yes Kaleb Klein MD   EPINEPHrine (EPIPEN 2-RAVINDER) 0.3 MG/0.3ML SOAJ injection Inject 0.3 mLs into the muscle once as needed (severe allergic reaction) Inject into lateral thigh muscle. 8/7/22 8/15/22 Yes Elvis Hunt MD   omeprazole (PRILOSEC) 20 MG delayed release capsule Take 1 capsule by mouth in the morning. Take on an empty stomach before breakfast. 7/22/22 11/19/22 Yes EDGAR Haney CNP   ondansetron (ZOFRAN) 4 MG tablet Take 1 tablet by mouth daily as needed for Nausea or Vomiting 7/22/22  Yes EDGAR Haney CNP   dicyclomine (BENTYL) 10 MG capsule Take 1 capsule by mouth in the morning and 1 capsule at noon and 1 capsule in the evening and 1 capsule before bedtime.  7/22/22  Yes EDGAR Haney CNP   escitalopram (LEXAPRO) 10 MG tablet TAKE 1 TABLET BY MOUTH EVERY DAY 6/30/22  Yes Kaleb Klein MD   tadalafil (CIALIS) 20 MG tablet Take 1 tablet by mouth as needed for Erectile Dysfunction 6/29/22  Yes Kaleb Klein MD   rivaroxaban (XARELTO) 20 MG TABS tablet Take 1 tablet by mouth daily 5/11/22  Yes EDGAR Sullivan CNP   buPROPion [facility] SR) 100 MG extended release tablet Take 1 tablet by mouth 2 times daily 3/23/22  Yes Kaleb Klein MD butalbital-acetaminophen-caffeine (FIORICET, ESGIC) -40 MG per tablet Take 1 tablet by mouth every 4 hours as needed for Headaches 3/15/22  Yes Paulina Rossi MD   amitriptyline (ELAVIL) 50 MG tablet Take 1 tablet by mouth nightly 1/20/22  Yes Paulina Rossi MD   atorvastatin (LIPITOR) 40 MG tablet Take 1 tablet by mouth daily 1/20/22  Yes Paulina Rossi MD   gabapentin (NEURONTIN) 400 MG capsule Take 1 capsule by mouth 3 times daily for 90 days. Intended supply: 90 days 1/20/22 8/15/22 Yes Paulina Rossi MD   hydroCHLOROthiazide (MICROZIDE) 12.5 MG capsule Take 1 capsule by mouth every morning 1/20/22  Yes Paulina Rossi MD   Insulin Degludec (TRESIBA FLEXTOUCH) 100 UNIT/ML SOPN Inject 40 Units into the skin nightly 1/20/22  Yes Paulina Rossi MD   insulin lispro (HUMALOG KWIKPEN) 200 UNIT/ML SOPN pen TID with meals Glucose [id number] No Insulin 140-199 2 Units 200-249 4 Units 250-299 6 Units 300-349 8 Units 350-400 10 Units over 400 12 Units 1/20/22  Yes Paulina Rossi MD   losartan (COZAAR) 100 MG tablet Take 1 tablet by mouth daily 1/20/22  Yes Paulina Rossi MD   meloxicam (MOBIC) 7.5 MG tablet Take 1 tablet by mouth daily 1/20/22  Yes Paulina Rossi MD   metFORMIN (GLUCOPHAGE) 1000 MG tablet Take 1 tablet by mouth 2 times daily (with meals) 1/20/22  Yes Paulina Rossi MD   potassium chloride (KLOR-CON M) 10 MEQ extended release tablet Take 1 tablet by mouth daily OTC 1/20/22  Yes Paulina Rossi MD   Ertugliflozin L-PyroglutamicAc (STEGLATRO) 15 MG TABS Take 15 mg by mouth daily 1/20/22  Yes Paulina Rossi MD   tiZANidine (ZANAFLEX) 4 MG tablet Take 1 tablet by mouth at bedtime 1/20/22  Yes Paulina Rossi MD   vitamin B-12 (CYANOCOBALAMIN) 1000 MCG tablet Take 1 tablet by mouth daily  Patient taking differently: Take 1,000 mcg by mouth in the morning.  OTC. 6/30/20  Yes Matthew Love,    Cholecalciferol (VITAMIN D3) 50 MCG (2000 UT) TABS Take one tablet by mouth once a day  Patient taking differently: Take one tablet by mouth once a day OTC 6/30/20  Yes Matthew Jewell, DO       LAB DATA: Reviewed. REVIEW OF SYSTEMS:   see HPI/ Comprehensive review of systems negative except for the ones mentioned in HPI. PHYSICAL EXAMINATION:   There were no vitals taken for this visit. No physical exam for this visit         ASSESSMENT/PLAN:        1. Acute bronchitis, unspecified organism  We will treat him empirically with antibiotics and cough medicine and Mucinex and check for COVID-19.  - guaiFENesin (MUCINEX) 600 MG extended release tablet; Take 1 tablet by mouth in the morning and 1 tablet before bedtime. Do all this for 15 days. Dispense: 30 tablet; Refill: 0  - benzonatate (TESSALON) 100 MG capsule; Take 1 capsule by mouth 3 times daily as needed for Cough  Dispense: 30 capsule; Refill: 0  - azithromycin (ZITHROMAX) 250 MG tablet; Take 1 tablet by mouth See Admin Instructions for 5 days 500mg on day 1 followed by 250mg on days 2 - 5  Dispense: 6 tablet; Refill: 0  - COVID-19; Future    2. Fatigue, unspecified type  Check for COVID-19 advised take vitamin D and vitamin C over-the-counter and take Tylenol as needed  Quarantine himself for at least 5 days. Or until the results of the COVID-19 comes back. .    - COVID-19; Future      Care discussed with patient. Questions answered and patient verbalizes understanding and agrees with plan. Medications reviewed and reconciled. Continue current medications. Appropriate prescriptions are ordered. Risks and benefits of meds are discussed. After visit summary provided. Advised to call for any problems, questions, or concerns. If symptoms worsen or don't improve as expected, to call us or go to ER. Follow up as directed, sooner if needed. No follow-ups on file. This dictation was performed with a verbal recognition program and it was checked for errors. It is possible that there are still dictated errors within this office note.  Any errors should be brought immediately to my attention for correction. All efforts were made to ensure that this office note is accurate.      Naheed Vivar MD MD

## 2022-08-15 NOTE — TELEPHONE ENCOUNTER
Pt transferred by [facility] ([facility]) for triage. He c/o SOB, dyspnea, chills, and non-productive cough x 2-3 days. Offered Covid-19 testing. Pt would like eval. Referred to [facility]. Pt voiced understanding/thanks. No further action is needed, at this time.

## 2022-08-15 NOTE — TELEPHONE ENCOUNTER
No provider at [facility] this date. PCP had opening on schedule. Scheduled pt for VV this date. PCP can order test. Pt can be swabbed at [facility]. No further action is needed, at this time.

## 2022-08-16 LAB
SARS-COV-2: DETECTED
SOURCE: ABNORMAL

## 2022-08-22 ENCOUNTER — TELEPHONE (OUTPATIENT)
Dept: INTERNAL MEDICINE CLINIC | Age: 52
End: 2022-08-22

## 2022-08-22 NOTE — TELEPHONE ENCOUNTER
Patient came into office requesting a letter stating his covid positive status and that he was required to quarentine for 5 days- Required by employer to receive pay. Patient stated he would return to office by end of day. Please advise.

## 2022-08-23 NOTE — TELEPHONE ENCOUNTER
Patient called back and stated that Cherylene Booze, MA provided a letter to him yesterday when he was in office. He is requesting if we could upload letter to [facility] so he has letter on file for his records.

## 2022-09-21 ENCOUNTER — OFFICE VISIT (OUTPATIENT)
Dept: CARDIOLOGY CLINIC | Age: 52
End: 2022-09-21
Payer: COMMERCIAL

## 2022-09-21 VITALS
HEART RATE: 70 BPM | DIASTOLIC BLOOD PRESSURE: 66 MMHG | SYSTOLIC BLOOD PRESSURE: 124 MMHG | HEIGHT: 73 IN | BODY MASS INDEX: 29.42 KG/M2 | WEIGHT: 222 LBS

## 2022-09-21 DIAGNOSIS — R00.2 PALPITATIONS: ICD-10-CM

## 2022-09-21 DIAGNOSIS — E11.42 TYPE 2 DIABETES MELLITUS WITH DIABETIC POLYNEUROPATHY, WITH LONG-TERM CURRENT USE OF INSULIN (HCC): ICD-10-CM

## 2022-09-21 DIAGNOSIS — D75.1 POLYCYTHEMIA: ICD-10-CM

## 2022-09-21 DIAGNOSIS — Z86.16 HISTORY OF COVID-19: ICD-10-CM

## 2022-09-21 DIAGNOSIS — E78.00 PURE HYPERCHOLESTEROLEMIA: ICD-10-CM

## 2022-09-21 DIAGNOSIS — K21.9 GASTROESOPHAGEAL REFLUX DISEASE WITHOUT ESOPHAGITIS: ICD-10-CM

## 2022-09-21 DIAGNOSIS — Z79.4 TYPE 2 DIABETES MELLITUS WITH DIABETIC POLYNEUROPATHY, WITH LONG-TERM CURRENT USE OF INSULIN (HCC): ICD-10-CM

## 2022-09-21 DIAGNOSIS — I45.6 WOLFF PARKINSON WHITE PATTERN SEEN ON ELECTROCARDIOGRAM: Primary | ICD-10-CM

## 2022-09-21 DIAGNOSIS — I48.0 PAROXYSMAL ATRIAL FIBRILLATION (HCC): ICD-10-CM

## 2022-09-21 DIAGNOSIS — I10 ESSENTIAL HYPERTENSION: ICD-10-CM

## 2022-09-21 PROCEDURE — 99214 OFFICE O/P EST MOD 30 MIN: CPT | Performed by: INTERNAL MEDICINE

## 2022-09-21 PROCEDURE — 93000 ELECTROCARDIOGRAM COMPLETE: CPT | Performed by: INTERNAL MEDICINE

## 2022-09-21 PROCEDURE — 3051F HG A1C>EQUAL 7.0%<8.0%: CPT | Performed by: INTERNAL MEDICINE

## 2022-09-21 NOTE — LETTER
[facility] [address]  [address] [address]  Phone: [phone]  Fax: [phone]    Autumn Weir MD    September 21, 2022     Micheal Olson MD  [facility] [address] [id number]    Patient: Sally Lin   MR Number: [id number]   YOB: 1970   Date of Visit: 9/21/2022       Dear Micheal Olson:    Thank you for referring Kathy Diggs to me for evaluation/treatment. Below are the relevant portions of my assessment and plan of care. If you have questions, please do not hesitate to call me. I look forward to following Bishop Tracy along with you.     Sincerely,      Autumn Weir MD

## 2022-09-21 NOTE — PROGRESS NOTES
OFFICE PROGRESS NOTES      Charlotte Daniels is a 54 y.o. male who has    CHIEF COMPLAINT AS FOLLOWS:  CHEST PAIN:  Patient denies any C/O chest pains at this time. SOB: No C/O SOB at this time. LEG EDEMA: No leg edema   PALPITATIONS:  C/O Palpitations several times a day, specially with any physical activity. Worse than before. DIZZINESS: No C/O Dizziness   SYNCOPE: None   OTHER/ ADDITIONAL COMPLAINTS:                                     HPI: Patient is here for F/U on his  Arrhythmia, HTN & Dyslipidemia problems. Arrhythmia: Patient has known  PAF. HTN: Patient has known essential HTN. Has been treated with guideline recommended medical / physical/ diet therapy as stated below. Dyslipidemia: Patient has known mixed dyslipidemia. Has been treated with guideline recommended medical / physical/ diet therapy as stated below. Current Outpatient Medications   Medication Sig Dispense Refill    STEGLATRO 15 MG TABS TAKE 1 TABLET BY MOUTH EVERY DAY 30 tablet 0    tiZANidine (ZANAFLEX) 4 MG tablet TAKE 1 TABLET BY MOUTH AT BEDTIME 30 tablet 0    sildenafil (VIAGRA) 50 MG tablet TAKE 1 TABLET BY MOUTH AS NEEDED FOR ERECTILE DYSFUNCTION 10 tablet 0    omeprazole (PRILOSEC) 20 MG delayed release capsule Take 1 capsule by mouth in the morning. Take on an empty stomach before breakfast. 30 capsule 1    ondansetron (ZOFRAN) 4 MG tablet Take 1 tablet by mouth daily as needed for Nausea or Vomiting 30 tablet 3    dicyclomine (BENTYL) 10 MG capsule Take 1 capsule by mouth in the morning and 1 capsule at noon and 1 capsule in the evening and 1 capsule before bedtime.  120 capsule 3    escitalopram (LEXAPRO) 10 MG tablet TAKE 1 TABLET BY MOUTH EVERY DAY 30 tablet 3    tadalafil (CIALIS) 20 MG tablet Take 1 tablet by mouth as needed for Erectile Dysfunction 10 tablet 3    rivaroxaban (XARELTO) 20 MG TABS tablet Take 1 tablet by mouth daily 14 tablet 0    buPROPion (WELLBUTRIN SR) 100 MG extended release tablet Take 1 tablet by mouth 2 times daily 60 tablet 3    butalbital-acetaminophen-caffeine (FIORICET, ESGIC) -40 MG per tablet Take 1 tablet by mouth every 4 hours as needed for Headaches 180 tablet 3    amitriptyline (ELAVIL) 50 MG tablet Take 1 tablet by mouth nightly 90 tablet 1    atorvastatin (LIPITOR) 40 MG tablet Take 1 tablet by mouth daily 90 tablet 1    hydroCHLOROthiazide (MICROZIDE) 12.5 MG capsule Take 1 capsule by mouth every morning 90 capsule 1    Insulin Degludec (TRESIBA FLEXTOUCH) 100 UNIT/ML SOPN Inject 40 Units into the skin nightly 6 pen 5    insulin lispro (HUMALOG KWIKPEN) 200 UNIT/ML SOPN pen TID with meals Glucose [id number] No Insulin 140-199 2 Units 200-249 4 Units 250-299 6 Units 300-349 8 Units 350-400 10 Units over 400 12 Units 5 pen 5    losartan (COZAAR) 100 MG tablet Take 1 tablet by mouth daily 90 tablet 1    meloxicam (MOBIC) 7.5 MG tablet Take 1 tablet by mouth daily 90 tablet 1    metFORMIN (GLUCOPHAGE) 1000 MG tablet Take 1 tablet by mouth 2 times daily (with meals) 180 tablet 1    potassium chloride (KLOR-CON M) 10 MEQ extended release tablet Take 1 tablet by mouth daily OTC 90 tablet 1    vitamin B-12 (CYANOCOBALAMIN) 1000 MCG tablet Take 1 tablet by mouth daily (Patient taking differently: Take 1,000 mcg by mouth daily OTC) 90 tablet 1    Cholecalciferol (VITAMIN D3) 50 MCG (2000 UT) TABS Take one tablet by mouth once a day (Patient taking differently: Take one tablet by mouth once a day OTC) 30 tablet 3    EPINEPHrine (EPIPEN 2-RVAINDER) 0.3 MG/0.3ML SOAJ injection Inject 0.3 mLs into the muscle once as needed (severe allergic reaction) Inject into lateral thigh muscle. 1 each 0    gabapentin (NEURONTIN) 400 MG capsule Take 1 capsule by mouth 3 times daily for 90 days. Intended supply: 90 days 270 capsule 1     No current facility-administered medications for this visit.      Allergies: Bee venom, Hornet venom, and Tramadol  Review of Systems:    Constitutional: Negative for diaphoresis and fatigue  Respiratory: Negative for shortness of breath  Cardiovascular: Negative for chest pain, dyspnea on exertion, claudication, edema, irregular heartbeat, murmur, palpitations or shortness of breath  Musculoskeletal: Negative for muscle pain, muscular weakness, negative for pain in arm and leg or swelling in foot and leg    Objective:  /66   Pulse 70   Ht 6' 1\" (1.854 m)   Wt 222 lb (100.7 kg)   BMI 29.29 kg/m²   Wt Readings from Last 3 Encounters:   09/21/22 222 lb (100.7 kg)   08/07/22 220 lb (99.8 kg)   07/22/22 219 lb 6.4 oz (99.5 kg)     Body mass index is 29.29 kg/m². GENERAL - Alert, oriented, pleasant, in no apparent distress. EYES: No jaundice, no conjunctival pallor. Neck - Supple. No jugular venous distention noted. No carotid bruits. Cardiovascular - Normal S1 and S2 without obvious murmur or gallop. Extremities - No cyanosis, clubbing, or significant edema. Pulmonary - No respiratory distress. No wheezes or rales.       MEDICAL DECISION MAKING & DATA REVIEW:    Lab Review   Lab Results   Component Value Date/Time    TROPONINT <0.010 09/04/2020 02:00 PM    Jayy Hannah <0.010 09/04/2020 10:55 AM     Lab Results   Component Value Date/Time    PROBNP 15.33 09/04/2020 10:55 AM    PROBNP 93.09 08/14/2017 01:50 PM     Lab Results   Component Value Date    INR 0.88 05/04/2022     Lab Results   Component Value Date    LABA1C 7.7 04/27/2022    LABA1C 8.3 01/20/2022     Lab Results   Component Value Date    WBC 4.3 08/10/2022    WBC 5.3 08/04/2022    HCT 48.8 08/10/2022    HCT 46.8 08/04/2022    MCV 91.2 08/10/2022    MCV 86.0 08/04/2022     08/10/2022     08/04/2022     Lab Results   Component Value Date    CHOL 206 (H) 09/12/2019    CHOL 171 12/28/2011    TRIG 84 09/12/2019    TRIG 73 12/28/2011    HDL 56 01/20/2022    HDL 63 (H) 07/21/2021    LDLCALC 86 01/20/2022    LDLCALC 75 07/21/2021    LDLDIRECT 126 (H) 12/28/2011     Lab Results   Component Value Date    ALT 39 08/10/2022    ALT 47 (H) 08/04/2022    AST 24 08/10/2022    AST 32 08/04/2022     BMP:    Lab Results   Component Value Date/Time     08/10/2022 01:49 PM     08/04/2022 08:58 PM    K 4.8 08/10/2022 01:49 PM    K 4.3 08/04/2022 08:58 PM     08/10/2022 01:49 PM     08/04/2022 08:58 PM    CO2 26 08/10/2022 01:49 PM    CO2 24 08/04/2022 08:58 PM    BUN 27 08/10/2022 01:49 PM    BUN 22 08/04/2022 08:58 PM    CREATININE 0.8 08/10/2022 01:49 PM    CREATININE 0.8 08/04/2022 08:58 PM     CMP:   Lab Results   Component Value Date/Time     08/10/2022 01:49 PM     08/04/2022 08:58 PM    K 4.8 08/10/2022 01:49 PM    K 4.3 08/04/2022 08:58 PM     08/10/2022 01:49 PM     08/04/2022 08:58 PM    CO2 26 08/10/2022 01:49 PM    CO2 24 08/04/2022 08:58 PM    BUN 27 08/10/2022 01:49 PM    BUN 22 08/04/2022 08:58 PM    CREATININE 0.8 08/10/2022 01:49 PM    CREATININE 0.8 08/04/2022 08:58 PM    PROT 7.1 08/10/2022 01:49 PM    PROT 7.2 08/04/2022 08:58 PM    PROT 6.7 12/28/2011 08:55 AM     Lab Results   Component Value Date/Time    TSH 2.22 09/12/2019 04:08 PM    TSHHS 2.810 05/10/2022 07:44 PM    TSHHS 1.534 12/28/2011 08:55 AM       QUALITY MEASURES REVIEWED:  1.CAD:Patient is taking anti platelet agent:No  Patient does not have Hx of documented CAD  2. DYSLIPIDEMIA: Patient is on cholesterol lowering medication:Yes   3. Beta-Blocker therapy for CAD, if prior Myocardial Infarction:No   4. Counselled regarding smoking cessation. No   Patient does not Smoke. 5.Anticoagulation therapy (for A.Fib) Yes   Does Not have A.Fib.  6.Discussed weight management strategies. Assessment & Plan:  Primary / Secondary prevention is the goal by aggressive risk modification, healthy and therapeutic life style changes for cardiovascular risk reduction. CORONARY ARTERY DISEASE:None known. Stress test 7/13/2022   Excellent exercise capacity. 11 METs work load.    Mildly Hypertensive BP response to exercise. ETT non diagnostic as THR is not achieved. No Exercise induced, clinically significant arrhythmias. HYPERTENSION:for more than 10 years. Controlled on current management. Takes HCTZ & Cozaar. Counseled regarding low salt diet, exercise & weight control. May have to add meds if remains high      VALVULAR HEART DISEASE:no              No significant VHD noted  Echo: 9/2020    Left ventricular systolic function is normal.   Ejection fraction is visually estimated at 50-55%. Small left ventricular cavity. No significant valvular disease noted. No evidence of any pericardial effusion. DYSLIPIDEMIA: yes, Good profile on current medication. Takes Lipitor  Patient's profile is at / near [employer],   HDL is  High  Tolerating current medical regimen wellyes. Takes Lipitor  See most recent Lab values:( Reviewed Labs from family MD, Dr. Jacquline Harada )  LDL is 86  HDL is 56  Diabetes mellitis:yes, For > 10 years, still not well control, but says A1c is down from 15 to 8.3. ARRHYTHMIAS:yes, WPW with PAF. Still C/O exertional Palpitations. Says he wants some thing quinteros for sure. Patient accessory pathway appeared to be benign with pathway Wenckebach was at 440 ms on also effective refractory period was also at S2 over 440 ms. Patient was easily inducible into atrial fibrillation                  His CHADS score is high. He is on Xarelto now & Multaq. EKG: NSR with WPW. QTc 425. Will have him see [facility] Doctor again. ? Loop recorder, ? WPW ablation. TESTS ORDERED: none this visit     PREVIOUSLY ORDERED TESTS REVIEWED & DISCUSSED WITH THE PATIENT:     I personally reviewed & interpreted, all previously ordered tests as copied above. Latest Labs are pulled in to the note with dates.    Labs, specially in Reference to Lipid profile, Cardiac testing in the form of Echo ( dated: ), stress tests ( dated: ) & other relevant cardiac testing reviewed with patient & recommendations made based on assessment of the results. Discussed role of Cardiac risk factors & effects + treatment of co morbidities with patient & advised accordingly. MEDICATIONS: List of medications patient is currently taking is reviewed in detail with the patient. Discussed any side effects or problems taking the medication. Recommend Continue present management & medications as listed. AFFIRMATION: I  reviewed patient's history, previous & current medical problems & all Labs + testing. This includes chart prep even prior to the vosit. Various goals are discussed and multiple questions answered. Relevant concelling performed. Office follow up in 3 months.

## 2022-09-21 NOTE — PATIENT INSTRUCTIONS
CORONARY ARTERY DISEASE:None known. Stress test 7/13/2022   Excellent exercise capacity. 11 METs work load. Mildly Hypertensive BP response to exercise. ETT non diagnostic as THR is not achieved. No Exercise induced, clinically significant arrhythmias. HYPERTENSION:for more than 10 years. Controlled on current management. Takes HCTZ & Cozaar. Counseled regarding low salt diet, exercise & weight control. May have to add meds if remains high      VALVULAR HEART DISEASE:no              No significant VHD noted  Echo: 9/2020    Left ventricular systolic function is normal.   Ejection fraction is visually estimated at 50-55%. Small left ventricular cavity. No significant valvular disease noted. No evidence of any pericardial effusion. DYSLIPIDEMIA: yes, Good profile on current medication. Takes Lipitor  Patient's profile is at / near [employer],   HDL is  High  Tolerating current medical regimen wellyes. Takes Lipitor  See most recent Lab values:( Reviewed Labs from family MD, Dr. Faraz Cook )  LDL is 86  HDL is 56  Diabetes mellitis:yes, For > 10 years, still not well control, but says A1c is down from 15 to 8.3. ARRHYTHMIAS:yes, WPW with PAF. Still C/O exertional Palpitations. Says he wants some thing quinteros for sure. Patient accessory pathway appeared to be benign with pathway Wenckebach was at 440 ms on also effective refractory period was also at S2 over 440 ms. Patient was easily inducible into atrial fibrillation                  His CHADS score is high. He is on Xarelto now & Multaq. EKG: NSR with WPW. QTc 425. Will have him see Abraham Chapa again. ? Loop recorder, ? WPW ablation. TESTS ORDERED: none this visit     PREVIOUSLY ORDERED TESTS REVIEWED & DISCUSSED WITH THE PATIENT:     I personally reviewed & interpreted, all previously ordered tests as copied above. Latest Labs are pulled in to the note with dates.    Labs, specially in Reference to Lipid profile, Cardiac testing in the form of Echo ( dated: ), stress tests ( dated: ) & other relevant cardiac testing reviewed with patient & recommendations made based on assessment of the results. Discussed role of Cardiac risk factors & effects + treatment of co morbidities with patient & advised accordingly. MEDICATIONS: List of medications patient is currently taking is reviewed in detail with the patient. Discussed any side effects or problems taking the medication. Recommend Continue present management & medications as listed. AFFIRMATION: I  reviewed patient's history, previous & current medical problems & all Labs + testing. This includes chart prep even prior to the vosit. Various goals are discussed and multiple questions answered. Relevant concelling performed. Office follow up in 3 months.

## 2022-09-28 ENCOUNTER — OFFICE VISIT (OUTPATIENT)
Dept: GASTROENTEROLOGY | Age: 52
End: 2022-09-28
Payer: COMMERCIAL

## 2022-09-28 VITALS
WEIGHT: 220.8 LBS | SYSTOLIC BLOOD PRESSURE: 130 MMHG | OXYGEN SATURATION: 98 % | HEIGHT: 73 IN | HEART RATE: 79 BPM | TEMPERATURE: 97.9 F | BODY MASS INDEX: 29.26 KG/M2 | DIASTOLIC BLOOD PRESSURE: 72 MMHG

## 2022-09-28 DIAGNOSIS — J39.2 THROAT IRRITATION: ICD-10-CM

## 2022-09-28 DIAGNOSIS — K21.9 GASTROESOPHAGEAL REFLUX DISEASE WITHOUT ESOPHAGITIS: Primary | ICD-10-CM

## 2022-09-28 DIAGNOSIS — K76.0 FATTY LIVER: ICD-10-CM

## 2022-09-28 PROCEDURE — 99212 OFFICE O/P EST SF 10 MIN: CPT | Performed by: NURSE PRACTITIONER

## 2022-09-28 NOTE — PROGRESS NOTES
Walt Colmenares 54 y.o. male was seen by MARIA ELENA Hagan on 9/28/2022     Wt Readings from Last 3 Encounters:   09/28/22 220 lb 12.8 oz (100.2 kg)   09/21/22 222 lb (100.7 kg)   08/07/22 220 lb (99.8 kg)       HPI  Walt Colmenares is a pleasant 54 y.o. male who presents today for follow-up on acid reflux and fatty liver. He is on Xarelto for WPW monitored by  Dr Laura Doyle and Dr. Damaris Dandy. He denies NSAID or alcohol use. He reports feeling fair. His EGD/colonoscopy were done by Dr. Nicole Ordonez on 4-. His EGD was normal with no peptic ulcer disease. His stomach biopsies showed mild chronic gastritis with no evidence of H. Pylori infection. His colonoscopy showed normal appearing colon with cecal biopsy showing mild active colitis. He continues to have a sore throat and has not followed with ENT. His CT of the abdomen/pelvis done on 8-5-2022 showed no acute finding in the abdomen or pelvis, cholelithiasis with tiny calcified gallstones, no findings to suggest acute cholecystitis and diffuse fatty infiltration of the liver. His appetite is fair and weight is stable. He is working on weight loss with portion control. No nausea or vomiting. No current abdominal pain, bloating or distention. He has intermittent pain at his left side at his surgical site; his pain is worse with lifting. No heartburn or acid reflux symptoms. No nocturnal awakenings with acid reflux. No dysphagia or pain with swallowing. No excess belching or flatulance. His bowel pattern has normalized. His bowel pattern is daily with soft brown formed stools. No diarrhea or constipation. No blood in his stools or melena. No family history of stomach or colon cancer. ROS  Review of Systems  Constitutional: Positive for fatigue and unexpected weight change. Negative for chills, diaphoresis and fever. HENT: Positive for ear pain (both ears). Negative for hearing loss and tinnitus.     Eyes: Negative for pain, discharge and visual disturbance. Respiratory: Negative for cough, shortness of breath and wheezing. Cardiovascular: Negative for chest pain, palpitations and leg swelling. Gastrointestinal: Positive for nausea. Negative for abdominal pain, constipation, diarrhea and vomiting. Endocrine: Negative for cold intolerance and heat intolerance. Genitourinary: Negative for dysuria, frequency, hematuria and urgency. Musculoskeletal: Positive for back pain. Negative for myalgias and neck pain. Skin: Negative for color change, pallor and rash. Allergic/Immunologic: Negative for environmental allergies and food allergies. Neurological: Positive for dizziness and headaches. Negative for seizures and weakness. Hematological: Does not bruise/bleed easily. Psychiatric/Behavioral: Positive for dysphoric mood and sleep disturbance. The patient is nervous/anxious. Allergies  Allergies   Allergen Reactions    Bee Venom Anaphylaxis    Hornet Venom     Tramadol Other (See Comments)     Night terrors and sweats       Medications  Current Outpatient Medications   Medication Sig Dispense Refill    STEGLATRO 15 MG TABS TAKE 1 TABLET BY MOUTH EVERY DAY 30 tablet 0    tiZANidine (ZANAFLEX) 4 MG tablet TAKE 1 TABLET BY MOUTH AT BEDTIME 30 tablet 0    sildenafil (VIAGRA) 50 MG tablet TAKE 1 TABLET BY MOUTH AS NEEDED FOR ERECTILE DYSFUNCTION 10 tablet 0    omeprazole (PRILOSEC) 20 MG delayed release capsule Take 1 capsule by mouth in the morning. Take on an empty stomach before breakfast. 30 capsule 1    dicyclomine (BENTYL) 10 MG capsule Take 1 capsule by mouth in the morning and 1 capsule at noon and 1 capsule in the evening and 1 capsule before bedtime.  120 capsule 3    escitalopram (LEXAPRO) 10 MG tablet TAKE 1 TABLET BY MOUTH EVERY DAY 30 tablet 3    tadalafil (CIALIS) 20 MG tablet Take 1 tablet by mouth as needed for Erectile Dysfunction 10 tablet 3    rivaroxaban (XARELTO) 20 MG TABS tablet Take 1 tablet by mouth daily 14 tablet 0 gangrene, and WPW (Sigrid-Parkinson-White syndrome). Past surgical history:  He has a past surgical history that includes hernia repair; Spine surgery (1990, 2010); Umbilical hernia repair (11/04/2015); Elbow surgery (Right); Abdomen surgery; Colonoscopy (N/A, 04/25/2022); Upper gastrointestinal endoscopy (N/A, 04/25/2022); other surgical history (N/A, 05/10/2022); and hernia repair (N/A, 5/17/2022). Social History:  He reports that he has never smoked. He has never used smokeless tobacco. He reports that he does not currently use alcohol after a past usage of about 2.0 standard drinks per week. He reports that he does not currently use drugs after having used the following drugs: Marijuana Neeta Ralph). Family history:  His family history includes Breast Cancer in his mother. Objective    Vitals:    09/28/22 1334   BP: 130/72   Pulse: 79   Temp: 97.9 °F (36.6 °C)   SpO2: 98%        Physical exam    Physical Exam  Constitutional:       General: He is not in acute distress. Appearance: Normal appearance. He is well-developed. He is not ill-appearing, toxic-appearing or diaphoretic. HENT:      Head: Normocephalic and atraumatic. Nose: Nose normal.      Mouth/Throat:      Mouth: Mucous membranes are moist.   Cardiovascular:      Rate and Rhythm: Normal rate and regular rhythm. Pulses: Normal pulses. Heart sounds: Normal heart sounds. No murmur heard. No gallop. Pulmonary:      Effort: Pulmonary effort is normal. No respiratory distress. Breath sounds: Normal breath sounds. No stridor. No wheezing or rhonchi. Abdominal:      General: Bowel sounds are normal. There is no distension. Palpations: Abdomen is soft. There is no mass. Tenderness: There is no abdominal tenderness. Hernia: No hernia is present. Musculoskeletal:         General: Normal range of motion. Cervical back: Neck supple. Skin:     General: Skin is warm and dry.    Neurological:      Mental Status: He is alert and oriented to person, place, and time. Psychiatric:         Mood and Affect: Mood normal.       Hospital Outpatient Visit on 08/15/2022   Component Date Value Ref Range Status    Source 08/15/2022 THROAT   Final    SARS-CoV-2 08/15/2022 DETECTED (A)  NOT DETECTED Final    Comment:         The specimen is POSITIVE for SARS-CoV-2, the novel coronovirus associated with COVID-19. This test has been authorized by the FDA under an Emergency Use Authorization (EUA) for use   by authorized laboratories. Nitronex SARS-CoV2 reagents for [employer] [facility] are designed to detect the virus that causes   COVID-19 in patients with signs and symptoms of infection who are suspected of COVID-19. An individual without symptoms of COVID-19 and who is not shedding SARS-CoV-2 virus would   expect to have a negaitve (not detected) result in this assay.           Fact sheet for Healthcare Providers: [employer]uleima  Fact sheet for Patients: [employer]uleima          Methodology: RT-PCR     Hospital Outpatient Visit on 08/10/2022   Component Date Value Ref Range Status    Ferritin 08/10/2022 113  30 - 400 NG/ML Final    Iron 08/10/2022 62  59 - 158 ug/dL Final    UIBC 08/10/2022 268  110 - 370 ug/dL Final    TIBC 08/10/2022 330  250 - 450 ug/dL Final    Transferrin % 08/10/2022 19  10 - 44 % Final    Sodium 08/10/2022 141  135 - 145 MMOL/L Final    Potassium 08/10/2022 4.8  3.5 - 5.1 MMOL/L Final    Chloride 08/10/2022 102  99 - 110 mMol/L Final    CO2 08/10/2022 26  21 - 32 MMOL/L Final    BUN 08/10/2022 27 (A)  6 - 23 MG/DL Final    Creatinine 08/10/2022 0.8 (A)  0.9 - 1.3 MG/DL Final    Glucose 08/10/2022 313 (A)  70 - 99 MG/DL Final    Calcium 08/10/2022 9.6  8.3 - 10.6 MG/DL Final    Albumin 08/10/2022 5.0  3.4 - 5.0 GM/DL Final    Total Protein 08/10/2022 7.1  6.4 - 8.2 GM/DL Final    Total Bilirubin 08/10/2022 0.3  0.0 - 1.0 MG/DL Final    ALT 08/10/2022 39 10 - 40 U/L Final    AST 08/10/2022 24  15 - 37 IU/L Final    Alkaline Phosphatase 08/10/2022 104  40 - 129 IU/L Final    GFR Non- 08/10/2022 >60  >60 mL/min/1.73m2 Final    GFR  08/10/2022 >60  >60 mL/min/1.73m2 Final    Anion Gap 08/10/2022 13  4 - 16 Final    WBC 08/10/2022 4.3  4.0 - 10.5 K/CU MM Final    RBC 08/10/2022 5.35  4.6 - 6.2 M/CU MM Final    Hemoglobin 08/10/2022 16.3  13.5 - 18.0 GM/DL Final    Hematocrit 08/10/2022 48.8  42 - 52 % Final    MCV 08/10/2022 91.2  78 - 100 FL Final    MCH 08/10/2022 30.5  27 - 31 PG Final    MCHC 08/10/2022 33.4  32.0 - 36.0 % Final    RDW 08/10/2022 12.5  11.7 - 14.9 % Final    Platelets 12/26/6730 195  140 - 440 K/CU MM Final    MPV 08/10/2022 10.3  7.5 - 11.1 FL Final    Differential Type 08/10/2022 AUTOMATED DIFFERENTIAL   Final    Segs Relative 08/10/2022 54.5  36 - 66 % Final    Lymphocytes % 08/10/2022 30.9  24 - 44 % Final    Monocytes % 08/10/2022 10.4 (A)  0 - 4 % Final    Eosinophils % 08/10/2022 3.5 (A)  0 - 3 % Final    Basophils % 08/10/2022 0.7  0 - 1 % Final    Segs Absolute 08/10/2022 2.4  K/CU MM Final    Lymphocytes Absolute 08/10/2022 1.3  K/CU MM Final    Monocytes Absolute 08/10/2022 0.5  K/CU MM Final    Eosinophils Absolute 08/10/2022 0.2  K/CU MM Final    Basophils Absolute 08/10/2022 0.0  K/CU MM Final    Nucleated RBC % 08/10/2022 0.0  % Final    Total Nucleated RBC 08/10/2022 0.0  K/CU MM Final    Total Immature Neutrophil 08/10/2022 0.00  K/CU MM Final    Immature Neutrophil % 08/10/2022 0.0  0 - 0.43 % Final   Admission on 08/07/2022, Discharged on 08/07/2022   Component Date Value Ref Range Status    Ventricular Rate 08/07/2022 65  BPM Final    Atrial Rate 08/07/2022 65  BPM Final    P-R Interval 08/07/2022 116  ms Final    QRS Duration 08/07/2022 118  ms Final    Q-T Interval 08/07/2022 432  ms Final    QTc Calculation (Bazett) 08/07/2022 449  ms Final    P Axis 08/07/2022 43  degrees Final    R Axis 08/07/2022 79  degrees Final    T Axis 08/07/2022 35  degrees Final    Diagnosis 08/07/2022    Final                    Value:Normal sinus rhythm  Sigrid-Parkinson-White  Abnormal ECG  When compared with ECG of 04-SEP-2020 10:37,  No significant change was found  Confirmed by Flory Brown ([id number]) on 8/8/2022 7:29:47 PM     Admission on 08/04/2022, Discharged on 08/05/2022   Component Date Value Ref Range Status    WBC 08/04/2022 5.3  4.0 - 10.5 K/CU MM Final    RBC 08/04/2022 5.44  4.6 - 6.2 M/CU MM Final    Hemoglobin 08/04/2022 16.7  13.5 - 18.0 GM/DL Final    Hematocrit 08/04/2022 46.8  42 - 52 % Final    MCV 08/04/2022 86.0  78 - 100 FL Final    MCH 08/04/2022 30.7  27 - 31 PG Final    MCHC 08/04/2022 35.7  32.0 - 36.0 % Final    RDW 08/04/2022 12.1  11.7 - 14.9 % Final    Platelets 99/39/2462 214  140 - 440 K/CU MM Final    MPV 08/04/2022 10.1  7.5 - 11.1 FL Final    Differential Type 08/04/2022 AUTOMATED DIFFERENTIAL   Final    Segs Relative 08/04/2022 52.4  36 - 66 % Final    Lymphocytes % 08/04/2022 33.8  24 - 44 % Final    Monocytes % 08/04/2022 9.4 (A)  0 - 4 % Final    Eosinophils % 08/04/2022 3.6 (A)  0 - 3 % Final    Basophils % 08/04/2022 0.6  0 - 1 % Final    Segs Absolute 08/04/2022 2.8  K/CU MM Final    Lymphocytes Absolute 08/04/2022 1.8  K/CU MM Final    Monocytes Absolute 08/04/2022 0.5  K/CU MM Final    Eosinophils Absolute 08/04/2022 0.2  K/CU MM Final    Basophils Absolute 08/04/2022 0.0  K/CU MM Final    Nucleated RBC % 08/04/2022 0.0  % Final    Total Nucleated RBC 08/04/2022 0.0  K/CU MM Final    Total Immature Neutrophil 08/04/2022 0.01  K/CU MM Final    Immature Neutrophil % 08/04/2022 0.2  0 - 0.43 % Final    Sodium 08/04/2022 137  135 - 145 MMOL/L Final    Potassium 08/04/2022 4.3  3.5 - 5.1 MMOL/L Final    Chloride 08/04/2022 101  99 - 110 mMol/L Final    CO2 08/04/2022 24  21 - 32 MMOL/L Final    BUN 08/04/2022 22  6 - 23 MG/DL Final    Creatinine 08/04/2022 0.8 (A)  0.9 - 1.3 MG/DL Final    Glucose 08/04/2022 147 (A)  70 - 99 MG/DL Final    Calcium 08/04/2022 9.6  8.3 - 10.6 MG/DL Final    Albumin 08/04/2022 4.7  3.4 - 5.0 GM/DL Final    Total Protein 08/04/2022 7.2  6.4 - 8.2 GM/DL Final    Total Bilirubin 08/04/2022 0.3  0.0 - 1.0 MG/DL Final    ALT 08/04/2022 47 (A)  10 - 40 U/L Final    AST 08/04/2022 32  15 - 37 IU/L Final    Alkaline Phosphatase 08/04/2022 82  40 - 129 IU/L Final    GFR Non- 08/04/2022 >60  >60 mL/min/1.73m2 Final    GFR  08/04/2022 >60  >60 mL/min/1.73m2 Final    Anion Gap 08/04/2022 12  4 - 16 Final    Lipase 08/04/2022 26  13 - 60 IU/L Final    Lactate 08/04/2022 0.8  0.4 - 2.0 mMOL/L Final       Assessment and Plan:  1. GERD without dysphagia or odynophagia. The patient was encouraged to continue taking Prilosec daily for treatment of acid reflux. The patient was encouraged to continue with anti-reflux measures and avoid foods that trigger. 2.  Sore throat with sensation will resend referral to ENT for evaluation. Avoid throat clearing. 3.  Fatty liver most likely due to obesity from excess calories. His Fib 4 index is 0.97 with negative predictive value for advanced fibrosis. His liver enzymes are normal.  The patient was encouraged to lose weight at least 10% of his body weight. The patient was provided with information on fatty liver and liver disease diet. 4. The patient was encouraged to follow-up in 6 months. Total time:  15 minutes.

## 2022-09-29 ENCOUNTER — TELEPHONE (OUTPATIENT)
Dept: CARDIOLOGY CLINIC | Age: 52
End: 2022-09-29

## 2022-09-29 NOTE — TELEPHONE ENCOUNTER
Patient called states he is at work and they asked him to call the office and advise him on his situation. Patient is having chest tightness, SOB (can hardly talk) and he has WPW. Advised him to go to ED, his breathing was very labored and hard to understand him.  He voiced understanding

## 2022-10-12 ENCOUNTER — OFFICE VISIT (OUTPATIENT)
Dept: INTERNAL MEDICINE CLINIC | Age: 52
End: 2022-10-12
Payer: COMMERCIAL

## 2022-10-12 VITALS
DIASTOLIC BLOOD PRESSURE: 82 MMHG | BODY MASS INDEX: 29 KG/M2 | WEIGHT: 218.8 LBS | OXYGEN SATURATION: 98 % | SYSTOLIC BLOOD PRESSURE: 128 MMHG | HEIGHT: 73 IN | HEART RATE: 78 BPM

## 2022-10-12 DIAGNOSIS — G62.9 NEUROPATHY: ICD-10-CM

## 2022-10-12 DIAGNOSIS — G89.29 CHRONIC PAIN OF LEFT KNEE: ICD-10-CM

## 2022-10-12 DIAGNOSIS — F90.9 ATTENTION DEFICIT HYPERACTIVITY DISORDER (ADHD), UNSPECIFIED ADHD TYPE: ICD-10-CM

## 2022-10-12 DIAGNOSIS — M25.562 CHRONIC PAIN OF LEFT KNEE: ICD-10-CM

## 2022-10-12 DIAGNOSIS — Z79.4 TYPE 2 DIABETES MELLITUS WITH DIABETIC POLYNEUROPATHY, WITH LONG-TERM CURRENT USE OF INSULIN (HCC): Primary | ICD-10-CM

## 2022-10-12 DIAGNOSIS — M79.671 PAIN IN BOTH FEET: ICD-10-CM

## 2022-10-12 DIAGNOSIS — E11.42 TYPE 2 DIABETES MELLITUS WITH DIABETIC POLYNEUROPATHY, WITH LONG-TERM CURRENT USE OF INSULIN (HCC): Primary | ICD-10-CM

## 2022-10-12 DIAGNOSIS — I10 ESSENTIAL HYPERTENSION: ICD-10-CM

## 2022-10-12 DIAGNOSIS — M79.672 PAIN IN BOTH FEET: ICD-10-CM

## 2022-10-12 DIAGNOSIS — F32.A CHRONIC DEPRESSION: ICD-10-CM

## 2022-10-12 DIAGNOSIS — I45.6 WOLFF PARKINSON WHITE PATTERN SEEN ON ELECTROCARDIOGRAM: ICD-10-CM

## 2022-10-12 DIAGNOSIS — E78.00 PURE HYPERCHOLESTEROLEMIA: ICD-10-CM

## 2022-10-12 DIAGNOSIS — K21.9 GASTROESOPHAGEAL REFLUX DISEASE WITHOUT ESOPHAGITIS: ICD-10-CM

## 2022-10-12 DIAGNOSIS — Z86.16 HISTORY OF COVID-19: ICD-10-CM

## 2022-10-12 DIAGNOSIS — N52.9 ERECTILE DYSFUNCTION, UNSPECIFIED ERECTILE DYSFUNCTION TYPE: ICD-10-CM

## 2022-10-12 DIAGNOSIS — F41.9 ANXIETY: ICD-10-CM

## 2022-10-12 DIAGNOSIS — I48.0 PAROXYSMAL ATRIAL FIBRILLATION (HCC): ICD-10-CM

## 2022-10-12 PROBLEM — G47.00 INSOMNIA: Status: ACTIVE | Noted: 2022-10-12

## 2022-10-12 LAB
CHP ED QC CHECK: NORMAL
GLUCOSE BLD-MCNC: 255 MG/DL

## 2022-10-12 PROCEDURE — 82962 GLUCOSE BLOOD TEST: CPT | Performed by: INTERNAL MEDICINE

## 2022-10-12 PROCEDURE — 3051F HG A1C>EQUAL 7.0%<8.0%: CPT | Performed by: INTERNAL MEDICINE

## 2022-10-12 PROCEDURE — 99214 OFFICE O/P EST MOD 30 MIN: CPT | Performed by: INTERNAL MEDICINE

## 2022-10-12 RX ORDER — TADALAFIL 20 MG/1
20 TABLET ORAL PRN
Qty: 10 TABLET | Refills: 3 | Status: SHIPPED | OUTPATIENT
Start: 2022-10-12

## 2022-10-12 RX ORDER — GABAPENTIN 400 MG/1
400 CAPSULE ORAL 3 TIMES DAILY
Qty: 270 CAPSULE | Refills: 1 | Status: SHIPPED | OUTPATIENT
Start: 2022-10-12 | End: 2023-01-10

## 2022-10-12 RX ORDER — BUPROPION HYDROCHLORIDE 100 MG/1
100 TABLET, EXTENDED RELEASE ORAL 2 TIMES DAILY
Qty: 60 TABLET | Refills: 3 | Status: SHIPPED | OUTPATIENT
Start: 2022-10-12

## 2022-10-12 RX ORDER — POTASSIUM CHLORIDE 750 MG/1
10 TABLET, EXTENDED RELEASE ORAL DAILY
Qty: 90 TABLET | Refills: 1 | Status: SHIPPED | OUTPATIENT
Start: 2022-10-12

## 2022-10-12 RX ORDER — ATORVASTATIN CALCIUM 40 MG/1
40 TABLET, FILM COATED ORAL DAILY
Qty: 90 TABLET | Refills: 1 | Status: SHIPPED | OUTPATIENT
Start: 2022-10-12

## 2022-10-12 RX ORDER — TIZANIDINE 4 MG/1
4 TABLET ORAL NIGHTLY
Qty: 30 TABLET | Refills: 0 | Status: SHIPPED | OUTPATIENT
Start: 2022-10-12

## 2022-10-12 RX ORDER — SILDENAFIL 50 MG/1
50 TABLET, FILM COATED ORAL PRN
Qty: 10 TABLET | Refills: 0 | Status: SHIPPED | OUTPATIENT
Start: 2022-10-12 | End: 2022-10-13 | Stop reason: ALTCHOICE

## 2022-10-12 RX ORDER — ERTUGLIFLOZIN 15 MG/1
TABLET, FILM COATED ORAL
Qty: 30 TABLET | Refills: 0 | Status: SHIPPED | OUTPATIENT
Start: 2022-10-12

## 2022-10-12 RX ORDER — HYDROCHLOROTHIAZIDE 12.5 MG/1
12.5 CAPSULE, GELATIN COATED ORAL EVERY MORNING
Qty: 90 CAPSULE | Refills: 1 | Status: SHIPPED | OUTPATIENT
Start: 2022-10-12

## 2022-10-12 RX ORDER — MELOXICAM 7.5 MG/1
7.5 TABLET ORAL DAILY
Qty: 90 TABLET | Refills: 1 | Status: SHIPPED | OUTPATIENT
Start: 2022-10-12

## 2022-10-12 RX ORDER — ESCITALOPRAM OXALATE 10 MG/1
TABLET ORAL
Qty: 30 TABLET | Refills: 3 | Status: SHIPPED | OUTPATIENT
Start: 2022-10-12

## 2022-10-12 RX ORDER — LOSARTAN POTASSIUM 100 MG/1
100 TABLET ORAL DAILY
Qty: 90 TABLET | Refills: 1 | Status: SHIPPED | OUTPATIENT
Start: 2022-10-12

## 2022-10-12 RX ORDER — INSULIN DEGLUDEC INJECTION 100 U/ML
45 INJECTION, SOLUTION SUBCUTANEOUS NIGHTLY
Qty: 9 ADJUSTABLE DOSE PRE-FILLED PEN SYRINGE | Refills: 1 | Status: SHIPPED | OUTPATIENT
Start: 2022-10-12

## 2022-10-12 NOTE — PROGRESS NOTES
without sciatica    Type 2 diabetes mellitus with diabetic polyneuropathy, with long-term current use of insulin (HCC)    Erectile dysfunction    Chest pain    Nereida Hiro Parkinson White pattern seen on electrocardiogram    Fatty liver    Neuropathy    Dorsalgia    Pure hypercholesterolemia    Pain in both feet    Chronic pain of left knee    Chronic depression    Anxiety    Polycythemia    History of COVID-19    Left upper quadrant abdominal swelling, mass and lump    Paroxysmal atrial fibrillation (HCC)    Palpitations    Tachycardia    Insomnia        Past Surgical History:        Procedure Laterality Date    ABDOMEN SURGERY      COLONOSCOPY N/A 04/25/2022    COLONOSCOPY POLYPECTOMY SNARE/COLD BIOPSY performed by Yasmeen Muñoz DO at [address] N/A 5/17/2022    HERNIA INCISIONAL REPAIR LAPAROSCOPIC ROBOTIC WITH MESH performed by Yasmeen Muñoz DO at [address] N/A 05/10/2022    Electrophysiology study and cardioversion performed by Dr. Dejah Quiñones. [address] [place] [address], [facility]    Fusion of L3, L4, L5    UMBILICAL HERNIA REPAIR  11/04/2015    repair incarcerated supra umbilical hernia    UPPER GASTROINTESTINAL ENDOSCOPY N/A 04/25/2022    EGD DIAGNOSTIC ONLY performed by Yasmeen Muñoz DO at [address] [facility] ENDOSCOPY       Social History:   Social History     Tobacco Use    Smoking status: Never    Smokeless tobacco: Never   Substance Use Topics    Alcohol use: Not Currently     Alcohol/week: 2.0 standard drinks     Types: 2 Standard drinks or equivalent per week       Family History:       Problem Relation Age of Onset    Breast Cancer Mother        Allergies:  Bee venom, Hornet venom, and Tramadol    Current Medications :      Prior to Admission medications    Medication Sig Start Date End Date Taking?  Authorizing Provider   tiZANidine (ZANAFLEX) 4 MG tablet Take 1 tablet by mouth at bedtime 10/12/22  Yes Zeinab Wood MD   tadalafil (CIALIS) 20 MG tablet Take 1 tablet by mouth as needed for Erectile Dysfunction 10/12/22  Yes Helga Mcnulty MD   Ertugliflozin L-PyroglutamicAc (STEGLATRO) 15 MG TABS TAKE 1 TABLET BY MOUTH EVERY DAY 10/12/22  Yes Helga Mcnulty MD   sildenafil (VIAGRA) 50 MG tablet Take 1 tablet by mouth as needed for Erectile Dysfunction 10/12/22  Yes Helga Mcnulty MD   potassium chloride (KLOR-CON M) 10 MEQ extended release tablet Take 1 tablet by mouth daily OTC 10/12/22  Yes Helga Mcnulty MD   metFORMIN (GLUCOPHAGE) 1000 MG tablet Take 1 tablet by mouth 2 times daily (with meals) 10/12/22  Yes Helga Mcunlty MD   meloxicam (MOBIC) 7.5 MG tablet Take 1 tablet by mouth daily 10/12/22  Yes Helga Mcnulty MD   losartan (COZAAR) 100 MG tablet Take 1 tablet by mouth daily 10/12/22  Yes Helga Mcnulty MD   hydroCHLOROthiazide (MICROZIDE) 12.5 MG capsule Take 1 capsule by mouth every morning 10/12/22  Yes Helga Mcnulty MD   gabapentin (NEURONTIN) 400 MG capsule Take 1 capsule by mouth 3 times daily for 90 days. Intended supply: 90 days 10/12/22 1/10/23 Yes Helga Mcnulty MD   escitalopram (LEXAPRO) 10 MG tablet TAKE 1 TABLET BY MOUTH EVERY DAY 10/12/22  Yes Helga Mcnulty MD   buPROPion [facility] SR) 100 MG extended release tablet Take 1 tablet by mouth 2 times daily 10/12/22  Yes Helga Mcnulty MD   atorvastatin (LIPITOR) 40 MG tablet Take 1 tablet by mouth daily 10/12/22  Yes Helga Mcnulty MD   Insulin Degludec (TRESIBA FLEXTOUCH) 100 UNIT/ML SOPN Inject 45 Units into the skin nightly 10/12/22  Yes Helga Mcnulty MD   omeprazole (PRILOSEC) 20 MG delayed release capsule Take 1 capsule by mouth in the morning. Take on an empty stomach before breakfast. 7/22/22 11/19/22 Yes EDGAR Ramsey - CNP   dicyclomine (BENTYL) 10 MG capsule Take 1 capsule by mouth in the morning and 1 capsule at noon and 1 capsule in the evening and 1 capsule before bedtime.  7/22/22  Yes EDGAR Ramsey - CNP   rivaroxaban (XARELTO) 20 MG TABS tablet Take 1 tablet by mouth daily 5/11/22  Yes EDGAR Begum - CNP   insulin lispro (HUMALOG KWIKPEN) 200 UNIT/ML SOPN pen TID with meals Glucose mmly290 No Insulin 140-199 2 Units 200-249 4 Units 250-299 6 Units 300-349 8 Units 350-400 10 Units over 400 12 Units 1/20/22  Yes Sky Gallo MD   vitamin B-12 (CYANOCOBALAMIN) 1000 MCG tablet Take 1 tablet by mouth daily 6/30/20  Yes Matthew Bob DO   Cholecalciferol (VITAMIN D3) 50 MCG (2000 UT) TABS Take one tablet by mouth once a day  Patient taking differently: Take one tablet by mouth once a day OTC 6/30/20  Yes Matthew Bob DO   EPINEPHrine (EPIPEN 2-RAVINDER) 0.3 MG/0.3ML SOAJ injection Inject 0.3 mLs into the muscle once as needed (severe allergic reaction) Inject into lateral thigh muscle. Patient not taking: Reported on 9/28/2022 8/7/22 8/15/22  Luli Tran MD   amitriptyline (ELAVIL) 50 MG tablet Take 1 tablet by mouth nightly  Patient not taking: Reported on 10/12/2022 1/20/22   Sky Gallo MD       LAB DATA: Reviewed. REVIEW OF SYSTEMS:   see HPI/ Comprehensive review of systems negative except for the ones mentioned in HPI. PHYSICAL EXAMINATION:   /82 (Site: Left Upper Arm, Position: Sitting, Cuff Size: Medium Adult)   Pulse 78   Ht 6' 1\" (1.854 m)   Wt 218 lb 12.8 oz (99.2 kg)   SpO2 98%   BMI 28.87 kg/m²      GENERAL APPEARANCE:      Alert, oriented x 3, well developed, cooperative, not in any distress, appears stated age. HEAD:                         Normocephalic, atraumatic   EYES:                          PERRLA, EOMI, lids normal, conjuctivea clear, sclera anicteric. NECK:                         Supple, symmetrical,  trachea midline, no thyromegaly, no JVD, no lymphadenopathy. LUNGS:                       Clear to auscultation bilaterally, respirations unlabored, accessory muscles are not used. HEART:                       Regular rate and rhythm, S1 and S2 normal, no murmur, rub or gallop. PMI in MCL.   ABDOMEN: Soft, mild tenderness in the left upper quadrant,  Mild swelling in the left lower quadrant area, bowel sounds are normoactive, no masses, no hepatospleenomegaly. EXTREMITY:              no bipedal edema  NEURO:                      Alert, oriented to person, place and time. Grossly intact. Musculoskeletal:         No kyphosis or scoliosis, no deformity in any extremity noted, muscle strength and tone are normal.  Skin:                            Warm and dry. No rash or obvious suspicious lesions    PSYCH:                        Mood euthymic, insight and judgement good. ASSESSMENT/PLAN:    1. Type 2 diabetes mellitus with diabetic polyneuropathy, with long-term current use of insulin (HCC)  Advised low sugar diet and exercise and continue his current medications for diabetes. - POCT Glucose  - Ertugliflozin L-PyroglutamicAc (STEGLATRO) 15 MG TABS; TAKE 1 TABLET BY MOUTH EVERY DAY  Dispense: 30 tablet; Refill: 0  - metFORMIN (GLUCOPHAGE) 1000 MG tablet; Take 1 tablet by mouth 2 times daily (with meals)  Dispense: 180 tablet; Refill: 1  - Insulin Degludec (TRESIBA FLEXTOUCH) 100 UNIT/ML SOPN; Inject 45 Units into the skin nightly  Dispense: 9 Adjustable Dose Pre-filled Pen Syringe; Refill: 1    2. Edra Contreras Parkinson White pattern seen on electrocardiogram  Advised to continue his current medications and continue to follow with his cardiologist and keep appointment Dr. Lakeisha Brown for possible ablation    3. Paroxysmal atrial fibrillation (HCC)  Patient on Xarelto. Being followed by cardiologist periodically. 4. Essential hypertension  Continue current medications, denies side effect with medicationss. Low salt diet and exercise advised. Continue current medications  - losartan (COZAAR) 100 MG tablet; Take 1 tablet by mouth daily  Dispense: 90 tablet; Refill: 1  - hydroCHLOROthiazide (MICROZIDE) 12.5 MG capsule;  Take 1 capsule by mouth every morning Dispense: 90 capsule; Refill: 1    5. Pure hypercholesterolemia  Advised low-cholesterol diet and exercise. Continue Lipitor  - atorvastatin (LIPITOR) 40 MG tablet; Take 1 tablet by mouth daily  Dispense: 90 tablet; Refill: 1    6. Gastroesophageal reflux disease without esophagitis  Continue Prilosec      7. Chronic depression  Continue Lexapro and Wellbutrin  - escitalopram (LEXAPRO) 10 MG tablet; TAKE 1 TABLET BY MOUTH EVERY DAY  Dispense: 30 tablet; Refill: 3    8. Anxiety  Continue Lexapro and Wellbutrin        9. Erectile dysfunction, unspecified erectile dysfunction type  Continue Cialis  - tadalafil (CIALIS) 20 MG tablet; Take 1 tablet by mouth as needed for Erectile Dysfunction  Dispense: 10 tablet; Refill: 3      10. History of COVID-19  Improved    11. Pain in both feet  Continue meloxicam and Zanaflex  - tiZANidine (ZANAFLEX) 4 MG tablet; Take 1 tablet by mouth at bedtime  Dispense: 30 tablet; Refill: 0  - meloxicam (MOBIC) 7.5 MG tablet; Take 1 tablet by mouth daily  Dispense: 90 tablet; Refill: 1    12. Chronic pain of left knee  Continue meloxicam  - meloxicam (MOBIC) 7.5 MG tablet; Take 1 tablet by mouth daily  Dispense: 90 tablet; Refill: 1    13. Neuropathy  Patient on Neurontin  - gabapentin (NEURONTIN) 400 MG capsule; Take 1 capsule by mouth 3 times daily for 90 days. Intended supply: 90 days  Dispense: 270 capsule; Refill: 1    14. Attention deficit hyperactivity disorder (ADHD), unspecified ADHD type  Continue Wellbutrin  - buPROPion (WELLBUTRIN SR) 100 MG extended release tablet; Take 1 tablet by mouth 2 times daily  Dispense: 60 tablet; Refill: 3    Advised to continue to follow COVID-19 precautions        Care discussed with patient. Questions answered and patient verbalizes understanding and agrees with plan. Medications reviewed and reconciled. Continue current medications. Appropriate prescriptions are ordered. Risks and benefits of meds are discussed.      After visit summary provided. Advised to call for any problems, questions, or concerns. If symptoms worsen or don't improve as expected, to call us or go to ER. Follow up as directed, sooner if needed. No follow-ups on file. This dictation was performed with a verbal recognition program and it was checked for errors. It is possible that there are still dictated errors within this office note. Any errors should be brought immediately to my attention for correction. All efforts were made to ensure that this office note is accurate.      Yolande Smalls MD MD

## 2022-10-13 DIAGNOSIS — N52.9 ERECTILE DYSFUNCTION, UNSPECIFIED ERECTILE DYSFUNCTION TYPE: ICD-10-CM

## 2022-10-13 RX ORDER — SILDENAFIL 50 MG/1
TABLET, FILM COATED ORAL
Qty: 10 TABLET | Refills: 0 | Status: SHIPPED | OUTPATIENT
Start: 2022-10-13

## 2022-10-26 ENCOUNTER — OFFICE VISIT (OUTPATIENT)
Dept: CARDIOLOGY CLINIC | Age: 52
End: 2022-10-26
Payer: COMMERCIAL

## 2022-10-26 VITALS
HEIGHT: 74 IN | SYSTOLIC BLOOD PRESSURE: 180 MMHG | HEART RATE: 86 BPM | DIASTOLIC BLOOD PRESSURE: 100 MMHG | BODY MASS INDEX: 28.57 KG/M2 | WEIGHT: 222.6 LBS | OXYGEN SATURATION: 97 % | RESPIRATION RATE: 18 BRPM

## 2022-10-26 DIAGNOSIS — D68.69 HYPERCOAGULABLE STATE DUE TO PAROXYSMAL ATRIAL FIBRILLATION (HCC): ICD-10-CM

## 2022-10-26 DIAGNOSIS — I48.0 HYPERCOAGULABLE STATE DUE TO PAROXYSMAL ATRIAL FIBRILLATION (HCC): ICD-10-CM

## 2022-10-26 DIAGNOSIS — I48.0 PAROXYSMAL ATRIAL FIBRILLATION (HCC): Primary | ICD-10-CM

## 2022-10-26 PROCEDURE — 99214 OFFICE O/P EST MOD 30 MIN: CPT | Performed by: NURSE PRACTITIONER

## 2022-10-26 PROCEDURE — 3074F SYST BP LT 130 MM HG: CPT | Performed by: NURSE PRACTITIONER

## 2022-10-26 PROCEDURE — 3078F DIAST BP <80 MM HG: CPT | Performed by: NURSE PRACTITIONER

## 2022-10-26 NOTE — LETTER
Kaye Zapata     PROCEDURE: Electrophysiology Study/Atrial Fibrillation Ablation     Date of Procedure: 2022 Time: [address] Time: 2938    Patient Name: Russell Bosch  : 1970  MRN# [id number] [place]: [facility])    Call to [facility] at [phone]  2 days before your procedure    x Please have blood work and chest-x-ray done 2022 at [facility], [address] [place] [address] or [facility].    X Please have COVID-19 Test done on 2022 at the [place] [address]. You will need to Quarantine yourself until procedure. x Please do not have anything by mouth after midnight prior to or 8 hours  before the procedure.    x You may take your medications with a sip of water in the morning before your procedure or take them with you unless listed below. x If takes Xarelto the morning hold the morning of the procedure. If takes Xarelto in the evening have them take a morning dose the day prior and hold the day of procedure.        x Hold Diltiazem (Cardizem), Verapamil (Calan) or Metoprolol 2 days prior to the procedure.     x  Hold Metformin 24 hours before the procedure until you may restart metformin 2  days after the procedure on 2022. X  Only half dose of regular insulin and NO INSULIN morning of procedure. IMPORTANT NOTICE TO PATIENTS: Prior Authorization  We will contact your insurance for prior authorization for the CPT code related to the procedure it is the patient's responsibility to contact their insurance to ensure they are following their medical plans provisions or requirements!     Patient Signature:  _______________________      Staff: Terra Contreras MA     Staff Given Instructions:___________________________      Kaye Mercadope:  Electrophysiology Study/Atrial Fibrillation Ablation      Date of Procedure: 2022 Time: [address] [facility] Time: 1359    Patient Name: Ramez Desai  : 1970  MRN# [id number]    Day of Procedure Cath Lab Holding area Pre op Orders:    ANTICOAGULATION[de-identified]   Hold Eliquis evening dose the night before the procedure and the morning dose of the procedure    ? IV peripheral saline lock x 2 sites (at least one inpreferred left arm). ? Type & Screen STAT on arrival.  ? If taking Coumadin, PT INR STAT on arrival day of procedure. ? Female patients <=48years of age >> Please do urine HCG test.  ? Diabetic patients >> Accu check Blood sugar check. ? Document home medications in EPIC and include date and time of last dose.  ? NPO  ? Notify Dr. Leo Jiang of abnormal lab results. ? Chest Prep> Clip hair anterior chest and posterior back. ? Groin Prep> Clip hair bilateral groins. Physician Signature:_____________________Date:__________Time:________                                             *This consent is applicable for 30 days following patient signature*    Estelle Atkins / PROCEDURE    I (We), Ramez Desai authorize, Dr. Catracho Lacy    and such assistants as may be selected by him/her, to perform the following operation/procedures:  Electrophysiology Study /Atrial Fibrillation Ablation     Note: If unable to obtain consent prior to an emergent procedure, document the emergent reason in the medical record. This procedure has been explained to my (our) satisfaction and included in the explanation was:   A) the intended benefit, nature, and extent of the procedure to be performed;   B) the significant risks involved and the probability of success;   C) alternative procedures and methods of treatment;   D) the dangers and probable consequences of such alternatives (including no procedure or treatment);    E) the expected consequences of the procedure on my future health;   F) whether other qualified individuals would be performing important surgical tasks and / or whether  would be present to advise or support the procedure. I (we) understand that there are other risks of infection and other serious complications in the pre-operative/procedural and postoperative/procedural stages of my (our) care. I (we) have asked all of the questions which I (we) thought were important in deciding whether or not to undergo treatment or diagnosis. These questions have been answered to my (our) satisfaction. I (we) understand that no assurance can be given that the procedure will be a success, and no guarantee or warranty of success has been given to me (us). 2. It has been explained to me (us) that during the course of the operation/procedure, unforeseen conditions may be revealed that necessitate extension of the original procedure(s) or different procedure(s) than those set forth in Paragraph 1. I (we) authorize and request that the above-named physician, his/her assistants or his/her designees, perform procedures as necessary and desirable if deemed to be in my (our) best interest.     3. I acknowledge that other health care personnel may be observing this procedure for the purpose of medical education or other specified purposes as may be necessary as requested and/or approved by my (our) physician. 4. I (we) consent to the disposal by the hospital Pathologist of the removed tissue, parts or organs in accordance with hospital policy. 5. I do_____ do not______ consent to the use of a local infiltration pain blocking agent that will be used by my provider/surgical provider to help alleviate pain during my procedure. 6. I do_____ do not_____ consent to an emergent blood transfusion in the case of a life-threatening situation that requires blood components to be administered. This consent is valid for 24 hours from the beginning of the procedure. 7. This patient does _____ or does not ______currently have a DNR status/order.  If DNR order is in Number: [phone]   Weight:    Wt Readings from Last 3 Encounters:   10/26/22 222 lb 9.6 oz (101 kg)   10/12/22 218 lb 12.8 oz (99.2 kg)   09/28/22 220 lb 12.8 oz (100.2 kg)        Insurance: Payor: Tex Degroot / Plan: [address] [place] [address] / Product Type: *No Product type* /     Date of Procedure: 12/13/2022 Time: 0730 Arrival Time: 9747    Diagnosis:  I47.1 (Supraventricular Tachycardia) Allergies:    Allergies   Allergen Reactions    Bee Venom Anaphylaxis    Hornet Venom     Tramadol Other (See Comments)     Night terrors and sweats        1) Call [facility] scheduling ([phone]) or [address] [facility]     PHONE OR   INSTANT MESSAGE  2) PREAUTHORIZATION NUMBER:    Spoke to:      From date:     expiration date:        Ruben Son, [address] [facility]

## 2022-10-28 PROBLEM — D68.69 HYPERCOAGULABLE STATE DUE TO PAROXYSMAL ATRIAL FIBRILLATION (HCC): Status: ACTIVE | Noted: 2022-10-28

## 2022-10-28 PROBLEM — I48.0 HYPERCOAGULABLE STATE DUE TO PAROXYSMAL ATRIAL FIBRILLATION (HCC): Status: ACTIVE | Noted: 2022-10-28

## 2022-10-28 ASSESSMENT — ENCOUNTER SYMPTOMS
COUGH: 0
EYE PAIN: 0
BACK PAIN: 0
ABDOMINAL PAIN: 0
CONSTIPATION: 0
SHORTNESS OF BREATH: 1
COLOR CHANGE: 0
DIARRHEA: 0
BLOOD IN STOOL: 0
NAUSEA: 0
PHOTOPHOBIA: 0
VOMITING: 0
CHEST TIGHTNESS: 0
WHEEZING: 0

## 2022-10-28 NOTE — PROGRESS NOTES
Electrophysiology Follow up  Note      Reason for consultation: WPW    Chief complaint; palpitations    Referring physician:       Primary care physician: Dolores Meigs, MD      History of Present Illness: This visit 10/28/2022  Patient here today with complaints of palpitations tachycardia with associated shortness of breath and dizziness. Patient reports that these symptoms have been going on for the past 1 month and they are debilitating at times. Patient would like to discuss ablation for atrial fibrillation as he had an EP study and noted to have A. fib. Patient has been taking Multaq and anticoagulation. Previous visit:     Patient is a 44-year-old male with history of hypertension, hyperlipidemia, diabetes mellitus, WPW referred by Dr. Bradley Roche for WPW management. Patient reports shortness of breath with exertion. Patient has on and off palpitations but nothing recently. Patient denies chest pains. Patient reports occasional dizziness but no syncopal episodes. Patient does not drink alcohol or caffeine. Patient does not smoke. Patient does try to exercise when he can. Patient has WPW and appears to be left lateral pathway on the EKG. Patient did have some wide-complex rhythm which could be from the WPW. Patient has hernia sent he is thinking of surgery. Patient has multiple other issues. Patient had some question of mass on the abdomen but CT scan did not show any mass.       Pastmedical history:   Past Medical History:   Diagnosis Date    Asthma     Depression     Diabetes mellitus ([address]     Hyperlipidemia     Hypertension     Palpitations     Sleep disorder     Tachycardia     Ventral hernia without obstruction or gangrene 11/04/2015    WPW (Sigrid-Parkinson-White syndrome)     Diann Primrose       Surgical history :   Past Surgical History:   Procedure Laterality Date    ABDOMEN SURGERY      COLONOSCOPY N/A 04/25/2022 COLONOSCOPY POLYPECTOMY SNARE/COLD BIOPSY performed by Katty Zamarripa DO at [address] N/A 5/17/2022    HERNIA INCISIONAL REPAIR LAPAROSCOPIC ROBOTIC WITH MESH performed by Katty Zamarripa DO at [address] [facility] N/A 05/10/2022    Electrophysiology study and cardioversion performed by Dr. Jasper Rahman. [facility]    Fusion of L3, L4, L5    UMBILICAL HERNIA REPAIR  11/04/2015    repair incarcerated supra umbilical hernia    UPPER GASTROINTESTINAL ENDOSCOPY N/A 04/25/2022    EGD DIAGNOSTIC ONLY performed by Katty Zamarripa DO at [address] [facility] ENDOSCOPY       Family history:   Family History   Problem Relation Age of Onset    Breast Cancer Mother        Social history :  reports that he has never smoked. He has never used smokeless tobacco. He reports that he does not currently use alcohol after a past usage of about 2.0 standard drinks per week. He reports that he does not currently use drugs after having used the following drugs: Marijuana Jammie Rosales).     Allergies   Allergen Reactions    Bee Venom Anaphylaxis    Hornet Venom     Tramadol Other (See Comments)     Night terrors and sweats       Current Outpatient Medications on File Prior to Visit   Medication Sig Dispense Refill    sildenafil (VIAGRA) 50 MG tablet TAKE 1 TABLET BY MOUTH DAILY AS NEEDED FOR ERECTILE DYSFUNCTION 10 tablet 0    tiZANidine (ZANAFLEX) 4 MG tablet Take 1 tablet by mouth at bedtime 30 tablet 0    tadalafil (CIALIS) 20 MG tablet Take 1 tablet by mouth as needed for Erectile Dysfunction 10 tablet 3    Ertugliflozin L-PyroglutamicAc (STEGLATRO) 15 MG TABS TAKE 1 TABLET BY MOUTH EVERY DAY 30 tablet 0    potassium chloride (KLOR-CON M) 10 MEQ extended release tablet Take 1 tablet by mouth daily OTC 90 tablet 1    metFORMIN (GLUCOPHAGE) 1000 MG tablet Take 1 tablet by mouth 2 times daily (with meals) 180 tablet 1    meloxicam (MOBIC) 7.5 MG tablet Take 1 tablet by mouth daily 90 tablet 1    losartan (COZAAR) 100 MG tablet Take 1 tablet by mouth daily 90 tablet 1    hydroCHLOROthiazide (MICROZIDE) 12.5 MG capsule Take 1 capsule by mouth every morning 90 capsule 1    gabapentin (NEURONTIN) 400 MG capsule Take 1 capsule by mouth 3 times daily for 90 days. Intended supply: 90 days 270 capsule 1    escitalopram (LEXAPRO) 10 MG tablet TAKE 1 TABLET BY MOUTH EVERY DAY 30 tablet 3    buPROPion (WELLBUTRIN SR) 100 MG extended release tablet Take 1 tablet by mouth 2 times daily 60 tablet 3    atorvastatin (LIPITOR) 40 MG tablet Take 1 tablet by mouth daily 90 tablet 1    Insulin Degludec (TRESIBA FLEXTOUCH) 100 UNIT/ML SOPN Inject 45 Units into the skin nightly 9 Adjustable Dose Pre-filled Pen Syringe 1    omeprazole (PRILOSEC) 20 MG delayed release capsule Take 1 capsule by mouth in the morning. Take on an empty stomach before breakfast. 30 capsule 1    dicyclomine (BENTYL) 10 MG capsule Take 1 capsule by mouth in the morning and 1 capsule at noon and 1 capsule in the evening and 1 capsule before bedtime. 120 capsule 3    rivaroxaban (XARELTO) 20 MG TABS tablet Take 1 tablet by mouth daily 14 tablet 0    insulin lispro (HUMALOG KWIKPEN) 200 UNIT/ML SOPN pen TID with meals Glucose [id number] No Insulin 140-199 2 Units 200-249 4 Units 250-299 6 Units 300-349 8 Units 350-400 10 Units over 400 12 Units 5 pen 5    vitamin B-12 (CYANOCOBALAMIN) 1000 MCG tablet Take 1 tablet by mouth daily 90 tablet 1    EPINEPHrine (EPIPEN 2-RAVINDER) 0.3 MG/0.3ML SOAJ injection Inject 0.3 mLs into the muscle once as needed (severe allergic reaction) Inject into lateral thigh muscle.  (Patient not taking: Reported on 9/28/2022) 1 each 0    amitriptyline (ELAVIL) 50 MG tablet Take 1 tablet by mouth nightly (Patient not taking: Reported on 10/12/2022) 90 tablet 1    Cholecalciferol (VITAMIN D3) 50 MCG (2000 UT) TABS Take one tablet by mouth once a day (Patient taking differently: Take one tablet by mouth once a day OTC) 30 tablet 3     No current facility-administered medications on file prior to visit. Review of Systems:   Review of Systems   Constitutional:  Negative for activity change, chills, fatigue and fever. HENT:  Negative for congestion, ear pain and tinnitus. Eyes:  Negative for photophobia, pain and visual disturbance. Respiratory:  Positive for shortness of breath. Negative for cough, chest tightness and wheezing. Cardiovascular:  Positive for palpitations. Negative for chest pain and leg swelling. Gastrointestinal:  Negative for abdominal pain, blood in stool, constipation, diarrhea, nausea and vomiting. Endocrine: Negative for cold intolerance and heat intolerance. Genitourinary:  Negative for dysuria, flank pain and hematuria. Musculoskeletal:  Positive for arthralgias. Negative for back pain, myalgias and neck stiffness. Skin:  Negative for color change and rash. Allergic/Immunologic: Negative for food allergies. Neurological:  Positive for dizziness. Negative for syncope and light-headedness. Hematological:  Does not bruise/bleed easily. Psychiatric/Behavioral:  Negative for agitation, behavioral problems and confusion. Examination:      Vitals:    10/26/22 1650   BP: (!) 180/100   Site: Left Upper Arm   Position: Sitting   Cuff Size: Medium Adult   Pulse: 86   Resp: 18   SpO2: 97%   Weight: 222 lb 9.6 oz (101 kg)   Height: 6' 2\" (1.88 m)        Body mass index is 28.58 kg/m². Physical Exam  Constitutional:       Appearance: Normal appearance. HENT:      Head: Normocephalic and atraumatic. Right Ear: External ear normal.      Left Ear: External ear normal.      Nose: Nose normal.      Mouth/Throat:      Mouth: Mucous membranes are moist.   Eyes:      General:         Right eye: No discharge. Left eye: No discharge. Conjunctiva/sclera: Conjunctivae normal.   Cardiovascular:      Rate and Rhythm: Normal rate and regular rhythm.       Heart sounds: No murmur heard. Pulmonary:      Effort: Pulmonary effort is normal.      Breath sounds: No wheezing or rhonchi. Abdominal:      General: Abdomen is flat. Bowel sounds are normal.      Palpations: Abdomen is soft. Musculoskeletal:         General: Normal range of motion. Cervical back: Normal range of motion and neck supple. Right lower leg: No edema. Left lower leg: No edema. Skin:     General: Skin is warm and dry. Neurological:      General: No focal deficit present. Mental Status: He is alert and oriented to person, place, and time. Psychiatric:         Mood and Affect: Mood normal.         Thought Content: Thought content normal.         CBC:   Lab Results   Component Value Date/Time    WBC 4.3 08/10/2022 01:49 PM    HGB 16.3 08/10/2022 01:49 PM    HCT 48.8 08/10/2022 01:49 PM     08/10/2022 01:49 PM     Lipids:  Lab Results   Component Value Date    CHOL 206 (H) 09/12/2019    TRIG 84 09/12/2019    HDL 56 01/20/2022    LDLCALC 86 01/20/2022    LDLDIRECT 126 (H) 12/28/2011     PT/INR:   Lab Results   Component Value Date/Time    INR 0.88 05/04/2022 03:01 PM        BMP:    Lab Results   Component Value Date     08/10/2022    K 4.8 08/10/2022     08/10/2022    CO2 26 08/10/2022    BUN 27 (H) 08/10/2022     CMP:   Lab Results   Component Value Date    AST 24 08/10/2022    PROT 7.1 08/10/2022    BILITOT 0.3 08/10/2022    ALKPHOS 104 08/10/2022     TSH:    Lab Results   Component Value Date/Time    TSH 2.22 09/12/2019 04:08 PM       EKGINTERPRETATION - EKG Interpretation:  Sinus rhythm, WPW      Echo 9/4/2020     Left ventricular systolic function is normal.   Ejection fraction is visually estimated at 50-55%. Small left ventricular cavity. No significant valvular disease noted. No evidence of any pericardial effusion.       IMPRESSION / RECOMMENDATIONS:     Paroxysmal atrial fibrillation  Hypercoagulable due to paroxysmal atrial fibrillation  WPW  HTN HLD   DM-2     Patient noted on EKG to be in sinus rhythm. WPW pattern noted on EKG  Patient did have previous EP study and the WPW was not robust enough to ablate. Patient however was induced into atrial fibrillation and was started on Multaq and anticoagulation  Patient reports over the last 1 month he has had worsening palpitations tachycardia with associated dizziness  He states that he will check his pulse and it will be irregular. He states that he has been going in and out of atrial fibrillation  I did discuss case with Chano Better  We will proceed with EP study    The risks including, but not limited to, vascular injury, bleeding, infection, radiation exposure, injury to cardiac and surrounding structures (including esophageal and pulmonary vein injury), injury to the normal conduction system of the heart (possibly requiring a pacemaker), stroke, myocardial infarction and death were all discussed. The patient considered the risks, benefits and alternatives; decided to proceed with the procedure. We will continue Multaq 400 mg twice daily and Xarelto 20 mg daily  Patient denies issues with bleeding        Thanks again for allowing me to participate in care of this patient. Please call me if you have any questions. With best regards. Jenna Betancur, EDGAR - CNP, 10/28/2022    Please note this report has been partially produced using speech recognition software and may contain errors related to that system including errors in grammar, punctuation, and spelling, as well as words and phrases that may be inappropriate. If there are any questions or concerns please feel free to contact the dictating provider for clarification.

## 2022-10-31 ENCOUNTER — TELEPHONE (OUTPATIENT)
Dept: CARDIOLOGY CLINIC | Age: 52
End: 2022-10-31

## 2022-10-31 NOTE — TELEPHONE ENCOUNTER
Attempted to call patient no answer.  Left message asking to return my call when available regarding scheduling a procedure with Dr Joellen Beal

## 2022-11-02 NOTE — PROGRESS NOTES
Patient Name:  Mando Blanc  Patient :  1970  Patient MRN:  [id number]     Primary Oncologist: Reynaldo Burroughs MD  Referring Provider: Doristine Curling, MD     Date of Service: 2022      Chief Complaint:    Chief Complaint   Patient presents with    Follow-up     Patient Active Problem List:     Ventral hernia without obstruction or gangrene     Essential hypertension     Gastroesophageal reflux disease without esophagitis     Dupuytren's contracture     Chronic skin ulcer, limited to breakdown of skin ([address]     Chronic bilateral low back pain without sciatica     Type 2 diabetes mellitus with diabetic polyneuropathy, with long-term current use of insulin ([address]     Erectile dysfunction     Darnella Lecher Parkinson White pattern seen on electrocardiogram     Fatty liver     Neuropathy     Dorsalgia     Pure hypercholesterolemia     Pain in both feet     Chronic pain of left knee     Chronic depression     Anxiety     Polycythemia     History of COVID-19     Left upper quadrant abdominal swelling, mass and lump    HPI:   Perla Garland. \"KJ\" Leah Carcamo is a 82-year-old very pleasant gentleman with medical history significant for hypertension, hyperlipidemia, diabetes mellitus, depression/anxiety, GERD, WPW syndrome with paroxysmal atrial fibrillation, initially referred to me on 2022 for evaluation of his erythrocytosis. He stated that he was noted to have mild erythrocytosis by primary care physician since 2020. It has been quite stable since then. He denies smoking, high altitude living or family history of erythrocytosis. In view of his non specific abdominal symptoms and swelling, I requested CT scan of abdomen/pelvis to rule out erythropoietin producing intra abdominal tumor. CT abdomen pelvis was done on 2022 and it showed no acute abnormality identified. No abdominal mass or splenomegaly. Layering small stones or sludge in the gallbladder. Findings suggestive of hepatic steatosis.     Laboratory work-ups done on 4/13/2022 showed high normal range hemoglobin and hematocrit [hemoglobin 17.7, hematocrit 49.6], low normal range erythropoietin level [erythropoietin 7 mU/ml]. The rest of the blood tests, including JAK2 V6 17F, JAK2 exon 12-13, BCR-ABL 1, MPL and CALR, were within normal range. On November 4, 2022, he presented to me for follow-up. He was referred to me for evaluation of erythrocytosis. CT scan on 4/2722 does not show any sign of intra-abdominal mass or tumor (erythropoietin producing tumor). Molecular (NGS) study for myeloproliferative neoplasm well within normal range. However, he is found to have low normal range erythropoietin level. In view of low normal range erythropoietin level, I would recommend to continue with observation for his erythrocytosis since I cannot rule out atypical (JAK2, MPL and CALR negative) myeloproliferative neoplasm. His hemoglobin and hematocrit on 11/4/22 were 17.1 and 48.3. Will observe for now. If he has significant elevation in hemoglobin and hematocrit in future, I will start therapeutic phlebotomy. He does not have any new significant symptoms at today visit. Past Medical History:     Significant for  1. Hypertension  2. Hyperlipidemia  3. Diabetes mellitus  4. Depression/anxiety  5. GERD  6. [address] White with proxysmal atrial fibrillation    Past Surgery History:    Significant for  1. Elbow surgery  2. Umbilical hernia repair  3. L3, L4, L5 spine fusion surgery    Social History:   He denies cigarette smoking or illicit drug abuse. He socially drinks alcohol. Family History:    Significant for breast cancer in his mother. No other pertinent family history. Allergies:  Bee Venom  Hornet Venom  Tramadol    Review of Systems: \"Per interval history; otherwise 10 point ROS is negative. \"  His energy level is pretty good and his sleep is fine.  He denies fever, chills, night sweats, cough, shortness of breath, chest pain, hemoptysis or palpitations. His bowel and bladder functions are normal. He denies nausea, vomiting, abdominal pain, diarrhea, constipation, dysuria, loss of appetite or weight loss. He denies neuropathy and he doesn't have bleeding or clotting issues. He denies any pain in his body. No anxiety or depression. The rest of the systems are unremarkable. Vital Signs: BP (!) 143/79 (Site: Right Upper Arm, Position: Sitting, Cuff Size: Large Adult)   Pulse 75   Temp 97.7 °F (36.5 °C) (Temporal)   Resp 18   Ht 6' 2\" (1.88 m)   Wt 219 lb 9.6 oz (99.6 kg)   SpO2 96%   BMI 28.19 kg/m²      Physical Exam:  CONSTITUTIONAL: awake, alert, cooperative, no apparent distress   EYES: pupils equal, round and reactive to light, sclera clear, normal conjunctiva  ENT: Normocephalic, without obvious abnormality, atraumatic  NECK: supple, symmetrical, no jugular venous distension, no carotid bruits   HEMATOLOGIC/LYMPHATIC: no cervical, supraclavicular or axillary lymphadenopathy   LUNGS: VBS, no wheezes, no increased work of breathing, no rhonchi, clear to auscultation, no crackles,    CARDIOVASCULAR: regular rate and rhythm, normal S1 and S2, no murmur noted  ABDOMEN: normal bowel sounds x 4, soft, non-distended, non-tender, no masses palpated, no hepatosplenomegaly   MUSCULOSKELETAL: full range of motion noted, tone is normal  NEUROLOGIC: awake, alert, oriented to name, place and time. Motor skills grossly intact. SKIN: appears intact, normal skin color, normal texture, normal turgor, no jaundice.    EXTREMITIES: no clubbing, no leg swelling, no LE edema, no cyanosis,       Labs:  Hematology:  Lab Results   Component Value Date    WBC 4.5 11/04/2022    RBC 5.69 11/04/2022    HGB 17.1 11/04/2022    HCT 48.3 11/04/2022    MCV 84.9 11/04/2022    MCH 30.1 11/04/2022    MCHC 35.4 11/04/2022    RDW 13.1 11/04/2022     11/04/2022    MPV 9.8 11/04/2022    SEGSPCT 55.3 11/04/2022    EOSRELPCT 4.7 (H) 11/04/2022    [facility] 11/[facility]/2022    LYMPHOPCT 27.9 11/04/2022    MONOPCT 11.4 (H) 11/04/2022    SEGSABS 2.5 11/04/2022    EOSABS 0.2 11/04/2022    BASOSABS 0.0 11/04/2022    LYMPHSABS 1.3 11/04/2022    MONOSABS 0.5 11/04/2022    DIFFTYPE AUTOMATED DIFFERENTIAL 11/04/2022     Lab Results   Component Value Date    ESR 2 04/13/2022     Chemistry:  Lab Results   Component Value Date     08/10/2022    K 4.8 08/10/2022     08/10/2022    CO2 26 08/10/2022    BUN 27 (H) 08/10/2022    CREATININE 0.8 (L) 08/10/2022    GLUCOSE 255 10/12/2022    CALCIUM 9.6 08/10/2022    PROT 7.1 08/10/2022    LABALBU 5.0 08/10/2022    BILITOT 0.3 08/10/2022    ALKPHOS 104 08/10/2022    AST 24 08/10/2022    ALT 39 08/10/2022    LABGLOM >60 08/10/2022    GFRAA >60 08/10/2022    AGRATIO 1.8 03/15/2022    GLOB 2.8 07/21/2021    PHOS 2.6 05/04/2022    MG 2.1 05/04/2022    POCGLU 216 (H) 05/17/2022     Lab Results   Component Value Date     04/13/2022     No components found for: LD  Lab Results   Component Value Date    TSHHS 2.810 05/10/2022    T4FREE 1.04 05/10/2022    FT3 3.6 12/28/2011     Immunology:  Lab Results   Component Value Date    PROT 7.1 08/10/2022     No results found for: Elfredia Anaya, KLFLCR  No results found for: B2M  Coagulation Panel:  Lab Results   Component Value Date    PROTIME 11.3 (L) 05/04/2022    INR 0.88 05/04/2022    APTT 24.7 (L) 05/04/2022     Anemia Panel:  No results found for: [id number], FOLATE  Tumor Markers:  No results found for: , CEA, , LABCA2, PSA     Observations:  No data recorded     Assessment   Erythrocytosis    Plan:  [employer]. \"KJ\" China Peterson is a 72-year-old very pleasant gentleman who was noted to have mild erythrocytosis since 9/4/2020. It has been quite stable since then. He denies smoking, high altitude living or family history of erythrocytosis.     In view of his non specific abdominal symptoms and swelling, I requested CT scan of abdomen/pelvis to rule out erythropoietin producing intra abdominal tumor. CT abdomen pelvis was done on 4/27/2022 and it showed no acute abnormality identified. No abdominal mass or splenomegaly. Layering small stones or sludge in the gallbladder. Findings suggestive of hepatic steatosis. Laboratory work-ups done on 4/13/2022 showed high normal range hemoglobin and hematocrit [hemoglobin 17.7, hematocrit 49.6], low normal range erythropoietin level [erythropoietin 7 mU/ml]. The rest of the blood tests, including JAK2 V6 17F, JAK2 exon 12-13, BCR-ABL 1, MPL and CALR, were within normal range. On November 4, 2022, he presented to me for follow-up. He was referred to me for evaluation of erythrocytosis. CT scan on 4/2722 does not show any sign of intra-abdominal mass or tumor (erythropoietin producing tumor). Molecular (NGS) study for myeloproliferative neoplasm well within normal range. However, he is found to have low normal range erythropoietin level. In view of low normal range erythropoietin level, I would recommend to continue with observation for his erythrocytosis since I cannot rule out atypical (JAK2, MPL and CALR negative) myeloproliferative neoplasm. His hemoglobin and hematocrit on 11/4/22 were 17.1 and 48.3. Will observe for now. If he has significant elevation in hemoglobin and hematocrit in future, I will start therapeutic phlebotomy. I answered all his questions and concerns for today. Recent imaging and labs were reviewed and discussed with the patient.

## 2022-11-04 ENCOUNTER — HOSPITAL ENCOUNTER (OUTPATIENT)
Dept: INFUSION THERAPY | Age: 52
Discharge: HOME | End: 2022-11-04
Payer: COMMERCIAL

## 2022-11-04 ENCOUNTER — OFFICE VISIT (OUTPATIENT)
Dept: ONCOLOGY | Age: 52
End: 2022-11-04
Payer: COMMERCIAL

## 2022-11-04 VITALS
BODY MASS INDEX: 28.18 KG/M2 | HEIGHT: 74 IN | SYSTOLIC BLOOD PRESSURE: 143 MMHG | OXYGEN SATURATION: 96 % | WEIGHT: 219.6 LBS | HEART RATE: 75 BPM | DIASTOLIC BLOOD PRESSURE: 79 MMHG | RESPIRATION RATE: 18 BRPM | TEMPERATURE: 97.7 F

## 2022-11-04 DIAGNOSIS — D75.1 POLYCYTHEMIA: Primary | ICD-10-CM

## 2022-11-04 DIAGNOSIS — D75.1 POLYCYTHEMIA: ICD-10-CM

## 2022-11-04 LAB
BASOPHILS ABSOLUTE: 0 K/CU MM
BASOPHILS RELATIVE PERCENT: 0.7 % (ref 0–1)
DIFFERENTIAL TYPE: ABNORMAL
EOSINOPHILS ABSOLUTE: 0.2 K/CU MM
EOSINOPHILS RELATIVE PERCENT: 4.7 % (ref 0–3)
HCT VFR BLD CALC: 48.3 % (ref 42–52)
HEMOGLOBIN: 17.1 GM/DL (ref 13.5–18)
LYMPHOCYTES ABSOLUTE: 1.3 K/CU MM
LYMPHOCYTES RELATIVE PERCENT: 27.9 % (ref 24–44)
MCH RBC QN AUTO: 30.1 PG (ref 27–31)
MCHC RBC AUTO-ENTMCNC: 35.4 % (ref 32–36)
MCV RBC AUTO: 84.9 FL (ref 78–100)
MONOCYTES ABSOLUTE: 0.5 K/CU MM
MONOCYTES RELATIVE PERCENT: 11.4 % (ref 0–4)
PDW BLD-RTO: 13.1 % (ref 11.7–14.9)
PLATELET # BLD: 179 K/CU MM (ref 140–440)
PMV BLD AUTO: 9.8 FL (ref 7.5–11.1)
RBC # BLD: 5.69 M/CU MM (ref 4.6–6.2)
SEGMENTED NEUTROPHILS ABSOLUTE COUNT: 2.5 K/CU MM
SEGMENTED NEUTROPHILS RELATIVE PERCENT: 55.3 % (ref 36–66)
WBC # BLD: 4.5 K/CU MM (ref 4–10.5)

## 2022-11-04 PROCEDURE — 99213 OFFICE O/P EST LOW 20 MIN: CPT | Performed by: INTERNAL MEDICINE

## 2022-11-04 PROCEDURE — 3078F DIAST BP <80 MM HG: CPT | Performed by: INTERNAL MEDICINE

## 2022-11-04 PROCEDURE — 3074F SYST BP LT 130 MM HG: CPT | Performed by: INTERNAL MEDICINE

## 2022-11-04 PROCEDURE — 99211 OFF/OP EST MAY X REQ PHY/QHP: CPT

## 2022-11-04 PROCEDURE — 85025 COMPLETE CBC W/AUTO DIFF WBC: CPT

## 2022-11-04 PROCEDURE — 36415 COLL VENOUS BLD VENIPUNCTURE: CPT

## 2022-11-04 NOTE — PROGRESS NOTES
MA Rooming Questions  Patient: Nilesh De Paz  MRN: [id number]    Date: 11/4/2022        1. Do you have any new issues? yes - states he was diagnosed with WBW syndrome         2. Do you need any refills on medications?    no    3. Have you had any imaging done since your last visit? yes - CT in Aug    4. Have you been hospitalized or seen in the emergency room since your last visit here?   yes - hernia repair x2    5. Did the patient have a depression screening completed today?  No    No data recorded     PHQ-9 Given to (if applicable):               PHQ-9 Score (if applicable):                     [] Positive     []  Negative              Does question #9 need addressed (if applicable)                     [] Yes    []  No               Hortencia Pang CMA

## 2022-12-07 ENCOUNTER — HOSPITAL ENCOUNTER (OUTPATIENT)
Age: 52
Discharge: HOME | End: 2022-12-07
Payer: COMMERCIAL

## 2022-12-07 ENCOUNTER — HOSPITAL ENCOUNTER (OUTPATIENT)
Age: 52
Setting detail: SPECIMEN
Discharge: HOME | End: 2022-12-07
Payer: COMMERCIAL

## 2022-12-07 ENCOUNTER — NURSE ONLY (OUTPATIENT)
Dept: CARDIOLOGY CLINIC | Age: 52
End: 2022-12-07

## 2022-12-07 ENCOUNTER — HOSPITAL ENCOUNTER (OUTPATIENT)
Dept: GENERAL RADIOLOGY | Age: 52
Discharge: HOME | End: 2022-12-07
Payer: COMMERCIAL

## 2022-12-07 DIAGNOSIS — Z01.810 PRE-OPERATIVE CARDIOVASCULAR EXAMINATION: ICD-10-CM

## 2022-12-07 DIAGNOSIS — I48.0 PAROXYSMAL ATRIAL FIBRILLATION (HCC): Primary | ICD-10-CM

## 2022-12-07 LAB
ANION GAP SERPL CALCULATED.3IONS-SCNC: 14 MMOL/L (ref 4–16)
APTT: 25.3 SECONDS (ref 25.1–37.1)
BASOPHILS ABSOLUTE: 0 K/CU MM
BASOPHILS RELATIVE PERCENT: 0.8 % (ref 0–1)
BUN BLDV-MCNC: 23 MG/DL (ref 6–23)
CALCIUM SERPL-MCNC: 9.2 MG/DL (ref 8.3–10.6)
CHLORIDE BLD-SCNC: 99 MMOL/L (ref 99–110)
CO2: 22 MMOL/L (ref 21–32)
CREAT SERPL-MCNC: 0.8 MG/DL (ref 0.9–1.3)
DIFFERENTIAL TYPE: ABNORMAL
EOSINOPHILS ABSOLUTE: 0.1 K/CU MM
EOSINOPHILS RELATIVE PERCENT: 3.4 % (ref 0–3)
GFR SERPL CREATININE-BSD FRML MDRD: >60 ML/MIN/1.73M2
GLUCOSE BLD-MCNC: 269 MG/DL (ref 70–99)
HCT VFR BLD CALC: 51.3 % (ref 42–52)
HEMOGLOBIN: 17.5 GM/DL (ref 13.5–18)
IMMATURE NEUTROPHIL %: 0.3 % (ref 0–0.43)
INR BLD: 0.85 INDEX
LYMPHOCYTES ABSOLUTE: 1.1 K/CU MM
LYMPHOCYTES RELATIVE PERCENT: 32.1 % (ref 24–44)
MAGNESIUM: 2.1 MG/DL (ref 1.8–2.4)
MCH RBC QN AUTO: 29.8 PG (ref 27–31)
MCHC RBC AUTO-ENTMCNC: 34.1 % (ref 32–36)
MCV RBC AUTO: 87.2 FL (ref 78–100)
MONOCYTES ABSOLUTE: 0.4 K/CU MM
MONOCYTES RELATIVE PERCENT: 10.7 % (ref 0–4)
NUCLEATED RBC %: 0 %
PDW BLD-RTO: 12.3 % (ref 11.7–14.9)
PHOSPHORUS: 3.2 MG/DL (ref 2.5–4.9)
PLATELET # BLD: 192 K/CU MM (ref 140–440)
PMV BLD AUTO: 10.2 FL (ref 7.5–11.1)
POTASSIUM SERPL-SCNC: 4.6 MMOL/L (ref 3.5–5.1)
PROTHROMBIN TIME: 11 SECONDS (ref 11.7–14.5)
RBC # BLD: 5.88 M/CU MM (ref 4.6–6.2)
SEGMENTED NEUTROPHILS ABSOLUTE COUNT: 1.9 K/CU MM
SEGMENTED NEUTROPHILS RELATIVE PERCENT: 52.7 % (ref 36–66)
SODIUM BLD-SCNC: 135 MMOL/L (ref 135–145)
TOTAL IMMATURE NEUTOROPHIL: 0.01 K/CU MM
TOTAL NUCLEATED RBC: 0 K/CU MM
WBC # BLD: 3.6 K/CU MM (ref 4–10.5)

## 2022-12-07 PROCEDURE — 83735 ASSAY OF MAGNESIUM: CPT

## 2022-12-07 PROCEDURE — 80048 BASIC METABOLIC PNL TOTAL CA: CPT

## 2022-12-07 PROCEDURE — U0003 INFECTIOUS AGENT DETECTION BY NUCLEIC ACID (DNA OR RNA); SEVERE ACUTE RESPIRATORY SYNDROME CORONAVIRUS 2 (SARS-COV-2) (CORONAVIRUS DISEASE [COVID-19]), AMPLIFIED PROBE TECHNIQUE, MAKING USE OF HIGH THROUGHPUT TECHNOLOGIES AS DESCRIBED BY CMS-2020-01-R: HCPCS

## 2022-12-07 PROCEDURE — 84100 ASSAY OF PHOSPHORUS: CPT

## 2022-12-07 PROCEDURE — 85730 THROMBOPLASTIN TIME PARTIAL: CPT

## 2022-12-07 PROCEDURE — U0005 INFEC AGEN DETEC AMPLI PROBE: HCPCS

## 2022-12-07 PROCEDURE — 85025 COMPLETE CBC W/AUTO DIFF WBC: CPT

## 2022-12-07 PROCEDURE — 71046 X-RAY EXAM CHEST 2 VIEWS: CPT

## 2022-12-07 PROCEDURE — 85610 PROTHROMBIN TIME: CPT

## 2022-12-07 NOTE — PROGRESS NOTES
Patient here in office and educated on Ep Study/SVT Ablation, schedule for 12/13/2022 @ 1737, with arrival @ [facility], @ [facility]; risk explained; and consents signed. Also copy of orders given for labs and CXR due 12/7/2022 at [facility]. Instruction given to patient to :  NPO after midnight the night before procedure; call hospital at [phone] to pre-register. May take rest of morning meds of procedure. Hold Metoprolol 2 days prior to procedure. Hold Metformin the day before, the day of and two days after procedure. Hold Xarelto the morning before procedure. Take insulin 1/2 dose on the night before. Do not take regular insulin dose the morning of the procedure. Patient voiced understanding. Copies of consent & info scanned in chart. Patient performed COVID throat swab for 10 seconds  Patient was wearing a mask  This CMA, LPN, RN present in the room, 6 feet away. Patient dropped swab in  labeled collection tube. Lab order, facesheet and copy of insurance card placed in collection bag. Bag placed in biohazard bag and placed in fridge.  called for .

## 2022-12-08 LAB
SARS-COV-2: NOT DETECTED
SOURCE: NORMAL

## 2022-12-12 DIAGNOSIS — M79.672 PAIN IN BOTH FEET: ICD-10-CM

## 2022-12-12 DIAGNOSIS — Z79.4 TYPE 2 DIABETES MELLITUS WITH DIABETIC POLYNEUROPATHY, WITH LONG-TERM CURRENT USE OF INSULIN (HCC): ICD-10-CM

## 2022-12-12 DIAGNOSIS — E11.42 TYPE 2 DIABETES MELLITUS WITH DIABETIC POLYNEUROPATHY, WITH LONG-TERM CURRENT USE OF INSULIN (HCC): ICD-10-CM

## 2022-12-12 DIAGNOSIS — M79.671 PAIN IN BOTH FEET: ICD-10-CM

## 2022-12-12 DIAGNOSIS — N52.9 ERECTILE DYSFUNCTION, UNSPECIFIED ERECTILE DYSFUNCTION TYPE: ICD-10-CM

## 2022-12-13 ENCOUNTER — HOSPITAL ENCOUNTER (OUTPATIENT)
Dept: CARDIAC CATH/INVASIVE PROCEDURES | Age: 52
Setting detail: OBSERVATION
Discharge: HOME | End: 2022-12-14
Attending: INTERNAL MEDICINE | Admitting: INTERNAL MEDICINE
Payer: COMMERCIAL

## 2022-12-13 ENCOUNTER — ANESTHESIA EVENT (OUTPATIENT)
Dept: CARDIAC CATH/INVASIVE PROCEDURES | Age: 52
End: 2022-12-13
Payer: COMMERCIAL

## 2022-12-13 ENCOUNTER — ANESTHESIA (OUTPATIENT)
Dept: CARDIAC CATH/INVASIVE PROCEDURES | Age: 52
End: 2022-12-13
Payer: COMMERCIAL

## 2022-12-13 ENCOUNTER — APPOINTMENT (OUTPATIENT)
Dept: GENERAL RADIOLOGY | Age: 52
End: 2022-12-13
Attending: INTERNAL MEDICINE
Payer: COMMERCIAL

## 2022-12-13 PROBLEM — Z98.890 S/P ABLATION OF ATRIAL FIBRILLATION: Status: ACTIVE | Noted: 2022-12-13

## 2022-12-13 PROBLEM — Z86.79 S/P ABLATION OF ATRIAL FIBRILLATION: Status: ACTIVE | Noted: 2022-12-13

## 2022-12-13 LAB
ABO/RH: NORMAL
ACTIVATED CLOTTING TIME, LOW RANGE: 137 SEC
ACTIVATED CLOTTING TIME, LOW RANGE: 285 SEC
ACTIVATED CLOTTING TIME, LOW RANGE: 303 SEC
ACTIVATED CLOTTING TIME, LOW RANGE: 307 SEC
ACTIVATED CLOTTING TIME, LOW RANGE: 308 SEC
ACTIVATED CLOTTING TIME, LOW RANGE: 328 SEC
ACTIVATED CLOTTING TIME, LOW RANGE: >400 SEC
ANTIBODY SCREEN: NEGATIVE
BASE EXCESS MIXED: 0.7 (ref 0–1.2)
BASE EXCESS: ABNORMAL (ref 0–3.3)
CO2: 25 MMOL/L (ref 21–32)
EGFR, POC: >60 ML/MIN/1.73M2
ESTIMATED AVERAGE GLUCOSE: 240 MG/DL
GLUCOSE BLD-MCNC: 241 MG/DL (ref 70–99)
GLUCOSE BLD-MCNC: 273 MG/DL (ref 70–99)
GLUCOSE BLD-MCNC: 274 MG/DL (ref 70–99)
GLUCOSE BLD-MCNC: 281 MG/DL (ref 70–99)
GLUCOSE BLD-MCNC: 288 MG/DL (ref 70–99)
GLUCOSE BLD-MCNC: 310 MG/DL (ref 70–99)
GLUCOSE BLD-MCNC: 310 MG/DL (ref 70–99)
GLUCOSE BLD-MCNC: 385 MG/DL (ref 70–99)
HBA1C MFR BLD: 10 % (ref 4.2–6.3)
HCO3 ARTERIAL: 24.1 MMOL/L (ref 18–23)
HCT VFR BLD CALC: 43 % (ref 42–52)
HEMOGLOBIN: 14.5 GM/DL (ref 13.5–18)
O2 SATURATION: 100 % (ref 96–97)
PCO2 ARTERIAL: 39.5 MMHG (ref 32–45)
PH BLOOD: 7.39 (ref 7.34–7.45)
PO2 ARTERIAL: 467.2 MMHG (ref 75–100)
POC CALCIUM: 1.15 MMOL/L (ref 1.12–1.32)
POC CHLORIDE: 102 MMOL/L (ref 98–109)
POC CREATININE: 0.9 MG/DL (ref 0.9–1.3)
POTASSIUM SERPL-SCNC: 4.3 MMOL/L (ref 3.5–4.5)
SODIUM BLD-SCNC: 137 MMOL/L (ref 138–146)
SOURCE, BLOOD GAS: ABNORMAL

## 2022-12-13 PROCEDURE — 80051 ELECTROLYTE PANEL: CPT

## 2022-12-13 PROCEDURE — 93657 TX L/R ATRIAL FIB ADDL: CPT

## 2022-12-13 PROCEDURE — 6360000002 HC RX W HCPCS

## 2022-12-13 PROCEDURE — 7100000001 HC PACU RECOVERY - ADDTL 15 MIN

## 2022-12-13 PROCEDURE — 93623 PRGRMD STIMJ&PACG IV RX NFS: CPT | Performed by: INTERNAL MEDICINE

## 2022-12-13 PROCEDURE — 3700000001 HC ADD 15 MINUTES (ANESTHESIA)

## 2022-12-13 PROCEDURE — 6370000000 HC RX 637 (ALT 250 FOR IP): Performed by: NURSE PRACTITIONER

## 2022-12-13 PROCEDURE — C1759 CATH, INTRA ECHOCARDIOGRAPHY: HCPCS

## 2022-12-13 PROCEDURE — 93656 COMPRE EP EVAL ABLTJ ATR FIB: CPT

## 2022-12-13 PROCEDURE — 82803 BLOOD GASES ANY COMBINATION: CPT

## 2022-12-13 PROCEDURE — 93308 TTE F-UP OR LMTD: CPT

## 2022-12-13 PROCEDURE — 71045 X-RAY EXAM CHEST 1 VIEW: CPT

## 2022-12-13 PROCEDURE — 2580000003 HC RX 258

## 2022-12-13 PROCEDURE — G0378 HOSPITAL OBSERVATION PER HR: HCPCS

## 2022-12-13 PROCEDURE — 3700000000 HC ANESTHESIA ATTENDED CARE

## 2022-12-13 PROCEDURE — 2580000003 HC RX 258: Performed by: NURSE PRACTITIONER

## 2022-12-13 PROCEDURE — 7100000000 HC PACU RECOVERY - FIRST 15 MIN

## 2022-12-13 PROCEDURE — 85347 COAGULATION TIME ACTIVATED: CPT

## 2022-12-13 PROCEDURE — 85014 HEMATOCRIT: CPT

## 2022-12-13 PROCEDURE — 86901 BLOOD TYPING SEROLOGIC RH(D): CPT

## 2022-12-13 PROCEDURE — 6360000002 HC RX W HCPCS: Performed by: ANESTHESIOLOGY

## 2022-12-13 PROCEDURE — C1894 INTRO/SHEATH, NON-LASER: HCPCS

## 2022-12-13 PROCEDURE — 2500000003 HC RX 250 WO HCPCS

## 2022-12-13 PROCEDURE — 82565 ASSAY OF CREATININE: CPT

## 2022-12-13 PROCEDURE — 6370000000 HC RX 637 (ALT 250 FOR IP): Performed by: INTERNAL MEDICINE

## 2022-12-13 PROCEDURE — 93312 ECHO TRANSESOPHAGEAL: CPT | Performed by: INTERNAL MEDICINE

## 2022-12-13 PROCEDURE — C1733 CATH, EP, OTHR THAN COOL-TIP: HCPCS

## 2022-12-13 PROCEDURE — 82962 GLUCOSE BLOOD TEST: CPT

## 2022-12-13 PROCEDURE — 6370000000 HC RX 637 (ALT 250 FOR IP): Performed by: ANESTHESIOLOGY

## 2022-12-13 PROCEDURE — 86850 RBC ANTIBODY SCREEN: CPT

## 2022-12-13 PROCEDURE — 93325 DOPPLER ECHO COLOR FLOW MAPG: CPT | Performed by: INTERNAL MEDICINE

## 2022-12-13 PROCEDURE — C1730 CATH, EP, 19 OR FEW ELECT: HCPCS

## 2022-12-13 PROCEDURE — 85018 HEMOGLOBIN: CPT

## 2022-12-13 PROCEDURE — 93655 ICAR CATH ABLTJ DSCRT ARRHYT: CPT

## 2022-12-13 PROCEDURE — 86900 BLOOD TYPING SEROLOGIC ABO: CPT

## 2022-12-13 PROCEDURE — C1893 INTRO/SHEATH, FIXED,NON-PEEL: HCPCS

## 2022-12-13 PROCEDURE — 36415 COLL VENOUS BLD VENIPUNCTURE: CPT

## 2022-12-13 PROCEDURE — 2720000010 HC SURG SUPPLY STERILE

## 2022-12-13 PROCEDURE — 93656 COMPRE EP EVAL ABLTJ ATR FIB: CPT | Performed by: INTERNAL MEDICINE

## 2022-12-13 PROCEDURE — 93312 ECHO TRANSESOPHAGEAL: CPT

## 2022-12-13 PROCEDURE — 83036 HEMOGLOBIN GLYCOSYLATED A1C: CPT

## 2022-12-13 PROCEDURE — C1732 CATH, EP, DIAG/ABL, 3D/VECT: HCPCS

## 2022-12-13 PROCEDURE — 2709999900 HC NON-CHARGEABLE SUPPLY

## 2022-12-13 PROCEDURE — 93005 ELECTROCARDIOGRAM TRACING: CPT | Performed by: INTERNAL MEDICINE

## 2022-12-13 RX ORDER — INSULIN LISPRO 100 [IU]/ML
0-4 INJECTION, SOLUTION INTRAVENOUS; SUBCUTANEOUS NIGHTLY
Status: DISCONTINUED | OUTPATIENT
Start: 2022-12-13 | End: 2022-12-13

## 2022-12-13 RX ORDER — LOSARTAN POTASSIUM 100 MG/1
100 TABLET ORAL DAILY
Status: DISCONTINUED | OUTPATIENT
Start: 2022-12-13 | End: 2022-12-14 | Stop reason: HOSPADM

## 2022-12-13 RX ORDER — ONDANSETRON 2 MG/ML
4 INJECTION INTRAMUSCULAR; INTRAVENOUS
Status: DISCONTINUED | OUTPATIENT
Start: 2022-12-13 | End: 2022-12-13

## 2022-12-13 RX ORDER — ACETAMINOPHEN 325 MG/1
650 TABLET ORAL EVERY 4 HOURS PRN
Status: DISCONTINUED | OUTPATIENT
Start: 2022-12-13 | End: 2022-12-14 | Stop reason: HOSPADM

## 2022-12-13 RX ORDER — TIZANIDINE 4 MG/1
4 TABLET ORAL NIGHTLY
Qty: 30 TABLET | Refills: 0 | Status: SHIPPED | OUTPATIENT
Start: 2022-12-13

## 2022-12-13 RX ORDER — ATORVASTATIN CALCIUM 40 MG/1
40 TABLET, FILM COATED ORAL DAILY
Status: DISCONTINUED | OUTPATIENT
Start: 2022-12-13 | End: 2022-12-14 | Stop reason: HOSPADM

## 2022-12-13 RX ORDER — INSULIN LISPRO 100 [IU]/ML
15 INJECTION, SOLUTION INTRAVENOUS; SUBCUTANEOUS
Status: DISCONTINUED | OUTPATIENT
Start: 2022-12-13 | End: 2022-12-14 | Stop reason: HOSPADM

## 2022-12-13 RX ORDER — LABETALOL HYDROCHLORIDE 5 MG/ML
10 INJECTION, SOLUTION INTRAVENOUS
Status: DISCONTINUED | OUTPATIENT
Start: 2022-12-13 | End: 2022-12-13

## 2022-12-13 RX ORDER — PROTAMINE SULFATE 10 MG/ML
INJECTION, SOLUTION INTRAVENOUS PRN
Status: DISCONTINUED | OUTPATIENT
Start: 2022-12-13 | End: 2022-12-13 | Stop reason: SDUPTHER

## 2022-12-13 RX ORDER — SODIUM CHLORIDE 0.9 % (FLUSH) 0.9 %
5-40 SYRINGE (ML) INJECTION PRN
Status: DISCONTINUED | OUTPATIENT
Start: 2022-12-13 | End: 2022-12-13

## 2022-12-13 RX ORDER — PANTOPRAZOLE SODIUM 40 MG/1
40 TABLET, DELAYED RELEASE ORAL
Status: DISCONTINUED | OUTPATIENT
Start: 2022-12-13 | End: 2022-12-14 | Stop reason: HOSPADM

## 2022-12-13 RX ORDER — FUROSEMIDE 10 MG/ML
INJECTION INTRAMUSCULAR; INTRAVENOUS PRN
Status: DISCONTINUED | OUTPATIENT
Start: 2022-12-13 | End: 2022-12-13 | Stop reason: SDUPTHER

## 2022-12-13 RX ORDER — INSULIN LISPRO 100 [IU]/ML
0-8 INJECTION, SOLUTION INTRAVENOUS; SUBCUTANEOUS
Status: DISCONTINUED | OUTPATIENT
Start: 2022-12-13 | End: 2022-12-14 | Stop reason: HOSPADM

## 2022-12-13 RX ORDER — ROCURONIUM BROMIDE 10 MG/ML
INJECTION, SOLUTION INTRAVENOUS PRN
Status: DISCONTINUED | OUTPATIENT
Start: 2022-12-13 | End: 2022-12-13 | Stop reason: SDUPTHER

## 2022-12-13 RX ORDER — SODIUM CHLORIDE 9 MG/ML
INJECTION, SOLUTION INTRAVENOUS PRN
Status: DISCONTINUED | OUTPATIENT
Start: 2022-12-13 | End: 2022-12-14 | Stop reason: HOSPADM

## 2022-12-13 RX ORDER — GABAPENTIN 400 MG/1
400 CAPSULE ORAL 3 TIMES DAILY
Status: DISCONTINUED | OUTPATIENT
Start: 2022-12-13 | End: 2022-12-14 | Stop reason: HOSPADM

## 2022-12-13 RX ORDER — ESCITALOPRAM OXALATE 10 MG/1
10 TABLET ORAL DAILY
Status: DISCONTINUED | OUTPATIENT
Start: 2022-12-13 | End: 2022-12-14 | Stop reason: HOSPADM

## 2022-12-13 RX ORDER — SODIUM CHLORIDE 0.9 % (FLUSH) 0.9 %
5-40 SYRINGE (ML) INJECTION EVERY 12 HOURS SCHEDULED
Status: DISCONTINUED | OUTPATIENT
Start: 2022-12-13 | End: 2022-12-13

## 2022-12-13 RX ORDER — OXYCODONE HYDROCHLORIDE 5 MG/1
10 TABLET ORAL PRN
Status: DISCONTINUED | OUTPATIENT
Start: 2022-12-13 | End: 2022-12-13

## 2022-12-13 RX ORDER — FENTANYL CITRATE 50 UG/ML
25 INJECTION, SOLUTION INTRAMUSCULAR; INTRAVENOUS EVERY 5 MIN PRN
Status: DISCONTINUED | OUTPATIENT
Start: 2022-12-13 | End: 2022-12-13

## 2022-12-13 RX ORDER — DICYCLOMINE HYDROCHLORIDE 10 MG/1
10 CAPSULE ORAL 4 TIMES DAILY
Status: DISCONTINUED | OUTPATIENT
Start: 2022-12-13 | End: 2022-12-14 | Stop reason: HOSPADM

## 2022-12-13 RX ORDER — ONDANSETRON 2 MG/ML
4 INJECTION INTRAMUSCULAR; INTRAVENOUS
Status: COMPLETED | OUTPATIENT
Start: 2022-12-13 | End: 2022-12-13

## 2022-12-13 RX ORDER — OXYCODONE HYDROCHLORIDE 5 MG/1
5 TABLET ORAL PRN
Status: DISCONTINUED | OUTPATIENT
Start: 2022-12-13 | End: 2022-12-13

## 2022-12-13 RX ORDER — LIDOCAINE HYDROCHLORIDE 20 MG/ML
INJECTION, SOLUTION EPIDURAL; INFILTRATION; INTRACAUDAL; PERINEURAL PRN
Status: DISCONTINUED | OUTPATIENT
Start: 2022-12-13 | End: 2022-12-13 | Stop reason: SDUPTHER

## 2022-12-13 RX ORDER — LABETALOL HYDROCHLORIDE 5 MG/ML
INJECTION, SOLUTION INTRAVENOUS PRN
Status: DISCONTINUED | OUTPATIENT
Start: 2022-12-13 | End: 2022-12-13 | Stop reason: SDUPTHER

## 2022-12-13 RX ORDER — ERTUGLIFLOZIN 15 MG/1
TABLET, FILM COATED ORAL
Qty: 30 TABLET | Refills: 0 | Status: SHIPPED | OUTPATIENT
Start: 2022-12-13

## 2022-12-13 RX ORDER — SILDENAFIL 50 MG/1
TABLET, FILM COATED ORAL
Qty: 10 TABLET | Refills: 0 | Status: SHIPPED | OUTPATIENT
Start: 2022-12-13

## 2022-12-13 RX ORDER — FENTANYL CITRATE 50 UG/ML
50 INJECTION, SOLUTION INTRAMUSCULAR; INTRAVENOUS EVERY 5 MIN PRN
Status: DISCONTINUED | OUTPATIENT
Start: 2022-12-13 | End: 2022-12-13

## 2022-12-13 RX ORDER — PROPOFOL 10 MG/ML
INJECTION, EMULSION INTRAVENOUS PRN
Status: DISCONTINUED | OUTPATIENT
Start: 2022-12-13 | End: 2022-12-13 | Stop reason: SDUPTHER

## 2022-12-13 RX ORDER — ONDANSETRON 2 MG/ML
INJECTION INTRAMUSCULAR; INTRAVENOUS PRN
Status: DISCONTINUED | OUTPATIENT
Start: 2022-12-13 | End: 2022-12-13 | Stop reason: SDUPTHER

## 2022-12-13 RX ORDER — SODIUM CHLORIDE 9 MG/ML
INJECTION, SOLUTION INTRAVENOUS CONTINUOUS PRN
Status: DISCONTINUED | OUTPATIENT
Start: 2022-12-13 | End: 2022-12-13 | Stop reason: SDUPTHER

## 2022-12-13 RX ORDER — SODIUM CHLORIDE 0.9 % (FLUSH) 0.9 %
5-40 SYRINGE (ML) INJECTION EVERY 12 HOURS SCHEDULED
Status: DISCONTINUED | OUTPATIENT
Start: 2022-12-13 | End: 2022-12-14 | Stop reason: HOSPADM

## 2022-12-13 RX ORDER — POTASSIUM CHLORIDE 20 MEQ/1
10 TABLET, EXTENDED RELEASE ORAL DAILY
Status: DISCONTINUED | OUTPATIENT
Start: 2022-12-13 | End: 2022-12-14 | Stop reason: HOSPADM

## 2022-12-13 RX ORDER — SODIUM CHLORIDE 0.9 % (FLUSH) 0.9 %
5-40 SYRINGE (ML) INJECTION PRN
Status: DISCONTINUED | OUTPATIENT
Start: 2022-12-13 | End: 2022-12-14 | Stop reason: HOSPADM

## 2022-12-13 RX ORDER — HYDROCHLOROTHIAZIDE 12.5 MG/1
12.5 TABLET ORAL EVERY MORNING
Status: DISCONTINUED | OUTPATIENT
Start: 2022-12-14 | End: 2022-12-14 | Stop reason: HOSPADM

## 2022-12-13 RX ORDER — TIZANIDINE 4 MG/1
4 TABLET ORAL NIGHTLY
Status: DISCONTINUED | OUTPATIENT
Start: 2022-12-13 | End: 2022-12-14 | Stop reason: HOSPADM

## 2022-12-13 RX ORDER — FENTANYL CITRATE 50 UG/ML
INJECTION, SOLUTION INTRAMUSCULAR; INTRAVENOUS PRN
Status: DISCONTINUED | OUTPATIENT
Start: 2022-12-13 | End: 2022-12-13 | Stop reason: SDUPTHER

## 2022-12-13 RX ORDER — MELOXICAM 7.5 MG/1
7.5 TABLET ORAL DAILY
Status: DISCONTINUED | OUTPATIENT
Start: 2022-12-13 | End: 2022-12-14 | Stop reason: HOSPADM

## 2022-12-13 RX ORDER — BUPROPION HYDROCHLORIDE 100 MG/1
100 TABLET, EXTENDED RELEASE ORAL 2 TIMES DAILY
Status: DISCONTINUED | OUTPATIENT
Start: 2022-12-13 | End: 2022-12-14 | Stop reason: HOSPADM

## 2022-12-13 RX ORDER — INSULIN GLARGINE 100 [IU]/ML
40 INJECTION, SOLUTION SUBCUTANEOUS NIGHTLY
Status: DISCONTINUED | OUTPATIENT
Start: 2022-12-13 | End: 2022-12-14 | Stop reason: HOSPADM

## 2022-12-13 RX ORDER — AMITRIPTYLINE HYDROCHLORIDE 50 MG/1
50 TABLET, FILM COATED ORAL NIGHTLY
Status: DISCONTINUED | OUTPATIENT
Start: 2022-12-13 | End: 2022-12-14 | Stop reason: HOSPADM

## 2022-12-13 RX ORDER — DEXTROSE MONOHYDRATE 100 MG/ML
INJECTION, SOLUTION INTRAVENOUS CONTINUOUS PRN
Status: DISCONTINUED | OUTPATIENT
Start: 2022-12-13 | End: 2022-12-14 | Stop reason: HOSPADM

## 2022-12-13 RX ORDER — HYDRALAZINE HYDROCHLORIDE 20 MG/ML
10 INJECTION INTRAMUSCULAR; INTRAVENOUS
Status: DISCONTINUED | OUTPATIENT
Start: 2022-12-13 | End: 2022-12-13

## 2022-12-13 RX ORDER — INSULIN GLARGINE 100 [IU]/ML
36 INJECTION, SOLUTION SUBCUTANEOUS NIGHTLY
Status: DISCONTINUED | OUTPATIENT
Start: 2022-12-13 | End: 2022-12-13

## 2022-12-13 RX ORDER — SODIUM CHLORIDE 9 MG/ML
25 INJECTION, SOLUTION INTRAVENOUS PRN
Status: DISCONTINUED | OUTPATIENT
Start: 2022-12-13 | End: 2022-12-13

## 2022-12-13 RX ADMIN — INSULIN LISPRO 2 UNITS: 100 INJECTION, SOLUTION INTRAVENOUS; SUBCUTANEOUS at 18:19

## 2022-12-13 RX ADMIN — PHENYLEPHRINE HYDROCHLORIDE 5 MCG/MIN: 10 INJECTION INTRAVENOUS at 08:18

## 2022-12-13 RX ADMIN — DICYCLOMINE HYDROCHLORIDE 10 MG: 10 CAPSULE ORAL at 20:23

## 2022-12-13 RX ADMIN — PHENYLEPHRINE HYDROCHLORIDE 50 MCG: 10 INJECTION INTRAVENOUS at 08:18

## 2022-12-13 RX ADMIN — FENTANYL CITRATE 25 MCG: 50 INJECTION, SOLUTION INTRAMUSCULAR; INTRAVENOUS at 08:08

## 2022-12-13 RX ADMIN — INSULIN LISPRO 8 UNITS: 100 INJECTION, SOLUTION INTRAVENOUS; SUBCUTANEOUS at 20:44

## 2022-12-13 RX ADMIN — SODIUM CHLORIDE, PRESERVATIVE FREE 10 ML: 5 INJECTION INTRAVENOUS at 20:23

## 2022-12-13 RX ADMIN — DRONEDARONE 400 MG: 400 TABLET, FILM COATED ORAL at 18:11

## 2022-12-13 RX ADMIN — INSULIN LISPRO 15 UNITS: 100 INJECTION, SOLUTION INTRAVENOUS; SUBCUTANEOUS at 18:19

## 2022-12-13 RX ADMIN — LABETALOL HYDROCHLORIDE 5 MG: 5 INJECTION, SOLUTION INTRAVENOUS at 10:47

## 2022-12-13 RX ADMIN — RIVAROXABAN 20 MG: 20 TABLET, FILM COATED ORAL at 18:11

## 2022-12-13 RX ADMIN — PHENYLEPHRINE HYDROCHLORIDE 50 MCG: 10 INJECTION INTRAVENOUS at 08:26

## 2022-12-13 RX ADMIN — PHENYLEPHRINE HYDROCHLORIDE 25 MCG: 10 INJECTION INTRAVENOUS at 09:48

## 2022-12-13 RX ADMIN — AMITRIPTYLINE HYDROCHLORIDE 50 MG: 50 TABLET, FILM COATED ORAL at 20:21

## 2022-12-13 RX ADMIN — ONDANSETRON 4 MG: 2 INJECTION INTRAMUSCULAR; INTRAVENOUS at 12:06

## 2022-12-13 RX ADMIN — LOSARTAN POTASSIUM 100 MG: 100 TABLET, FILM COATED ORAL at 18:11

## 2022-12-13 RX ADMIN — ATORVASTATIN CALCIUM 40 MG: 40 TABLET, FILM COATED ORAL at 18:11

## 2022-12-13 RX ADMIN — FUROSEMIDE 10 MG: 10 INJECTION, SOLUTION INTRAVENOUS at 10:55

## 2022-12-13 RX ADMIN — SODIUM CHLORIDE: 900 INJECTION INTRAVENOUS at 10:57

## 2022-12-13 RX ADMIN — POTASSIUM CHLORIDE 10 MEQ: 1500 TABLET, EXTENDED RELEASE ORAL at 18:11

## 2022-12-13 RX ADMIN — SUGAMMADEX 200 MG: 100 INJECTION, SOLUTION INTRAVENOUS at 10:45

## 2022-12-13 RX ADMIN — PHENYLEPHRINE HYDROCHLORIDE 25 MCG: 10 INJECTION INTRAVENOUS at 10:07

## 2022-12-13 RX ADMIN — BUPROPION HYDROCHLORIDE 100 MG: 100 TABLET, EXTENDED RELEASE ORAL at 20:21

## 2022-12-13 RX ADMIN — PANTOPRAZOLE SODIUM 40 MG: 40 TABLET, DELAYED RELEASE ORAL at 18:10

## 2022-12-13 RX ADMIN — INSULIN HUMAN 10 UNITS: 100 INJECTION, SOLUTION PARENTERAL at 13:09

## 2022-12-13 RX ADMIN — PROTAMINE SULFATE 20 MG: 10 INJECTION, SOLUTION INTRAVENOUS at 10:45

## 2022-12-13 RX ADMIN — MELOXICAM 7.5 MG: 7.5 TABLET ORAL at 18:10

## 2022-12-13 RX ADMIN — INSULIN GLARGINE 40 UNITS: 100 INJECTION, SOLUTION SUBCUTANEOUS at 20:44

## 2022-12-13 RX ADMIN — ROCURONIUM BROMIDE 50 MG: 10 INJECTION INTRAVENOUS at 10:00

## 2022-12-13 RX ADMIN — ESCITALOPRAM OXALATE 10 MG: 10 TABLET ORAL at 18:11

## 2022-12-13 RX ADMIN — INSULIN HUMAN 8 UNITS: 100 INJECTION, SOLUTION PARENTERAL at 12:26

## 2022-12-13 RX ADMIN — GABAPENTIN 400 MG: 400 CAPSULE ORAL at 20:22

## 2022-12-13 RX ADMIN — LIDOCAINE HYDROCHLORIDE 100 MG: 20 INJECTION, SOLUTION EPIDURAL; INFILTRATION; INTRACAUDAL; PERINEURAL at 07:48

## 2022-12-13 RX ADMIN — PHENYLEPHRINE HYDROCHLORIDE 50 MCG: 10 INJECTION INTRAVENOUS at 09:04

## 2022-12-13 RX ADMIN — TIZANIDINE 4 MG: 4 TABLET ORAL at 20:21

## 2022-12-13 RX ADMIN — ROCURONIUM BROMIDE 50 MG: 10 INJECTION INTRAVENOUS at 08:47

## 2022-12-13 RX ADMIN — ONDANSETRON 4 MG: 2 INJECTION INTRAMUSCULAR; INTRAVENOUS at 10:41

## 2022-12-13 RX ADMIN — PROPOFOL 200 MG: 10 INJECTION, EMULSION INTRAVENOUS at 07:48

## 2022-12-13 RX ADMIN — ROCURONIUM BROMIDE 50 MG: 10 INJECTION INTRAVENOUS at 07:48

## 2022-12-13 RX ADMIN — SODIUM CHLORIDE: 900 INJECTION INTRAVENOUS at 07:34

## 2022-12-13 ASSESSMENT — ENCOUNTER SYMPTOMS
ABDOMINAL PAIN: 0
SHORTNESS OF BREATH: 1
COUGH: 0
SHORTNESS OF BREATH: 0
EYE PAIN: 0
NAUSEA: 0
COLOR CHANGE: 0
DIARRHEA: 0
CHEST TIGHTNESS: 0
WHEEZING: 0
PHOTOPHOBIA: 0
BACK PAIN: 0
BLOOD IN STOOL: 0
VOMITING: 0
CONSTIPATION: 0

## 2022-12-13 ASSESSMENT — PAIN SCALES - GENERAL
PAINLEVEL_OUTOF10: 0

## 2022-12-13 NOTE — OP NOTE
PROCEDURES PERFORMED:    1. Comprehensive electrophysiologic evaluation including transseptal catheterization, insertion and repositioning of multiple electrode catheters with induction or attempted induction of an arrhythmia including left or right atrial pacing/recording when necessary, right ventricular pacing/recording when necessary, and His bundle recording when necessary with intracardiac catheter ablation of atrial fibrillation by pulmonary vein isolation  2. Intracardiac electrophysiologic three-dimensional mapping  3. Transseptal puncture through an intact septum with measurement of RA and LA pressures. 4. Intracardiac echocardiography. 5. Drug testing    ADD ON ABLATION:    Ablation of Left anterolateral accessory pathway      ATTENDING: Orvel Lesches, MD.    COMPLICATIONS: None. ESTIMATED BLOOD LOSS: 30cc    ANESTHESIA: General with intubation    MEDICATIONS USED FOR INDUCTION/TESTING: Adenosine    PREOPERATIVE DIAGNOSIS: Paroxysmal Atrial fibrillation     POSTOPERATIVE DIAGNOSIS: Paroxysmal Atrial fibrillation sp Pulmonary vein isolation, Accessory path way ablation    INDICATIONS FOR PROCEDURE: Patient with hx of WPW and symptomatic Paroxysmal Atrial fibrillation despite medical therapy here for Ablation       DETAILS OF PROCEDURE:     The patient was brought to the electrophysiology laboratory in stable condition. The patient was in a fasting, nonsedated state. The risks, benefits and alternatives of the procedure were discussed with the patient and his family. The risks including, but not limited to, the risks of vascular injury, bleeding, infection, radiation exposure, injury to cardiac and surrounding structures (including esophageal and phrenic nerve injury in addition to pulmonary vein stenosis), injury to the normal conduction system of the heart, stroke, myocardial infarction and death were discussed. Written informed consent was obtained and placed on the chart.  A timeout protocol was completed to identify the patient and the procedure being performed. GINGER was performed prior to the ablation procedure to assure the absence of any intracardiac thrombi or other contraindications to proceeding with ablation. The full GINGER report is dictated separately. The patient was prepped and draped in a sterile fashion. Lidocaine was administered to the right and left groin. Using a modified Seldinger technique, venous access was obtained and sheaths were placed as detailed below. Catheters were then placed under fluoroscopic guidance as detailed below. SHEATH AND CATHETER PLACEMENT:  Location  Sheath  Catheter  site    Left femoral vein  7F  [employer] 6F Decapolar catheter  Coronary sinus    Left femoral vein  11F  Quadripolar catheter      Intracardiac Echo with sound technology  Right atrium    Right femoral vein  8F Short and SRO sheath D-F curve Smart touch irrigated [employer] Ablation catheter      Pentaray catheter  Pulmonay veins, Left atrium, RA, RV      LA mapping    Right femoral vein  8F Short  Quadripolar catheter  RV      A small caliber arterial line in the radial artery was obtained by the anesthesia service. Comprehensive EP study    Patient presented in sinus rhythm so we performed Comprehensive EP study    ME: 118 ms  QRS: 149 ms  QT: 513 ms    AH : 75 ms  HV : 30 ms    Sinus node function is normal       Arrhythmia induction    Programmed stimulation was performed. Patient had previous EP study where patient was easily induced into atrial fibrillation. Patient Pathway ERP was 440ms antegrade and there was no retrograde conduction. So patient was here for PV isolation and reassess. Intracardiac echocardiography:     A baseline intracardiac echocardiography study was performed.  During the procedure, intracardiac echocardiography was used for transseptal puncture, monitoring of catheter placement during radiofrequency lesions, and monitoring for complications. Three-dimensional electroanatomical mapping:    A three-dimensional electroanatomical mapping: Using the Expert CARTO 3 three-dimensional electroanatomical map, a shell map of the left atrium and the pulmonary veins was created by dragging the ablation catheter and the pentaray catheter in different areas of the left atrium and in the pulmonary veins. The map was used for aiding the navigation process and to reduce fluoroscopy use. It was also used to guide ablation lesions and to binud those lesions. Trans-septal puncture:     During transseptal access, he was in sinus rhythm,  an 0.032 inch J-tipped guidewire was advanced through the 8-[place] sheath in the right femoral vein under fluoroscopic guidance. An SRO sheath and dilator were advanced over the guidewire into the superior vena cava under fluoroscopic guidance. The guidewire was removed. A [place] 1 needle was advanced through the dilator until the tip was slightly behind the tip of the dilator. This system was withdrawn until the apparatus was in contact with the foramen ovale. Transseptal access was obtained. Under fluoroscopic, hemodynamic ((Mean RA pressure 5 cm and mean LA pressure on transseptal was 12 mm) and ultrasound guidance, the left atrium was cannulated and sheath advanced. The dilator and needle were removed. Intracardiac echocardiography demonstrated no acute complications. Heparin was given to the patient to keep ACT at around 350sec through out procedure with ACT checks every 15 minutes     The pentaray catheter was sequentially positioned in the ostium of each of the pulmonary veins under fluoroscopic, electroanatomic, electrophysiologic and ultrasound guidance. Examination of the Pentaray catheter signals demonstrated conduction of electrical activity in all the pulmonary veins (two left pulmonary veins and two right pulmonary veins).        During the mapping with pentaray catheter patient was going into short SVT runs antegrade through pathway. So we decided to perform Pathway ablation also. Mapping and ablation of Accessory pathway :    Initial mapping showed that pathway was around left lateral area where there was best fusion of A&v signals were and ablation performed at this area terminated the tachycardia in less than 5 seconds. But it reconnected and we noted that conduction was slowed down. So we decided to ablate pulmonary vein ostial area. During ablation of the left pulmonary ostial area patient was having full preexcitation again. So we mapped and this time best fusion was noted in left anterior area below the appendage - ablation here terminated the pathway in less than 4 seconds. But later appear reconnected at baseline and this had to be slant pathway so we then mapped again and this time we noted pathway potential between the two ablation areas and ablation here terminated the accessory pathway in less than 1 second. Ablation was performed using 35-40 gonzalez and insurance lesion was provided. Ablation:     The Smart touch ablation catheter was advanced into position near the right-sided pulmonary veins. A wide circumferential ablation line was performed to encompass the ostia of both the right superior and right inferior pulmonary veins. The settings for ablation were 35 gonzalez with a contact force ranging between  3 gm/sec to 40 gm/ sec was given per lesion with assessment of signal reduction at the site of ablation, impedance drop, Visitag ablation mapping and also FTI upto 450 and Ablation index of upto 450 on posterior wall and upto 550 on anterior wall    Esophageal temperature was monitored through out the procedure with movement of the esophageal temperature probe.  Max temp reached during whole procedure on esophageal temp probe was 37.5 C    Ablation lesions were continued until a large circumference ablation line was created, both anterior and posterior to the right-sided pulmonary veins. After ablation was completed, entrance and exit block from each of the veins was checked and confirmed. A wide circumference ablation line was similarly done around the left-sided superior and inferior pulmonary veins - including ridge was ablated. Exit and entrance block from the left-sided pulmonary veins was then checked and confirmed. Following ablation, we confirmed entrance block at each vessel again. Pacing was also performed from each pole of the pentaray catheter at 10 milliamps, 2 milliseconds to assess for exit block. Both entrance and exit block were confirmed. Drug Infusion :     Adenosine was given to assess for spontaneous activation of PVP and also to assess dormant conduction from the PV. Adenosine was given at each vein and exit block from Pulmonary vein were confirmed and entrance and exit block confirmed. Post adenosine pacing was performed and  no arrhythmia was induced    No pathway was noted either    Rapid atrial stimulation did not induce arrhythmia either. Intracardiac echocardiography was used to document the absence of any significant effusion or any other complication. Catheters were removed under fluoroscopic guidance. The patient was extubated and transferred to recovery. Sheaths were pulled and figure of eight suture (to be removed in recovery) performed to achieve hemostasis  . There was no bleeding noted from any of the access sites. SUMMARY:    Successful isolation of the pulmonary veins for ablation of paroxysmal atrial fibrillation  Ablation of left anterolateral accessory pathway      RECOMMENDATIONS:  1. Admit to the floor  2. Call Taras Colmenares if the patient becomes hypotensive, febrile, unstable or if there is a change in mental status. 3.  Resume anticoagulation as recommended. Will give lovenox tonight and xarelto starting tomorrow morning  4. Bed rest x6 hours with legs straight after sheaths are removed.   5. Continue current medications. 6. Follow up in the electrophysiology clinic in three weeks.             Electronically signed by Ana Luisa Abraham MD on 12/13/2022 at 12:59 PM

## 2022-12-13 NOTE — H&P
Electrophysiology H&p  Note      Reason for consultation: WPW    Chief complaint; palpitations    Referring physician:       Primary care physician: Miller Apley, MD      History of Present Illness: Today visit (12/13/22)    Patient here for atrial fibrillation ablation. No change in H&P noted from previous clinic visit. Previous visit 10/28/2022  Patient here today with complaints of palpitations tachycardia with associated shortness of breath and dizziness. Patient reports that these symptoms have been going on for the past 1 month and they are debilitating at times. Patient would like to discuss ablation for atrial fibrillation as he had an EP study and noted to have A. fib. Patient has been taking Multaq and anticoagulation. Previous visit:     Patient is a 82-year-old male with history of hypertension, hyperlipidemia, diabetes mellitus, WPW referred by Dr. Sergo Guardado for WPW management. Patient reports shortness of breath with exertion. Patient has on and off palpitations but nothing recently. Patient denies chest pains. Patient reports occasional dizziness but no syncopal episodes. Patient does not drink alcohol or caffeine. Patient does not smoke. Patient does try to exercise when he can. Patient has WPW and appears to be left lateral pathway on the EKG. Patient did have some wide-complex rhythm which could be from the WPW. Patient has hernia sent he is thinking of surgery. Patient has multiple other issues. Patient had some question of mass on the abdomen but CT scan did not show any mass.       Pastmedical history:   Past Medical History:   Diagnosis Date    Asthma     Depression     Diabetes mellitus ([address]     Hyperlipidemia     Hypertension     Palpitations     Sleep disorder     Tachycardia     Ventral hernia without obstruction or gangrene 11/04/2015    WPW (Sigrid-Parkinson-White syndrome)     [facility] history :   Past Surgical History:   Procedure Laterality Date    ABDOMEN SURGERY      COLONOSCOPY N/A 04/25/2022    COLONOSCOPY POLYPECTOMY SNARE/COLD BIOPSY performed by Deepti Sommer DO at [address] N/A 5/17/2022    HERNIA INCISIONAL REPAIR LAPAROSCOPIC ROBOTIC WITH MESH performed by Deepti Sommer DO at [address] N/A 05/10/2022    Electrophysiology study and cardioversion performed by Dr. Joellen Beal. [facility], [facility]    Fusion of L3, L4, L5    UMBILICAL HERNIA REPAIR  11/04/2015    repair incarcerated supra umbilical hernia    UPPER GASTROINTESTINAL ENDOSCOPY N/A 04/25/2022    EGD DIAGNOSTIC ONLY performed by Deepti Sommer DO at [facility] ENDOSCOPY       Family history:   Family History   Problem Relation Age of Onset    Breast Cancer Mother        Social history :  reports that he has never smoked. He has never used smokeless tobacco. He reports that he does not currently use alcohol after a past usage of about 2.0 standard drinks per week. He reports that he does not currently use drugs after having used the following drugs: Marijuana Dietra Due). Allergies   Allergen Reactions    Bee Venom Anaphylaxis    Hornet Venom     Tramadol Other (See Comments)     Night terrors and sweats       No current facility-administered medications on file prior to encounter.      Current Outpatient Medications on File Prior to Encounter   Medication Sig Dispense Refill    dronedarone hcl (MULTAQ) 400 MG TABS Take 1 tablet by mouth 2 times daily (with meals) 60 tablet 0    sildenafil (VIAGRA) 50 MG tablet TAKE 1 TABLET BY MOUTH DAILY AS NEEDED FOR ERECTILE DYSFUNCTION 10 tablet 0    tiZANidine (ZANAFLEX) 4 MG tablet Take 1 tablet by mouth at bedtime 30 tablet 0    tadalafil (CIALIS) 20 MG tablet Take 1 tablet by mouth as needed for Erectile Dysfunction 10 tablet 3    Ertugliflozin L-PyroglutamicAc (STEGLATRO) 15 MG TABS TAKE 1 TABLET BY MOUTH EVERY DAY 30 tablet 0    potassium chloride (KLOR-CON M) 10 MEQ extended release tablet Take 1 tablet by mouth daily OTC 90 tablet 1    metFORMIN (GLUCOPHAGE) 1000 MG tablet Take 1 tablet by mouth 2 times daily (with meals) 180 tablet 1    meloxicam (MOBIC) 7.5 MG tablet Take 1 tablet by mouth daily 90 tablet 1    losartan (COZAAR) 100 MG tablet Take 1 tablet by mouth daily 90 tablet 1    hydroCHLOROthiazide (MICROZIDE) 12.5 MG capsule Take 1 capsule by mouth every morning 90 capsule 1    gabapentin (NEURONTIN) 400 MG capsule Take 1 capsule by mouth 3 times daily for 90 days. Intended supply: 90 days 270 capsule 1    escitalopram (LEXAPRO) 10 MG tablet TAKE 1 TABLET BY MOUTH EVERY DAY 30 tablet 3    buPROPion (WELLBUTRIN SR) 100 MG extended release tablet Take 1 tablet by mouth 2 times daily 60 tablet 3    atorvastatin (LIPITOR) 40 MG tablet Take 1 tablet by mouth daily 90 tablet 1    Insulin Degludec (TRESIBA FLEXTOUCH) 100 UNIT/ML SOPN Inject 45 Units into the skin nightly 9 Adjustable Dose Pre-filled Pen Syringe 1    omeprazole (PRILOSEC) 20 MG delayed release capsule Take 1 capsule by mouth in the morning. Take on an empty stomach before breakfast. 30 capsule 1    dicyclomine (BENTYL) 10 MG capsule Take 1 capsule by mouth in the morning and 1 capsule at noon and 1 capsule in the evening and 1 capsule before bedtime.  120 capsule 3    rivaroxaban (XARELTO) 20 MG TABS tablet Take 1 tablet by mouth daily 14 tablet 0    amitriptyline (ELAVIL) 50 MG tablet Take 1 tablet by mouth nightly 90 tablet 1    insulin lispro (HUMALOG KWIKPEN) 200 UNIT/ML SOPN pen TID with meals Glucose [id number] No Insulin 140-199 2 Units 200-249 4 Units 250-299 6 Units 300-349 8 Units 350-400 10 Units over 400 12 Units 5 pen 5    vitamin B-12 (CYANOCOBALAMIN) 1000 MCG tablet Take 1 tablet by mouth daily 90 tablet 1    Cholecalciferol (VITAMIN D3) 50 MCG (2000 UT) TABS Take one tablet by mouth once a day (Patient taking differently: Take one tablet by mouth once a day OTC) 30 tablet 3       Review of Systems:   Review of Systems   Constitutional:  Negative for activity change, chills, fatigue and fever. HENT:  Negative for congestion, ear pain and tinnitus. Eyes:  Negative for photophobia, pain and visual disturbance. Respiratory:  Positive for shortness of breath. Negative for cough, chest tightness and wheezing. Cardiovascular:  Positive for palpitations. Negative for chest pain and leg swelling. Gastrointestinal:  Negative for abdominal pain, blood in stool, constipation, diarrhea, nausea and vomiting. Endocrine: Negative for cold intolerance and heat intolerance. Genitourinary:  Negative for dysuria, flank pain and hematuria. Musculoskeletal:  Positive for arthralgias. Negative for back pain, myalgias and neck stiffness. Skin:  Negative for color change and rash. Allergic/Immunologic: Negative for food allergies. Neurological:  Positive for dizziness. Negative for syncope and light-headedness. Hematological:  Does not bruise/bleed easily. Psychiatric/Behavioral:  Negative for agitation, behavioral problems and confusion. Examination:      Vitals:    12/13/22 0629   BP: (!) 166/97   Pulse: 63   Resp: 18   Temp: (!) 96.2 °F (35.7 °C)   TempSrc: Temporal   SpO2: 97%        There is no height or weight on file to calculate BMI. Physical Exam  Constitutional:       Appearance: Normal appearance. HENT:      Head: Normocephalic and atraumatic. Right Ear: External ear normal.      Left Ear: External ear normal.      Nose: Nose normal.      Mouth/Throat:      Mouth: Mucous membranes are moist.   Eyes:      General:         Right eye: No discharge. Left eye: No discharge. Conjunctiva/sclera: Conjunctivae normal.   Cardiovascular:      Rate and Rhythm: Normal rate and regular rhythm. Heart sounds: No murmur heard.   Pulmonary:      Effort: Pulmonary effort is normal.      Breath sounds: No wheezing or rhonchi. Abdominal:      General: Abdomen is flat. Bowel sounds are normal.      Palpations: Abdomen is soft. Musculoskeletal:         General: Normal range of motion. Cervical back: Normal range of motion and neck supple. Right lower leg: No edema. Left lower leg: No edema. Skin:     General: Skin is warm and dry. Neurological:      General: No focal deficit present. Mental Status: He is alert and oriented to person, place, and time. Psychiatric:         Mood and Affect: Mood normal.         Thought Content: Thought content normal.         CBC:   Lab Results   Component Value Date/Time    WBC 3.6 12/07/2022 11:42 AM    HGB 17.5 12/07/2022 11:42 AM    HCT 51.3 12/07/2022 11:42 AM     12/07/2022 11:42 AM     Lipids:  Lab Results   Component Value Date    CHOL 206 (H) 09/12/2019    TRIG 84 09/12/2019    HDL 56 01/20/2022    LDLCALC 86 01/20/2022    LDLDIRECT 126 (H) 12/28/2011     PT/INR:   Lab Results   Component Value Date/Time    INR 0.85 12/07/2022 11:42 AM        BMP:    Lab Results   Component Value Date     12/07/2022    K 4.6 12/07/2022    CL 99 12/07/2022    CO2 22 12/07/2022    BUN 23 12/07/2022     CMP:   Lab Results   Component Value Date    AST 24 08/10/2022    PROT 7.1 08/10/2022    BILITOT 0.3 08/10/2022    ALKPHOS 104 08/10/2022     TSH:    Lab Results   Component Value Date/Time    TSH 2.22 09/12/2019 04:08 PM       EKGINTERPRETATION - EKG Interpretation:  Sinus rhythm, WPW      Echo 9/4/2020     Left ventricular systolic function is normal.   Ejection fraction is visually estimated at 50-55%. Small left ventricular cavity. No significant valvular disease noted. No evidence of any pericardial effusion. IMPRESSION / RECOMMENDATIONS:     Paroxysmal atrial fibrillation  Hypercoagulable due to paroxysmal atrial fibrillation  WPW  HTN   HLD   DM-2     Patient noted on EKG to be in sinus rhythm.   WPW pattern noted on EKG  Patient did have previous EP study and the WPW was not robust enough to ablate. Patient however was induced into atrial fibrillation and was started on Multaq and anticoagulation  Patient reports over the last 1 month he has had worsening palpitations tachycardia with associated dizziness  He states that he will check his pulse and it will be irregular. He states that he has been going in and out of atrial fibrillation  I did discuss case with Quang Cline  We will proceed with EP study    The risks including, but not limited to, vascular injury, bleeding, infection, radiation exposure, injury to cardiac and surrounding structures (including esophageal and pulmonary vein injury), injury to the normal conduction system of the heart (possibly requiring a pacemaker), stroke, myocardial infarction and death were all discussed. The patient considered the risks, benefits and alternatives; decided to proceed with the procedure. We will continue Multaq 400 mg twice daily and Xarelto 20 mg daily  Patient denies issues with bleeding        Thanks again for allowing me to participate in care of this patient. Please call me if you have any questions. With best regards. Leonel Jordan MD, 12/13/2022    Please note this report has been partially produced using speech recognition software and may contain errors related to that system including errors in grammar, punctuation, and spelling, as well as words and phrases that may be inappropriate. If there are any questions or concerns please feel free to contact the dictating provider for clarification.

## 2022-12-13 NOTE — PROGRESS NOTES
1118 Pt arrived from Cath Lab. Monitors applied and alarms set. Assessment completed and report received. 1135   1137 EKG tech here. Dr Meyers Like notified of BS. Orders received  1150 Echo tech here  1215 A line out  [id number] [phone] NP here. Sutures removed from groin sites.   1640  Report called to [facility]

## 2022-12-13 NOTE — PROGRESS NOTES
Bilateral femoral groin sutures removed. Minimal bruising. No hematoma. Clean 4x4 and clear tegaderm applied to bilateral sites.

## 2022-12-13 NOTE — ANESTHESIA PRE PROCEDURE
Department of Anesthesiology  Preprocedure Note       Name:  Ivone Maldonado   Age:  54 y.o.  :  1970                                          MRN:  [id number]         Date:  2022      Surgeon: * Surgery not found *    Procedure:     Medications prior to admission:   Prior to Admission medications    Medication Sig Start Date End Date Taking? Authorizing Provider   dronedarone hcl (MULTAQ) 400 MG TABS Take 1 tablet by mouth 2 times daily (with meals) 10/26/22   EDGAR Cordova - CNP   sildenafil (VIAGRA) 50 MG tablet TAKE 1 TABLET BY MOUTH DAILY AS NEEDED FOR ERECTILE DYSFUNCTION 10/13/22   Mirella Webb MD   tiZANidine (ZANAFLEX) 4 MG tablet Take 1 tablet by mouth at bedtime 10/12/22   Mirella Webb MD   tadalafil (CIALIS) 20 MG tablet Take 1 tablet by mouth as needed for Erectile Dysfunction 10/12/22   Mirella Webb MD   Ertugliflozin L-PyroglutamicAc (STEGLATRO) 15 MG TABS TAKE 1 TABLET BY MOUTH EVERY DAY 10/12/22   Mirella Webb MD   potassium chloride (KLOR-CON M) 10 MEQ extended release tablet Take 1 tablet by mouth daily OTC 10/12/22   Mirella Webb MD   metFORMIN (GLUCOPHAGE) 1000 MG tablet Take 1 tablet by mouth 2 times daily (with meals) 10/12/22   Mirella Webb MD   meloxicam (MOBIC) 7.5 MG tablet Take 1 tablet by mouth daily 10/12/22   Mirella Webb MD   losartan (COZAAR) 100 MG tablet Take 1 tablet by mouth daily 10/12/22   Mirella Webb MD   hydroCHLOROthiazide (MICROZIDE) 12.5 MG capsule Take 1 capsule by mouth every morning 10/12/22   Mirella Webb MD   gabapentin (NEURONTIN) 400 MG capsule Take 1 capsule by mouth 3 times daily for 90 days.  Intended supply: 90 days 10/12/22 1/10/23  Mirella Webb MD   escitalopram (LEXAPRO) 10 MG tablet TAKE 1 TABLET BY MOUTH EVERY DAY 10/12/22   Mirella Webb MD   buPROPion [facility] SR) 100 MG extended release tablet Take 1 tablet by mouth 2 times daily 10/12/22   Mirella Webb MD   atorvastatin (LIPITOR) 40 MG tablet Take 1 tablet by mouth daily 10/12/22   Jose Baez MD   Insulin Degludec (TRESIBA FLEXTOUCH) 100 UNIT/ML SOPN Inject 45 Units into the skin nightly 10/12/22   Jose Baez MD   omeprazole (PRILOSEC) 20 MG delayed release capsule Take 1 capsule by mouth in the morning. Take on an empty stomach before breakfast. 7/22/22 11/19/22  EDGAR Harden CNP   dicyclomine (BENTYL) 10 MG capsule Take 1 capsule by mouth in the morning and 1 capsule at noon and 1 capsule in the evening and 1 capsule before bedtime.  7/22/22   EDGAR Harden CNP   rivaroxaban Shelbie Gey) 20 MG TABS tablet Take 1 tablet by mouth daily 5/11/22   EDGAR Curiel CNP   amitriptyline (ELAVIL) 50 MG tablet Take 1 tablet by mouth nightly 1/20/22   Jose Baez MD   insulin lispro (HUMALOG KWIKPEN) 200 UNIT/ML SOPN pen TID with meals Glucose [id number] No Insulin 140-199 2 Units 200-249 4 Units 250-299 6 Units 300-349 8 Units 350-400 10 Units over 400 12 Units 1/20/22   Jose Baez MD   vitamin B-12 (CYANOCOBALAMIN) 1000 MCG tablet Take 1 tablet by mouth daily 6/30/20   Matthew Valenzuela DO   Cholecalciferol (VITAMIN D3) 50 MCG (2000 UT) TABS Take one tablet by mouth once a day  Patient taking differently: Take one tablet by mouth once a day OTC 6/30/20   Matthew Valenzuela DO       Current medications:    Current Outpatient Medications   Medication Sig Dispense Refill    dronedarone hcl (MULTAQ) 400 MG TABS Take 1 tablet by mouth 2 times daily (with meals) 60 tablet 0    sildenafil (VIAGRA) 50 MG tablet TAKE 1 TABLET BY MOUTH DAILY AS NEEDED FOR ERECTILE DYSFUNCTION 10 tablet 0    tiZANidine (ZANAFLEX) 4 MG tablet Take 1 tablet by mouth at bedtime 30 tablet 0    tadalafil (CIALIS) 20 MG tablet Take 1 tablet by mouth as needed for Erectile Dysfunction 10 tablet 3    Ertugliflozin L-PyroglutamicAc (STEGLATRO) 15 MG TABS TAKE 1 TABLET BY MOUTH EVERY DAY 30 tablet 0    potassium chloride (KLOR-CON M) 10 MEQ extended release tablet Take 1 tablet by mouth daily OTC 90 tablet 1    metFORMIN (GLUCOPHAGE) 1000 MG tablet Take 1 tablet by mouth 2 times daily (with meals) 180 tablet 1    meloxicam (MOBIC) 7.5 MG tablet Take 1 tablet by mouth daily 90 tablet 1    losartan (COZAAR) 100 MG tablet Take 1 tablet by mouth daily 90 tablet 1    hydroCHLOROthiazide (MICROZIDE) 12.5 MG capsule Take 1 capsule by mouth every morning 90 capsule 1    gabapentin (NEURONTIN) 400 MG capsule Take 1 capsule by mouth 3 times daily for 90 days. Intended supply: 90 days 270 capsule 1    escitalopram (LEXAPRO) 10 MG tablet TAKE 1 TABLET BY MOUTH EVERY DAY 30 tablet 3    buPROPion (WELLBUTRIN SR) 100 MG extended release tablet Take 1 tablet by mouth 2 times daily 60 tablet 3    atorvastatin (LIPITOR) 40 MG tablet Take 1 tablet by mouth daily 90 tablet 1    Insulin Degludec (TRESIBA FLEXTOUCH) 100 UNIT/ML SOPN Inject 45 Units into the skin nightly 9 Adjustable Dose Pre-filled Pen Syringe 1    omeprazole (PRILOSEC) 20 MG delayed release capsule Take 1 capsule by mouth in the morning. Take on an empty stomach before breakfast. 30 capsule 1    dicyclomine (BENTYL) 10 MG capsule Take 1 capsule by mouth in the morning and 1 capsule at noon and 1 capsule in the evening and 1 capsule before bedtime.  120 capsule 3    rivaroxaban (XARELTO) 20 MG TABS tablet Take 1 tablet by mouth daily 14 tablet 0    amitriptyline (ELAVIL) 50 MG tablet Take 1 tablet by mouth nightly 90 tablet 1    insulin lispro (HUMALOG KWIKPEN) 200 UNIT/ML SOPN pen TID with meals Glucose [id number] No Insulin 140-199 2 Units 200-249 4 Units 250-299 6 Units 300-349 8 Units 350-400 10 Units over 400 12 Units 5 pen 5    vitamin B-12 (CYANOCOBALAMIN) 1000 MCG tablet Take 1 tablet by mouth daily 90 tablet 1    Cholecalciferol (VITAMIN D3) 50 MCG (2000 UT) TABS Take one tablet by mouth once a day (Patient taking differently: Take one tablet by mouth once a day OTC) 30 tablet 3     No current facility-administered medications for this visit. Allergies: Allergies   Allergen Reactions    Bee Venom Anaphylaxis    Hornet Venom     Tramadol Other (See Comments)     Night terrors and sweats       Problem List:    Patient Active Problem List   Diagnosis Code    Ventral hernia without obstruction or gangrene K43.9    Essential hypertension I10    Gastroesophageal reflux disease without esophagitis K21.9    Dupuytren's contracture M72.0    Chronic skin ulcer, limited to breakdown of skin ([facility] [address] L98.491    Chronic bilateral low back pain without sciatica M54.50, G89.29    Type 2 diabetes mellitus with diabetic polyneuropathy, with long-term current use of insulin (HCC) E11.42, Z79.4    Erectile dysfunction N52.9    Chest pain R07.9    Sigrid Parkinson White pattern seen on electrocardiogram I45.6    Fatty liver K76.0    Neuropathy G62.9    Dorsalgia M54.9    Pure hypercholesterolemia E78.00    Pain in both feet M79.671, M79.672    Chronic pain of left knee M25.562, G89.29    Chronic depression F32. A    Anxiety F41.9    Polycythemia D75.1    History of COVID-19 Z86.16    Left upper quadrant abdominal swelling, mass and lump R19.02    Paroxysmal atrial fibrillation (HCC) I48.0    Palpitations R00.2    Tachycardia R00.0    Insomnia G47.00    Hypercoagulable state due to paroxysmal atrial fibrillation (HCC) D68.69, I48.0       Past Medical History:        Diagnosis Date    Asthma     Depression     Diabetes mellitus ([facility] [address]     Hyperlipidemia     Hypertension     Palpitations     Sleep disorder     Tachycardia     Ventral hernia without obstruction or gangrene 11/04/2015    WPW (Sigrid-Parkinson-White syndrome)     Scharlene Reddish       Past Surgical History:        Procedure Laterality Date    ABDOMEN SURGERY      COLONOSCOPY N/A 04/25/2022    COLONOSCOPY POLYPECTOMY SNARE/COLD BIOPSY performed by Katiana Pop DO at [address] [place] [address] 231 REPAIR N/A 5/17/2022    HERNIA INCISIONAL REPAIR LAPAROSCOPIC ROBOTIC WITH MESH performed by Mehdi Oscar DO at [place] [address] N/A 05/10/2022    Electrophysiology study and cardioversion performed by Dr. Jolanta Sandhu. [address], 2010    Fusion of L3, L4, L5    UMBILICAL HERNIA REPAIR  11/04/2015    repair incarcerated supra umbilical hernia    UPPER GASTROINTESTINAL ENDOSCOPY N/A 04/25/2022    EGD DIAGNOSTIC ONLY performed by Mehdi Oscar DO at [facility] END[facility]       Social History:    Social History     Tobacco Use    Smoking status: Never    Smokeless tobacco: Never   Substance Use Topics    Alcohol use: Not Currently     Alcohol/week: 2.0 standard drinks     Types: 2 Standard drinks or equivalent per week                                Counseling given: Not Answered      Vital Signs (Current): There were no vitals filed for this visit.                                            BP Readings from Last 3 Encounters:   12/13/22 (!) 166/97   11/04/22 (!) 143/79   10/26/22 (!) 180/100       NPO Status:                                                                                 BMI:   Wt Readings from Last 3 Encounters:   11/04/22 219 lb 9.6 oz (99.6 kg)   10/26/22 222 lb 9.6 oz (101 kg)   10/12/22 218 lb 12.8 oz (99.2 kg)     There is no height or weight on file to calculate BMI.    CBC:   Lab Results   Component Value Date/Time    WBC 3.6 12/07/2022 11:42 AM    RBC 5.88 12/07/2022 11:42 AM    HGB 17.5 12/07/2022 11:42 AM    HCT 51.3 12/07/2022 11:42 AM    MCV 87.2 12/07/2022 11:42 AM    RDW 12.3 12/07/2022 11:42 AM     12/07/2022 11:42 AM       CMP:   Lab Results   Component Value Date/Time     12/07/2022 11:42 AM    K 4.6 12/07/2022 11:42 AM    CL 99 12/07/2022 11:42 AM    CO2 22 12/07/2022 11:42 AM    BUN 23 12/07/2022 11:42 AM    CREATININE 0.8 12/07/2022 11:42 AM    GFRAA >60 08/10/2022 01:49 PM    AGRATIO 1.8 03/15/2022 09:36 AM    LABGLOM >60 12/07/2022 11:42 AM    GLUCOSE 269 12/07/2022 11:42 AM    PROT 7.1 08/10/2022 01:49 PM    PROT 6.7 12/28/2011 08:55 AM    CALCIUM 9.2 12/07/2022 11:42 AM    BILITOT 0.3 08/10/2022 01:49 PM    ALKPHOS 104 08/10/2022 01:49 PM    AST 24 08/10/2022 01:49 PM    ALT 39 08/10/2022 01:49 PM       POC Tests:   Recent Labs     12/13/22  0629   POCGLU 273*       Coags:   Lab Results   Component Value Date/Time    PROTIME 11.0 12/07/2022 11:42 AM    INR 0.85 12/07/2022 11:42 AM    APTT 25.3 12/07/2022 11:42 AM       HCG (If Applicable): No results found for: PREGTESTUR, PREGSERUM, HCG, HCGQUANT     ABGs: No results found for: PHART, PO2ART, EPA5FQS, URB8KXL, BEART, G4IONDUJ     Type & Screen (If Applicable):  No results found for: LABABO, LABRH    Drug/Infectious Status (If Applicable):  No results found for: HIV, HEPCAB    COVID-19 Screening (If Applicable):   Lab Results   Component Value Date/Time    COVID19 NOT DETECTED 12/07/2022 11:00 AM           Anesthesia Evaluation  Patient summary reviewed no history of anesthetic complications:   Airway: Mallampati: I  TM distance: >3 FB   Neck ROM: full  Mouth opening: > = 3 FB Dental: normal exam         Pulmonary: breath sounds clear to auscultation  (+) asthma:                            Cardiovascular:  Exercise tolerance: good (>4 METS),   (+) hypertension:, hyperlipidemia        Rhythm: regular  Rate: normal           Beta Blocker:  Not on Beta Blocker         Neuro/Psych:   (+) depression/anxiety             GI/Hepatic/Renal:   (+) GERD:, liver disease (MTZ):, bowel prep,           Endo/Other:    (+) DiabetesType II DM, well controlled, , .                 Abdominal:             Vascular: negative vascular ROS. Other Findings:           Anesthesia Plan      MAC     ASA 2       Induction: intravenous. Anesthetic plan and risks discussed with patient. Plan discussed with CRNA. Pre Anesthesia Evaluation complete.  Anesthesia plan, risks, benefits, alternatives, and personal involved discussed with patient. Patients and/or legal guardian verbalized an understanding  and agreed to proceed. Cristino Normantingham, EDGAR - CRNA  2022  Department of Anesthesiology  Preprocedure Note       Name:  Riri Waggoner   Age:  54 y.o.  :  1970                                          MRN:  [id number]         Date:  2022      Surgeon: * Surgery not found *    Procedure:     Medications prior to admission:   Prior to Admission medications    Medication Sig Start Date End Date Taking? Authorizing Provider   dronedarone hcl (MULTAQ) 400 MG TABS Take 1 tablet by mouth 2 times daily (with meals) 10/26/22   EDGAR Sahni - CNP   sildenafil (VIAGRA) 50 MG tablet TAKE 1 TABLET BY MOUTH DAILY AS NEEDED FOR ERECTILE DYSFUNCTION 10/13/22   Long Rodriguez MD   tiZANidine (ZANAFLEX) 4 MG tablet Take 1 tablet by mouth at bedtime 10/12/22   Long Rodriguez MD   tadalafil (CIALIS) 20 MG tablet Take 1 tablet by mouth as needed for Erectile Dysfunction 10/12/22   Long Rodriguez MD   Ertugliflozin L-PyroglutamicAc (STEGLATRO) 15 MG TABS TAKE 1 TABLET BY MOUTH EVERY DAY 10/12/22   Long Rodriguez MD   potassium chloride (KLOR-CON M) 10 MEQ extended release tablet Take 1 tablet by mouth daily OTC 10/12/22   Long Rodriguez MD   metFORMIN (GLUCOPHAGE) 1000 MG tablet Take 1 tablet by mouth 2 times daily (with meals) 10/12/22   Long Rodriguez MD   meloxicam (MOBIC) 7.5 MG tablet Take 1 tablet by mouth daily 10/12/22   Long Rodriguez MD   losartan (COZAAR) 100 MG tablet Take 1 tablet by mouth daily 10/12/22   Long Rodriguez MD   hydroCHLOROthiazide (MICROZIDE) 12.5 MG capsule Take 1 capsule by mouth every morning 10/12/22   Long Rodriguez MD   gabapentin (NEURONTIN) 400 MG capsule Take 1 capsule by mouth 3 times daily for 90 days.  Intended supply: 90 days 10/12/22 1/10/23  Long Rodriguez MD   escitalopram (LEXAPRO) 10 MG tablet TAKE 1 TABLET BY MOUTH EVERY DAY 10/12/22   Long Rodriguez MD   buPROPion [facility] SR) 100 MG extended release tablet Take 1 tablet by mouth 2 times daily 10/12/22   Helga Mcnulty MD   atorvastatin (LIPITOR) 40 MG tablet Take 1 tablet by mouth daily 10/12/22   Helga Mcnulty MD   Insulin Degludec (TRESIBA FLEXTOUCH) 100 UNIT/ML SOPN Inject 45 Units into the skin nightly 10/12/22   Helga Mcnulty MD   omeprazole (PRILOSEC) 20 MG delayed release capsule Take 1 capsule by mouth in the morning. Take on an empty stomach before breakfast. 7/22/22 11/19/22  EDGAR Ramsey CNP   dicyclomine (BENTYL) 10 MG capsule Take 1 capsule by mouth in the morning and 1 capsule at noon and 1 capsule in the evening and 1 capsule before bedtime.  7/22/22   EDGAR Ramsey CNP   rivaroxaban Juan Jose Noreen) 20 MG TABS tablet Take 1 tablet by mouth daily 5/11/22   EDGAR Madrid CNP   amitriptyline (ELAVIL) 50 MG tablet Take 1 tablet by mouth nightly 1/20/22   Helga Mcnulty MD   insulin lispro (HUMALOG KWIKPEN) 200 UNIT/ML SOPN pen TID with meals Glucose [id number] No Insulin 140-199 2 Units 200-249 4 Units 250-299 6 Units 300-349 8 Units 350-400 10 Units over 400 12 Units 1/20/22   Helga Mcnulty MD   vitamin B-12 (CYANOCOBALAMIN) 1000 MCG tablet Take 1 tablet by mouth daily 6/30/20   Matthew Mcarthur DO   Cholecalciferol (VITAMIN D3) 50 MCG (2000 UT) TABS Take one tablet by mouth once a day  Patient taking differently: Take one tablet by mouth once a day OTC 6/30/20   Matthew Mcarthur DO       Current medications:    Current Outpatient Medications   Medication Sig Dispense Refill    dronedarone hcl (MULTAQ) 400 MG TABS Take 1 tablet by mouth 2 times daily (with meals) 60 tablet 0    sildenafil (VIAGRA) 50 MG tablet TAKE 1 TABLET BY MOUTH DAILY AS NEEDED FOR ERECTILE DYSFUNCTION 10 tablet 0    tiZANidine (ZANAFLEX) 4 MG tablet Take 1 tablet by mouth at bedtime 30 tablet 0    tadalafil (CIALIS) 20 MG tablet Take 1 tablet by mouth as needed for Erectile Dysfunction 10 tablet 3    Ertugliflozin L-PyroglutamicAc (STEGLATRO) 15 MG TABS TAKE 1 TABLET BY MOUTH EVERY DAY 30 tablet 0    potassium chloride (KLOR-CON M) 10 MEQ extended release tablet Take 1 tablet by mouth daily OTC 90 tablet 1    metFORMIN (GLUCOPHAGE) 1000 MG tablet Take 1 tablet by mouth 2 times daily (with meals) 180 tablet 1    meloxicam (MOBIC) 7.5 MG tablet Take 1 tablet by mouth daily 90 tablet 1    losartan (COZAAR) 100 MG tablet Take 1 tablet by mouth daily 90 tablet 1    hydroCHLOROthiazide (MICROZIDE) 12.5 MG capsule Take 1 capsule by mouth every morning 90 capsule 1    gabapentin (NEURONTIN) 400 MG capsule Take 1 capsule by mouth 3 times daily for 90 days. Intended supply: 90 days 270 capsule 1    escitalopram (LEXAPRO) 10 MG tablet TAKE 1 TABLET BY MOUTH EVERY DAY 30 tablet 3    buPROPion (WELLBUTRIN SR) 100 MG extended release tablet Take 1 tablet by mouth 2 times daily 60 tablet 3    atorvastatin (LIPITOR) 40 MG tablet Take 1 tablet by mouth daily 90 tablet 1    Insulin Degludec (TRESIBA FLEXTOUCH) 100 UNIT/ML SOPN Inject 45 Units into the skin nightly 9 Adjustable Dose Pre-filled Pen Syringe 1    omeprazole (PRILOSEC) 20 MG delayed release capsule Take 1 capsule by mouth in the morning. Take on an empty stomach before breakfast. 30 capsule 1    dicyclomine (BENTYL) 10 MG capsule Take 1 capsule by mouth in the morning and 1 capsule at noon and 1 capsule in the evening and 1 capsule before bedtime.  120 capsule 3    rivaroxaban (XARELTO) 20 MG TABS tablet Take 1 tablet by mouth daily 14 tablet 0    amitriptyline (ELAVIL) 50 MG tablet Take 1 tablet by mouth nightly 90 tablet 1    insulin lispro (HUMALOG KWIKPEN) 200 UNIT/ML SOPN pen TID with meals Glucose [id number] No Insulin 140-199 2 Units 200-249 4 Units 250-299 6 Units 300-349 8 Units 350-400 10 Units over 400 12 Units 5 pen 5    vitamin B-12 (CYANOCOBALAMIN) 1000 MCG tablet Take 1 tablet by mouth daily 90 tablet 1    Cholecalciferol (VITAMIN D3) 50 MCG (2000 UT) TABS Take one tablet by mouth once a day (Patient taking differently: Take one tablet by mouth once a day OTC) 30 tablet 3     No current facility-administered medications for this visit. Allergies: Allergies   Allergen Reactions    Bee Venom Anaphylaxis    Hornet Venom     Tramadol Other (See Comments)     Night terrors and sweats       Problem List:    Patient Active Problem List   Diagnosis Code    Ventral hernia without obstruction or gangrene K43.9    Essential hypertension I10    Gastroesophageal reflux disease without esophagitis K21.9    Dupuytren's contracture M72.0    Chronic skin ulcer, limited to breakdown of skin ([facility] [address] L98.491    Chronic bilateral low back pain without sciatica M54.50, G89.29    Type 2 diabetes mellitus with diabetic polyneuropathy, with long-term current use of insulin (HCC) E11.42, Z79.4    Erectile dysfunction N52.9    Chest pain R07.9    Sigrid Parkinson White pattern seen on electrocardiogram I45.6    Fatty liver K76.0    Neuropathy G62.9    Dorsalgia M54.9    Pure hypercholesterolemia E78.00    Pain in both feet M79.671, M79.672    Chronic pain of left knee M25.562, G89.29    Chronic depression F32. A    Anxiety F41.9    Polycythemia D75.1    History of COVID-19 Z86.16    Left upper quadrant abdominal swelling, mass and lump R19.02    Paroxysmal atrial fibrillation (HCC) I48.0    Palpitations R00.2    Tachycardia R00.0    Insomnia G47.00    Hypercoagulable state due to paroxysmal atrial fibrillation (HCC) D68.69, I48.0       Past Medical History:        Diagnosis Date    Asthma     Depression     Diabetes mellitus ([facility] [address]     Hyperlipidemia     Hypertension     Palpitations     Sleep disorder     Tachycardia     Ventral hernia without obstruction or gangrene 11/04/2015    WPW (Sigrid-Parkinson-White syndrome)     Gary French       Past Surgical History:        Procedure Laterality Date    ABDOMEN SURGERY      COLONOSCOPY N/A 04/25/2022    COLONOSCOPY POLYPECTOMY SNARE/COLD BIOPSY performed by Rylee Hand DO at [facility]     [address] N/A 5/17/2022    HERNIA INCISIONAL REPAIR LAPAROSCOPIC ROBOTIC WITH MESH performed by Rylee Hand DO at [place] [address] N/A 05/10/2022    Electrophysiology study and cardioversion performed by Dr. Selene Jonas. [facility], 2010    Fusion of L3, L4, L5    UMBILICAL HERNIA REPAIR  11/04/2015    repair incarcerated supra umbilical hernia    UPPER GASTROINTESTINAL ENDOSCOPY N/A 04/25/2022    EGD DIAGNOSTIC ONLY performed by Rylee Hand DO at [facility] ENDOSCOPY       Social History:    Social History     Tobacco Use    Smoking status: Never    Smokeless tobacco: Never   Substance Use Topics    Alcohol use: Not Currently     Alcohol/week: 2.0 standard drinks     Types: 2 Standard drinks or equivalent per week                                Counseling given: Not Answered      Vital Signs (Current): There were no vitals filed for this visit.                                            BP Readings from Last 3 Encounters:   12/13/22 (!) 166/97   11/04/22 (!) 143/79   10/26/22 (!) 180/100       NPO Status:                                                                                 BMI:   Wt Readings from Last 3 Encounters:   11/04/22 219 lb 9.6 oz (99.6 kg)   10/26/22 222 lb 9.6 oz (101 kg)   10/12/22 218 lb 12.8 oz (99.2 kg)     There is no height or weight on file to calculate BMI.    CBC:   Lab Results   Component Value Date/Time    WBC 3.6 12/07/2022 11:42 AM    RBC 5.88 12/07/2022 11:42 AM    HGB 17.5 12/07/2022 11:42 AM    HCT 51.3 12/07/2022 11:42 AM    MCV 87.2 12/07/2022 11:42 AM    RDW 12.3 12/07/2022 11:42 AM     12/07/2022 11:42 AM       CMP:   Lab Results   Component Value Date/Time     12/07/2022 11:42 AM    K 4.6 12/07/2022 11:42 AM    CL 99 12/07/2022 11:42 AM    CO2 22 12/07/2022 11:42 AM    BUN 23 12/07/2022 11:42 AM    CREATININE 0.8 12/07/2022 11:42 AM    GFRAA >60 08/10/2022 01:49 PM    AGRATIO 1.8 03/15/2022 09:36 AM    LABGLOM >60 12/07/2022 11:42 AM    GLUCOSE 269 12/07/2022 11:42 AM    PROT 7.1 08/10/2022 01:49 PM    PROT 6.7 12/28/2011 08:55 AM    CALCIUM 9.2 12/07/2022 11:42 AM    BILITOT 0.3 08/10/2022 01:49 PM    ALKPHOS 104 08/10/2022 01:49 PM    AST 24 08/10/2022 01:49 PM    ALT 39 08/10/2022 01:49 PM       POC Tests:   Recent Labs     12/13/22  0629   POCGLU 273*       Coags:   Lab Results   Component Value Date/Time    PROTIME 11.0 12/07/2022 11:42 AM    INR 0.85 12/07/2022 11:42 AM    APTT 25.3 12/07/2022 11:42 AM       HCG (If Applicable): No results found for: PREGTESTUR, PREGSERUM, HCG, HCGQUANT     ABGs: No results found for: PHART, PO2ART, ZKZ8CYW, PMY3VQP, BEART, V5OXGBXN     Type & Screen (If Applicable):  No results found for: LABABO, LABRH    Drug/Infectious Status (If Applicable):  No results found for: HIV, HEPCAB    COVID-19 Screening (If Applicable):   Lab Results   Component Value Date/Time    COVID19 NOT DETECTED 12/07/2022 11:00 AM           Anesthesia Evaluation  Patient summary reviewed no history of anesthetic complications:   Airway: Mallampati: II  TM distance: >3 FB   Neck ROM: full  Mouth opening: > = 3 FB   Dental:          Pulmonary:normal exam    (+) asthma:     (-) pneumonia, COPD and shortness of breath                           Cardiovascular:    (+) hypertension:, dysrhythmias: atrial fibrillation, hyperlipidemia        Rhythm: regular  Rate: normal    Stress test reviewed       Beta Blocker:  Not on Beta Blocker      ROS comment: Cardiac Stress Test (GXT)      Demographics      Patient Name      Joey PRAJAPATI      Date of Study      07/13/2022    Conclusions      Summary   Overall Impression:   Excellent exercise capacity. 11 METs work load. Mildly Hypertensive BP response to exercise.    ETT non diagnostic as THR is not achieved. No Exercise induced, clinically significant arrhythmias. Neuro/Psych:   (+) neuromuscular disease:, depression/anxiety             GI/Hepatic/Renal:   (+) GERD: well controlled, liver disease:,           Endo/Other:    (+) DiabetesType II DM, using insulin, blood dyscrasia: anticoagulation therapy:., electrolyte abnormalities, . Abdominal:             Vascular:   + PVD, aortic or cerebral, . Other Findings:             Anesthesia Plan      general     ASA 3       Induction: intravenous. arterial line  MIPS: Postoperative opioids intended and Prophylactic antiemetics administered. Anesthetic plan and risks discussed with patient. Plan discussed with CRNA. EDGAR Montejo CRNA   12/13/2022    Pre Anesthesia Evaluation complete. Anesthesia plan, risks, benefits, alternatives, and personnel discussed with patient and/or legal guardian. Patient and/or legal guardian verbalized an understanding and agreed to proceed. Anesthesia plan discussed with care team members and agreed upon.   EDGAR Montejo CRNA  12/13/2022

## 2022-12-13 NOTE — ANESTHESIA POSTPROCEDURE EVALUATION
Department of Anesthesiology  Postprocedure Note    Patient: Nilesh De Paz  MRN: [id number]  YOB: 1970  Date of evaluation: 12/13/2022      Procedure Summary     Date: 12/13/22 Room / Location: [address] [place] [address] Cath Lab    Anesthesia Start: 0362 Anesthesia Stop: 1120    Procedure: 1200 [facility] EP AFIB ABLATION W ANESTH Diagnosis:       Unspecified atrial fibrillation      S/P ablation of atrial fibrillation    Scheduled Providers:  Responsible Provider: Danyell Mac MD    Anesthesia Type: General ASA Status: 3          Anesthesia Type: General    Michelle Phase I: Michelle Score: 9    Michelle Phase II:        Anesthesia Post Evaluation    Patient location during evaluation: PACU  Patient participation: complete - patient participated  Level of consciousness: awake and alert  Airway patency: patent  Nausea & Vomiting: no nausea and no vomiting  Complications: no  Cardiovascular status: hemodynamically stable  Respiratory status: face mask and spontaneous ventilation  Hydration status: stable

## 2022-12-13 NOTE — CONSULTS
Endocrinology   Consult Note  12/13/2022  7:35 AM     Primary Care provider: Doristine Curling, MD     Referring physician:  Ana Luisa Abraham MD     Dear Doctor  Reena Love    Thank You for the Consult     Pt. Was Admitted for : EP study studies/ ablation of left anterior lateral accessory pathway    Reason for Consult: Better control of blood glucose      History Obtained From:  Patient/ EMR       HISTORY OF PRESENT ILLNESS:                The patient is a 54 y.o. male with significant past medical history of asthma, diabetes mellitus, hyperlipidemia, hypertension palpitation with a WPW syndrome and sleep disorder, her abdominal surgeries admitted to hospital for EP studies and ablation of left anterior lateral accessory pathway to disrupt WPW's syndrome. Accomplished this morning. Has been running higher blood glucose levels I was  consulted for better control of blood glucose. ROS:   Pt's ROS done in detail. Abnormal ROS are noted in Medical and Surgical History Section below: Other Medical History:        Diagnosis Date    Asthma     Depression     Diabetes mellitus ([facility] [address]     Hyperlipidemia     Hypertension     Palpitations     Sleep disorder     Tachycardia     Ventral hernia without obstruction or gangrene 11/04/2015    WPW (Sigrid-Parkinson-White syndrome)     Solitario Conklin     Surgical History:        Procedure Laterality Date    ABDOMEN SURGERY      ABLATION OF DYSRHYTHMIC FOCUS  12/13/2022    atrial fibrillation ablation and WPW accessory pathway ablation    COLONOSCOPY N/A 04/25/2022    COLONOSCOPY POLYPECTOMY SNARE/COLD BIOPSY performed by Sanchez Murray DO at [address] N/A 05/17/2022    HERNIA INCISIONAL REPAIR LAPAROSCOPIC ROBOTIC WITH MESH performed by Sanchez Murray DO at [address] N/A 05/10/2022    Electrophysiology study and cardioversion performed by Dr. Reena Love.     [address], 2010    Fusion of L3, L4, L5    UMBILICAL HERNIA REPAIR  11/04/2015    repair incarcerated supra umbilical hernia    UPPER GASTROINTESTINAL ENDOSCOPY N/A 04/25/2022    EGD DIAGNOSTIC ONLY performed by Alina Sandoval DO at [facility] ENDOSCOPY       Allergies:  Bee venom, Hornet venom, and Tramadol    Family History:       Problem Relation Age of Onset    Breast Cancer Mother      REVIEW OF SYSTEMS:  Review of System Done as noted above     PHYSICAL EXAM:      Vitals:    /73   Pulse 70   Temp 98.1 °F (36.7 °C) (Oral)   Resp 18   Ht 6' 2\" (1.88 m)   Wt 225 lb 12 oz (102.4 kg)   SpO2 98%   BMI 28.98 kg/m²     CONSTITUTIONAL:  awake, alert, cooperative, appears stated age   EYES:  vision intact Fundoscopic Exam not performed   ENT:Normal  NECK:  Supple, No JVD. Thyroid Exam:Normal   LUNGS:  Has Vesicular Breath Sounds,   CARDIOVASCULAR:  Normal apical impulse, regular rate and rhythm, normal S1 and S2, no S3 or S4, and has no  murmur   ABDOMEN:  No scars, normal bowel sounds, soft, non-distended, non-tender, no masses palpated, no hepatolienomegaly  Musculoskeletal: Normal  Extremities: Normal, peripheral pulses normal, , has no edema   NEUROLOGIC:  Awake, alert, oriented to name, place and time. Cranial nerves II-XII are grossly intact. Motor is  intact. Sensory is intact.  ,  and gait is normal.    DATA:    CBC:   Recent Labs     12/13/22  0824   HGB 14.5    CMP:  Recent Labs     12/13/22  0824   *   K 4.3   CO2 25   CREATININE 0.9     Lipids:   Lab Results   Component Value Date/Time    CHOL 206 09/12/2019 04:08 PM    HDL 56 01/20/2022 08:29 AM    TRIG 84 09/12/2019 04:08 PM     Glucose:   Recent Labs     12/13/22  1136 12/13/22  1242 12/13/22  1326   POCGLU 310* 310* 288*     Hemoglobin A1C:   Lab Results   Component Value Date/Time    LABA1C 7.7 04/27/2022 02:23 PM     Free T4:   Lab Results   Component Value Date/Time    T4FREE 1.04 05/10/2022 07:44 PM     Free T3:   Lab Results   Component Value Date/Time FT3 3.6 12/28/2011 08:55 AM     TSH High Sensitivity:   Lab Results   Component Value Date/Time    [facility] 2.810 05/10/2022 07:44 PM       XR CHEST PORTABLE    (Results Pending)          Scheduled Medicines   Medications:    pantoprazole  40 mg Oral BID AC    amitriptyline  50 mg Oral Nightly    atorvastatin  40 mg Oral Daily    buPROPion  100 mg Oral BID    dicyclomine  10 mg Oral 4x Daily    dronedarone hcl  400 mg Oral BID WC    escitalopram  10 mg Oral Daily    gabapentin  400 mg Oral TID    [START ON 12/14/2022] hydroCHLOROthiazide  12.5 mg Oral QAM    losartan  100 mg Oral Daily    meloxicam  7.5 mg Oral Daily    potassium chloride  10 mEq Oral Daily    rivaroxaban  20 mg Oral Daily    tiZANidine  4 mg Oral Nightly    sodium chloride flush  5-40 mL IntraVENous 2 times per day    insulin lispro  0-8 Units SubCUTAneous TID WC    insulin lispro  15 Units SubCUTAneous TID WC    insulin lispro  0-8 Units SubCUTAneous 2 times per day    insulin glargine  40 Units SubCUTAneous Nightly      Infusions:    sodium chloride      dextrose           IMPRESSION    Patient Active Problem List   Diagnosis    Ventral hernia without obstruction or gangrene    Essential hypertension    Gastroesophageal reflux disease without esophagitis    Dupuytren's contracture    Chronic skin ulcer, limited to breakdown of skin (HCC)    Chronic bilateral low back pain without sciatica    Type 2 diabetes mellitus with diabetic polyneuropathy, with long-term current use of insulin (HCC)    Erectile dysfunction    Chest pain    Chai Sero Parkinson White pattern seen on electrocardiogram    Fatty liver    Neuropathy    Dorsalgia    Pure hypercholesterolemia    Pain in both feet    Chronic pain of left knee    Chronic depression    Anxiety    Polycythemia    History of COVID-19    Left upper quadrant abdominal swelling, mass and lump    Paroxysmal atrial fibrillation (HCC)    Palpitations    Tachycardia    Insomnia    Hypercoagulable state due to paroxysmal atrial fibrillation (HCC)    S/P ablation of atrial fibrillation         RECOMMENDATIONS:      Reviewed POC blood glucose . Labs and X ray results   Reviewed Home and Current Medicines   Will Start On meal/ Correction bolus Humalog/ Lantus Insulin regime   Monitor Blood glucose frequently   Modify  the dose of Insulin/  as needed        Will follow with you  Again thank you for sharing pt's care with me.      Truly yours,       Teja Jesus MD

## 2022-12-13 NOTE — PROCEDURES
Ramez Desai   54 y.o., male  1970 12/13/2022    Attending: Willow Gonzalez MD    Sedation : Anesthesia                        Indication:          pre-atrial fibrillation ablation                                                                                                                                                After obtaining the informed cosent. Pt brought to EP lab. Sedation per anesthesia . After proper sedation GINGER performed. US Doppler was used to assess abnormalities where appropriate. Post procedure pt is stable.       Findings:  LV=  LVEF is normal  LA=  normal  ITZ= NO CLOTS  RV= normal  RA=  normal  IAS= Normal  Bubble study=not done for PFO or ASD  AV= tricuspid, no significant regurgitation or stenosis  MV= trivial regurgitation, no stenosis  TV= no significant regurgitation  PV= no significant regurgitation,   Aorta= no significant plaque  PUL NIGEL FLOW= not well visualized  No pericardial effusion    Impression:  No ITZ clot    Plan:  Proceed with ablation

## 2022-12-14 VITALS
TEMPERATURE: 98.7 F | DIASTOLIC BLOOD PRESSURE: 65 MMHG | HEART RATE: 70 BPM | HEIGHT: 74 IN | WEIGHT: 225.75 LBS | SYSTOLIC BLOOD PRESSURE: 119 MMHG | BODY MASS INDEX: 28.97 KG/M2 | OXYGEN SATURATION: 98 % | RESPIRATION RATE: 18 BRPM

## 2022-12-14 LAB
GLUCOSE BLD-MCNC: 205 MG/DL (ref 70–99)
GLUCOSE BLD-MCNC: 209 MG/DL (ref 70–99)

## 2022-12-14 PROCEDURE — G0378 HOSPITAL OBSERVATION PER HR: HCPCS

## 2022-12-14 PROCEDURE — 6370000000 HC RX 637 (ALT 250 FOR IP): Performed by: NURSE PRACTITIONER

## 2022-12-14 PROCEDURE — 82962 GLUCOSE BLOOD TEST: CPT

## 2022-12-14 PROCEDURE — 6370000000 HC RX 637 (ALT 250 FOR IP): Performed by: INTERNAL MEDICINE

## 2022-12-14 PROCEDURE — 99217 PR OBSERVATION CARE DISCHARGE MANAGEMENT: CPT | Performed by: NURSE PRACTITIONER

## 2022-12-14 RX ORDER — PANTOPRAZOLE SODIUM 40 MG/1
40 TABLET, DELAYED RELEASE ORAL DAILY
Qty: 30 TABLET | Refills: 0 | Status: SHIPPED | OUTPATIENT
Start: 2022-12-14

## 2022-12-14 RX ADMIN — RIVAROXABAN 20 MG: 20 TABLET, FILM COATED ORAL at 09:02

## 2022-12-14 RX ADMIN — ESCITALOPRAM OXALATE 10 MG: 10 TABLET ORAL at 09:01

## 2022-12-14 RX ADMIN — INSULIN LISPRO 2 UNITS: 100 INJECTION, SOLUTION INTRAVENOUS; SUBCUTANEOUS at 02:48

## 2022-12-14 RX ADMIN — INSULIN LISPRO 2 UNITS: 100 INJECTION, SOLUTION INTRAVENOUS; SUBCUTANEOUS at 09:04

## 2022-12-14 RX ADMIN — INSULIN LISPRO 15 UNITS: 100 INJECTION, SOLUTION INTRAVENOUS; SUBCUTANEOUS at 09:04

## 2022-12-14 RX ADMIN — POTASSIUM CHLORIDE 10 MEQ: 1500 TABLET, EXTENDED RELEASE ORAL at 09:01

## 2022-12-14 RX ADMIN — MELOXICAM 7.5 MG: 7.5 TABLET ORAL at 09:01

## 2022-12-14 RX ADMIN — ATORVASTATIN CALCIUM 40 MG: 40 TABLET, FILM COATED ORAL at 09:01

## 2022-12-14 RX ADMIN — LOSARTAN POTASSIUM 100 MG: 100 TABLET, FILM COATED ORAL at 09:02

## 2022-12-14 RX ADMIN — GABAPENTIN 400 MG: 400 CAPSULE ORAL at 09:02

## 2022-12-14 RX ADMIN — PANTOPRAZOLE SODIUM 40 MG: 40 TABLET, DELAYED RELEASE ORAL at 06:37

## 2022-12-14 RX ADMIN — DICYCLOMINE HYDROCHLORIDE 10 MG: 10 CAPSULE ORAL at 09:01

## 2022-12-14 RX ADMIN — BUPROPION HYDROCHLORIDE 100 MG: 100 TABLET, EXTENDED RELEASE ORAL at 09:01

## 2022-12-14 RX ADMIN — DRONEDARONE 400 MG: 400 TABLET, FILM COATED ORAL at 09:01

## 2022-12-14 RX ADMIN — HYDROCHLOROTHIAZIDE 12.5 MG: 12.5 TABLET ORAL at 09:02

## 2022-12-14 ASSESSMENT — PAIN SCALES - GENERAL: PAINLEVEL_OUTOF10: 0

## 2022-12-14 NOTE — DISCHARGE SUMMARY
[facility]  Discharge Summary    Ramez Desai  :  1970  MRN:  [id number]    Admit date:  2022  Discharge date:      Admitting Physician:  Catracho Lacy MD    Discharge Diagnoses:      1. Sp Ablation of  PAF- pulmonary vein isolation  2. S/p ablation of left anterolateral accessory pathway  3. Hypercoagulable due to PAF        Patient Active Problem List   Diagnosis    Ventral hernia without obstruction or gangrene    Essential hypertension    Gastroesophageal reflux disease without esophagitis    Dupuytren's contracture    Chronic skin ulcer, limited to breakdown of skin (HCC)    Chronic bilateral low back pain without sciatica    Type 2 diabetes mellitus with diabetic polyneuropathy, with long-term current use of insulin (HCC)    Erectile dysfunction    Chest pain    Marge Poly Parkinson White pattern seen on electrocardiogram    Fatty liver    Neuropathy    Dorsalgia    Pure hypercholesterolemia    Pain in both feet    Chronic pain of left knee    Chronic depression    Anxiety    Polycythemia    History of COVID-19    Left upper quadrant abdominal swelling, mass and lump    Paroxysmal atrial fibrillation (HCC)    Palpitations    Tachycardia    Insomnia    Hypercoagulable state due to paroxysmal atrial fibrillation (HCC)    S/P ablation of atrial fibrillation       Admission Condition:  fair    Discharged Condition:  good    Hospital Course:   Patient admitted post ablation of PAF and WPW for observation. Patient tolerated the procedure and post procedure stay was uneventful.  Patient was stable for discharge to be followed as an out paitent    Current Discharge Medication List             Details   pantoprazole (PROTONIX) 40 MG tablet Take 1 tablet by mouth daily  Qty: 30 tablet, Refills: 0                Details   STEGLATRO 15 MG TABS TAKE 1 TABLET BY MOUTH EVERY DAY  Qty: 30 tablet, Refills: 0    Associated Diagnoses: Type 2 diabetes mellitus with diabetic polyneuropathy, with long-term current use of insulin ([facility])      sildenafil (VIAGRA) 50 MG tablet TAKE 1 TABLET BY MOUTH DAILY AS NEEDED FOR ERECTILE DYSFUNCTION  Qty: 10 tablet, Refills: 0    Associated Diagnoses: Erectile dysfunction, unspecified erectile dysfunction type      tiZANidine (ZANAFLEX) 4 MG tablet TAKE 1 TABLET BY MOUTH AT BEDTIME  Qty: 30 tablet, Refills: 0    Associated Diagnoses: Pain in both feet      dronedarone hcl (MULTAQ) 400 MG TABS Take 1 tablet by mouth 2 times daily (with meals)  Qty: 60 tablet, Refills: 0      tadalafil (CIALIS) 20 MG tablet Take 1 tablet by mouth as needed for Erectile Dysfunction  Qty: 10 tablet, Refills: 3    Associated Diagnoses: Erectile dysfunction, unspecified erectile dysfunction type      potassium chloride (KLOR-CON M) 10 MEQ extended release tablet Take 1 tablet by mouth daily OTC  Qty: 90 tablet, Refills: 1      metFORMIN (GLUCOPHAGE) 1000 MG tablet Take 1 tablet by mouth 2 times daily (with meals)  Qty: 180 tablet, Refills: 1    Associated Diagnoses: Type 2 diabetes mellitus with diabetic polyneuropathy, with long-term current use of insulin ([facility])      meloxicam (MOBIC) 7.5 MG tablet Take 1 tablet by mouth daily  Qty: 90 tablet, Refills: 1    Associated Diagnoses: Pain in both feet; Chronic pain of left knee      losartan (COZAAR) 100 MG tablet Take 1 tablet by mouth daily  Qty: 90 tablet, Refills: 1    Associated Diagnoses: Essential hypertension      hydroCHLOROthiazide (MICROZIDE) 12.5 MG capsule Take 1 capsule by mouth every morning  Qty: 90 capsule, Refills: 1    Associated Diagnoses: Essential hypertension      gabapentin (NEURONTIN) 400 MG capsule Take 1 capsule by mouth 3 times daily for 90 days.  Intended supply: 90 days  Qty: 270 capsule, Refills: 1    Associated Diagnoses: Neuropathy      escitalopram (LEXAPRO) 10 MG tablet TAKE 1 TABLET BY MOUTH EVERY DAY  Qty: 30 tablet, Refills: 3    Associated Diagnoses: Chronic depression      buPROPion (WELLBUTRIN SR) 100 MG extended release tablet Take 1 tablet by mouth 2 times daily  Qty: 60 tablet, Refills: 3    Associated Diagnoses: Attention deficit hyperactivity disorder (ADHD), unspecified ADHD type      atorvastatin (LIPITOR) 40 MG tablet Take 1 tablet by mouth daily  Qty: 90 tablet, Refills: 1      Insulin Degludec (TRESIBA FLEXTOUCH) 100 UNIT/ML SOPN Inject 45 Units into the skin nightly  Qty: 9 Adjustable Dose Pre-filled Pen Syringe, Refills: 1    Associated Diagnoses: Type 2 diabetes mellitus with diabetic polyneuropathy, with long-term current use of insulin ([facility])      dicyclomine (BENTYL) 10 MG capsule Take 1 capsule by mouth in the morning and 1 capsule at noon and 1 capsule in the evening and 1 capsule before bedtime.   Qty: 120 capsule, Refills: 3      rivaroxaban (XARELTO) 20 MG TABS tablet Take 1 tablet by mouth daily  Qty: 14 tablet, Refills: 0    Comments: 5/11/2022-Lot [id number] exp 11/23 2 bottles=14 tablets      amitriptyline (ELAVIL) 50 MG tablet Take 1 tablet by mouth nightly  Qty: 90 tablet, Refills: 1    Associated Diagnoses: Neuropathy      insulin lispro (HUMALOG KWIKPEN) 200 UNIT/ML SOPN pen TID with meals Glucose [id number] No Insulin 140-199 2 Units 200-249 4 Units 250-299 6 Units 300-349 8 Units 350-400 10 Units over 400 12 Units  Qty: 5 pen, Refills: 5    Associated Diagnoses: Type 2 diabetes mellitus with diabetic polyneuropathy, with long-term current use of insulin ([facility])      vitamin B-12 (CYANOCOBALAMIN) 1000 MCG tablet Take 1 tablet by mouth daily  Qty: 90 tablet, Refills: 1    Associated Diagnoses: Muscle cramping      Cholecalciferol (VITAMIN D3) 50 MCG (2000 UT) TABS Take one tablet by mouth once a day  Qty: 30 tablet, Refills: 3    Associated Diagnoses: Muscle cramping             Discharge Exam:  /63   Pulse 72   Temp 99.1 °F (37.3 °C) (Oral)   Resp 19   Ht 6' 2\" (1.88 m)   Wt 225 lb 12 oz (102.4 kg)   SpO2 97%   BMI 28.98 kg/m²   General appearance: alert, appears stated age, and cooperative  Head: Normocephalic, without obvious abnormality, atraumatic  Lungs: clear to auscultation bilaterally  Heart: regular rate and rhythm, S1, S2 normal, no murmur, click, rub or gallop  Extremities: extremities normal, atraumatic, no cyanosis or edema  Skin: Skin color, texture, turgor normal. No rashes or lesions.  Bilateral femoral groin sites soft, nontender, no hematoma  Neurologic: Grossly normal    Disposition:   home    Signed:  EDGAR Sahni CNP  12/14/2022, 9:04 AM

## 2022-12-14 NOTE — PLAN OF CARE
Problem: Chronic Conditions and Co-morbidities  Goal: Patient's chronic conditions and co-morbidity symptoms are monitored and maintained or improved  12/14/2022 1226 by Jose Sheppard RN  Outcome: Adequate for Discharge  12/13/2022 2329 by Danii Rosales RN  Outcome: Progressing  Flowsheets (Taken 12/13/2022 1704 by Ignacia Barnes RN)  Care Plan - Patient's Chronic Conditions and Co-Morbidity Symptoms are Monitored and Maintained or Improved:   Monitor and assess patient's chronic conditions and comorbid symptoms for stability, deterioration, or improvement   Collaborate with multidisciplinary team to address chronic and comorbid conditions and prevent exacerbation or deterioration   Update acute care plan with appropriate goals if chronic or comorbid symptoms are exacerbated and prevent overall improvement and discharge     Problem: Discharge Planning  Goal: Discharge to home or other facility with appropriate resources  12/14/2022 1226 by Jose Sheppard RN  Outcome: Adequate for Discharge  12/13/2022 2329 by Danii Rosales RN  Outcome: Progressing  Flowsheets (Taken 12/13/2022 1704 by Ignacia Barnes RN)  Discharge to home or other facility with appropriate resources:   Identify barriers to discharge with patient and caregiver   Arrange for needed discharge resources and transportation as appropriate   Identify discharge learning needs (meds, wound care, etc)   Arrange for interpreters to assist at discharge as needed   Refer to discharge planning if patient needs post-hospital services based on physician order or complex needs related to functional status, cognitive ability or social support system     Problem: Pain  Goal: Verbalizes/displays adequate comfort level or baseline comfort level  12/14/2022 1226 by Jose Sheppard RN  Outcome: Adequate for Discharge  12/13/2022 2329 by Danii Rosales RN  Outcome: Progressing  Flowsheets (Taken 12/13/2022 1700 by Ignacia Barnes RN)  Verbalizes/displays adequate comfort level or baseline comfort level:   Encourage patient to monitor pain and request assistance   Assess pain using appropriate pain scale   Administer analgesics based on type and severity of pain and evaluate response   Implement non-pharmacological measures as appropriate and evaluate response   Consider cultural and social influences on pain and pain management   Notify Licensed Independent Practitioner if interventions unsuccessful or patient reports new pain     Problem: ABCDS Injury Assessment  Goal: Absence of physical injury  12/14/2022 1226 by Madison Parra RN  Outcome: Adequate for Discharge  12/13/2022 2329 by Calvin Bloom RN  Outcome: Progressing

## 2022-12-14 NOTE — PLAN OF CARE
Problem: Chronic Conditions and Co-morbidities  Goal: Patient's chronic conditions and co-morbidity symptoms are monitored and maintained or improved  Outcome: Progressing  Flowsheets (Taken 12/13/2022 1704 by Betzaida Gant RN)  Care Plan - Patient's Chronic Conditions and Co-Morbidity Symptoms are Monitored and Maintained or Improved:   Monitor and assess patient's chronic conditions and comorbid symptoms for stability, deterioration, or improvement   Collaborate with multidisciplinary team to address chronic and comorbid conditions and prevent exacerbation or deterioration   Update acute care plan with appropriate goals if chronic or comorbid symptoms are exacerbated and prevent overall improvement and discharge     Problem: Discharge Planning  Goal: Discharge to home or other facility with appropriate resources  Outcome: Progressing  Flowsheets (Taken 12/13/2022 1704 by Betzaida Gant RN)  Discharge to home or other facility with appropriate resources:   Identify barriers to discharge with patient and caregiver   Arrange for needed discharge resources and transportation as appropriate   Identify discharge learning needs (meds, wound care, etc)   Arrange for interpreters to assist at discharge as needed   Refer to discharge planning if patient needs post-hospital services based on physician order or complex needs related to functional status, cognitive ability or social support system     Problem: Pain  Goal: Verbalizes/displays adequate comfort level or baseline comfort level  Outcome: Progressing  Flowsheets (Taken 12/13/2022 1700 by Betzaida Gant RN)  Verbalizes/displays adequate comfort level or baseline comfort level:   Encourage patient to monitor pain and request assistance   Assess pain using appropriate pain scale   Administer analgesics based on type and severity of pain and evaluate response   Implement non-pharmacological measures as appropriate and evaluate response   Consider cultural and social influences on pain and pain management   Notify Licensed Independent Practitioner if interventions unsuccessful or patient reports new pain     Problem: ABCDS Injury Assessment  Goal: Absence of physical injury  Outcome: Progressing

## 2022-12-14 NOTE — PROGRESS NOTES
Pt given d/c paperwork and all questions were answered at this time. Pt has follow up appointment scheduled at the [facility]. Pt drove himself here and wanted to drive himself home, educated pt on risks after being under anesthesia yesterday, he said he understood but still would drive himself home.

## 2022-12-15 ENCOUNTER — TELEPHONE (OUTPATIENT)
Dept: INTERNAL MEDICINE CLINIC | Age: 52
End: 2022-12-15

## 2022-12-15 NOTE — PROGRESS NOTES
Progress Note( Dr. Bia Rust)  12/14/2022  Subjective:   Admit Date: 12/13/2022  PCP: Nataliya Quintanilla MD    Admitted For :  : EP study studies/ ablation of left anterior lateral accessory pathway    Consulted For: Better control of blood glucose    Interval History: Patient is doing well blood glucose of better this morning    Denies any chest pains,   Denies SOB . Denies nausea or vomiting. No new bowel or bladder symptoms. No intake or output data in the 24 hours ending 12/14/22 2139    DATA    CBC:   Recent Labs     12/13/22  0824   HGB 14.5    CMP:  Recent Labs     12/13/22  0824   *   K 4.3   CO2 25   CREATININE 0.9     Lipids:   Lab Results   Component Value Date/Time    CHOL 206 09/12/2019 04:08 PM    HDL 56 01/20/2022 08:29 AM    TRIG 84 09/12/2019 04:08 PM     Glucose:  Recent Labs     12/13/22  1956 12/14/22  0240 12/14/22  0636   POCGLU 385* 205* 209*     [id number]:  Lab Results   Component Value Date/Time    LABA1C 10.0 12/13/2022 05:15 PM     High Sensitivity TSH:   Lab Results   Component Value Date/Time    TSHHS 2.810 05/10/2022 07:44 PM     Free T3:   Lab Results   Component Value Date/Time    FT3 3.6 12/28/2011 08:55 AM     Free T4:  Lab Results   Component Value Date/Time    T4FREE 1.04 05/10/2022 07:44 PM       XR CHEST PORTABLE   Final Result   No acute airspace disease identified.               Scheduled Medicines   Medications:    Infusions:       Objective:   Vitals: /65   Pulse 70   Temp 98.7 °F (37.1 °C) (Oral)   Resp 18   Ht 6' 2\" (1.88 m)   Wt 225 lb 12 oz (102.4 kg)   SpO2 98%   BMI 28.98 kg/m²   General appearance: alert and cooperative with exam  Neck: no JVD or bruit  Thyroid : Normal lobes   Lungs: Has Vesicular Breath sounds   Heart:  regular rate and rhythm  Abdomen: soft, non-tender; bowel sounds normal; no masses,  no organomegaly  Musculoskeletal: Normal  Extremities: extremities normal, , no edema  Neurologic:  Awake, alert, oriented to name, place and time.  Cranial nerves II-XII are grossly intact. Motor is  intact. Sensory is intact. ,  and gait is normal.    Assessment:     Patient Active Problem List:     Ventral hernia without obstruction or gangrene     Essential hypertension     Gastroesophageal reflux disease without esophagitis     Dupuytren's contracture     Chronic skin ulcer, limited to breakdown of skin (HCC)     Chronic bilateral low back pain without sciatica     Type 2 diabetes mellitus with diabetic polyneuropathy, with long-term current use of insulin (HCC)     Erectile dysfunction     Chest pain     Hermon Lennox Parkinson White pattern seen on electrocardiogram     Fatty liver     Neuropathy     Dorsalgia     Pure hypercholesterolemia     Pain in both feet     Chronic pain of left knee     Chronic depression     Anxiety     Polycythemia     History of COVID-19     Left upper quadrant abdominal swelling, mass and lump     Paroxysmal atrial fibrillation (HCC)     Palpitations     Tachycardia     Insomnia     Hypercoagulable state due to paroxysmal atrial fibrillation (HCC)     S/P ablation of atrial fibrillation      Plan:     Reviewed POC blood glucose . Labs and X ray results   Reviewed Current Medicines   On  Correction bolus Humalog/ Basal Lantus Insulin regime Monitor Blood glucose frequently   Modified  the dose of Insulin/ other medicines as needed   Will follow     .      Surya Henriquez MD, MD

## 2022-12-15 NOTE — TELEPHONE ENCOUNTER
Care Transitions Initial Follow Up Call    Outreach made within 2 business days of discharge: Yes    Patient: Demetris Hampton Patient : 1970   MRN: [id number]  Reason for Admission: There are no discharge diagnoses documented for the most recent discharge. Discharge Date: 22       Spoke with: Fabio Apodaca    Discharge department/facility: [facility]    TCM Interactive Patient Contact:  Was patient able to fill all prescriptions: Yes  Was patient instructed to bring all medications to the follow-up visit: Yes  Is patient taking all medications as directed in the discharge summary?  Yes  Does patient understand their discharge instructions: Yes  Does patient have questions or concerns that need addressed prior to 7-14 day follow up office visit: no    Scheduled appointment with PCP within 7-14 days    Follow Up  Future Appointments   Date Time Provider Izaiah Louis   2022  1:30 PM Tamra Stover MD [facility] Heart [facility]   2023  2:40 PM MD Baljnider Baker Riedel NOR [facility]   3/29/2023  1:00 PM EDGAR Vega - C[facility]   2023  8:45 AM CAROLYNN, MED ONC NURSE [address] [facility] MED ONC [place]   2023  9:00 AM aMxine Toledo MD [facility]       Cherelle Granda MA

## 2022-12-16 LAB
EKG ATRIAL RATE: 68 BPM
EKG DIAGNOSIS: NORMAL
EKG P AXIS: 66 DEGREES
EKG P-R INTERVAL: 150 MS
EKG Q-T INTERVAL: 446 MS
EKG QRS DURATION: 88 MS
EKG QTC CALCULATION (BAZETT): 474 MS
EKG R AXIS: 56 DEGREES
EKG T AXIS: 85 DEGREES
EKG VENTRICULAR RATE: 68 BPM

## 2022-12-19 DIAGNOSIS — J20.9 ACUTE BRONCHITIS, UNSPECIFIED ORGANISM: ICD-10-CM

## 2022-12-21 ENCOUNTER — NURSE ONLY (OUTPATIENT)
Dept: CARDIOLOGY CLINIC | Age: 52
End: 2022-12-21

## 2022-12-21 ENCOUNTER — PROCEDURE VISIT (OUTPATIENT)
Dept: CARDIOLOGY CLINIC | Age: 52
End: 2022-12-21
Payer: COMMERCIAL

## 2022-12-21 ENCOUNTER — OFFICE VISIT (OUTPATIENT)
Dept: CARDIOLOGY CLINIC | Age: 52
End: 2022-12-21
Payer: COMMERCIAL

## 2022-12-21 VITALS
DIASTOLIC BLOOD PRESSURE: 80 MMHG | HEART RATE: 80 BPM | HEIGHT: 74 IN | SYSTOLIC BLOOD PRESSURE: 130 MMHG | BODY MASS INDEX: 27.72 KG/M2 | WEIGHT: 216 LBS

## 2022-12-21 DIAGNOSIS — Z86.79 S/P ABLATION OF ATRIAL FIBRILLATION: ICD-10-CM

## 2022-12-21 DIAGNOSIS — Z98.890 S/P ABLATION OF ATRIAL FIBRILLATION: ICD-10-CM

## 2022-12-21 DIAGNOSIS — E78.00 PURE HYPERCHOLESTEROLEMIA: ICD-10-CM

## 2022-12-21 DIAGNOSIS — I10 ESSENTIAL HYPERTENSION: ICD-10-CM

## 2022-12-21 DIAGNOSIS — D68.69 HYPERCOAGULABLE STATE DUE TO PAROXYSMAL ATRIAL FIBRILLATION (HCC): ICD-10-CM

## 2022-12-21 DIAGNOSIS — E11.42 TYPE 2 DIABETES MELLITUS WITH DIABETIC POLYNEUROPATHY, WITH LONG-TERM CURRENT USE OF INSULIN (HCC): ICD-10-CM

## 2022-12-21 DIAGNOSIS — Z79.4 TYPE 2 DIABETES MELLITUS WITH DIABETIC POLYNEUROPATHY, WITH LONG-TERM CURRENT USE OF INSULIN (HCC): ICD-10-CM

## 2022-12-21 DIAGNOSIS — I45.6 WOLFF PARKINSON WHITE PATTERN SEEN ON ELECTROCARDIOGRAM: ICD-10-CM

## 2022-12-21 DIAGNOSIS — I48.0 PAROXYSMAL ATRIAL FIBRILLATION (HCC): Primary | ICD-10-CM

## 2022-12-21 DIAGNOSIS — I48.0 HYPERCOAGULABLE STATE DUE TO PAROXYSMAL ATRIAL FIBRILLATION (HCC): ICD-10-CM

## 2022-12-21 DIAGNOSIS — D75.1 POLYCYTHEMIA: ICD-10-CM

## 2022-12-21 DIAGNOSIS — I48.0 PAROXYSMAL ATRIAL FIBRILLATION (HCC): ICD-10-CM

## 2022-12-21 PROCEDURE — 93000 ELECTROCARDIOGRAM COMPLETE: CPT | Performed by: INTERNAL MEDICINE

## 2022-12-21 PROCEDURE — 3074F SYST BP LT 130 MM HG: CPT | Performed by: INTERNAL MEDICINE

## 2022-12-21 PROCEDURE — 3078F DIAST BP <80 MM HG: CPT | Performed by: INTERNAL MEDICINE

## 2022-12-21 PROCEDURE — 93308 TTE F-UP OR LMTD: CPT | Performed by: INTERNAL MEDICINE

## 2022-12-21 PROCEDURE — 3046F HEMOGLOBIN A1C LEVEL >9.0%: CPT | Performed by: INTERNAL MEDICINE

## 2022-12-21 PROCEDURE — 99214 OFFICE O/P EST MOD 30 MIN: CPT | Performed by: INTERNAL MEDICINE

## 2022-12-21 NOTE — LETTER
[address]  [address] [address]  Phone: [phone]  Fax: [phone]    Jeannie Salas MD    December 21, 2022     Jose A Anderson MD  [facility] [phone] [id number]    Patient: Leora Crigler   MR Number: [id number]   YOB: 1970   Date of Visit: 12/21/2022       Dear Jose A Anderson:    Thank you for referring Carlos Castellano to me for evaluation/treatment. Below are the relevant portions of my assessment and plan of care. If you have questions, please do not hesitate to call me. I look forward to following Wellington Hdz along with you.     Sincerely,      Jeannie Salas MD

## 2022-12-21 NOTE — PATIENT INSTRUCTIONS
CORONARY ARTERY DISEASE:None known. Stress test 7/13/2022   Excellent exercise capacity. 11 METs work load. Mildly Hypertensive BP response to exercise. ETT non diagnostic as THR is not achieved. No Exercise induced, clinically significant arrhythmias. EKG: NSR 80/min WNL     HYPERTENSION:for more than 10 years. Controlled on current management. Takes HCTZ & Cozaar. Counseled regarding low salt diet, exercise & weight control. May have to add meds if remains high      VALVULAR HEART DISEASE:no              No significant VHD noted  Echo: 9/2020    Left ventricular systolic function is normal.   Ejection fraction is visually estimated at 50-55%. Small left ventricular cavity. No significant valvular disease noted. No evidence of any pericardial effusion. DYSLIPIDEMIA: yes, Good profile on current medication. Takes Lipitor  Patient's profile is at / near [employer],   HDL is  High  Tolerating current medical regimen wellyes. Takes Lipitor  See most recent Lab values:( Reviewed Labs from family MD, Dr. Vincent Stovall )  LDL is 86  HDL is 56  Diabetes mellitis:yes, For > 10 years, still not well control, but says A1c is down from 15 to 8.3. ARRHYTHMIAS:yes, WPW with PAF. s/p Ablation with [facility]. Still C/O exertional SOB,Palpitations CHEST HEAVINESS. His CHADS score is high. He is on Xarelto & Multaq. TESTS ORDERED:Echo & 72 hour Holter     PREVIOUSLY ORDERED TESTS REVIEWED & DISCUSSED WITH THE PATIENT:     I personally reviewed & interpreted, all previously ordered tests as copied above. Latest Labs are pulled in to the note with dates. Labs, specially in Reference to Lipid profile, Cardiac testing in the form of Echo ( dated: ), stress tests ( dated: ) & other relevant cardiac testing reviewed with patient & recommendations made based on assessment of the results.     Discussed role of Cardiac risk factors & effects + treatment of co morbidities with patient & advised accordingly. MEDICATIONS: List of medications patient is currently taking is reviewed in detail with the patient. Discussed any side effects or problems taking the medication. Recommend Continue present management & medications as listed. AFFIRMATION: I  reviewed patient's history, previous & current medical problems & all Labs + testing. This includes chart prep even prior to the vosit. Various goals are discussed and multiple questions answered. Relevant concelling performed. Office follow up in one month.

## 2022-12-21 NOTE — PROGRESS NOTES
OFFICE PROGRESS NOTES      Terry Arceo is a 54 y.o. male who has    CHIEF COMPLAINT AS FOLLOWS:  CHEST PAIN:  C/O chest heaviness with exertion. SOB: Has SOB with exertion but no change over previous noted. LEG EDEMA: No leg edema   PALPITATIONS:  C/O heart racing with exertion. DIZZINESS: C/O Dizziness with above symptoms. SYNCOPE: None   OTHER/ ADDITIONAL COMPLAINTS:                                     HPI: Patient is here for F/U on his WPW / PAF- Arrhythmia, HTN & Dyslipidemia problems. Arrhythmia: Patient has known  h/o WPW/PAF arrhythmia in the past. S/P Ablation of both. HTN: Patient has known essential HTN. Has been treated with guideline recommended medical / physical/ diet therapy as stated below. Dyslipidemia: Patient has known mixed dyslipidemia. Has been treated with guideline recommended medical / physical/ diet therapy as stated below.                 Current Outpatient Medications   Medication Sig Dispense Refill    MUCINEX 600 MG extended release tablet Take 1 tablet by mouth in the morning and 1 tablet before bedtime FOR 15 DAYS 30 tablet 0    pantoprazole (PROTONIX) 40 MG tablet Take 1 tablet by mouth daily 30 tablet 0    STEGLATRO 15 MG TABS TAKE 1 TABLET BY MOUTH EVERY DAY 30 tablet 0    sildenafil (VIAGRA) 50 MG tablet TAKE 1 TABLET BY MOUTH DAILY AS NEEDED FOR ERECTILE DYSFUNCTION 10 tablet 0    tiZANidine (ZANAFLEX) 4 MG tablet TAKE 1 TABLET BY MOUTH AT BEDTIME 30 tablet 0    dronedarone hcl (MULTAQ) 400 MG TABS Take 1 tablet by mouth 2 times daily (with meals) 60 tablet 0    tadalafil (CIALIS) 20 MG tablet Take 1 tablet by mouth as needed for Erectile Dysfunction 10 tablet 3    potassium chloride (KLOR-CON M) 10 MEQ extended release tablet Take 1 tablet by mouth daily OTC 90 tablet 1    metFORMIN (GLUCOPHAGE) 1000 MG tablet Take 1 tablet by mouth 2 times daily (with meals) 180 tablet 1    meloxicam (MOBIC) 7.5 MG tablet Take 1 tablet by mouth daily 90 tablet 1    losartan (COZAAR) 100 MG tablet Take 1 tablet by mouth daily 90 tablet 1    hydroCHLOROthiazide (MICROZIDE) 12.5 MG capsule Take 1 capsule by mouth every morning 90 capsule 1    gabapentin (NEURONTIN) 400 MG capsule Take 1 capsule by mouth 3 times daily for 90 days. Intended supply: 90 days 270 capsule 1    escitalopram (LEXAPRO) 10 MG tablet TAKE 1 TABLET BY MOUTH EVERY DAY 30 tablet 3    buPROPion (WELLBUTRIN SR) 100 MG extended release tablet Take 1 tablet by mouth 2 times daily 60 tablet 3    atorvastatin (LIPITOR) 40 MG tablet Take 1 tablet by mouth daily 90 tablet 1    Insulin Degludec (TRESIBA FLEXTOUCH) 100 UNIT/ML SOPN Inject 45 Units into the skin nightly 9 Adjustable Dose Pre-filled Pen Syringe 1    dicyclomine (BENTYL) 10 MG capsule Take 1 capsule by mouth in the morning and 1 capsule at noon and 1 capsule in the evening and 1 capsule before bedtime. 120 capsule 3    rivaroxaban (XARELTO) 20 MG TABS tablet Take 1 tablet by mouth daily 14 tablet 0    amitriptyline (ELAVIL) 50 MG tablet Take 1 tablet by mouth nightly 90 tablet 1    insulin lispro (HUMALOG KWIKPEN) 200 UNIT/ML SOPN pen TID with meals Glucose [id number] No Insulin 140-199 2 Units 200-249 4 Units 250-299 6 Units 300-349 8 Units 350-400 10 Units over 400 12 Units 5 pen 5    vitamin B-12 (CYANOCOBALAMIN) 1000 MCG tablet Take 1 tablet by mouth daily 90 tablet 1    Cholecalciferol (VITAMIN D3) 50 MCG (2000 UT) TABS Take one tablet by mouth once a day (Patient taking differently: Take one tablet by mouth once a day OTC) 30 tablet 3     No current facility-administered medications for this visit.      Allergies: Bee venom, Hornet venom, and Tramadol  Review of Systems:    Constitutional: Negative for diaphoresis and fatigue  Respiratory: Negative for shortness of breath  Cardiovascular: Negative for chest pain, dyspnea on exertion, claudication, edema, irregular heartbeat, murmur, palpitations or shortness of breath  Musculoskeletal: Negative for muscle pain, muscular weakness, negative for pain in arm and leg or swelling in foot and leg    Objective:  /80   Pulse 80   Ht 6' 2\" (1.88 m)   Wt 216 lb (98 kg)   BMI 27.73 kg/m²   Wt Readings from Last 3 Encounters:   12/21/22 216 lb (98 kg)   12/13/22 225 lb 12 oz (102.4 kg)   11/04/22 219 lb 9.6 oz (99.6 kg)     Body mass index is 27.73 kg/m². GENERAL - Alert, oriented, pleasant, in no apparent distress. EYES: No jaundice, no conjunctival pallor. Neck - Supple. No jugular venous distention noted. No carotid bruits. Cardiovascular - Normal S1 and S2 without obvious murmur or gallop. Extremities - No cyanosis, clubbing, or significant edema. Pulmonary - No respiratory distress. No wheezes or rales.       MEDICAL DECISION MAKING & DATA REVIEW:    Lab Review   Lab Results   Component Value Date/Time    TROPONINT <0.010 09/04/2020 02:00 PM    TROPONINT <0.010 09/04/2020 10:55 AM     Lab Results   Component Value Date/Time    PROBNP 15.33 09/04/2020 10:55 AM    PROBNP 93.09 08/14/2017 01:50 PM     Lab Results   Component Value Date    INR 0.85 12/07/2022    INR 0.88 05/04/2022     Lab Results   Component Value Date    LABA1C 10.0 (H) 12/13/2022    LABA1C 7.7 04/27/2022     Lab Results   Component Value Date    WBC 3.6 (L) 12/07/2022    WBC 4.5 11/04/2022    HCT 43.0 12/13/2022    HCT 51.3 12/07/2022    MCV 87.2 12/07/2022    MCV 84.9 11/04/2022     12/07/2022     11/04/2022     Lab Results   Component Value Date    CHOL 206 (H) 09/12/2019    CHOL 171 12/28/2011    TRIG 84 09/12/2019    TRIG 73 12/28/2011    HDL 56 01/20/2022    HDL 63 (H) 07/21/2021    LDLCALC 86 01/20/2022    LDLCALC 75 07/21/2021    LDLDIRECT 126 (H) 12/28/2011     Lab Results   Component Value Date    ALT 39 08/10/2022    ALT 47 (H) 08/04/2022    AST 24 08/10/2022    AST 32 08/04/2022     BMP:    Lab Results   Component Value Date/Time     12/13/2022 08:24 AM     12/07/2022 11:42 AM    K 4.3 12/13/2022 08:24 AM    K 4.6 12/07/2022 11:42 AM    CL 99 12/07/2022 11:42 AM     08/10/2022 01:49 PM    CO2 25 12/13/2022 08:24 AM    CO2 22 12/07/2022 11:42 AM    BUN 23 12/07/2022 11:42 AM    BUN 27 08/10/2022 01:49 PM    CREATININE 0.9 12/13/2022 08:24 AM    CREATININE 0.8 12/07/2022 11:42 AM    CREATININE 0.8 08/10/2022 01:49 PM     CMP:   Lab Results   Component Value Date/Time     12/13/2022 08:24 AM     12/07/2022 11:42 AM    K 4.3 12/13/2022 08:24 AM    K 4.6 12/07/2022 11:42 AM    CL 99 12/07/2022 11:42 AM     08/10/2022 01:49 PM    CO2 25 12/13/2022 08:24 AM    CO2 22 12/07/2022 11:42 AM    BUN 23 12/07/2022 11:42 AM    BUN 27 08/10/2022 01:49 PM    CREATININE 0.9 12/13/2022 08:24 AM    CREATININE 0.8 12/07/2022 11:42 AM    CREATININE 0.8 08/10/2022 01:49 PM    PROT 7.1 08/10/2022 01:49 PM    PROT 7.2 08/04/2022 08:58 PM    PROT 6.7 12/28/2011 08:55 AM     Lab Results   Component Value Date/Time    TSH 2.22 09/12/2019 04:08 PM    TSHHS 2.810 05/10/2022 07:44 PM    TSHHS 1.534 12/28/2011 08:55 AM       QUALITY MEASURES REVIEWED:  1.CAD:Patient is taking anti platelet agent:No  Patient does not have Hx of documented CAD  2. DYSLIPIDEMIA: Patient is on cholesterol lowering medication:Yes  3. Beta-Blocker therapy for CAD, if prior Myocardial Infarction:No  4. Counselled regarding smoking cessation. No   Patient does not Smoke. 5.Anticoagulation therapy (for A.Fib) Yes   6. Discussed weight management strategies. 1. Sp Ablation of  PAF- pulmonary vein isolation  2. S/p ablation of left anterolateral accessory pathway  3. Hypercoagulable due to PAF    12/13/2022   This is a limited echocardiogram.   Left ventricular systolic function is normal.   Ejection fraction is visually estimated at 55-60%. No evidence of any pericardial effusion.     Assessment & Plan:  Primary / Secondary prevention is the goal by aggressive risk modification, healthy and therapeutic life style changes for cardiovascular risk reduction. CORONARY ARTERY DISEASE:None known. Stress test 7/13/2022   Excellent exercise capacity. 11 METs work load. Mildly Hypertensive BP response to exercise. ETT non diagnostic as THR is not achieved. No Exercise induced, clinically significant arrhythmias. EKG: NSR 80/min WNL     HYPERTENSION:for more than 10 years. Controlled on current management. Takes HCTZ & Cozaar. Counseled regarding low salt diet, exercise & weight control. May have to add meds if remains high      VALVULAR HEART DISEASE:no              No significant VHD noted  Echo: 9/2020    Left ventricular systolic function is normal.   Ejection fraction is visually estimated at 50-55%. Small left ventricular cavity. No significant valvular disease noted. No evidence of any pericardial effusion. DYSLIPIDEMIA: yes, Good profile on current medication. Takes Lipitor  Patient's profile is at / near [employer],   HDL is  High  Tolerating current medical regimen wellyes. Takes Lipitor  See most recent Lab values:( Reviewed Labs from family MD, Dr. Duc Leon )  LDL is 86  HDL is 56  Diabetes mellitis:yes, For > 10 years, still not well control, but says A1c is down from 15 to 8.3. ARRHYTHMIAS:yes, WPW with PAF. s/p Ablation with [employer]. Still C/O exertional SOB,Palpitations CHEST HEAVINESS. His CHADS score is high. He is on Xarelto & Multaq. TESTS ORDERED:Echo & 72 hour Holter     PREVIOUSLY ORDERED TESTS REVIEWED & DISCUSSED WITH THE PATIENT:     I personally reviewed & interpreted, all previously ordered tests as copied above. Latest Labs are pulled in to the note with dates. Labs, specially in Reference to Lipid profile, Cardiac testing in the form of Echo ( dated: ), stress tests ( dated: ) & other relevant cardiac testing reviewed with patient & recommendations made based on assessment of the results.     Discussed role of Cardiac risk factors & effects + treatment of co morbidities with patient & advised accordingly. MEDICATIONS: List of medications patient is currently taking is reviewed in detail with the patient. Discussed any side effects or problems taking the medication. Recommend Continue present management & medications as listed. AFFIRMATION: I  reviewed patient's history, previous & current medical problems & all Labs + testing. This includes chart prep even prior to the vosit. Various goals are discussed and multiple questions answered. Relevant concelling performed. Office follow up in one month.

## 2022-12-21 NOTE — PROGRESS NOTES
Atrial Fibrillation CHADSVASC2 Score Stroke Risk:   54 y.o. <65 - 0    male Male - 0   CHF HX: No - 0   HTN HX: Yes - 1   Stroke/TIA/Thromboembolism No - 0   Vascular Disease HX: No - 0   Diabetes Mellitus Yes - 1   CHADSVASC 2 Score 2      Annual Stroke Risk 2.2%- moderate-high

## 2022-12-22 ENCOUNTER — TELEPHONE (OUTPATIENT)
Dept: CARDIOLOGY CLINIC | Age: 52
End: 2022-12-22

## 2022-12-22 NOTE — TELEPHONE ENCOUNTER
Echo done on 12/21/2022: This is a limited echo to assess EF and RVSP. Left ventricular function is normal, EF is estimated at 55-60%. Mild tricuspid regurgitation. RVSP of 24mmHg. No evidence of any pericardial effusion. When this echo is compared with the echo from 12/13/2022, no significant   interval changes have occurred. Patient was given echo results over the phone, reminding patient of results follow up with Dr. Galindo Shall coming up for echo and holter monitor results, as well as to give the office a call back if patient had anymore questions. Patient understood results.

## 2022-12-28 ENCOUNTER — OFFICE VISIT (OUTPATIENT)
Dept: INTERNAL MEDICINE CLINIC | Age: 52
End: 2022-12-28

## 2022-12-28 VITALS
WEIGHT: 216 LBS | HEART RATE: 86 BPM | OXYGEN SATURATION: 97 % | DIASTOLIC BLOOD PRESSURE: 87 MMHG | HEIGHT: 74 IN | BODY MASS INDEX: 27.72 KG/M2 | SYSTOLIC BLOOD PRESSURE: 129 MMHG

## 2022-12-28 DIAGNOSIS — Z09 HOSPITAL DISCHARGE FOLLOW-UP: ICD-10-CM

## 2022-12-28 DIAGNOSIS — K21.9 GASTROESOPHAGEAL REFLUX DISEASE WITHOUT ESOPHAGITIS: ICD-10-CM

## 2022-12-28 DIAGNOSIS — G89.29 CHRONIC PAIN OF LEFT KNEE: ICD-10-CM

## 2022-12-28 DIAGNOSIS — M79.672 PAIN IN BOTH FEET: ICD-10-CM

## 2022-12-28 DIAGNOSIS — E11.42 TYPE 2 DIABETES MELLITUS WITH DIABETIC POLYNEUROPATHY, WITH LONG-TERM CURRENT USE OF INSULIN (HCC): ICD-10-CM

## 2022-12-28 DIAGNOSIS — F32.A CHRONIC DEPRESSION: ICD-10-CM

## 2022-12-28 DIAGNOSIS — Z79.4 TYPE 2 DIABETES MELLITUS WITH DIABETIC POLYNEUROPATHY, WITH LONG-TERM CURRENT USE OF INSULIN (HCC): ICD-10-CM

## 2022-12-28 DIAGNOSIS — E78.00 PURE HYPERCHOLESTEROLEMIA: ICD-10-CM

## 2022-12-28 DIAGNOSIS — F41.9 ANXIETY: ICD-10-CM

## 2022-12-28 DIAGNOSIS — N52.9 ERECTILE DYSFUNCTION, UNSPECIFIED ERECTILE DYSFUNCTION TYPE: ICD-10-CM

## 2022-12-28 DIAGNOSIS — G62.9 NEUROPATHY: ICD-10-CM

## 2022-12-28 DIAGNOSIS — I10 ESSENTIAL HYPERTENSION: ICD-10-CM

## 2022-12-28 DIAGNOSIS — M25.562 CHRONIC PAIN OF LEFT KNEE: ICD-10-CM

## 2022-12-28 DIAGNOSIS — Z86.16 HISTORY OF COVID-19: ICD-10-CM

## 2022-12-28 DIAGNOSIS — M79.671 PAIN IN BOTH FEET: ICD-10-CM

## 2022-12-28 DIAGNOSIS — I45.6 WOLFF PARKINSON WHITE PATTERN SEEN ON ELECTROCARDIOGRAM: Primary | ICD-10-CM

## 2022-12-28 DIAGNOSIS — I48.0 PAROXYSMAL ATRIAL FIBRILLATION (HCC): ICD-10-CM

## 2022-12-28 LAB
CHP ED QC CHECK: NORMAL
GLUCOSE BLD-MCNC: 310 MG/DL

## 2022-12-28 NOTE — PROGRESS NOTES
Post-Discharge Transitional Care  Follow Up      Sherlyn Mccord   YOB: 1970    Date of Office Visit:  12/28/2022  Date of Hospital Admission: 12/13/22  Date of Hospital Discharge: 12/14/22  Risk of hospital readmission (high >=14%. Medium >=10%) :No data recorded    Care management risk score Rising risk (score 2-5) and Complex Care (Scores >=6): No Risk Score On File     Non face to face  following discharge, date last encounter closed (first attempt may have been earlier): 12/15/2022    Call initiated 2 business days of discharge: Yes    ASSESSMENT/PLAN:   Jesus Moots White pattern seen on electrocardiogram  Paroxysmal atrial fibrillation [facility])  He had these procedures on 12/13/2022.  1. Sp Ablation of  PAF- pulmonary vein isolation  2. S/p ablation of left anterolateral accessory pathway  3. Hypercoagulable due to PAF  Patient claimed that he continues to have palpitation and occasional dizziness. He will be seen Liu Schaefer and Dr. Rosa Mujica soon for follow-up  Patient on Xarelto and Multaq    Essential hypertension  Continue current medications, denies side effect with medicationss. Low salt diet and exercise advised. Continue losartan and hydrochlorothiazide      Pure hypercholesterolemia  Patient is taking cholesterol medications regularly. Denies any side effects. Diet and exercise advised. Continue Lipitor      Gastroesophageal reflux disease without esophagitis  Patient on Protonix    Chronic depression  Continue Lexapro and Wellbutrin    Anxiety  Continue Lexapro and Wellbutrin    Erectile dysfunction, unspecified erectile dysfunction type  Patient on Cialis as needed    History of COVID-19  Improved. No residual effects. Pain in both feet  Continue Mobic and Neurontin and Tylenol as needed.     Chronic pain of left knee  Continue Mobic and Tylenol as needed    Type 2 diabetes mellitus with diabetic polyneuropathy, with long-term current use of insulin (HCC)  Advised to increase Tresiba to 50 units at at bedtime. Continue Humalog per sliding scale. Continue Glucophage and Steglatro. Advised to continue to follow with his endocrinologist..  Advised low sugar diet and exercise. -     POCT Glucose    Neuropathy  Continue Neurontin. Hospital discharge follow-up  -     OR DISCHARGE MEDS RECONCILED W/ CURRENT OUTPATIENT MED LIST      Medical Decision Making: high complexity  Return in 2 months (on 2/28/2023). On this date 12/28/2022 I have spent 35 minutes reviewing previous notes, test results and face to face with the patient discussing the diagnosis and importance of compliance with the treatment plan as well as documenting on the day of the visit. Subjective:   HPI:  Follow up of Hospital problems/diagnosis(es):   Patient was recently admitted to the hospital from 12/13/2020 due to 12/14/2022. During hospitalization he had :  1. Sp Ablation of  PAF- pulmonary vein isolation  2. S/p ablation of left anterolateral accessory pathway  3. Hypercoagulable due to PAF    Patient claimed that he continues to have occasional palpitations. Denies any chest pain. Still continues to have migraine headaches but rare. He is taking Fioricet as needed. Appetite is good. Bowels are moving okay. Gastritis symptoms are better. No urinary symptoms. His sugars are running high recently. He admits he is not very compliant with the diet. He is taking his medications regularly including insulins. He is being followed by his endocrinologist, Dr. Renetta Moya. He has been vaccinated for COVID-19 including 1 booster dose on February 2022. Labs from 12/13/2022 were reviewed and explained to the patient. Patient's hemoglobin A1c was 10.0 on 12/13/2022 suggesting poor control of diabetes. Inpatient course: Discharge summary reviewed- see chart.     Interval history/Current status: As mentioned above    Patient Active Problem List   Diagnosis    Ventral hernia without obstruction or gangrene Essential hypertension    Gastroesophageal reflux disease without esophagitis    Dupuytren's contracture    Chronic skin ulcer, limited to breakdown of skin (HCC)    Chronic bilateral low back pain without sciatica    Type 2 diabetes mellitus with diabetic polyneuropathy, with long-term current use of insulin (HCC)    Erectile dysfunction    Chest pain    Rommel Jessica Parkinson White pattern seen on electrocardiogram    Fatty liver    Neuropathy    Dorsalgia    Pure hypercholesterolemia    Pain in both feet    Chronic pain of left knee    Chronic depression    Anxiety    Polycythemia    History of COVID-19    Left upper quadrant abdominal swelling, mass and lump    Paroxysmal atrial fibrillation ([facility])    Palpitations    Tachycardia    Insomnia    Hypercoagulable state due to paroxysmal atrial fibrillation (HCC)    S/P ablation of atrial fibrillation       Medications listed as ordered at the time of discharge from hospital     Medication List            Accurate as of December 28, 2022  8:21 PM. If you have any questions, ask your nurse or doctor. CHANGE how you take these medications      Vitamin D3 50 MCG (2000 UT) Tabs  Take one tablet by mouth once a day  What changed: additional instructions            CONTINUE taking these medications      amitriptyline 50 MG tablet  Commonly known as: ELAVIL  Take 1 tablet by mouth nightly     atorvastatin 40 MG tablet  Commonly known as: LIPITOR  Take 1 tablet by mouth daily     buPROPion 100 MG extended release tablet  Commonly known as: Wellbutrin SR  Take 1 tablet by mouth 2 times daily     dicyclomine 10 MG capsule  Commonly known as: Bentyl  Take 1 capsule by mouth in the morning and 1 capsule at noon and 1 capsule in the evening and 1 capsule before bedtime.      dronedarone hcl 400 MG Tabs  Commonly known as: MULTAQ  Take 1 tablet by mouth 2 times daily (with meals)     escitalopram 10 MG tablet  Commonly known as: LEXAPRO  TAKE 1 TABLET BY MOUTH EVERY DAY gabapentin 400 MG capsule  Commonly known as: NEURONTIN  Take 1 capsule by mouth 3 times daily for 90 days.  Intended supply: 90 days     HumaLOG KwikPen 200 UNIT/ML Sopn pen  Generic drug: insulin lispro  TID with meals Glucose [id number] No Insulin 140-199 2 Units 200-249 4 Units 250-299 6 Units 300-349 8 Units 350-400 10 Units over 400 12 Units     hydroCHLOROthiazide 12.5 MG capsule  Commonly known as: MICROZIDE  Take 1 capsule by mouth every morning     losartan 100 MG tablet  Commonly known as: COZAAR  Take 1 tablet by mouth daily     meloxicam 7.5 MG tablet  Commonly known as: Mobic  Take 1 tablet by mouth daily     metFORMIN 1000 MG tablet  Commonly known as: GLUCOPHAGE  Take 1 tablet by mouth 2 times daily (with meals)     Mucinex 600 MG extended release tablet  Generic drug: guaiFENesin  Take 1 tablet by mouth in the morning and 1 tablet before bedtime FOR 15 DAYS     pantoprazole 40 MG tablet  Commonly known as: PROTONIX  Take 1 tablet by mouth daily     potassium chloride 10 MEQ extended release tablet  Commonly known as: KLOR-CON M  Take 1 tablet by mouth daily OTC     rivaroxaban 20 MG Tabs tablet  Commonly known as: XARELTO  Take 1 tablet by mouth daily     sildenafil 50 MG tablet  Commonly known as: VIAGRA  TAKE 1 TABLET BY MOUTH DAILY AS NEEDED FOR ERECTILE DYSFUNCTION     Steglatro 15 MG Tabs  Generic drug: Ertugliflozin L-PyroglutamicAc  TAKE 1 TABLET BY MOUTH EVERY DAY     tadalafil 20 MG tablet  Commonly known as: Cialis  Take 1 tablet by mouth as needed for Erectile Dysfunction     tiZANidine 4 MG tablet  Commonly known as: ZANAFLEX  TAKE 1 TABLET BY MOUTH AT BEDTIME     Tresiba FlexTouch 100 UNIT/ML Sopn  Generic drug: Insulin Degludec  Inject 45 Units into the skin nightly     vitamin B-12 1000 MCG tablet  Commonly known as: CYANOCOBALAMIN  Take 1 tablet by mouth daily                Medications marked \"taking\" at this time  Outpatient Medications Marked as Taking for the 12/28/22 encounter (Office Visit) with Cortney Buchanan MD   Medication Sig Dispense Refill    MUCINEX 600 MG extended release tablet Take 1 tablet by mouth in the morning and 1 tablet before bedtime FOR 15 DAYS 30 tablet 0    pantoprazole (PROTONIX) 40 MG tablet Take 1 tablet by mouth daily 30 tablet 0    STEGLATRO 15 MG TABS TAKE 1 TABLET BY MOUTH EVERY DAY 30 tablet 0    tiZANidine (ZANAFLEX) 4 MG tablet TAKE 1 TABLET BY MOUTH AT BEDTIME 30 tablet 0    dronedarone hcl (MULTAQ) 400 MG TABS Take 1 tablet by mouth 2 times daily (with meals) 60 tablet 0    potassium chloride (KLOR-CON M) 10 MEQ extended release tablet Take 1 tablet by mouth daily OTC 90 tablet 1    metFORMIN (GLUCOPHAGE) 1000 MG tablet Take 1 tablet by mouth 2 times daily (with meals) 180 tablet 1    meloxicam (MOBIC) 7.5 MG tablet Take 1 tablet by mouth daily 90 tablet 1    losartan (COZAAR) 100 MG tablet Take 1 tablet by mouth daily 90 tablet 1    hydroCHLOROthiazide (MICROZIDE) 12.5 MG capsule Take 1 capsule by mouth every morning 90 capsule 1    gabapentin (NEURONTIN) 400 MG capsule Take 1 capsule by mouth 3 times daily for 90 days. Intended supply: 90 days 270 capsule 1    escitalopram (LEXAPRO) 10 MG tablet TAKE 1 TABLET BY MOUTH EVERY DAY 30 tablet 3    buPROPion (WELLBUTRIN SR) 100 MG extended release tablet Take 1 tablet by mouth 2 times daily 60 tablet 3    atorvastatin (LIPITOR) 40 MG tablet Take 1 tablet by mouth daily 90 tablet 1    Insulin Degludec (TRESIBA FLEXTOUCH) 100 UNIT/ML SOPN Inject 45 Units into the skin nightly 9 Adjustable Dose Pre-filled Pen Syringe 1    dicyclomine (BENTYL) 10 MG capsule Take 1 capsule by mouth in the morning and 1 capsule at noon and 1 capsule in the evening and 1 capsule before bedtime.  120 capsule 3    rivaroxaban (XARELTO) 20 MG TABS tablet Take 1 tablet by mouth daily 14 tablet 0    amitriptyline (ELAVIL) 50 MG tablet Take 1 tablet by mouth nightly 90 tablet 1    insulin lispro (HUMALOG KWIKPEN) 200 UNIT/ML SOPN pen TID with meals Glucose [id number] No Insulin 140-199 2 Units 200-249 4 Units 250-299 6 Units 300-349 8 Units 350-400 10 Units over 400 12 Units 5 pen 5    vitamin B-12 (CYANOCOBALAMIN) 1000 MCG tablet Take 1 tablet by mouth daily 90 tablet 1    Cholecalciferol (VITAMIN D3) 50 MCG (2000 UT) TABS Take one tablet by mouth once a day (Patient taking differently: Take one tablet by mouth once a day OTC) 30 tablet 3        Medications patient taking as of now reconciled against medications ordered at time of hospital discharge: Yes    A comprehensive review of systems was negative except for what was noted in the HPI. Objective:    /87 (Site: Left Upper Arm, Position: Sitting, Cuff Size: Medium Adult)   Pulse 86   Ht 6' 2\" (1.88 m)   Wt 216 lb (98 kg)   SpO2 97%   BMI 27.73 kg/m²       GENERAL APPEARANCE:      Alert, oriented x 3, well developed, cooperative, not in any distress, appears stated age. HEAD:                         Normocephalic, atraumatic   EYES:                          PERRLA, EOMI, lids normal, conjuctivea clear, sclera anicteric. NECK:                         Supple, symmetrical,  trachea midline, no thyromegaly, no JVD, no lymphadenopathy. LUNGS:                       Clear to auscultation bilaterally, respirations unlabored, accessory muscles are not used. HEART:                       Regular rate and rhythm, S1 and S2 normal, no murmur, rub or gallop. PMI in MCL. ABDOMEN:                 Soft, mild tenderness in the left upper quadrant,  Mild swelling in the left lower quadrant area, bowel sounds are normoactive, no masses, no hepatospleenomegaly. EXTREMITY:              no bipedal edema  NEURO:                      Alert, oriented to person, place and time. Grossly intact. Musculoskeletal:         No kyphosis or scoliosis, no deformity in any extremity noted, muscle strength and tone are normal.  Skin:                            Warm and dry.  No rash or obvious suspicious lesions    PSYCH:                        Mood euthymic, insight and judgement good. Care discussed with patient. Questions answered and patient verbalizes understanding and agrees with plan. Medications reviewed and reconciled. Continue current medications. Appropriate prescriptions are ordered. Risks and benefits of meds are discussed. After visit summary provided. Advised to call for any problems, questions, or concerns. If symptoms worsen or don't improve as expected, to call us or go to ER. Follow up as directed, sooner if needed. No follow-ups on file. This dictation was performed with a verbal recognition program and it was checked for errors. It is possible that there are still dictated errors within this office note. Any errors should be brought immediately to my attention for correction. All efforts were made to ensure that this office note is accurate. An electronic signature was used to authenticate this note.   --Wilder Pelletier MD

## 2023-01-25 ENCOUNTER — OFFICE VISIT (OUTPATIENT)
Dept: CARDIOLOGY CLINIC | Age: 53
End: 2023-01-25
Payer: COMMERCIAL

## 2023-01-25 VITALS
SYSTOLIC BLOOD PRESSURE: 138 MMHG | BODY MASS INDEX: 27.59 KG/M2 | WEIGHT: 215 LBS | HEIGHT: 74 IN | HEART RATE: 67 BPM | DIASTOLIC BLOOD PRESSURE: 80 MMHG

## 2023-01-25 DIAGNOSIS — E78.00 PURE HYPERCHOLESTEROLEMIA: ICD-10-CM

## 2023-01-25 DIAGNOSIS — Z86.79 S/P ABLATION OF ATRIAL FIBRILLATION: ICD-10-CM

## 2023-01-25 DIAGNOSIS — I10 ESSENTIAL HYPERTENSION: ICD-10-CM

## 2023-01-25 DIAGNOSIS — I45.6 WOLFF PARKINSON WHITE PATTERN SEEN ON ELECTROCARDIOGRAM: ICD-10-CM

## 2023-01-25 DIAGNOSIS — I48.0 PAROXYSMAL ATRIAL FIBRILLATION (HCC): Primary | ICD-10-CM

## 2023-01-25 DIAGNOSIS — Z98.890 S/P ABLATION OF ATRIAL FIBRILLATION: ICD-10-CM

## 2023-01-25 DIAGNOSIS — D68.69 HYPERCOAGULABLE STATE DUE TO PAROXYSMAL ATRIAL FIBRILLATION (HCC): ICD-10-CM

## 2023-01-25 DIAGNOSIS — E11.42 TYPE 2 DIABETES MELLITUS WITH DIABETIC POLYNEUROPATHY, WITH LONG-TERM CURRENT USE OF INSULIN (HCC): ICD-10-CM

## 2023-01-25 DIAGNOSIS — I48.0 HYPERCOAGULABLE STATE DUE TO PAROXYSMAL ATRIAL FIBRILLATION (HCC): ICD-10-CM

## 2023-01-25 DIAGNOSIS — Z79.4 TYPE 2 DIABETES MELLITUS WITH DIABETIC POLYNEUROPATHY, WITH LONG-TERM CURRENT USE OF INSULIN (HCC): ICD-10-CM

## 2023-01-25 PROCEDURE — 99213 OFFICE O/P EST LOW 20 MIN: CPT | Performed by: INTERNAL MEDICINE

## 2023-01-25 PROCEDURE — 93000 ELECTROCARDIOGRAM COMPLETE: CPT | Performed by: INTERNAL MEDICINE

## 2023-01-25 PROCEDURE — 3079F DIAST BP 80-89 MM HG: CPT | Performed by: INTERNAL MEDICINE

## 2023-01-25 PROCEDURE — 3075F SYST BP GE 130 - 139MM HG: CPT | Performed by: INTERNAL MEDICINE

## 2023-01-25 NOTE — PROGRESS NOTES
OFFICE PROGRESS NOTES      Kiara Lin is a 50 y.o. male who has    CHIEF COMPLAINT AS FOLLOWS:  CHEST PAIN: C/O chest heaviness with exertion. SOB: Has SOB with exertion but no change over previous noted. LEG EDEMA: No leg edema   PALPITATIONS:  C/O heart racing with exertion. DIZZINESS: C/O Dizziness with above symptoms. SYNCOPE: None   OTHER/ ADDITIONAL COMPLAINTS:                                     HPI: Patient is here for F/U on his  WPW / PAF-Arrhythmia, HTN & Dyslipidemia problems. Arrhythmia: Patient has known  h/o WPW/PAF arrhythmia in the past. S/P Ablation of both. HTN: Patient has known essential HTN. Has been treated with guideline recommended medical / physical/ diet therapy as stated below. Dyslipidemia: Patient has known mixed dyslipidemia. Has been treated with guideline recommended medical / physical/ diet therapy as stated below.                 Current Outpatient Medications   Medication Sig Dispense Refill    MUCINEX 600 MG extended release tablet Take 1 tablet by mouth in the morning and 1 tablet before bedtime FOR 15 DAYS 30 tablet 0    pantoprazole (PROTONIX) 40 MG tablet Take 1 tablet by mouth daily 30 tablet 0    STEGLATRO 15 MG TABS TAKE 1 TABLET BY MOUTH EVERY DAY 30 tablet 0    tiZANidine (ZANAFLEX) 4 MG tablet TAKE 1 TABLET BY MOUTH AT BEDTIME 30 tablet 0    dronedarone hcl (MULTAQ) 400 MG TABS Take 1 tablet by mouth 2 times daily (with meals) 60 tablet 0    potassium chloride (KLOR-CON M) 10 MEQ extended release tablet Take 1 tablet by mouth daily OTC 90 tablet 1    metFORMIN (GLUCOPHAGE) 1000 MG tablet Take 1 tablet by mouth 2 times daily (with meals) 180 tablet 1    meloxicam (MOBIC) 7.5 MG tablet Take 1 tablet by mouth daily 90 tablet 1    losartan (COZAAR) 100 MG tablet Take 1 tablet by mouth daily 90 tablet 1    hydroCHLOROthiazide (MICROZIDE) 12.5 MG capsule Take 1 capsule by mouth every morning 90 capsule 1    gabapentin (NEURONTIN) 400 MG capsule Take 1 capsule by mouth 3 times daily for 90 days. Intended supply: 90 days 270 capsule 1    escitalopram (LEXAPRO) 10 MG tablet TAKE 1 TABLET BY MOUTH EVERY DAY 30 tablet 3    buPROPion (WELLBUTRIN SR) 100 MG extended release tablet Take 1 tablet by mouth 2 times daily 60 tablet 3    atorvastatin (LIPITOR) 40 MG tablet Take 1 tablet by mouth daily 90 tablet 1    Insulin Degludec (TRESIBA FLEXTOUCH) 100 UNIT/ML SOPN Inject 45 Units into the skin nightly 9 Adjustable Dose Pre-filled Pen Syringe 1    dicyclomine (BENTYL) 10 MG capsule Take 1 capsule by mouth in the morning and 1 capsule at noon and 1 capsule in the evening and 1 capsule before bedtime. 120 capsule 3    rivaroxaban (XARELTO) 20 MG TABS tablet Take 1 tablet by mouth daily 14 tablet 0    amitriptyline (ELAVIL) 50 MG tablet Take 1 tablet by mouth nightly 90 tablet 1    insulin lispro (HUMALOG KWIKPEN) 200 UNIT/ML SOPN pen TID with meals Glucose [id number] No Insulin 140-199 2 Units 200-249 4 Units 250-299 6 Units 300-349 8 Units 350-400 10 Units over 400 12 Units 5 pen 5    vitamin B-12 (CYANOCOBALAMIN) 1000 MCG tablet Take 1 tablet by mouth daily 90 tablet 1    Cholecalciferol (VITAMIN D3) 50 MCG (2000 UT) TABS Take one tablet by mouth once a day (Patient taking differently: Take one tablet by mouth once a day OTC) 30 tablet 3    sildenafil (VIAGRA) 50 MG tablet TAKE 1 TABLET BY MOUTH DAILY AS NEEDED FOR ERECTILE DYSFUNCTION (Patient not taking: No sig reported) 10 tablet 0    tadalafil (CIALIS) 20 MG tablet Take 1 tablet by mouth as needed for Erectile Dysfunction (Patient not taking: No sig reported) 10 tablet 3     No current facility-administered medications for this visit.      Allergies: Bee venom, Hornet venom, and Tramadol  Review of Systems:    Constitutional: Negative for diaphoresis and fatigue  Respiratory: Negative for shortness of breath  Cardiovascular: Negative for chest pain, dyspnea on exertion, claudication, edema, irregular heartbeat, murmur, palpitations or shortness of breath  Musculoskeletal: Negative for muscle pain, muscular weakness, negative for pain in arm and leg or swelling in foot and leg    Objective:  /80 (Site: Left Upper Arm, Position: Sitting, Cuff Size: Large Adult) Comment: Did not take BP Meds yet  Pulse 67   Ht 6' 2\" (1.88 m)   Wt 215 lb (97.5 kg)   BMI 27.60 kg/m²   Wt Readings from Last 3 Encounters:   01/25/23 215 lb (97.5 kg)   12/28/22 216 lb (98 kg)   12/21/22 216 lb (98 kg)     Body mass index is 27.6 kg/m². GENERAL - Alert, oriented, pleasant, in no apparent distress. EYES: No jaundice, no conjunctival pallor. Neck - Supple. No jugular venous distention noted. No carotid bruits. Cardiovascular - Normal S1 and S2 without obvious murmur or gallop. Extremities - No cyanosis, clubbing, or significant edema. Pulmonary - No respiratory distress. No wheezes or rales.       MEDICAL DECISION MAKING & DATA REVIEW:    Lab Review   Lab Results   Component Value Date/Time    TROPONINT <0.010 09/04/2020 02:00 PM    Milon Leonila <0.010 09/04/2020 10:55 AM     Lab Results   Component Value Date/Time    PROBNP 15.33 09/04/2020 10:55 AM    PROBNP 93.09 08/14/2017 01:50 PM     Lab Results   Component Value Date    INR 0.85 12/07/2022    INR 0.88 05/04/2022     Lab Results   Component Value Date    LABA1C 10.0 (H) 12/13/2022    LABA1C 7.7 04/27/2022     Lab Results   Component Value Date    WBC 3.6 (L) 12/07/2022    WBC 4.5 11/04/2022    HCT 43.0 12/13/2022    HCT 51.3 12/07/2022    MCV 87.2 12/07/2022    MCV 84.9 11/04/2022     12/07/2022     11/04/2022     Lab Results   Component Value Date    CHOL 206 (H) 09/12/2019    CHOL 171 12/28/2011    TRIG 84 09/12/2019    TRIG 73 12/28/2011    HDL 56 01/20/2022    HDL 63 (H) 07/21/2021    LDLCALC 86 01/20/2022    LDLCALC 75 07/21/2021    LDLDIRECT 126 (H) 12/28/2011     Lab Results   Component Value Date    ALT 39 08/10/2022    ALT 47 (H) 08/04/2022    AST 24 08/10/2022    AST 32 08/04/2022     BMP:    Lab Results   Component Value Date/Time     12/13/2022 08:24 AM     12/07/2022 11:42 AM    K 4.3 12/13/2022 08:24 AM    K 4.6 12/07/2022 11:42 AM    CL 99 12/07/2022 11:42 AM     08/10/2022 01:49 PM    CO2 25 12/13/2022 08:24 AM    CO2 22 12/07/2022 11:42 AM    BUN 23 12/07/2022 11:42 AM    BUN 27 08/10/2022 01:49 PM    CREATININE 0.9 12/13/2022 08:24 AM    CREATININE 0.8 12/07/2022 11:42 AM    CREATININE 0.8 08/10/2022 01:49 PM     CMP:   Lab Results   Component Value Date/Time     12/13/2022 08:24 AM     12/07/2022 11:42 AM    K 4.3 12/13/2022 08:24 AM    K 4.6 12/07/2022 11:42 AM    CL 99 12/07/2022 11:42 AM     08/10/2022 01:49 PM    CO2 25 12/13/2022 08:24 AM    CO2 22 12/07/2022 11:42 AM    BUN 23 12/07/2022 11:42 AM    BUN 27 08/10/2022 01:49 PM    CREATININE 0.9 12/13/2022 08:24 AM    CREATININE 0.8 12/07/2022 11:42 AM    CREATININE 0.8 08/10/2022 01:49 PM    PROT 7.1 08/10/2022 01:49 PM    PROT 7.2 08/04/2022 08:58 PM    PROT 6.7 12/28/2011 08:55 AM     Lab Results   Component Value Date/Time    TSH 2.22 09/12/2019 04:08 PM    TSHHS 2.810 05/10/2022 07:44 PM    TSHHS 1.534 12/28/2011 08:55 AM       QUALITY MEASURES REVIEWED:  1.CAD:Patient is taking anti platelet agent:No  Patient does not have Hx of documented CAD  2. DYSLIPIDEMIA: Patient is on cholesterol lowering medication:Yes   3. Beta-Blocker therapy for CAD, if prior Myocardial Infarction:No   4. Counselled regarding smoking cessation. No   Patient does not Smoke. 5.Anticoagulation therapy (for A.Fib) Yes   6. Discussed weight management strategies. Assessment & Plan:  Primary / Secondary prevention is the goal by aggressive risk modification, healthy and therapeutic life style changes for cardiovascular risk reduction. CORONARY ARTERY DISEASE:None known. Stress test 7/13/2022   Excellent exercise capacity. 11 METs work load.    Mildly Hypertensive BP response to exercise. ETT non diagnostic as THR is not achieved. No Exercise induced, clinically significant arrhythmias. EKG: NSR 80/min WNL     HYPERTENSION:for more than 10 years. Controlled on current management. Takes HCTZ & Cozaar. Counseled regarding low salt diet, exercise & weight control. May have to add meds if remains high      VALVULAR HEART DISEASE:no              No significant VHD noted  Echo: 9/2020    Left ventricular systolic function is normal.   Ejection fraction is visually estimated at 50-55%. Small left ventricular cavity. No significant valvular disease noted. No evidence of any pericardial effusion. TE-Echo: Normal 12/13/2022     DYSLIPIDEMIA: yes, Good profile on current medication. Takes Lipitor  Patient's profile is at / near [employer],   HDL is  High  Tolerating current medical regimen wellyes. Takes Lipitor  See most recent Lab values:( Reviewed Labs from family MD, Dr. Calla Skiff )  LDL is 86  HDL is 56  Diabetes mellitis:yes, For > 10 years, still not well control, but says A1c is down from 15 to 8.3. ARRHYTHMIAS:yes, WPW with PAF. s/p Ablation with Jason Sor. Still C/O exertional SOB,Palpitations CHEST HEAVINESS. His CHADS score is high. He is on Xarelto & Multaq. HOLTER 1/23/2022  Baseline rhythm is normal sinus. Multiple episodes of sinus tachycardia noted, fastest at 126/min. No other clinically significant arrhythmias noted    EKG: NSR 67/min, QTc 407    TESTS ORDERED:none this visit     PREVIOUSLY ORDERED TESTS REVIEWED & DISCUSSED WITH THE PATIENT:     I personally reviewed & interpreted, all previously ordered tests as copied above. Latest Labs are pulled in to the note with dates.    Labs, specially in Reference to Lipid profile, Cardiac testing in the form of Echo ( dated: ), stress tests ( dated: ) & other relevant cardiac testing reviewed with patient & recommendations made based on assessment of the results. Discussed role of Cardiac risk factors & effects + treatment of co morbidities with patient & advised accordingly. MEDICATIONS: List of medications patient is currently taking is reviewed in detail with the patient. Discussed any side effects or problems taking the medication. Recommend Continue present management & medications as listed. AFFIRMATION: I spent at least 20 minutes of time reviewing patient's history, previous & current medical problems & all Labs + testing. This includes chart prep even prior to the vosit. Various goals are discussed and multiple questions answered. Relevant concelling performed. Office follow up in six months.

## 2023-01-25 NOTE — PROGRESS NOTES
Vein \"LEG PROBLEM Questionnaire\"  Do you have prominent leg veins? No   Do you have any skin discoloration? No  Do you have any healed or active sores? No  Do you have swelling of the legs? No  Do you have a family history of varicose veins? No  Does your profession involve pro-longed        standing or heavy lifting? Yes  7. Have you been fighting overweight problems? No  8. Do you have restless legs? Yes  9. Do you have any night time cramps? No  10. Do you have any of the following in your legs:        Aching and Tiredness I  11. If Yes - Have they worn compression stockings No  12.  If they have worn compression stockings

## 2023-01-25 NOTE — LETTER
[address]  [address] [address]  Phone: [phone]  Fax: [phone]    Charbel Vera MD    January 25, 2023     Ita Jonas MD  [place] [id number]    Patient: Srinath Caputo   MR Number: [id number]   YOB: 1970   Date of Visit: 1/25/2023       Dear Ita Jonas:    Thank you for referring Sarah Huang to me for evaluation/treatment. Below are the relevant portions of my assessment and plan of care. If you have questions, please do not hesitate to call me. I look forward to following Lizet Gutiérrez along with you.     Sincerely,      Charbel Vera MD

## 2023-01-25 NOTE — PATIENT INSTRUCTIONS
Please be informed that if you contact our office outside of normal business hours the physician on call cannot help with any scheduling or rescheduling issues, procedure instruction questions or any type of medication issue. We advise you for any urgent/emergency that you go to the nearest emergency room! PLEASE CALL OUR OFFICE DURING NORMAL BUSINESS HOURS    Monday - Friday   8 am to 5 pm    [place]: [place]: [phone]    [place]:  [phone]  **It is YOUR responsibilty to bring medication bottles and/or updated medication list to [address] [place] [address]. This will allow us to better serve you and all your healthcare needs**  [facility] Locations - No appointment necessary. Sites open Monday to Friday. Call your preferred location for test preparation, business   hours and other information you need. [place] [facility] [employer]. [address] [place], [address] [facility]  Phone: [phone] [place]  [address] [facility]  [address] [place], [address] [facility]  Phone: [phone]     Thank you for allowing us to care for you today! We want to ensure we can follow your treatment plan and we strive to give you the best outcomes and experience possible. If you ever have a life threatening emergency and call 911 - for an ambulance (EMS)   Our providers can only care for you at:   [facility] or [facility]. Even if you have someone take you or you drive yourself we can only care for you in a [facility] facility. Our providers are not setup at the other healthcare locations! CORONARY ARTERY DISEASE:None known. Stress test 7/13/2022   Excellent exercise capacity. 11 METs work load. Mildly Hypertensive BP response to exercise. ETT non diagnostic as THR is not achieved. No Exercise induced, clinically significant arrhythmias. EKG: NSR 80/min WNL     HYPERTENSION:for more than 10 years.   Controlled on current management. Takes HCTZ & Cozaar. Counseled regarding low salt diet, exercise & weight control. May have to add meds if remains high      VALVULAR HEART DISEASE:no              No significant VHD noted  Echo: 9/2020    Left ventricular systolic function is normal.   Ejection fraction is visually estimated at 50-55%. Small left ventricular cavity. No significant valvular disease noted. No evidence of any pericardial effusion. TE-Echo: Normal 12/13/2022     DYSLIPIDEMIA: yes, Good profile on current medication. Takes Lipitor  Patient's profile is at / near [employer],   HDL is  High  Tolerating current medical regimen wellyes. Takes Lipitor  See most recent Lab values:( Reviewed Labs from family MD, Dr. Wendy Mason )  LDL is 86  HDL is 56  Diabetes mellitis:yes, For > 10 years, still not well control, but says A1c is down from 15 to 8.3. ARRHYTHMIAS:yes, WPW with PAF. s/p Ablation with Horan Neighbor. Still C/O exertional SOB,Palpitations CHEST HEAVINESS. His CHADS score is high. He is on Xarelto & Multaq. HOLTER 1/23/2022  Baseline rhythm is normal sinus. Multiple episodes of sinus tachycardia noted, fastest at 126/min. No other clinically significant arrhythmias noted    EKG: NSR 67/min, QTc 407    TESTS ORDERED:none this visit     PREVIOUSLY ORDERED TESTS REVIEWED & DISCUSSED WITH THE PATIENT:     I personally reviewed & interpreted, all previously ordered tests as copied above. Latest Labs are pulled in to the note with dates. Labs, specially in Reference to Lipid profile, Cardiac testing in the form of Echo ( dated: ), stress tests ( dated: ) & other relevant cardiac testing reviewed with patient & recommendations made based on assessment of the results. Discussed role of Cardiac risk factors & effects + treatment of co morbidities with patient & advised accordingly. MEDICATIONS: List of medications patient is currently taking is reviewed in detail with the patient.  Discussed any side effects or problems taking the medication. Recommend Continue present management & medications as listed. AFFIRMATION: I spent at least 20 minutes of time reviewing patient's history, previous & current medical problems & all Labs + testing. This includes chart prep even prior to the vosit. Various goals are discussed and multiple questions answered. Relevant concelling performed. Office follow up in six months.

## 2023-01-29 DIAGNOSIS — M79.671 PAIN IN BOTH FEET: ICD-10-CM

## 2023-01-29 DIAGNOSIS — M79.672 PAIN IN BOTH FEET: ICD-10-CM

## 2023-01-29 DIAGNOSIS — Z79.4 TYPE 2 DIABETES MELLITUS WITH DIABETIC POLYNEUROPATHY, WITH LONG-TERM CURRENT USE OF INSULIN (HCC): ICD-10-CM

## 2023-01-29 DIAGNOSIS — E11.42 TYPE 2 DIABETES MELLITUS WITH DIABETIC POLYNEUROPATHY, WITH LONG-TERM CURRENT USE OF INSULIN (HCC): ICD-10-CM

## 2023-01-29 DIAGNOSIS — J20.9 ACUTE BRONCHITIS, UNSPECIFIED ORGANISM: ICD-10-CM

## 2023-01-30 RX ORDER — TIZANIDINE 4 MG/1
4 TABLET ORAL NIGHTLY
Qty: 30 TABLET | Refills: 0 | Status: SHIPPED | OUTPATIENT
Start: 2023-01-30

## 2023-01-30 RX ORDER — PANTOPRAZOLE SODIUM 40 MG/1
TABLET, DELAYED RELEASE ORAL
Qty: 30 TABLET | Refills: 0 | OUTPATIENT
Start: 2023-01-30

## 2023-01-30 RX ORDER — ERTUGLIFLOZIN 15 MG/1
TABLET, FILM COATED ORAL
Qty: 30 TABLET | Refills: 0 | Status: SHIPPED | OUTPATIENT
Start: 2023-01-30

## 2023-02-20 SDOH — ECONOMIC STABILITY: TRANSPORTATION INSECURITY
IN THE PAST 12 MONTHS, HAS LACK OF TRANSPORTATION KEPT YOU FROM MEETINGS, WORK, OR FROM GETTING THINGS NEEDED FOR DAILY LIVING?: NO

## 2023-02-20 SDOH — ECONOMIC STABILITY: HOUSING INSECURITY
IN THE LAST 12 MONTHS, WAS THERE A TIME WHEN YOU DID NOT HAVE A STEADY PLACE TO SLEEP OR SLEPT IN A SHELTER (INCLUDING NOW)?: NO

## 2023-02-20 SDOH — ECONOMIC STABILITY: FOOD INSECURITY: WITHIN THE PAST 12 MONTHS, THE FOOD YOU BOUGHT JUST DIDN'T LAST AND YOU DIDN'T HAVE MONEY TO GET MORE.: OFTEN TRUE

## 2023-02-20 SDOH — ECONOMIC STABILITY: FOOD INSECURITY: WITHIN THE PAST 12 MONTHS, YOU WORRIED THAT YOUR FOOD WOULD RUN OUT BEFORE YOU GOT MONEY TO BUY MORE.: OFTEN TRUE

## 2023-02-20 SDOH — ECONOMIC STABILITY: INCOME INSECURITY: HOW HARD IS IT FOR YOU TO PAY FOR THE VERY BASICS LIKE FOOD, HOUSING, MEDICAL CARE, AND HEATING?: SOMEWHAT HARD

## 2023-02-22 ENCOUNTER — OFFICE VISIT (OUTPATIENT)
Dept: INTERNAL MEDICINE CLINIC | Age: 53
End: 2023-02-22

## 2023-02-22 VITALS
WEIGHT: 218 LBS | DIASTOLIC BLOOD PRESSURE: 82 MMHG | SYSTOLIC BLOOD PRESSURE: 132 MMHG | HEIGHT: 74 IN | BODY MASS INDEX: 27.98 KG/M2 | OXYGEN SATURATION: 98 % | HEART RATE: 86 BPM

## 2023-02-22 DIAGNOSIS — E78.00 PURE HYPERCHOLESTEROLEMIA: ICD-10-CM

## 2023-02-22 DIAGNOSIS — M79.671 PAIN IN BOTH FEET: ICD-10-CM

## 2023-02-22 DIAGNOSIS — G89.29 CHRONIC PAIN OF LEFT KNEE: ICD-10-CM

## 2023-02-22 DIAGNOSIS — R25.2 MUSCLE CRAMPING: ICD-10-CM

## 2023-02-22 DIAGNOSIS — M79.672 PAIN IN BOTH FEET: ICD-10-CM

## 2023-02-22 DIAGNOSIS — I10 ESSENTIAL HYPERTENSION: ICD-10-CM

## 2023-02-22 DIAGNOSIS — N52.9 ERECTILE DYSFUNCTION, UNSPECIFIED ERECTILE DYSFUNCTION TYPE: ICD-10-CM

## 2023-02-22 DIAGNOSIS — G62.9 NEUROPATHY: ICD-10-CM

## 2023-02-22 DIAGNOSIS — Z86.16 HISTORY OF COVID-19: ICD-10-CM

## 2023-02-22 DIAGNOSIS — F90.9 ATTENTION DEFICIT HYPERACTIVITY DISORDER (ADHD), UNSPECIFIED ADHD TYPE: ICD-10-CM

## 2023-02-22 DIAGNOSIS — F41.9 ANXIETY: ICD-10-CM

## 2023-02-22 DIAGNOSIS — I48.0 PAROXYSMAL ATRIAL FIBRILLATION (HCC): ICD-10-CM

## 2023-02-22 DIAGNOSIS — I45.6 WOLFF PARKINSON WHITE PATTERN SEEN ON ELECTROCARDIOGRAM: Primary | ICD-10-CM

## 2023-02-22 DIAGNOSIS — Z98.890 S/P ABLATION OF ATRIAL FIBRILLATION: ICD-10-CM

## 2023-02-22 DIAGNOSIS — Z79.4 TYPE 2 DIABETES MELLITUS WITH DIABETIC POLYNEUROPATHY, WITH LONG-TERM CURRENT USE OF INSULIN (HCC): ICD-10-CM

## 2023-02-22 DIAGNOSIS — F32.A CHRONIC DEPRESSION: ICD-10-CM

## 2023-02-22 DIAGNOSIS — K21.9 GASTROESOPHAGEAL REFLUX DISEASE WITHOUT ESOPHAGITIS: ICD-10-CM

## 2023-02-22 DIAGNOSIS — E11.42 TYPE 2 DIABETES MELLITUS WITH DIABETIC POLYNEUROPATHY, WITH LONG-TERM CURRENT USE OF INSULIN (HCC): ICD-10-CM

## 2023-02-22 DIAGNOSIS — Z86.79 S/P ABLATION OF ATRIAL FIBRILLATION: ICD-10-CM

## 2023-02-22 DIAGNOSIS — M25.562 CHRONIC PAIN OF LEFT KNEE: ICD-10-CM

## 2023-02-22 LAB
CHP ED QC CHECK: NORMAL
GLUCOSE BLD-MCNC: 243 MG/DL

## 2023-02-22 RX ORDER — BUPROPION HYDROCHLORIDE 100 MG/1
100 TABLET, EXTENDED RELEASE ORAL 2 TIMES DAILY
Qty: 180 TABLET | Refills: 1 | Status: SHIPPED | OUTPATIENT
Start: 2023-02-22

## 2023-02-22 RX ORDER — CHOLECALCIFEROL (VITAMIN D3) 125 MCG
CAPSULE ORAL
Qty: 90 TABLET | Refills: 1 | Status: SHIPPED | OUTPATIENT
Start: 2023-02-22

## 2023-02-22 RX ORDER — HYDROCHLOROTHIAZIDE 12.5 MG/1
12.5 CAPSULE, GELATIN COATED ORAL EVERY MORNING
Qty: 90 CAPSULE | Refills: 1 | Status: SHIPPED | OUTPATIENT
Start: 2023-02-22

## 2023-02-22 RX ORDER — ESCITALOPRAM OXALATE 10 MG/1
TABLET ORAL
Qty: 30 TABLET | Refills: 3 | Status: SHIPPED | OUTPATIENT
Start: 2023-02-22

## 2023-02-22 RX ORDER — PANTOPRAZOLE SODIUM 40 MG/1
40 TABLET, DELAYED RELEASE ORAL DAILY
Qty: 30 TABLET | Refills: 0 | Status: SHIPPED | OUTPATIENT
Start: 2023-02-22

## 2023-02-22 RX ORDER — MELOXICAM 7.5 MG/1
7.5 TABLET ORAL DAILY
Qty: 90 TABLET | Refills: 1 | Status: SHIPPED | OUTPATIENT
Start: 2023-02-22

## 2023-02-22 RX ORDER — TIZANIDINE 4 MG/1
4 TABLET ORAL NIGHTLY
Qty: 30 TABLET | Refills: 0 | Status: SHIPPED | OUTPATIENT
Start: 2023-02-22

## 2023-02-22 RX ORDER — AMITRIPTYLINE HYDROCHLORIDE 50 MG/1
50 TABLET, FILM COATED ORAL NIGHTLY
Qty: 90 TABLET | Refills: 1 | Status: SHIPPED | OUTPATIENT
Start: 2023-02-22

## 2023-02-22 RX ORDER — GABAPENTIN 600 MG/1
600 TABLET ORAL 3 TIMES DAILY
Qty: 270 TABLET | Refills: 1 | Status: SHIPPED | OUTPATIENT
Start: 2023-02-22 | End: 2023-05-23

## 2023-02-22 RX ORDER — LOSARTAN POTASSIUM 100 MG/1
100 TABLET ORAL DAILY
Qty: 90 TABLET | Refills: 1 | Status: SHIPPED | OUTPATIENT
Start: 2023-02-22

## 2023-02-22 RX ORDER — LANOLIN ALCOHOL/MO/W.PET/CERES
1000 CREAM (GRAM) TOPICAL DAILY
Qty: 90 TABLET | Refills: 1 | Status: SHIPPED | OUTPATIENT
Start: 2023-02-22

## 2023-02-22 RX ORDER — POTASSIUM CHLORIDE 750 MG/1
10 TABLET, EXTENDED RELEASE ORAL DAILY
Qty: 90 TABLET | Refills: 1 | Status: SHIPPED | OUTPATIENT
Start: 2023-02-22

## 2023-02-22 RX ORDER — INSULIN DEGLUDEC INJECTION 100 U/ML
50 INJECTION, SOLUTION SUBCUTANEOUS NIGHTLY
Qty: 10 ADJUSTABLE DOSE PRE-FILLED PEN SYRINGE | Refills: 1 | Status: SHIPPED | OUTPATIENT
Start: 2023-02-22

## 2023-02-22 RX ORDER — ATORVASTATIN CALCIUM 40 MG/1
40 TABLET, FILM COATED ORAL DAILY
Qty: 90 TABLET | Refills: 1 | Status: SHIPPED | OUTPATIENT
Start: 2023-02-22

## 2023-02-22 RX ORDER — ERTUGLIFLOZIN 15 MG/1
1 TABLET, FILM COATED ORAL DAILY
Qty: 90 TABLET | Refills: 1 | Status: SHIPPED | OUTPATIENT
Start: 2023-02-22

## 2023-02-22 ASSESSMENT — PATIENT HEALTH QUESTIONNAIRE - PHQ9
4. FEELING TIRED OR HAVING LITTLE ENERGY: 0
2. FEELING DOWN, DEPRESSED OR HOPELESS: 0
1. LITTLE INTEREST OR PLEASURE IN DOING THINGS: 0
10. IF YOU CHECKED OFF ANY PROBLEMS, HOW DIFFICULT HAVE THESE PROBLEMS MADE IT FOR YOU TO DO YOUR WORK, TAKE CARE OF THINGS AT HOME, OR GET ALONG WITH OTHER PEOPLE: 0
7. TROUBLE CONCENTRATING ON THINGS, SUCH AS READING THE NEWSPAPER OR WATCHING TELEVISION: 0
SUM OF ALL RESPONSES TO PHQ QUESTIONS 1-9: 0
SUM OF ALL RESPONSES TO PHQ9 QUESTIONS 1 & 2: 0
DEPRESSION UNABLE TO ASSESS: PT REFUSES
SUM OF ALL RESPONSES TO PHQ QUESTIONS 1-9: 0
6. FEELING BAD ABOUT YOURSELF - OR THAT YOU ARE A FAILURE OR HAVE LET YOURSELF OR YOUR FAMILY DOWN: 0
SUM OF ALL RESPONSES TO PHQ QUESTIONS 1-9: 0
9. THOUGHTS THAT YOU WOULD BE BETTER OFF DEAD, OR OF HURTING YOURSELF: 0
5. POOR APPETITE OR OVEREATING: 0
SUM OF ALL RESPONSES TO PHQ QUESTIONS 1-9: 0
8. MOVING OR SPEAKING SO SLOWLY THAT OTHER PEOPLE COULD HAVE NOTICED. OR THE OPPOSITE, BEING SO FIGETY OR RESTLESS THAT YOU HAVE BEEN MOVING AROUND A LOT MORE THAN USUAL: 0
3. TROUBLE FALLING OR STAYING ASLEEP: 0

## 2023-02-22 NOTE — PROGRESS NOTES
Name: Jhon Rush  Age: 50 y.o. YOB: 1970  Sex: male    CHIEF COMPLAINT:    Chief Complaint   Patient presents with    Diabetes    Hypertension    Other     Patient states he has had taste issues for the last 2 months. He states everything tastes like burnt cinnamon. Other chronic conditions         HISTORY OF PRESENT ILLNESS:     This is a pleasant  50 y.o. male  is seen today for management of chronic medical problems and medications refills. Previous records reviewed . Doing better. Patient claim that he continues to have mild palpitations and dizziness and SOB but much less now. Denies any chest pain. Did see Dr. Jerald Ohara recently and him diet he was told he is doing better. No recent change in medications. He also sees Dr. Josette Main periodically. Still continues to have migraine headaches but rare. He is taking Fioricet as needed. Appetite is  little poor since he has lost his taste since his COVID infection. Bowels are moving okay. Gastritis symptoms are better. IBS symptoms are better with Bentyl as needed  No urinary symptoms. His sugars are usually@ 180- 200  He admits he is not very compliant with the diet. He is taking his medications regularly including insulins. He is being followed by his endocrinologist, Dr. Pipo Rouse. C/O paraesthesia of his feet. Depression and anxiety better with medication  He has been vaccinated for COVID-19 x3. He had a flu shot on January 2022. Apparently did not get a flu shot this year.   Labs from 12/7/2022 and 12/13/2022 reviewed    Past Medical History:    Patient Active Problem List   Diagnosis    Ventral hernia without obstruction or gangrene    Essential hypertension    Gastroesophageal reflux disease without esophagitis    Dupuytren's contracture    Chronic skin ulcer, limited to breakdown of skin (HCC)    Chronic bilateral low back pain without sciatica    Type 2 diabetes mellitus with diabetic polyneuropathy, with long-term current use of insulin (HCC)    Erectile dysfunction    Chest pain    Paige Patella Parkinson White pattern seen on electrocardiogram    Fatty liver    Neuropathy    Dorsalgia    Pure hypercholesterolemia    Pain in both feet    Chronic pain of left knee    Chronic depression    Anxiety    Polycythemia    History of COVID-19    Left upper quadrant abdominal swelling, mass and lump    Paroxysmal atrial fibrillation (HCC)    Palpitations    Tachycardia    Insomnia    Hypercoagulable state due to paroxysmal atrial fibrillation (HCC)    S/P ablation of atrial fibrillation        Past Surgical History:        Procedure Laterality Date    ABDOMEN SURGERY      ABLATION OF DYSRHYTHMIC FOCUS  12/13/2022    atrial fibrillation ablation and WPW accessory pathway ablation    COLONOSCOPY N/A 04/25/2022    COLONOSCOPY POLYPECTOMY SNARE/COLD BIOPSY performed by Glenny Side, DO at [address] N/A 05/17/2022    HERNIA INCISIONAL REPAIR LAPAROSCOPIC ROBOTIC WITH MESH performed by Glenny Side, DO at [facility] [address] N/A 05/10/2022    Electrophysiology study and cardioversion performed by Dr. Jake Najera.     [address] [place] [address]    Fusion of L3, L4, L5    UMBILICAL HERNIA REPAIR  11/04/2015    repair incarcerated supra umbilical hernia    UPPER GASTROINTESTINAL ENDOSCOPY N/A 04/25/2022    EGD DIAGNOSTIC ONLY performed by Glenny Side, DO at [facility] ENDOSCOPY       Social History:   Social History     Tobacco Use    Smoking status: Never    Smokeless tobacco: Never   Substance Use Topics    Alcohol use: Not Currently     Alcohol/week: 2.0 standard drinks     Types: 2 Standard drinks or equivalent per week     Comment: Caffiene - 1 Soda every Wednesday       Family History:       Problem Relation Age of Onset    Breast Cancer Mother        Allergies:  Bee venom, Hornet venom, and Tramadol    Current Medications :      Prior to Admission medications Medication Sig Start Date End Date Taking? Authorizing Provider   vitamin B-12 (CYANOCOBALAMIN) 1000 MCG tablet Take 1 tablet by mouth daily 2/22/23  Yes Sky Gallo MD   tiZANidine (ZANAFLEX) 4 MG tablet Take 1 tablet by mouth at bedtime 2/22/23  Yes Sky Gallo MD   Ertugliflozin L-PyroglutamicAc [facility]) 15 MG TABS Take 1 tablet by mouth daily 2/22/23  Yes Sky Gallo MD   rivaroxaban (XARELTO) 20 MG TABS tablet Take 1 tablet by mouth daily 2/22/23 5/23/23 Yes Sky Gallo MD   potassium chloride (KLOR-CON M) 10 MEQ extended release tablet Take 1 tablet by mouth daily OTC 2/22/23  Yes Sky Gallo MD   pantoprazole (PROTONIX) 40 MG tablet Take 1 tablet by mouth daily 2/22/23  Yes Sky Gallo MD   metFORMIN (GLUCOPHAGE) 1000 MG tablet Take 1 tablet by mouth 2 times daily (with meals) 2/22/23  Yes Sky Gallo MD   Insulin Degludec (TRESIBA FLEXTOUCH) 100 UNIT/ML SOPN Inject 50 Units into the skin nightly 2/22/23  Yes Sky Gallo MD   gabapentin (NEURONTIN) 600 MG tablet Take 1 tablet by mouth 3 times daily for 90 days.  2/22/23 5/23/23 Yes Sky Gallo MD   escitalopram (LEXAPRO) 10 MG tablet TAKE 1 TABLET BY MOUTH EVERY DAY 2/22/23  Yes Sky Gallo MD   meloxicam (MOBIC) 7.5 MG tablet Take 1 tablet by mouth daily 2/22/23  Yes Sky Gallo MD   losartan (COZAAR) 100 MG tablet Take 1 tablet by mouth daily 2/22/23  Yes Sky Gallo MD   hydroCHLOROthiazide (MICROZIDE) 12.5 MG capsule Take 1 capsule by mouth every morning 2/22/23  Yes Sky Gallo MD   dronedarone hcl (MULTAQ) 400 MG TABS Take 1 tablet by mouth 2 times daily (with meals) 2/22/23  Yes Sky Gallo MD   Cholecalciferol (VITAMIN D3) 50 MCG (2000 UT) TABS Take one tablet by mouth once a day 2/22/23  Yes Sky Gallo MD   buPROPion [facility]) 100 MG extended release tablet Take 1 tablet by mouth 2 times daily 2/22/23  Yes Sky Gallo MD   atorvastatin (LIPITOR) 40 MG tablet Take 1 tablet by mouth daily 2/22/23  Yes Elaina Bush MD Janey   amitriptyline (ELAVIL) 50 MG tablet Take 1 tablet by mouth nightly 2/22/23  Yes Cheng Culver MD   MUCINEX 600 MG extended release tablet Take 1 tablet by mouth in the morning and 1 tablet before bedtime FOR 15 DAYS 1/30/23  Yes Cheng Culver MD   tadalafil (CIALIS) 20 MG tablet Take 1 tablet by mouth as needed for Erectile Dysfunction 10/12/22  Yes Cheng Culver MD   dicyclomine (BENTYL) 10 MG capsule Take 1 capsule by mouth in the morning and 1 capsule at noon and 1 capsule in the evening and 1 capsule before bedtime. 7/22/22  Yes EDGAR Mack - CNP   insulin lispro (HUMALOG KWIKPEN) 200 UNIT/ML SOPN pen TID with meals Glucose [id number] No Insulin 140-199 2 Units 200-249 4 Units 250-299 6 Units 300-349 8 Units 350-400 10 Units over 400 12 Units 1/20/22  Yes Cheng Culver MD       LAB DATA: Reviewed. REVIEW OF SYSTEMS:   see HPI/ Comprehensive review of systems negative except for the ones mentioned in HPI. PHYSICAL EXAMINATION:   /82 (Site: Left Upper Arm, Position: Sitting, Cuff Size: Medium Adult)   Pulse 86   Ht 6' 2\" (1.88 m)   Wt 218 lb (98.9 kg)   SpO2 98%   BMI 27.99 kg/m²      GENERAL APPEARANCE:      Alert, oriented x 3, well developed, cooperative, not in any distress, appears stated age. HEAD:                         Normocephalic, atraumatic   EYES:                          PERRLA, EOMI, lids normal, conjuctivea clear, sclera anicteric. NECK:                         Supple, symmetrical,  trachea midline, no thyromegaly, no JVD, no lymphadenopathy. LUNGS:                       Clear to auscultation bilaterally, respirations unlabored, accessory muscles are not used. HEART:                       Regular rate and rhythm, S1 and S2 normal, no murmur, rub or gallop. PMI in MCL.   ABDOMEN:                 Soft, mild tenderness in the left upper quadrant,  Mild swelling in the left lower quadrant area, bowel sounds are normoactive, no masses, no hepatospleenomegaly. EXTREMITY:              no bipedal edema  NEURO:                      Alert, oriented to person, place and time. Grossly intact. Musculoskeletal:         No kyphosis or scoliosis, no deformity in any extremity noted, muscle strength and tone are normal.  Skin:                            Warm and dry. No rash or obvious suspicious lesions    PSYCH:                        Mood euthymic, insight and judgement good. ASSESSMENT/PLAN:    1. Sherra Carolyn Parkinson White pattern seen on electrocardiogram  2. Paroxysmal atrial fibrillation ([facility])  Continue Xarelto and Multaq and continue to follow with his cardiologist as per his recommendations. - rivaroxaban (XARELTO) 20 MG TABS tablet; Take 1 tablet by mouth daily  Dispense: 90 tablet; Refill: 1  - dronedarone hcl (MULTAQ) 400 MG TABS; Take 1 tablet by mouth 2 times daily (with meals)  Dispense: 180 tablet; Refill: 1    3. S/P ablation of atrial fibrillation  Advised to continue to follow with his cardiologist and EP doctor as per their recommendations. 4. Essential hypertension  Continue current medications, denies side effect with medicationss. Low salt diet and exercise advised. Continue losartan and hydrochlorothiazide  - losartan (COZAAR) 100 MG tablet; Take 1 tablet by mouth daily  Dispense: 90 tablet; Refill: 1  - hydroCHLOROthiazide (MICROZIDE) 12.5 MG capsule; Take 1 capsule by mouth every morning  Dispense: 90 capsule; Refill: 1    5. Pure hypercholesterolemia  Patient is taking cholesterol medications regularly. Denies any side effects. Diet and exercise advised. Continue Lipitor  - atorvastatin (LIPITOR) 40 MG tablet; Take 1 tablet by mouth daily  Dispense: 90 tablet; Refill: 1    6. Gastroesophageal reflux disease without esophagitis  Patient on Protonix  - pantoprazole (PROTONIX) 40 MG tablet; Take 1 tablet by mouth daily  Dispense: 30 tablet; Refill: 0    7.  Chronic depression  Continue Celexa and Wellbutrin  - escitalopram (LEXAPRO) 10 MG tablet; TAKE 1 TABLET BY MOUTH EVERY DAY  Dispense: 30 tablet; Refill: 3    8. Anxiety  Continue Celexa    9. Erectile dysfunction, unspecified erectile dysfunction type  Patient on Cialis as needed    10. History of COVID-19  Improved. Still with loss of taste. Observe for now    11. Pain in both feet  Continue Zanaflex and Mobic and Tylenol as needed  - tiZANidine (ZANAFLEX) 4 MG tablet; Take 1 tablet by mouth at bedtime  Dispense: 30 tablet; Refill: 0  - meloxicam (MOBIC) 7.5 MG tablet; Take 1 tablet by mouth daily  Dispense: 90 tablet; Refill: 1    12. Chronic pain of left knee  Continue Mobic and Tylenol as needed  - meloxicam (MOBIC) 7.5 MG tablet; Take 1 tablet by mouth daily  Dispense: 90 tablet; Refill: 1    13. Type 2 diabetes mellitus with diabetic polyneuropathy, with long-term current use of insulin (HCC)  Continue Glucophage, Steglatro and [place]. Will increase Tresiba to 50 units and advised him to see Dr. Karlene Joseph  - POCT Glucose  - Ertugliflozin L-PyroglutamicAc (STEGLATRO) 15 MG TABS; Take 1 tablet by mouth daily  Dispense: 90 tablet; Refill: 1  - metFORMIN (GLUCOPHAGE) 1000 MG tablet; Take 1 tablet by mouth 2 times daily (with meals)  Dispense: 180 tablet; Refill: 1  - Insulin Degludec (TRESIBA FLEXTOUCH) 100 UNIT/ML SOPN; Inject 50 Units into the skin nightly  Dispense: 10 Adjustable Dose Pre-filled Pen Syringe; Refill: 1    14. Neuropathy  Increase Neurontin to 600 mg 3 times daily  - gabapentin (NEURONTIN) 600 MG tablet; Take 1 tablet by mouth 3 times daily for 90 days. Dispense: 270 tablet; Refill: 1  - amitriptyline (ELAVIL) 50 MG tablet; Take 1 tablet by mouth nightly  Dispense: 90 tablet; Refill: 1    15. Attention deficit hyperactivity disorder (ADHD), unspecified ADHD type  Continue Wellbutrin  - buPROPion (WELLBUTRIN SR) 100 MG extended release tablet; Take 1 tablet by mouth 2 times daily  Dispense: 180 tablet; Refill: 1    16. Muscle cramping  Continue vitamin B12 and Zanaflex  - vitamin B-12 (CYANOCOBALAMIN) 1000 MCG tablet; Take 1 tablet by mouth daily  Dispense: 90 tablet; Refill: 1  - Cholecalciferol (VITAMIN D3) 50 MCG (2000 UT) TABS; Take one tablet by mouth once a day  Dispense: 90 tablet; Refill: 1    Advised to continue to follow COVID-19 precautions    Care discussed with patient. Questions answered and patient verbalizes understanding and agrees with plan. Medications reviewed and reconciled. Continue current medications. Appropriate prescriptions are ordered. Risks and benefits of meds are discussed. After visit summary provided. Advised to call for any problems, questions, or concerns. If symptoms worsen or don't improve as expected, to call us or go to ER. Follow up as directed, sooner if needed. No follow-ups on file. This dictation was performed with a verbal recognition program and it was checked for errors. It is possible that there are still dictated errors within this office note. Any errors should be brought immediately to my attention for correction. All efforts were made to ensure that this office note is accurate.      Wilder Pelletier MD MD

## 2023-03-21 DIAGNOSIS — N52.9 ERECTILE DYSFUNCTION, UNSPECIFIED ERECTILE DYSFUNCTION TYPE: ICD-10-CM

## 2023-03-22 RX ORDER — SILDENAFIL 50 MG/1
TABLET, FILM COATED ORAL
Qty: 10 TABLET | Refills: 0 | OUTPATIENT
Start: 2023-03-22

## 2023-03-29 ENCOUNTER — OFFICE VISIT (OUTPATIENT)
Dept: GASTROENTEROLOGY | Age: 53
End: 2023-03-29
Payer: COMMERCIAL

## 2023-03-29 VITALS
HEART RATE: 76 BPM | WEIGHT: 217.8 LBS | HEIGHT: 74 IN | DIASTOLIC BLOOD PRESSURE: 86 MMHG | SYSTOLIC BLOOD PRESSURE: 136 MMHG | BODY MASS INDEX: 27.95 KG/M2 | OXYGEN SATURATION: 98 % | TEMPERATURE: 97.6 F

## 2023-03-29 DIAGNOSIS — R19.7 DIARRHEA, UNSPECIFIED TYPE: ICD-10-CM

## 2023-03-29 DIAGNOSIS — K76.0 FATTY LIVER: ICD-10-CM

## 2023-03-29 DIAGNOSIS — K21.9 GASTROESOPHAGEAL REFLUX DISEASE WITHOUT ESOPHAGITIS: ICD-10-CM

## 2023-03-29 DIAGNOSIS — R11.2 NAUSEA AND VOMITING, UNSPECIFIED VOMITING TYPE: Primary | ICD-10-CM

## 2023-03-29 PROCEDURE — 3078F DIAST BP <80 MM HG: CPT | Performed by: NURSE PRACTITIONER

## 2023-03-29 PROCEDURE — 99213 OFFICE O/P EST LOW 20 MIN: CPT | Performed by: NURSE PRACTITIONER

## 2023-03-29 PROCEDURE — 3074F SYST BP LT 130 MM HG: CPT | Performed by: NURSE PRACTITIONER

## 2023-03-29 RX ORDER — PANTOPRAZOLE SODIUM 40 MG/1
40 TABLET, DELAYED RELEASE ORAL DAILY
Qty: 90 TABLET | Refills: 5 | Status: SHIPPED | OUTPATIENT
Start: 2023-03-29

## 2023-03-29 NOTE — PROGRESS NOTES
surgical history:  He has a past surgical history that includes hernia repair; Spine surgery (1990, 2010); Umbilical hernia repair (11/04/2015); Elbow surgery (Right); Abdomen surgery; Colonoscopy (N/A, 04/25/2022); Upper gastrointestinal endoscopy (N/A, 04/25/2022); other surgical history (N/A, 05/10/2022); hernia repair (N/A, 05/17/2022); and ablation of dysrhythmic focus (12/13/2022). Social History:  He reports that he has never smoked. He has never used smokeless tobacco. He reports that he does not currently use alcohol after a past usage of about 2.0 standard drinks per week. He reports that he does not currently use drugs after having used the following drugs: Marijuana Pardeep Blow). Family history:  His family history includes Breast Cancer in his mother. Objective    Vitals:    03/29/23 1259   BP: 136/86   Pulse: 76   Temp: 97.6 °F (36.4 °C)   SpO2: 98%        Physical exam    Physical Exam  Constitutional:       General: He is not in acute distress. Appearance: Normal appearance. He is well-developed. He is not ill-appearing, toxic-appearing or diaphoretic. HENT:      Head: Normocephalic and atraumatic. Nose: Nose normal.      Mouth/Throat:      Mouth: Mucous membranes are moist.   Cardiovascular:      Rate and Rhythm: Normal rate and regular rhythm. Pulses: Normal pulses. Heart sounds: Normal heart sounds. No murmur heard. No gallop. Pulmonary:      Effort: Pulmonary effort is normal. No respiratory distress. Breath sounds: Normal breath sounds. No stridor. No wheezing or rhonchi. Abdominal:      General: Bowel sounds are normal. There is no distension. Palpations: Abdomen is soft. There is no mass. Tenderness: There is no abdominal tenderness. Hernia: No hernia is present. Comments: Well healed scar to left lower abdomen with no current pain   Musculoskeletal:         General: Normal range of motion. Cervical back: Neck supple.    Skin:

## 2023-05-10 ENCOUNTER — HOSPITAL ENCOUNTER (OUTPATIENT)
Dept: NUCLEAR MEDICINE | Age: 53
Discharge: HOME | End: 2023-05-10
Payer: COMMERCIAL

## 2023-05-10 DIAGNOSIS — R11.2 NAUSEA AND VOMITING, UNSPECIFIED VOMITING TYPE: ICD-10-CM

## 2023-05-10 PROCEDURE — 78264 GASTRIC EMPTYING IMG STUDY: CPT

## 2023-05-10 RX ADMIN — Medication 1 MILLICURIE: at 10:05

## 2023-05-17 DIAGNOSIS — N52.9 ERECTILE DYSFUNCTION, UNSPECIFIED ERECTILE DYSFUNCTION TYPE: ICD-10-CM

## 2023-05-17 RX ORDER — TADALAFIL 20 MG/1
TABLET ORAL
Qty: 10 TABLET | Refills: 0 | Status: SHIPPED | OUTPATIENT
Start: 2023-05-17

## 2023-06-26 DIAGNOSIS — N52.9 ERECTILE DYSFUNCTION, UNSPECIFIED ERECTILE DYSFUNCTION TYPE: ICD-10-CM

## 2023-06-26 RX ORDER — TADALAFIL 20 MG/1
TABLET ORAL
Qty: 10 TABLET | Refills: 0 | Status: SHIPPED | OUTPATIENT
Start: 2023-06-26

## 2023-06-26 RX ORDER — SILDENAFIL 50 MG/1
50 TABLET, FILM COATED ORAL DAILY PRN
Qty: 10 TABLET | Refills: 0 | OUTPATIENT
Start: 2023-06-26

## 2023-07-19 ENCOUNTER — OFFICE VISIT (OUTPATIENT)
Dept: INTERNAL MEDICINE CLINIC | Age: 53
End: 2023-07-19
Payer: COMMERCIAL

## 2023-07-19 VITALS
SYSTOLIC BLOOD PRESSURE: 144 MMHG | OXYGEN SATURATION: 99 % | DIASTOLIC BLOOD PRESSURE: 82 MMHG | BODY MASS INDEX: 26.58 KG/M2 | HEART RATE: 71 BPM | WEIGHT: 207 LBS

## 2023-07-19 DIAGNOSIS — Z79.4 TYPE 2 DIABETES MELLITUS WITH DIABETIC POLYNEUROPATHY, WITH LONG-TERM CURRENT USE OF INSULIN (HCC): Primary | ICD-10-CM

## 2023-07-19 DIAGNOSIS — N52.9 ERECTILE DYSFUNCTION, UNSPECIFIED ERECTILE DYSFUNCTION TYPE: ICD-10-CM

## 2023-07-19 DIAGNOSIS — E11.42 TYPE 2 DIABETES MELLITUS WITH DIABETIC POLYNEUROPATHY, WITH LONG-TERM CURRENT USE OF INSULIN (HCC): Primary | ICD-10-CM

## 2023-07-19 PROCEDURE — 3079F DIAST BP 80-89 MM HG: CPT

## 2023-07-19 PROCEDURE — 3077F SYST BP >= 140 MM HG: CPT

## 2023-07-19 PROCEDURE — 83037 HB GLYCOSYLATED A1C HOME DEV: CPT

## 2023-07-19 PROCEDURE — 99213 OFFICE O/P EST LOW 20 MIN: CPT

## 2023-07-19 RX ORDER — TADALAFIL 20 MG/1
TABLET ORAL
Qty: 10 TABLET | Refills: 3 | Status: SHIPPED | OUTPATIENT
Start: 2023-07-19

## 2023-07-19 ASSESSMENT — ENCOUNTER SYMPTOMS
COLOR CHANGE: 0
COUGH: 0
NAUSEA: 0
EYE PAIN: 0
WHEEZING: 0
ABDOMINAL PAIN: 0
RHINORRHEA: 0
VOMITING: 0
EYE DISCHARGE: 0
DIARRHEA: 0
CHEST TIGHTNESS: 0
SORE THROAT: 0
TROUBLE SWALLOWING: 0
FACIAL SWELLING: 0
CHOKING: 0
CONSTIPATION: 0
STRIDOR: 0
SHORTNESS OF BREATH: 0
EYE REDNESS: 0

## 2023-07-19 ASSESSMENT — VISUAL ACUITY: OU: 1

## 2023-07-19 NOTE — PROGRESS NOTES
Subjective:      Chief Complaint   Patient presents with    Discuss Medications     Ed meds     HPI:  Casandra Rizzo is a 50 y.o. male who presents today to check A1c and discuss ED medications. Had better response to Cialis than Viagra and would like to solely take that medication for treatment of ED. Past Medical History:   Diagnosis Date    Asthma     Depression     Diabetes mellitus ([address])     Hx of echocardiogram 12/21/2022    This is a limited echo to assess EF and RVSP. Left ventricular function is normal, EF is estimated at 55-60%. Mild tricuspid regurgitation. RVSP of 24mmHg. No evidence of any pericardial effusion. When this echo is compared with the echo from 12/13/2022, no significant interval changes have occurred. Hyperlipidemia     Hypertension     Palpitations     Sleep disorder     Tachycardia     Ventral hernia without obstruction or gangrene 11/04/2015    WPW (Sigrid-Parkinson-White syndrome)     Eugenia Mazariegos      Past Surgical History:   Procedure Laterality Date    ABDOMEN SURGERY      ABLATION OF DYSRHYTHMIC FOCUS  12/13/2022    atrial fibrillation ablation and WPW accessory pathway ablation    COLONOSCOPY N/A 04/25/2022    COLONOSCOPY POLYPECTOMY SNARE/COLD BIOPSY performed by Magen Harmon DO at [address]  N/A 05/17/2022    HERNIA INCISIONAL REPAIR LAPAROSCOPIC ROBOTIC WITH MESH performed by Magen Harmon DO at [address] N/A 05/10/2022    Electrophysiology study and cardioversion performed by Dr. Anastacio Soto.     Rakesh 2010    Fusion of L3, L4, L5    UMBILICAL HERNIA REPAIR  11/04/2015    repair incarcerated supra umbilical hernia    UPPER GASTROINTESTINAL ENDOSCOPY N/A 04/25/2022    EGD DIAGNOSTIC ONLY performed by Magen Harmon DO at [address] [facility] History     Tobacco Use    Smoking status: Never    Smokeless tobacco: Never   Substance Use Topics    Alcohol use: Not

## 2023-08-02 ENCOUNTER — TELEPHONE (OUTPATIENT)
Dept: INTERNAL MEDICINE CLINIC | Age: 53
End: 2023-08-02

## 2023-08-02 DIAGNOSIS — N52.9 ERECTILE DYSFUNCTION, UNSPECIFIED ERECTILE DYSFUNCTION TYPE: ICD-10-CM

## 2023-08-02 RX ORDER — TADALAFIL 20 MG/1
TABLET ORAL
Qty: 10 TABLET | Refills: 3 | Status: SHIPPED | OUTPATIENT
Start: 2023-08-02

## 2023-08-02 NOTE — TELEPHONE ENCOUNTER
Patient called and requested refill for ED medication Tadalafil to be sent to [facility] DR DIEGO CACERES on [address] .

## 2023-08-09 ENCOUNTER — OFFICE VISIT (OUTPATIENT)
Dept: CARDIOLOGY CLINIC | Age: 53
End: 2023-08-09
Payer: COMMERCIAL

## 2023-08-09 VITALS
DIASTOLIC BLOOD PRESSURE: 78 MMHG | WEIGHT: 209.4 LBS | BODY MASS INDEX: 27.75 KG/M2 | HEIGHT: 73 IN | HEART RATE: 80 BPM | SYSTOLIC BLOOD PRESSURE: 140 MMHG

## 2023-08-09 DIAGNOSIS — Z98.890 S/P ABLATION OF ATRIAL FIBRILLATION: ICD-10-CM

## 2023-08-09 DIAGNOSIS — I48.0 PAROXYSMAL ATRIAL FIBRILLATION (HCC): ICD-10-CM

## 2023-08-09 DIAGNOSIS — I10 ESSENTIAL HYPERTENSION: Primary | ICD-10-CM

## 2023-08-09 DIAGNOSIS — I48.0 HYPERCOAGULABLE STATE DUE TO PAROXYSMAL ATRIAL FIBRILLATION (HCC): ICD-10-CM

## 2023-08-09 DIAGNOSIS — Z86.79 S/P ABLATION OF ATRIAL FIBRILLATION: ICD-10-CM

## 2023-08-09 DIAGNOSIS — Z79.4 TYPE 2 DIABETES MELLITUS WITH DIABETIC POLYNEUROPATHY, WITH LONG-TERM CURRENT USE OF INSULIN (HCC): ICD-10-CM

## 2023-08-09 DIAGNOSIS — E11.42 TYPE 2 DIABETES MELLITUS WITH DIABETIC POLYNEUROPATHY, WITH LONG-TERM CURRENT USE OF INSULIN (HCC): ICD-10-CM

## 2023-08-09 DIAGNOSIS — D68.69 HYPERCOAGULABLE STATE DUE TO PAROXYSMAL ATRIAL FIBRILLATION (HCC): ICD-10-CM

## 2023-08-09 DIAGNOSIS — I45.6 WOLFF PARKINSON WHITE PATTERN SEEN ON ELECTROCARDIOGRAM: ICD-10-CM

## 2023-08-09 PROCEDURE — 3078F DIAST BP <80 MM HG: CPT | Performed by: INTERNAL MEDICINE

## 2023-08-09 PROCEDURE — 99214 OFFICE O/P EST MOD 30 MIN: CPT | Performed by: INTERNAL MEDICINE

## 2023-08-09 PROCEDURE — 93000 ELECTROCARDIOGRAM COMPLETE: CPT | Performed by: INTERNAL MEDICINE

## 2023-08-09 PROCEDURE — 3077F SYST BP >= 140 MM HG: CPT | Performed by: INTERNAL MEDICINE

## 2023-08-09 RX ORDER — METOPROLOL SUCCINATE 25 MG/1
25 TABLET, EXTENDED RELEASE ORAL DAILY
Qty: 30 TABLET | Refills: 5 | Status: SHIPPED | OUTPATIENT
Start: 2023-08-09

## 2023-08-09 NOTE — PATIENT INSTRUCTIONS
relevant cardiac testing reviewed with patient & recommendations made based on assessment of the results. Discussed role of Cardiac risk factors & effects + treatment of co morbidities with patient & advised accordingly. MEDICATIONS: List of medications patient is currently taking is reviewed in detail with the patient. Discussed any side effects or problems taking the medication. Recommend Continue present management & medications as listed. + add Toprol XL to the regimen    AFFIRMATION: I reviewed patient's history, previous & current medical problems & all Labs + testing. This includes chart prep even prior to the vosit. Various goals are discussed and multiple questions answered. Relevant concelling performed. Office follow up in six months.

## 2023-08-09 NOTE — PROGRESS NOTES
OFFICE PROGRESS NOTES      Brandy Parker is a 50 y.o. male who has    CHIEF COMPLAINT AS FOLLOWS:  CHEST PAIN:    C/O chest heaviness with exertion. SOB: Has SOB with exertion but no change over previous noted. LEG EDEMA: No leg edema   PALPITATIONS:  C/O heart racing with exertion & night time. DIZZINESS: C/O Dizziness with above symptoms. SYNCOPE: None   OTHER/ ADDITIONAL COMPLAINTS:                                     HPI: Patient is here for F/U on his  WPW / PAFArrhythmia, HTN & Dyslipidemia problems. Arrhythmia: Patient has known H/O WPW/PAF arrhythmia in the past. S/P Ablation of both. HTN: Patient has known essential HTN. Has been treated with guideline recommended medical / physical/ diet therapy as stated below. Dyslipidemia: Patient has known mixed dyslipidemia. Has been treated with guideline recommended medical / physical/ diet therapy as stated below. Current Outpatient Medications   Medication Sig Dispense Refill    tadalafil (CIALIS) 20 MG tablet TAKE 1 TABLET BY MOUTH EVERY DAY AS NEEDED 10 tablet 3    Ertugliflozin L-PyroglutamicAc (STEGLATRO) 15 MG TABS Take 1 tablet by mouth daily 90 tablet 1    rivaroxaban (XARELTO) 20 MG TABS tablet Take 1 tablet by mouth daily 90 tablet 1    metFORMIN (GLUCOPHAGE) 1000 MG tablet Take 1 tablet by mouth 2 times daily (with meals) 180 tablet 1    Insulin Degludec (TRESIBA FLEXTOUCH) 100 UNIT/ML SOPN Inject 50 Units into the skin nightly 10 Adjustable Dose Pre-filled Pen Syringe 1    gabapentin (NEURONTIN) 600 MG tablet Take 1 tablet by mouth 3 times daily for 90 days.  270 tablet 1    escitalopram (LEXAPRO) 10 MG tablet TAKE 1 TABLET BY MOUTH EVERY DAY 30 tablet 3    meloxicam (MOBIC) 7.5 MG tablet Take 1 tablet by mouth daily 90 tablet 1    losartan (COZAAR) 100 MG tablet Take 1 tablet by mouth daily 90 tablet 1    hydroCHLOROthiazide (MICROZIDE) 12.5 MG capsule Take 1 capsule by mouth every

## 2023-08-18 DIAGNOSIS — N52.9 ERECTILE DYSFUNCTION, UNSPECIFIED ERECTILE DYSFUNCTION TYPE: ICD-10-CM

## 2023-08-18 RX ORDER — TADALAFIL 20 MG/1
TABLET ORAL
Qty: 10 TABLET | Refills: 3 | Status: SHIPPED | OUTPATIENT
Start: 2023-08-18

## 2023-09-05 ENCOUNTER — PROCEDURE VISIT (OUTPATIENT)
Dept: CARDIOLOGY CLINIC | Age: 53
End: 2023-09-05

## 2023-09-05 DIAGNOSIS — Z86.79 S/P ABLATION OF ATRIAL FIBRILLATION: ICD-10-CM

## 2023-09-05 DIAGNOSIS — I10 ESSENTIAL HYPERTENSION: ICD-10-CM

## 2023-09-05 DIAGNOSIS — I45.6 WOLFF PARKINSON WHITE PATTERN SEEN ON ELECTROCARDIOGRAM: ICD-10-CM

## 2023-09-05 DIAGNOSIS — Z98.890 S/P ABLATION OF ATRIAL FIBRILLATION: ICD-10-CM

## 2023-09-05 DIAGNOSIS — I48.0 PAROXYSMAL ATRIAL FIBRILLATION (HCC): ICD-10-CM

## 2023-09-05 DIAGNOSIS — I48.0 HYPERCOAGULABLE STATE DUE TO PAROXYSMAL ATRIAL FIBRILLATION (HCC): ICD-10-CM

## 2023-09-05 DIAGNOSIS — E11.42 TYPE 2 DIABETES MELLITUS WITH DIABETIC POLYNEUROPATHY, WITH LONG-TERM CURRENT USE OF INSULIN (HCC): ICD-10-CM

## 2023-09-05 DIAGNOSIS — D68.69 HYPERCOAGULABLE STATE DUE TO PAROXYSMAL ATRIAL FIBRILLATION (HCC): ICD-10-CM

## 2023-09-05 DIAGNOSIS — Z79.4 TYPE 2 DIABETES MELLITUS WITH DIABETIC POLYNEUROPATHY, WITH LONG-TERM CURRENT USE OF INSULIN (HCC): ICD-10-CM

## 2023-09-05 DIAGNOSIS — R94.31 ABNORMAL EKG: Primary | ICD-10-CM

## 2023-09-05 LAB
LV EF: 60 %
LVEF MODALITY: NORMAL

## 2023-09-08 ENCOUNTER — TELEPHONE (OUTPATIENT)
Dept: CARDIOLOGY CLINIC | Age: 53
End: 2023-09-08

## 2023-09-11 NOTE — TELEPHONE ENCOUNTER
Called pt for Nm stress test result. Summary   Normal study. Normal perfusion study with normal distribution in all coronal, short, and   horizontal axis. The observed defect is consistent with diaphragmatic attenuation. Normal LV function. LVEF is 60 %. Supervising physician Dr. Nicolas Marroquin .      Recommendation   Recommendation: Routine follow-up. Pt verbalized understanding and will keep f/u appt.

## 2023-10-01 DIAGNOSIS — N52.9 ERECTILE DYSFUNCTION, UNSPECIFIED ERECTILE DYSFUNCTION TYPE: ICD-10-CM

## 2023-10-02 RX ORDER — TADALAFIL 20 MG/1
TABLET ORAL
Qty: 10 TABLET | Refills: 0 | Status: SHIPPED | OUTPATIENT
Start: 2023-10-02 | End: 2023-10-04 | Stop reason: SDUPTHER

## 2023-10-04 ENCOUNTER — OFFICE VISIT (OUTPATIENT)
Dept: INTERNAL MEDICINE CLINIC | Age: 53
End: 2023-10-04
Payer: COMMERCIAL

## 2023-10-04 VITALS
SYSTOLIC BLOOD PRESSURE: 126 MMHG | HEART RATE: 68 BPM | HEIGHT: 73 IN | BODY MASS INDEX: 27.96 KG/M2 | DIASTOLIC BLOOD PRESSURE: 86 MMHG | OXYGEN SATURATION: 99 % | WEIGHT: 211 LBS

## 2023-10-04 DIAGNOSIS — Z12.5 SCREENING FOR PROSTATE CANCER: ICD-10-CM

## 2023-10-04 DIAGNOSIS — M25.562 CHRONIC PAIN OF LEFT KNEE: ICD-10-CM

## 2023-10-04 DIAGNOSIS — I48.0 PAROXYSMAL ATRIAL FIBRILLATION (HCC): Primary | ICD-10-CM

## 2023-10-04 DIAGNOSIS — M79.671 PAIN IN BOTH FEET: ICD-10-CM

## 2023-10-04 DIAGNOSIS — N52.9 ERECTILE DYSFUNCTION, UNSPECIFIED ERECTILE DYSFUNCTION TYPE: ICD-10-CM

## 2023-10-04 DIAGNOSIS — F41.9 ANXIETY: ICD-10-CM

## 2023-10-04 DIAGNOSIS — I45.6 WOLFF PARKINSON WHITE PATTERN SEEN ON ELECTROCARDIOGRAM: ICD-10-CM

## 2023-10-04 DIAGNOSIS — E78.00 PURE HYPERCHOLESTEROLEMIA: ICD-10-CM

## 2023-10-04 DIAGNOSIS — G62.9 NEUROPATHY: ICD-10-CM

## 2023-10-04 DIAGNOSIS — F32.A CHRONIC DEPRESSION: ICD-10-CM

## 2023-10-04 DIAGNOSIS — G89.29 CHRONIC BILATERAL LOW BACK PAIN WITHOUT SCIATICA: ICD-10-CM

## 2023-10-04 DIAGNOSIS — M79.672 PAIN IN BOTH FEET: ICD-10-CM

## 2023-10-04 DIAGNOSIS — Z79.4 TYPE 2 DIABETES MELLITUS WITH DIABETIC POLYNEUROPATHY, WITH LONG-TERM CURRENT USE OF INSULIN (HCC): ICD-10-CM

## 2023-10-04 DIAGNOSIS — K21.9 GASTROESOPHAGEAL REFLUX DISEASE WITHOUT ESOPHAGITIS: ICD-10-CM

## 2023-10-04 DIAGNOSIS — F90.9 ATTENTION DEFICIT HYPERACTIVITY DISORDER (ADHD), UNSPECIFIED ADHD TYPE: ICD-10-CM

## 2023-10-04 DIAGNOSIS — Z98.890 S/P ABLATION OF ATRIAL FIBRILLATION: ICD-10-CM

## 2023-10-04 DIAGNOSIS — G89.29 CHRONIC PAIN OF LEFT KNEE: ICD-10-CM

## 2023-10-04 DIAGNOSIS — Z86.79 S/P ABLATION OF ATRIAL FIBRILLATION: ICD-10-CM

## 2023-10-04 DIAGNOSIS — E11.42 TYPE 2 DIABETES MELLITUS WITH DIABETIC POLYNEUROPATHY, WITH LONG-TERM CURRENT USE OF INSULIN (HCC): ICD-10-CM

## 2023-10-04 DIAGNOSIS — M54.50 CHRONIC BILATERAL LOW BACK PAIN WITHOUT SCIATICA: ICD-10-CM

## 2023-10-04 DIAGNOSIS — I10 ESSENTIAL HYPERTENSION: ICD-10-CM

## 2023-10-04 LAB
CHP ED QC CHECK: NORMAL
GLUCOSE BLD-MCNC: 233 MG/DL
HBA1C MFR BLD: 7.5 %

## 2023-10-04 PROCEDURE — 3051F HG A1C>EQUAL 7.0%<8.0%: CPT | Performed by: INTERNAL MEDICINE

## 2023-10-04 PROCEDURE — 3074F SYST BP LT 130 MM HG: CPT | Performed by: INTERNAL MEDICINE

## 2023-10-04 PROCEDURE — 99214 OFFICE O/P EST MOD 30 MIN: CPT | Performed by: INTERNAL MEDICINE

## 2023-10-04 PROCEDURE — 83036 HEMOGLOBIN GLYCOSYLATED A1C: CPT | Performed by: INTERNAL MEDICINE

## 2023-10-04 PROCEDURE — 3078F DIAST BP <80 MM HG: CPT | Performed by: INTERNAL MEDICINE

## 2023-10-04 PROCEDURE — 82962 GLUCOSE BLOOD TEST: CPT | Performed by: INTERNAL MEDICINE

## 2023-10-04 RX ORDER — HYDROCHLOROTHIAZIDE 12.5 MG/1
12.5 CAPSULE, GELATIN COATED ORAL EVERY MORNING
Qty: 90 CAPSULE | Refills: 1 | Status: SHIPPED | OUTPATIENT
Start: 2023-10-04

## 2023-10-04 RX ORDER — METOPROLOL SUCCINATE 25 MG/1
25 TABLET, EXTENDED RELEASE ORAL DAILY
Qty: 90 TABLET | Refills: 1 | Status: SHIPPED | OUTPATIENT
Start: 2023-10-04

## 2023-10-04 RX ORDER — GABAPENTIN 600 MG/1
600 TABLET ORAL 3 TIMES DAILY
Qty: 270 TABLET | Refills: 1 | Status: SHIPPED | OUTPATIENT
Start: 2023-10-04 | End: 2024-04-01

## 2023-10-04 RX ORDER — ESCITALOPRAM OXALATE 10 MG/1
TABLET ORAL
Qty: 90 TABLET | Refills: 1 | Status: SHIPPED | OUTPATIENT
Start: 2023-10-04

## 2023-10-04 RX ORDER — TADALAFIL 20 MG/1
TABLET ORAL
Qty: 10 TABLET | Refills: 2 | Status: SHIPPED | OUTPATIENT
Start: 2023-10-04

## 2023-10-04 RX ORDER — MELOXICAM 7.5 MG/1
7.5 TABLET ORAL DAILY
Qty: 90 TABLET | Refills: 1 | Status: SHIPPED | OUTPATIENT
Start: 2023-10-04

## 2023-10-04 RX ORDER — BUPROPION HYDROCHLORIDE 100 MG/1
100 TABLET, EXTENDED RELEASE ORAL 2 TIMES DAILY
Qty: 180 TABLET | Refills: 1 | Status: SHIPPED | OUTPATIENT
Start: 2023-10-04

## 2023-10-04 RX ORDER — ATORVASTATIN CALCIUM 40 MG/1
40 TABLET, FILM COATED ORAL DAILY
Qty: 90 TABLET | Refills: 1 | Status: SHIPPED | OUTPATIENT
Start: 2023-10-04

## 2023-10-04 RX ORDER — ERTUGLIFLOZIN 15 MG/1
1 TABLET, FILM COATED ORAL DAILY
Qty: 90 TABLET | Refills: 1 | Status: SHIPPED | OUTPATIENT
Start: 2023-10-04 | End: 2023-10-06

## 2023-10-04 RX ORDER — LOSARTAN POTASSIUM 100 MG/1
100 TABLET ORAL DAILY
Qty: 90 TABLET | Refills: 1 | Status: SHIPPED | OUTPATIENT
Start: 2023-10-04

## 2023-10-04 RX ORDER — AMITRIPTYLINE HYDROCHLORIDE 50 MG/1
50 TABLET, FILM COATED ORAL NIGHTLY
Qty: 90 TABLET | Refills: 1 | Status: SHIPPED | OUTPATIENT
Start: 2023-10-04

## 2023-10-04 NOTE — PROGRESS NOTES
Name: Jimmy Rush  Age: 50 y.o. YOB: 1970  Sex: male    CHIEF COMPLAINT:    Chief Complaint   Patient presents with    Hypertension    Diabetes    Other     Other chronic conditions         HISTORY OF PRESENT ILLNESS:     This is a pleasant  50 y.o. male  is seen today for management of chronic medical problems and medications refills. Previous records reviewed . Patient has not seen me since February 2023. He has multiple complaints. Patient claims that he has significant pain and paresthesia of his legs. He has a lot of burning sensation in both legs and he is getting to a point where it is difficult for him to walk also. He did see Dr. Thurmon Phoenix, his a spine surgeon and they did a myelogram CT scan and it does not show anything pushing on any nerve. The nerve will probably all look good. He does have some facet arthritis at L2-3 and L5-S1 above and below the previous fusion, but those are not really all that impressive. He recommended him to see a neurologist for further recommendations. Patient claims that his neuropathy is severe enough that he wants to have it amputated his feet rather than suffering from neuropathy. Patient wants a referral to a neurologist here in [place]. He is currently taking Neurontin and Elavil without any significant relief. Patient claims that he has stopped working and is working for his disability now. Patient claims that he continues to have mild palpitations and dizziness and SOB but much less now. Denies any chest pain. Did see Dr. Da Justin recently. He told him to continue on Multaq and Xarelto and added Toprol-XL small dose. He also sees Dr. Lloyd Serrano periodically. Still continues to have migraine headaches but rare. He is taking Fioricet as needed. Appetite is better. Bowels are moving okay. Gastritis symptoms are better. IBS symptoms are better with Bentyl as needed  No urinary symptoms.     His sugars are usually@

## 2023-10-05 LAB
ALBUMIN SERPL-MCNC: 4.9 G/DL (ref 3.4–5)
ALBUMIN/GLOB SERPL: 1.8 {RATIO} (ref 1.1–2.2)
ALP SERPL-CCNC: 81 U/L (ref 40–129)
ALT SERPL-CCNC: 29 U/L (ref 10–40)
ANION GAP SERPL CALCULATED.3IONS-SCNC: 14 MMOL/L (ref 3–16)
AST SERPL-CCNC: 38 U/L (ref 15–37)
BASOPHILS # BLD: 0 K/UL (ref 0–0.2)
BASOPHILS NFR BLD: 0.7 %
BILIRUB SERPL-MCNC: 0.4 MG/DL (ref 0–1)
BUN SERPL-MCNC: 18 MG/DL (ref 7–20)
CALCIUM SERPL-MCNC: 9.6 MG/DL (ref 8.3–10.6)
CHLORIDE SERPL-SCNC: 100 MMOL/L (ref 99–110)
CHOLEST SERPL-MCNC: 219 MG/DL (ref 0–199)
CO2 SERPL-SCNC: 23 MMOL/L (ref 21–32)
CREAT SERPL-MCNC: 0.9 MG/DL (ref 0.9–1.3)
DEPRECATED RDW RBC AUTO: 13.7 % (ref 12.4–15.4)
EOSINOPHIL # BLD: 0.1 K/UL (ref 0–0.6)
EOSINOPHIL NFR BLD: 2.9 %
GFR SERPLBLD CREATININE-BSD FMLA CKD-EPI: >60 ML/MIN/{1.73_M2}
GLUCOSE SERPL-MCNC: 206 MG/DL (ref 70–99)
HCT VFR BLD AUTO: 52.3 % (ref 40.5–52.5)
HDLC SERPL-MCNC: 68 MG/DL (ref 40–60)
HGB BLD-MCNC: 17.9 G/DL (ref 13.5–17.5)
LDL CHOLESTEROL CALCULATED: 131 MG/DL
LYMPHOCYTES # BLD: 1.6 K/UL (ref 1–5.1)
LYMPHOCYTES NFR BLD: 31.1 %
MCH RBC QN AUTO: 30.8 PG (ref 26–34)
MCHC RBC AUTO-ENTMCNC: 34.2 G/DL (ref 31–36)
MCV RBC AUTO: 89.9 FL (ref 80–100)
MONOCYTES # BLD: 0.5 K/UL (ref 0–1.3)
MONOCYTES NFR BLD: 9.5 %
NEUTROPHILS # BLD: 2.9 K/UL (ref 1.7–7.7)
NEUTROPHILS NFR BLD: 55.8 %
PLATELET # BLD AUTO: ABNORMAL K/UL (ref 135–450)
PLATELET BLD QL SMEAR: ABNORMAL
PMV BLD AUTO: ABNORMAL FL (ref 5–10.5)
POTASSIUM SERPL-SCNC: 5.2 MMOL/L (ref 3.5–5.1)
PROT SERPL-MCNC: 7.7 G/DL (ref 6.4–8.2)
PSA SERPL DL<=0.01 NG/ML-MCNC: 0.62 NG/ML (ref 0–4)
RBC # BLD AUTO: 5.82 M/UL (ref 4.2–5.9)
SLIDE REVIEW: ABNORMAL
SODIUM SERPL-SCNC: 137 MMOL/L (ref 136–145)
TRIGL SERPL-MCNC: 102 MG/DL (ref 0–150)
VLDLC SERPL CALC-MCNC: 20 MG/DL
WBC # BLD AUTO: 5.1 K/UL (ref 4–11)

## 2023-10-06 ENCOUNTER — TELEPHONE (OUTPATIENT)
Dept: INTERNAL MEDICINE CLINIC | Age: 53
End: 2023-10-06

## 2023-10-06 NOTE — TELEPHONE ENCOUNTER
Pharmacy called the patients steglatro was going to be $1200 for a 3 month supply now that he is on short term disability. Per the provider we have changed the medication to Jardiance 25 mg daily. This is only $10 am month or $29.99 for 3 months. The patient was informed of this change and he voiced understanding. The script was called to the pharmacy.

## 2023-10-12 ENCOUNTER — TELEPHONE (OUTPATIENT)
Dept: CARDIOLOGY CLINIC | Age: 53
End: 2023-10-12

## 2023-10-12 NOTE — TELEPHONE ENCOUNTER
Faxed records release request to [facility] for [facility] with Disability Determination. Faxed to [phone]. Call [phone]  Option 2 to check on progress.

## 2023-10-16 ENCOUNTER — TELEPHONE (OUTPATIENT)
Dept: INTERNAL MEDICINE CLINIC | Age: 53
End: 2023-10-16

## 2023-10-16 NOTE — TELEPHONE ENCOUNTER
Medical records request received from [facility]. Request sent to [facility] for processing.

## 2023-10-31 DIAGNOSIS — N52.9 ERECTILE DYSFUNCTION, UNSPECIFIED ERECTILE DYSFUNCTION TYPE: ICD-10-CM

## 2023-11-01 RX ORDER — TADALAFIL 20 MG/1
TABLET ORAL
Qty: 10 TABLET | Refills: 2 | Status: SHIPPED | OUTPATIENT
Start: 2023-11-01

## 2023-12-28 ENCOUNTER — OFFICE VISIT (OUTPATIENT)
Dept: FAMILY MEDICINE CLINIC | Age: 53
End: 2023-12-28
Payer: COMMERCIAL

## 2023-12-28 VITALS
OXYGEN SATURATION: 99 % | DIASTOLIC BLOOD PRESSURE: 82 MMHG | HEIGHT: 73 IN | BODY MASS INDEX: 28.31 KG/M2 | HEART RATE: 88 BPM | WEIGHT: 213.6 LBS | SYSTOLIC BLOOD PRESSURE: 136 MMHG | TEMPERATURE: 96.4 F

## 2023-12-28 DIAGNOSIS — Z76.89 ENCOUNTER TO ESTABLISH CARE WITH NEW DOCTOR: ICD-10-CM

## 2023-12-28 DIAGNOSIS — G62.9 NEUROPATHY: ICD-10-CM

## 2023-12-28 DIAGNOSIS — I10 ESSENTIAL HYPERTENSION: ICD-10-CM

## 2023-12-28 DIAGNOSIS — G47.00 INSOMNIA, UNSPECIFIED TYPE: ICD-10-CM

## 2023-12-28 DIAGNOSIS — G89.29 CHRONIC BILATERAL LOW BACK PAIN WITHOUT SCIATICA: ICD-10-CM

## 2023-12-28 DIAGNOSIS — M54.50 CHRONIC BILATERAL LOW BACK PAIN WITHOUT SCIATICA: ICD-10-CM

## 2023-12-28 DIAGNOSIS — E78.00 PURE HYPERCHOLESTEROLEMIA: ICD-10-CM

## 2023-12-28 DIAGNOSIS — F32.A CHRONIC DEPRESSION: ICD-10-CM

## 2023-12-28 DIAGNOSIS — E11.42 TYPE 2 DIABETES MELLITUS WITH DIABETIC POLYNEUROPATHY, WITH LONG-TERM CURRENT USE OF INSULIN (HCC): Primary | ICD-10-CM

## 2023-12-28 DIAGNOSIS — Z79.4 TYPE 2 DIABETES MELLITUS WITH DIABETIC POLYNEUROPATHY, WITH LONG-TERM CURRENT USE OF INSULIN (HCC): Primary | ICD-10-CM

## 2023-12-28 PROCEDURE — 3079F DIAST BP 80-89 MM HG: CPT | Performed by: FAMILY MEDICINE

## 2023-12-28 PROCEDURE — 3051F HG A1C>EQUAL 7.0%<8.0%: CPT | Performed by: FAMILY MEDICINE

## 2023-12-28 PROCEDURE — 99204 OFFICE O/P NEW MOD 45 MIN: CPT | Performed by: FAMILY MEDICINE

## 2023-12-28 PROCEDURE — 3075F SYST BP GE 130 - 139MM HG: CPT | Performed by: FAMILY MEDICINE

## 2023-12-28 NOTE — PROGRESS NOTES
Aram Maki is a 48 y.o. male , here to establish care, who presents for evaluation of hypertension, hyperlipidemia, and diabetes. Chandu Numbers He indicates that he is feeling well and denies any symptoms referable to his elevated blood pressure. Specifically denies chest pain, palpitations, dyspnea, orthopnea, PND or peripheral edema. No anorexia, arthralgia, or leg cramps noted. Current medication regimen is as listed below. He denies any side effects of medication, and has been taking it regularly. medication compliance:  compliant all of the time, Tresiba 34 units nightly, and Humalog 12 to 14 units with meals, diabetic diet compliance:  compliant most of the time, home glucose monitoring: are performed regularly, values range fastings 1 , further diabetic ROS: no polyuria or polydipsia, no chest pain, dyspnea or TIAs, no numbness, tingling or pain in extremities, last eye exam approximately 1 year ago. Normally sees Dr. Radha Shetty for his diabetes but his previous internist has been prescribing his meds. A1c on 10/4/2023 was 7.5%    Chronic lumbar radiculopathy currently on Neurontin which helps. He is taking a 600 mg 3 times daily. He was on Vicodin in the past but has stopped. He also has his medical marijuana card which seems to help more than anything. He is currently on short-term disability being evaluated for permanent disability due to his back pain    Patient with chronic insomnia currently managed by Elavil 50 mg nightly. This works somewhat. He had been on Ambien in the past for many years but was able to get off of it finally. Depression and anxiety currently on Lexapro 10 mg daily and Wellbutrin 150 mg twice daily. Patient states that his depression has been well-controlled and does not want to take so many meds.       Current Outpatient Medications   Medication Sig Dispense Refill    tadalafil (CIALIS) 20 MG tablet TAKE 1 TABLET BY MOUTH ONCE DAILY AS NEEDED 10 tablet 2

## 2024-03-07 ENCOUNTER — OFFICE VISIT (OUTPATIENT)
Dept: NEUROLOGY | Age: 54
End: 2024-03-07
Payer: COMMERCIAL

## 2024-03-07 VITALS
OXYGEN SATURATION: 98 % | SYSTOLIC BLOOD PRESSURE: 148 MMHG | DIASTOLIC BLOOD PRESSURE: 74 MMHG | BODY MASS INDEX: 28.37 KG/M2 | RESPIRATION RATE: 16 BRPM | HEART RATE: 87 BPM | WEIGHT: 215 LBS

## 2024-03-07 DIAGNOSIS — M79.2 NEUROPATHIC PAIN: Primary | ICD-10-CM

## 2024-03-07 DIAGNOSIS — E11.42 DIABETIC PERIPHERAL NEUROPATHY (HCC): ICD-10-CM

## 2024-03-07 DIAGNOSIS — G62.9 PERIPHERAL NERVE DISEASE: ICD-10-CM

## 2024-03-07 DIAGNOSIS — G89.29 CHRONIC BILATERAL LOW BACK PAIN WITHOUT SCIATICA: ICD-10-CM

## 2024-03-07 DIAGNOSIS — M54.50 CHRONIC BILATERAL LOW BACK PAIN WITHOUT SCIATICA: ICD-10-CM

## 2024-03-07 PROCEDURE — 3078F DIAST BP <80 MM HG: CPT | Performed by: PSYCHIATRY & NEUROLOGY

## 2024-03-07 PROCEDURE — 99205 OFFICE O/P NEW HI 60 MIN: CPT | Performed by: PSYCHIATRY & NEUROLOGY

## 2024-03-07 PROCEDURE — 3077F SYST BP >= 140 MM HG: CPT | Performed by: PSYCHIATRY & NEUROLOGY

## 2024-03-07 RX ORDER — PREGABALIN 75 MG/1
75 CAPSULE ORAL 3 TIMES DAILY
Qty: 60 CAPSULE | Refills: 5 | Status: SHIPPED | OUTPATIENT
Start: 2024-03-07 | End: 2024-09-03

## 2024-03-07 NOTE — PROGRESS NOTES
3/7/24    Nathan Graham  1970    Chief Complaint   Patient presents with    New Patient     New patient here for neuropathy- both feet, right is the worse       History of Present Illness  Nathan is a 53 y.o. male presenting today for evaluation of:  Peripheral neuropathy, neuropathic pain    KJ states that he has been having excruciating pain in his feet that is progressively getting worse over the past couple years.  He states he has had several health problems over the past couple years.  He was diagnosed with Sigrid-Parkinson-White syndrome and atrial fibrillation.  He is on antiarrhythmic medications as well as Xarelto for anticoagulation.  He was diagnosed with ankylosing spondylitis after seeing a surgeon for low back pain.  He has had previous fusion in his lumbar spine.  He does have type 2 diabetes and states at the time of diagnosis his hemoglobin A1c was 15 and now it is down to 7.  He states that years ago he was 327 pounds and now he is 215 pounds.  He states that he works for [employer] and it is very painful to walk for work.  By the late morning hours he is in excruciating pain.  He describes the pain as a needlelike pain, sharpness, and achiness.  He states that on the right foot it is the sides of the foot and the bottom of the foot.  On the left foot is primarily the sides of the foot.  He is on gabapentin 600 mg 3 times daily and amitriptyline 50 mg at bedtime.  He tells me that he does not feel the gabapentin is really helping at all.  He is unsure if the amitriptyline is helping much with the discomfort.  He takes this as well as tizanidine at bedtime.  He was previously on Vicodin and Ambien in the past but now has a medical marijuana card and uses THC which helps with his anxiety and depression.  He states it has not helped with the pain.  He states he is also dealing with a torn rotator cuff on the right.  He thinks that he might have tried Lyrica in the past and is unsure if it was better

## 2024-03-08 DIAGNOSIS — E11.42 DIABETIC PERIPHERAL NEUROPATHY (HCC): ICD-10-CM

## 2024-03-08 RX ORDER — PREGABALIN 75 MG/1
75 CAPSULE ORAL 3 TIMES DAILY
Qty: 270 CAPSULE | Refills: 0 | Status: CANCELLED | OUTPATIENT
Start: 2024-03-08 | End: 2024-06-06

## 2024-03-11 DIAGNOSIS — E11.42 DIABETIC PERIPHERAL NEUROPATHY (HCC): ICD-10-CM

## 2024-03-11 NOTE — TELEPHONE ENCOUNTER
Verbal given to [facility] pharmacy to update the quantity to 90 tabs for lyrica script.

## 2024-03-15 RX ORDER — PREGABALIN 75 MG/1
75 CAPSULE ORAL 3 TIMES DAILY
Qty: 90 CAPSULE | Refills: 5 | OUTPATIENT
Start: 2024-03-14 | End: 2024-09-13

## 2024-04-04 DIAGNOSIS — N52.9 ERECTILE DYSFUNCTION, UNSPECIFIED ERECTILE DYSFUNCTION TYPE: ICD-10-CM

## 2024-04-05 RX ORDER — TADALAFIL 20 MG/1
TABLET ORAL
Qty: 10 TABLET | Refills: 2 | Status: SHIPPED | OUTPATIENT
Start: 2024-04-05

## 2024-04-22 SDOH — ECONOMIC STABILITY: FOOD INSECURITY: WITHIN THE PAST 12 MONTHS, THE FOOD YOU BOUGHT JUST DIDN'T LAST AND YOU DIDN'T HAVE MONEY TO GET MORE.: SOMETIMES TRUE

## 2024-04-22 SDOH — ECONOMIC STABILITY: INCOME INSECURITY: HOW HARD IS IT FOR YOU TO PAY FOR THE VERY BASICS LIKE FOOD, HOUSING, MEDICAL CARE, AND HEATING?: SOMEWHAT HARD

## 2024-04-22 SDOH — ECONOMIC STABILITY: FOOD INSECURITY: WITHIN THE PAST 12 MONTHS, YOU WORRIED THAT YOUR FOOD WOULD RUN OUT BEFORE YOU GOT MONEY TO BUY MORE.: SOMETIMES TRUE

## 2024-04-22 ASSESSMENT — PATIENT HEALTH QUESTIONNAIRE - PHQ9
7. TROUBLE CONCENTRATING ON THINGS, SUCH AS READING THE NEWSPAPER OR WATCHING TELEVISION: MORE THAN HALF THE DAYS
SUM OF ALL RESPONSES TO PHQ9 QUESTIONS 1 & 2: 5
6. FEELING BAD ABOUT YOURSELF - OR THAT YOU ARE A FAILURE OR HAVE LET YOURSELF OR YOUR FAMILY DOWN: NEARLY EVERY DAY
3. TROUBLE FALLING OR STAYING ASLEEP: NEARLY EVERY DAY
8. MOVING OR SPEAKING SO SLOWLY THAT OTHER PEOPLE COULD HAVE NOTICED. OR THE OPPOSITE - BEING SO FIDGETY OR RESTLESS THAT YOU HAVE BEEN MOVING AROUND A LOT MORE THAN USUAL: MORE THAN HALF THE DAYS
9. THOUGHTS THAT YOU WOULD BE BETTER OFF DEAD, OR OF HURTING YOURSELF: NEARLY EVERY DAY
6. FEELING BAD ABOUT YOURSELF - OR THAT YOU ARE A FAILURE OR HAVE LET YOURSELF OR YOUR FAMILY DOWN: NEARLY EVERY DAY
SUM OF ALL RESPONSES TO PHQ QUESTIONS 1-9: 22
4. FEELING TIRED OR HAVING LITTLE ENERGY: MORE THAN HALF THE DAYS
5. POOR APPETITE OR OVEREATING: MORE THAN HALF THE DAYS
10. IF YOU CHECKED OFF ANY PROBLEMS, HOW DIFFICULT HAVE THESE PROBLEMS MADE IT FOR YOU TO DO YOUR WORK, TAKE CARE OF THINGS AT HOME, OR GET ALONG WITH OTHER PEOPLE: NOT DIFFICULT AT ALL
3. TROUBLE FALLING OR STAYING ASLEEP: NEARLY EVERY DAY
7. TROUBLE CONCENTRATING ON THINGS, SUCH AS READING THE NEWSPAPER OR WATCHING TELEVISION: MORE THAN HALF THE DAYS
SUM OF ALL RESPONSES TO PHQ QUESTIONS 1-9: 22
1. LITTLE INTEREST OR PLEASURE IN DOING THINGS: MORE THAN HALF THE DAYS
10. IF YOU CHECKED OFF ANY PROBLEMS, HOW DIFFICULT HAVE THESE PROBLEMS MADE IT FOR YOU TO DO YOUR WORK, TAKE CARE OF THINGS AT HOME, OR GET ALONG WITH OTHER PEOPLE: NOT DIFFICULT AT ALL
SUM OF ALL RESPONSES TO PHQ QUESTIONS 1-9: 22
SUM OF ALL RESPONSES TO PHQ QUESTIONS 1-9: 19
SUM OF ALL RESPONSES TO PHQ QUESTIONS 1-9: 22
5. POOR APPETITE OR OVEREATING: MORE THAN HALF THE DAYS
2. FEELING DOWN, DEPRESSED OR HOPELESS: NEARLY EVERY DAY
2. FEELING DOWN, DEPRESSED OR HOPELESS: NEARLY EVERY DAY
1. LITTLE INTEREST OR PLEASURE IN DOING THINGS: MORE THAN HALF THE DAYS
4. FEELING TIRED OR HAVING LITTLE ENERGY: MORE THAN HALF THE DAYS
9. THOUGHTS THAT YOU WOULD BE BETTER OFF DEAD, OR OF HURTING YOURSELF: NEARLY EVERY DAY
8. MOVING OR SPEAKING SO SLOWLY THAT OTHER PEOPLE COULD HAVE NOTICED. OR THE OPPOSITE, BEING SO FIGETY OR RESTLESS THAT YOU HAVE BEEN MOVING AROUND A LOT MORE THAN USUAL: MORE THAN HALF THE DAYS

## 2024-04-22 ASSESSMENT — COLUMBIA-SUICIDE SEVERITY RATING SCALE - C-SSRS
6. IN YOUR LIFETIME, HAVE YOU EVER DONE ANYTHING, STARTED TO DO ANYTHING, OR PREPARED TO DO ANYTHING TO END YOUR LIFE?: YES
1. IN THE PAST MONTH, HAVE YOU WISHED YOU WERE DEAD OR WISHED YOU COULD GO TO SLEEP AND NOT WAKE UP?: NO
2. IN THE PAST MONTH, HAVE YOU ACTUALLY HAD ANY THOUGHTS OF KILLING YOURSELF?: NO

## 2024-04-25 ENCOUNTER — OFFICE VISIT (OUTPATIENT)
Dept: FAMILY MEDICINE CLINIC | Age: 54
End: 2024-04-25
Payer: COMMERCIAL

## 2024-04-25 VITALS
OXYGEN SATURATION: 99 % | SYSTOLIC BLOOD PRESSURE: 138 MMHG | DIASTOLIC BLOOD PRESSURE: 76 MMHG | BODY MASS INDEX: 27.76 KG/M2 | WEIGHT: 210.4 LBS | HEART RATE: 78 BPM

## 2024-04-25 DIAGNOSIS — I10 ESSENTIAL HYPERTENSION: ICD-10-CM

## 2024-04-25 DIAGNOSIS — E11.42 TYPE 2 DIABETES MELLITUS WITH DIABETIC POLYNEUROPATHY, WITH LONG-TERM CURRENT USE OF INSULIN (HCC): Primary | ICD-10-CM

## 2024-04-25 DIAGNOSIS — E78.00 PURE HYPERCHOLESTEROLEMIA: ICD-10-CM

## 2024-04-25 DIAGNOSIS — Z79.4 TYPE 2 DIABETES MELLITUS WITH DIABETIC POLYNEUROPATHY, WITH LONG-TERM CURRENT USE OF INSULIN (HCC): Primary | ICD-10-CM

## 2024-04-25 DIAGNOSIS — S46.011S TRAUMATIC TEAR OF RIGHT ROTATOR CUFF, UNSPECIFIED TEAR EXTENT, SEQUELA: ICD-10-CM

## 2024-04-25 LAB — HBA1C MFR BLD: 9.9 %

## 2024-04-25 PROCEDURE — 3078F DIAST BP <80 MM HG: CPT | Performed by: FAMILY MEDICINE

## 2024-04-25 PROCEDURE — 99214 OFFICE O/P EST MOD 30 MIN: CPT | Performed by: FAMILY MEDICINE

## 2024-04-25 PROCEDURE — 3046F HEMOGLOBIN A1C LEVEL >9.0%: CPT | Performed by: FAMILY MEDICINE

## 2024-04-25 PROCEDURE — 83036 HEMOGLOBIN GLYCOSYLATED A1C: CPT | Performed by: FAMILY MEDICINE

## 2024-04-25 PROCEDURE — 3075F SYST BP GE 130 - 139MM HG: CPT | Performed by: FAMILY MEDICINE

## 2024-04-25 RX ORDER — ACYCLOVIR 400 MG/1
TABLET ORAL
Qty: 3 EACH | Refills: 5 | Status: SHIPPED | OUTPATIENT
Start: 2024-04-25

## 2024-04-25 RX ORDER — HYDROCODONE BITARTRATE AND ACETAMINOPHEN 5; 325 MG/1; MG/1
1 TABLET ORAL EVERY 4 HOURS PRN
Qty: 42 TABLET | Refills: 0 | Status: SHIPPED | OUTPATIENT
Start: 2024-04-25 | End: 2024-05-02

## 2024-04-25 NOTE — PROGRESS NOTES
Nathan Graham is a 53 y.o. male who presents for evaluation of hypertension, hyperlipidemia, and diabetes.. He indicates that he is feeling well and denies any symptoms referable to his elevated blood pressure.   Specifically denies chest pain, palpitations, dyspnea, orthopnea, PND or peripheral edema.  No anorexia, arthralgia, or leg cramps noted. Current medication regimen is as listed below. He denies any side effects of medication, Tresiba 40 units qhs, Humalog 18 units daily and has been taking it regularly. medication compliance:  compliant most of the time, diabetic diet compliance:  noncompliant: much of the time , home glucose monitoring: are performed sporadically, values range 200s, further diabetic ROS: no polyuria or polydipsia, no chest pain, dyspnea or TIAs, no numbness, tingling or pain in extremities.    Right shoulder pain /rotator cuff tear .  Unable to have surgery due to lack of PTO.  Had been on Norco prior to starting to use medical marijuana 3 years ago.  Is not able to sleep well.       Current Outpatient Medications   Medication Sig Dispense Refill    tadalafil (CIALIS) 20 MG tablet TAKE 1 TABLET BY MOUTH ONCE DAILY AS NEEDED 10 tablet 2    pregabalin (LYRICA) 75 MG capsule Take 1 capsule by mouth 3 times daily for 183 days. Max Daily Amount: 225 mg 90 capsule 5    UNABLE TO FIND KBCGL-KETAMINE 10%, BACLOFEN 2%, CYCLOBENZAPRINE 2%, GABAPENTIN 6%, LIDOCAINE 5% 240 g 3    empagliflozin (JARDIANCE) 25 MG tablet Take 1 tablet by mouth daily      amitriptyline (ELAVIL) 50 MG tablet Take 1 tablet by mouth nightly 90 tablet 1    atorvastatin (LIPITOR) 40 MG tablet Take 1 tablet by mouth daily 90 tablet 1    dronedarone hcl (MULTAQ) 400 MG TABS Take 1 tablet by mouth 2 times daily (with meals) 180 tablet 1    escitalopram (LEXAPRO) 10 MG tablet TAKE 1 TABLET BY MOUTH EVERY DAY 90 tablet 1    gabapentin (NEURONTIN) 600 MG tablet Take 1 tablet by mouth 3 times daily for 180 days. 270 tablet 1

## 2024-04-25 NOTE — PATIENT INSTRUCTIONS
(income based)  Phone number: [phone]      [facility]:  What they offer: [facility] - adaptive home modifications for 60+   Phone number: [phone]      [facility]:   What they offer: elderly home repair, insulation, and weatherization, rent assistance, HEAP / PIPP / water; emergency shelter ([place] and [place])   [facility] served: [place], [place], [place], [facility], [place]  Phone Number: [place] [phone]      [facility]: ([place])  What they offer: Emergency shelter for men women and children or victims of domestic violence.  Phone Numbers: [phone] or [phone]       [employer]:   What they offer: Homeless Shelter  Phone Number: [phone]      [facility]:   What they offer: 24-7 Residential treatment center for women   Phone Number: [phone]      [facility]   What they offer: emergency shelter for victims of domestic violence and / or sexual assault and their children, 24-hour crisis line, individual and group counseling, case management, victim advocacy, community education and prevention.  Phone number: 24/7: [phone]      [facility] Food Resources*  (Call 211 for more resources.)    [employer] ([place]):    What they offer:  Information on food pantries, mobile food pantries(locations throughout [place], [place] and [facility]), summer meal programs, and senior programs  Address: [address] [place] [address]  Phone Number: [phone]  Website: https://[email or web address]      [facility] ([place]):   What they offer:  food pantry, soup kitchen, clothing, emergency shelter, tutoring  Address: [address] [place] [address]  Phone Number: [phone]  Website: https://www.[facility].org      [facility] ([place]):   What they offer: meals on wheels, lunch in one of the 4 dining rooms Monday - Friday  Dining with [employer] for lunch: Call to reserve a spot

## 2024-04-26 ENCOUNTER — TELEPHONE (OUTPATIENT)
Dept: FAMILY MEDICINE CLINIC | Age: 54
End: 2024-04-26

## 2024-04-26 NOTE — TELEPHONE ENCOUNTER
Wen the pharmacist from [facility] called to state she will only be able to do a max of 4 tablets daily for the Norco prescription for the patient. Pharmacist also wanting to know if this will turn into a long term medication for chronic pain. Pharmacist states if you do not want to change script he can go through a different pharmacy.  I am working on the piror authorization for the patient now.

## 2024-04-29 NOTE — TELEPHONE ENCOUNTER
Side about the Dexcom.  Our system says that it is covered.  I recommend calling his insurance company to find out if they cover the freestyle christine.  If so, I will send that prescription

## 2024-04-29 NOTE — TELEPHONE ENCOUNTER
Patient will pay out of pocket. He wanted to let you know they insurance does not cover the Dexicom and it will be $301.

## 2024-04-29 NOTE — TELEPHONE ENCOUNTER
Unknown if this will become a long-term medication for him or not.  What is the reason that he can only get for tabs daily?

## 2024-04-29 NOTE — TELEPHONE ENCOUNTER
Pharmacy stated that insurance will only pay for 4 tablets a day. She stated it is $45 out of pocket without insurance.

## 2024-04-29 NOTE — TELEPHONE ENCOUNTER
I guess is up to the patient that if he wants to get 4 tablets a day with his insurance or pay the extra out-of-pocket.

## 2024-05-15 DIAGNOSIS — N52.9 ERECTILE DYSFUNCTION, UNSPECIFIED ERECTILE DYSFUNCTION TYPE: ICD-10-CM

## 2024-05-16 RX ORDER — TADALAFIL 20 MG/1
TABLET ORAL
Qty: 10 TABLET | Refills: 0 | OUTPATIENT
Start: 2024-05-16

## 2024-05-30 ENCOUNTER — TELEPHONE (OUTPATIENT)
Dept: FAMILY MEDICINE CLINIC | Age: 54
End: 2024-05-30

## 2024-05-30 NOTE — TELEPHONE ENCOUNTER
Patient called stating he had an MRI at [facility] and they found spurs in C1 and C2. He stated they said there is a disc flipping in the neck and a tube closing. He stated he is having an injection done by Dr. Garcia.

## 2024-06-18 DIAGNOSIS — N52.9 ERECTILE DYSFUNCTION, UNSPECIFIED ERECTILE DYSFUNCTION TYPE: ICD-10-CM

## 2024-06-18 RX ORDER — TADALAFIL 20 MG/1
TABLET ORAL
Qty: 10 TABLET | Refills: 0 | OUTPATIENT
Start: 2024-06-18

## 2024-06-18 RX ORDER — TADALAFIL 20 MG/1
20 TABLET ORAL PRN
Qty: 10 TABLET | Refills: 2 | Status: SHIPPED | OUTPATIENT
Start: 2024-06-18

## 2024-07-01 ENCOUNTER — TELEPHONE (OUTPATIENT)
Dept: FAMILY MEDICINE CLINIC | Age: 54
End: 2024-07-01

## 2024-07-01 ENCOUNTER — PROCEDURE VISIT (OUTPATIENT)
Dept: NEUROLOGY | Age: 54
End: 2024-07-01
Payer: COMMERCIAL

## 2024-07-01 DIAGNOSIS — E11.42 DIABETIC PERIPHERAL NEUROPATHY (HCC): ICD-10-CM

## 2024-07-01 DIAGNOSIS — M54.16 LUMBAR RADICULOPATHY: Primary | ICD-10-CM

## 2024-07-01 PROCEDURE — 95886 MUSC TEST DONE W/N TEST COMP: CPT | Performed by: STUDENT IN AN ORGANIZED HEALTH CARE EDUCATION/TRAINING PROGRAM

## 2024-07-01 PROCEDURE — 95911 NRV CNDJ TEST 9-10 STUDIES: CPT | Performed by: STUDENT IN AN ORGANIZED HEALTH CARE EDUCATION/TRAINING PROGRAM

## 2024-07-01 NOTE — TELEPHONE ENCOUNTER
Patient states he needs a note for work and is requesting  please write it as he needs it on Wednesday. Patient states due to his torn rotator cuff on his right shoulder he administers ice onto his shoulder during his 2 30 minute breaks. Patient states [facility] is requiring him to have a letter allowing him to do this from his doctor. Patient states the letter has to state for 30 minutes 2 times a day and that he can administer it himself and it is due to his shoulder.

## 2024-07-01 NOTE — PROGRESS NOTES
EMG/NCS Bilateral Lower Extremities    Reason for referral/Clinical data:  KVNG presents today for bilateral lower extremity EMG to assess numbness in his feet bilaterally with prominent pain in the right foot.  He states that he has had symptoms for at least 2 years now.  While he describes there being numbness present in both feet he describes more dysesthesias involved in the right lower extremity especially over the fifth metatarsal.  He is diabetic.  He has had multiple lumbar spinal fusions.    Refer to the Electrodiagnostic Data Sheet for normative values, specific techniques utilized for conductions, the numeric values obtained on nerve conductions, and specific muscles sampled for needle examination.   Informed consent was given by the patient after discussion of the following - Expected potential benefits of procedure include the following: identifying diagnoses, excluding diagnoses, and helping the treating practitioners to prescribe treatment, testing, and follow-up. Possible adverse events of electrodiagnostic testing include local discomfort, mild bleeding or bruising (common) and rare/uncommon events such as infection, nausea, fainting, or other idiosyncratic adverse events.    Motor studies:  -- Right tibial motor response demonstrates amplitude which is normal, distal latency which is normal, and conduction velocity which is normal.   -- Left tibial motor response demonstrates amplitude which is normal, distal latency which is normal, and conduction velocity which is normal.  -- Right peroneal motor response demonstrates amplitude which is normal, distal latency which is normal, and conduction velocity which is normal.   -- Left peroneal motor response demonstrates amplitude which is normal, distal latency which is normal, and conduction velocity which is normal.     Sensory studies:  -- Right Superficial Peroneal sensory nerve conduction response demonstrates amplitude which is normal, and distal

## 2024-07-11 ENCOUNTER — TELEPHONE (OUTPATIENT)
Dept: NEUROLOGY | Age: 54
End: 2024-07-11

## 2024-07-11 DIAGNOSIS — M54.16 LUMBAR RADICULOPATHY: Primary | ICD-10-CM

## 2024-07-11 NOTE — TELEPHONE ENCOUNTER
Looks like the EMG suggested some irritability of the nerve roots on the right coming from his lower back into the L5 distribution.  I will order an MRI of his lumbar spine to evaluate this in better detail.

## 2024-07-27 ENCOUNTER — HOSPITAL ENCOUNTER (OUTPATIENT)
Dept: MRI IMAGING | Age: 54
Discharge: HOME | End: 2024-07-27
Attending: PSYCHIATRY & NEUROLOGY
Payer: COMMERCIAL

## 2024-07-27 DIAGNOSIS — M54.16 LUMBAR RADICULOPATHY: ICD-10-CM

## 2024-07-27 PROCEDURE — 72148 MRI LUMBAR SPINE W/O DYE: CPT

## 2024-07-30 ENCOUNTER — TELEPHONE (OUTPATIENT)
Dept: FAMILY MEDICINE CLINIC | Age: 54
End: 2024-07-30

## 2024-07-30 ENCOUNTER — HOSPITAL ENCOUNTER (EMERGENCY)
Age: 54
Discharge: HOME | End: 2024-07-30
Attending: EMERGENCY MEDICINE
Payer: COMMERCIAL

## 2024-07-30 VITALS
HEART RATE: 86 BPM | BODY MASS INDEX: 26.95 KG/M2 | WEIGHT: 210 LBS | RESPIRATION RATE: 20 BRPM | HEIGHT: 74 IN | OXYGEN SATURATION: 99 % | SYSTOLIC BLOOD PRESSURE: 172 MMHG | TEMPERATURE: 98 F | DIASTOLIC BLOOD PRESSURE: 96 MMHG

## 2024-07-30 DIAGNOSIS — K12.2 UVULITIS: ICD-10-CM

## 2024-07-30 DIAGNOSIS — J02.9 ACUTE PHARYNGITIS, UNSPECIFIED ETIOLOGY: Primary | ICD-10-CM

## 2024-07-30 LAB
ALBUMIN SERPL-MCNC: 4.6 GM/DL (ref 3.4–5)
ALP BLD-CCNC: 81 IU/L (ref 40–129)
ALT SERPL-CCNC: 25 U/L (ref 10–40)
ANION GAP SERPL CALCULATED.3IONS-SCNC: 16 MMOL/L (ref 7–16)
AST SERPL-CCNC: 21 IU/L (ref 15–37)
BASOPHILS ABSOLUTE: 0.1 K/CU MM
BASOPHILS RELATIVE PERCENT: 0.6 % (ref 0–1)
BILIRUB SERPL-MCNC: 1 MG/DL (ref 0–1)
BUN SERPL-MCNC: 13 MG/DL (ref 6–23)
CALCIUM SERPL-MCNC: 10.6 MG/DL (ref 8.3–10.6)
CHLORIDE BLD-SCNC: 92 MMOL/L (ref 99–110)
CO2: 25 MMOL/L (ref 21–32)
CREAT SERPL-MCNC: 0.9 MG/DL (ref 0.9–1.3)
DIFFERENTIAL TYPE: ABNORMAL
EOSINOPHILS ABSOLUTE: 0 K/CU MM
EOSINOPHILS RELATIVE PERCENT: 0.5 % (ref 0–3)
GFR, ESTIMATED: >90 ML/MIN/1.73M2
GLUCOSE SERPL-MCNC: 187 MG/DL (ref 70–99)
HCT VFR BLD CALC: 53.5 % (ref 42–52)
HEMOGLOBIN: 19.3 GM/DL (ref 13.5–18)
IMMATURE NEUTROPHIL %: 0.4 % (ref 0–0.43)
LYMPHOCYTES ABSOLUTE: 1 K/CU MM
LYMPHOCYTES RELATIVE PERCENT: 11.6 % (ref 24–44)
MCH RBC QN AUTO: 29.9 PG (ref 27–31)
MCHC RBC AUTO-ENTMCNC: 36.1 % (ref 32–36)
MCV RBC AUTO: 82.8 FL (ref 78–100)
MONOCYTES ABSOLUTE: 0.6 K/CU MM
MONOCYTES RELATIVE PERCENT: 7 % (ref 0–4)
NEUTROPHILS ABSOLUTE: 6.7 K/CU MM
NEUTROPHILS RELATIVE PERCENT: 79.9 % (ref 36–66)
NUCLEATED RBC %: 0 %
PDW BLD-RTO: 12.2 % (ref 11.7–14.9)
PLATELET # BLD: 251 K/CU MM (ref 140–440)
PMV BLD AUTO: 9.6 FL (ref 7.5–11.1)
POTASSIUM SERPL-SCNC: 4.2 MMOL/L (ref 3.5–5.1)
RBC # BLD: 6.46 M/CU MM (ref 4.6–6.2)
SODIUM BLD-SCNC: 133 MMOL/L (ref 135–145)
TOTAL IMMATURE NEUTOROPHIL: 0.03 K/CU MM
TOTAL NUCLEATED RBC: 0 K/CU MM
TOTAL PROTEIN: 8.3 GM/DL (ref 6.4–8.2)
WBC # BLD: 8.3 K/CU MM (ref 4–10.5)

## 2024-07-30 PROCEDURE — 93005 ELECTROCARDIOGRAM TRACING: CPT | Performed by: NURSE PRACTITIONER

## 2024-07-30 PROCEDURE — 87077 CULTURE AEROBIC IDENTIFY: CPT

## 2024-07-30 PROCEDURE — 6360000002 HC RX W HCPCS: Performed by: EMERGENCY MEDICINE

## 2024-07-30 PROCEDURE — 87081 CULTURE SCREEN ONLY: CPT

## 2024-07-30 PROCEDURE — 99284 EMERGENCY DEPT VISIT MOD MDM: CPT

## 2024-07-30 PROCEDURE — 80053 COMPREHEN METABOLIC PANEL: CPT

## 2024-07-30 PROCEDURE — 2580000003 HC RX 258: Performed by: NURSE PRACTITIONER

## 2024-07-30 PROCEDURE — 96374 THER/PROPH/DIAG INJ IV PUSH: CPT

## 2024-07-30 PROCEDURE — 96375 TX/PRO/DX INJ NEW DRUG ADDON: CPT

## 2024-07-30 PROCEDURE — 6370000000 HC RX 637 (ALT 250 FOR IP): Performed by: EMERGENCY MEDICINE

## 2024-07-30 PROCEDURE — 87430 STREP A AG IA: CPT

## 2024-07-30 PROCEDURE — 85025 COMPLETE CBC W/AUTO DIFF WBC: CPT

## 2024-07-30 RX ORDER — 0.9 % SODIUM CHLORIDE 0.9 %
1000 INTRAVENOUS SOLUTION INTRAVENOUS ONCE
Status: COMPLETED | OUTPATIENT
Start: 2024-07-30 | End: 2024-07-30

## 2024-07-30 RX ORDER — LIDOCAINE HYDROCHLORIDE 20 MG/ML
15 SOLUTION OROPHARYNGEAL ONCE
Status: COMPLETED | OUTPATIENT
Start: 2024-07-30 | End: 2024-07-30

## 2024-07-30 RX ORDER — ONDANSETRON 2 MG/ML
4 INJECTION INTRAMUSCULAR; INTRAVENOUS EVERY 30 MIN PRN
Status: DISCONTINUED | OUTPATIENT
Start: 2024-07-30 | End: 2024-07-31 | Stop reason: HOSPADM

## 2024-07-30 RX ORDER — DEXAMETHASONE SODIUM PHOSPHATE 10 MG/ML
10 INJECTION, SOLUTION INTRAMUSCULAR; INTRAVENOUS ONCE
Status: COMPLETED | OUTPATIENT
Start: 2024-07-30 | End: 2024-07-30

## 2024-07-30 RX ADMIN — ONDANSETRON 4 MG: 2 INJECTION INTRAMUSCULAR; INTRAVENOUS at 21:14

## 2024-07-30 RX ADMIN — LIDOCAINE HYDROCHLORIDE 15 ML: 20 SOLUTION ORAL at 21:13

## 2024-07-30 RX ADMIN — SODIUM CHLORIDE 1000 ML: 9 INJECTION, SOLUTION INTRAVENOUS at 20:58

## 2024-07-30 RX ADMIN — DEXAMETHASONE SODIUM PHOSPHATE 10 MG: 10 INJECTION, SOLUTION INTRAMUSCULAR; INTRAVENOUS at 21:13

## 2024-07-30 ASSESSMENT — PAIN - FUNCTIONAL ASSESSMENT
PAIN_FUNCTIONAL_ASSESSMENT: NONE - DENIES PAIN
PAIN_FUNCTIONAL_ASSESSMENT: NONE - DENIES PAIN

## 2024-07-30 ASSESSMENT — LIFESTYLE VARIABLES
HOW MANY STANDARD DRINKS CONTAINING ALCOHOL DO YOU HAVE ON A TYPICAL DAY: PATIENT DOES NOT DRINK
HOW OFTEN DO YOU HAVE A DRINK CONTAINING ALCOHOL: NEVER

## 2024-07-30 ASSESSMENT — ENCOUNTER SYMPTOMS
NAUSEA: 1
SHORTNESS OF BREATH: 0
ABDOMINAL PAIN: 0
DIARRHEA: 1
VOMITING: 1
CHEST TIGHTNESS: 0
SORE THROAT: 1
BACK PAIN: 0

## 2024-07-30 ASSESSMENT — PAIN SCALES - GENERAL: PAINLEVEL_OUTOF10: 0

## 2024-07-30 ASSESSMENT — PAIN DESCRIPTION - LOCATION
LOCATION: THROAT
LOCATION: THROAT

## 2024-07-30 NOTE — ED TRIAGE NOTES
Pt to the ED via walk in with pharyngitis for three days and states he is concerned that he might swallow his uvula. Pt states he has had nausea, vomiting, and diarrhea and that the vomiting has caused his throat to be very sore. Pt is also stating he does not want to be seen for his abdominal issues and is only concerned about his uvula. Pt states if this ED visit will keep him from getting his neck epidural tomorrow, he won't stay. Patient is alert and oriented, his not in acute distress, and denies any needs

## 2024-07-31 LAB
EKG ATRIAL RATE: 89 BPM
EKG DIAGNOSIS: NORMAL
EKG P AXIS: 59 DEGREES
EKG P-R INTERVAL: 138 MS
EKG Q-T INTERVAL: 386 MS
EKG QRS DURATION: 86 MS
EKG QTC CALCULATION (BAZETT): 469 MS
EKG R AXIS: 28 DEGREES
EKG T AXIS: 44 DEGREES
EKG VENTRICULAR RATE: 89 BPM

## 2024-07-31 PROCEDURE — 93010 ELECTROCARDIOGRAM REPORT: CPT | Performed by: INTERNAL MEDICINE

## 2024-07-31 NOTE — ED PROVIDER NOTES
[facility] EMERGENCY DEPARTMENT  EMERGENCY DEPARTMENT ENCOUNTER      Pt Name: Nathan Graham  MRN: [id number]  Birthdate 1970  Date of evaluation: 7/30/2024  Provider: EDGAR Jackson - TRACE  PCP: Drake Kimble MD  Note Started: 8:30 PM EDT 7/30/24    I am the Primary Clinician of Record.   I have seen and evaluated this patient with my supervising physician Drake Tracey DO.    CHIEF COMPLAINT       Chief Complaint   Patient presents with    Pharyngitis     Patient stated he feels like his uvula is going down his throat. Patient states he has not ate in over 54 hours and has had nausea/vomiting       HISTORY OF PRESENT ILLNESS: 1 or more Elements     History from : Patient    Limitations to history : None    Nathan Graham is a 53 y.o. male who presents to the emergency department complaining of feeling like he is going to swallow his uvula.  He states he has had several days of nausea and vomiting.  He states that he feels like his uvula is sliding down the back of his throat.  He has not felt this in the past.  He reports the he denies any hemoptysis, fever  Nursing Notes were all reviewed and agreed with or any disagreements were addressed in the HPI.    REVIEW OF SYSTEMS :      Review of Systems   Constitutional:  Negative for chills, diaphoresis and fatigue.   HENT:  Positive for sore throat.    Respiratory:  Negative for chest tightness and shortness of breath.    Gastrointestinal:  Positive for diarrhea, nausea and vomiting. Negative for abdominal pain.   Musculoskeletal:  Negative for back pain.     Positives and Pertinent negatives as per HPI.     SURGICAL HISTORY     Past Surgical History:   Procedure Laterality Date    ABDOMEN SURGERY      ABLATION OF DYSRHYTHMIC FOCUS  12/13/2022    atrial fibrillation ablation and WPW accessory pathway ablation    COLONOSCOPY N/A 04/25/2022    COLONOSCOPY POLYPECTOMY SNARE/COLD BIOPSY performed by Vinnie

## 2024-07-31 NOTE — ED PROVIDER NOTES
ALISE Tracey D.O. am the primary physician of record. I personally saw the patient and made/approved the management plan and take responsibility for the patient management. I independently examined and evaluated Nathan Graham.    In brief their history revealed a 53-year-old male presents with complaint of sore throat nausea vomiting diarrhea.  States for last few days he has had nausea vomit diarrhea cannot keep any thing down.  Nausea vomiting diarrhea started first no abdominal pain followed by sore throat uvula swelling he has to sleep sitting up cannot sleep laying down it chokes him.  He has surgery tomorrow on his shoulder came in for evaluation.  Other questions or concerns no travel sick contacts he lives alone.    Their focused exam revealed alert oriented male resting in bed no distress or cephalic atraumatic sclera clear, lungs are clear abdomen soft nontender no rebound guarding rigidity bowel sounds normal.  No Garay sign over Isabel's point.  Skin has no other rash or swelling.  Moves neck freely no stridor no drooling does have uvula swelling uvula is midline but is long and swollen, no airway obstruction, no signs of PTA no signs of RPA no exudate or swelling of tonsils.  Cranial nerves grossly intact.    ED course/MDM: Patient seen with NP please see her note.  Patient complaint of sore throat uvula swelling nausea vomiting diarrhea.  He supposed to have surgery tomorrow on his shoulder he states for last few days has had nausea vomiting, no chest pain shortness of breath cough abdominal pain.  States his throat is hurting after having several episodes of vomiting cannot keep any down.  He notices uvula swelling.  States he has to sleep sitting up.  States when he lays down it chokes him.  No other questions or concerns.  Upon arrival patient appears well vital signs are stable mildly hypertensive.  His airway does have uvular swelling but it is midline no signs of PTA, RPA, no stridor

## 2024-08-02 ENCOUNTER — TELEPHONE (OUTPATIENT)
Dept: PHARMACY | Age: 54
End: 2024-08-02

## 2024-08-02 LAB
CULTURE: ABNORMAL
CULTURE: ABNORMAL
Lab: ABNORMAL
SPECIMEN: ABNORMAL
STREP A DIRECT SCREEN: NEGATIVE

## 2024-08-02 RX ORDER — AMOXICILLIN 500 MG/1
500 CAPSULE ORAL 2 TIMES DAILY
Qty: 20 CAPSULE | Refills: 0 | Status: SHIPPED | OUTPATIENT
Start: 2024-08-02 | End: 2024-08-12

## 2024-08-02 NOTE — PROGRESS NOTES
Pharmacy Note  ED Culture Follow-up    Nathan Graham is a 53 y.o. male.     Allergies: Bee venom and Hornet venom     Labs:  Lab Results   Component Value Date    BUN 13 07/30/2024    CREATININE 0.9 07/30/2024    WBC 8.3 07/30/2024     Estimated Creatinine Clearance: 113 mL/min (based on SCr of 0.9 mg/dL).    Current antimicrobials:   None     ASSESSMENT:  Micro results:   Throat culture: positive for Step Group C     PLAN:  Need for intervention: Yes  Discussed with: Dr. Peacock  Chosen treatment:    Amoxicillin 500 mg by mouth twice daily for 10 days    Patient response:   Called and spoke with patient.    Counseled patient on importance of completing entire antibiotic course, transmission and contagion periods, necessity of staying home until fever free on antibiotics for at least 24 hours, and sanitization.     Called/sent in prescription to:  [facility] pharmacy    Please call with any questions. Ext. [phone]    Inez Gallegos RPH, PharmD 3:06 PM 8/2/2024

## 2024-08-02 NOTE — TELEPHONE ENCOUNTER
Pharmacy Note  ED Culture Follow-up    Nathan Graham is a 53 y.o. male.     Allergies: Bee venom and Hornet venom     Labs:  Lab Results   Component Value Date    BUN 13 07/30/2024    CREATININE 0.9 07/30/2024    WBC 8.3 07/30/2024     Estimated Creatinine Clearance: 113 mL/min (based on SCr of 0.9 mg/dL).    Current antimicrobials:   None     ASSESSMENT:  Micro results:   Throat culture: positive for Step Group C     PLAN:  Need for intervention: Yes  Discussed with: Dr. Peacock  Chosen treatment:    Amoxicillin 500 mg by mouth twice daily for 10 days    Patient response:   Called and spoke with patient.   Counseled patient on importance of completing entire antibiotic course, transmission and contagion periods, necessity of staying home until fever free on antibiotics for at least 24 hours, and sanitization.     Called/sent in prescription to: [facility] pharmacy    Please call with any questions. Ext. [phone]    Inez Gallegos RPH, PharmD 3:06 PM 8/2/2024

## 2024-08-06 DIAGNOSIS — N52.9 ERECTILE DYSFUNCTION, UNSPECIFIED ERECTILE DYSFUNCTION TYPE: ICD-10-CM

## 2024-08-07 RX ORDER — TADALAFIL 20 MG/1
TABLET ORAL
Qty: 10 TABLET | Refills: 0 | OUTPATIENT
Start: 2024-08-07

## 2024-08-10 DIAGNOSIS — N52.9 ERECTILE DYSFUNCTION, UNSPECIFIED ERECTILE DYSFUNCTION TYPE: ICD-10-CM

## 2024-08-12 RX ORDER — TADALAFIL 20 MG/1
20 TABLET ORAL PRN
Qty: 10 TABLET | Refills: 0 | Status: SHIPPED | OUTPATIENT
Start: 2024-08-12

## 2024-08-21 ENCOUNTER — OFFICE VISIT (OUTPATIENT)
Dept: FAMILY MEDICINE CLINIC | Age: 54
End: 2024-08-21
Payer: COMMERCIAL

## 2024-08-21 VITALS
HEART RATE: 91 BPM | DIASTOLIC BLOOD PRESSURE: 90 MMHG | BODY MASS INDEX: 26.99 KG/M2 | TEMPERATURE: 97.5 F | SYSTOLIC BLOOD PRESSURE: 150 MMHG | WEIGHT: 210.2 LBS | OXYGEN SATURATION: 98 %

## 2024-08-21 DIAGNOSIS — M79.672 PAIN IN BOTH FEET: ICD-10-CM

## 2024-08-21 DIAGNOSIS — M25.562 CHRONIC PAIN OF LEFT KNEE: ICD-10-CM

## 2024-08-21 DIAGNOSIS — S46.011S TRAUMATIC TEAR OF RIGHT ROTATOR CUFF, UNSPECIFIED TEAR EXTENT, SEQUELA: ICD-10-CM

## 2024-08-21 DIAGNOSIS — E78.00 PURE HYPERCHOLESTEROLEMIA: ICD-10-CM

## 2024-08-21 DIAGNOSIS — E11.42 TYPE 2 DIABETES MELLITUS WITH DIABETIC POLYNEUROPATHY, WITH LONG-TERM CURRENT USE OF INSULIN (HCC): Primary | ICD-10-CM

## 2024-08-21 DIAGNOSIS — I10 ESSENTIAL HYPERTENSION: ICD-10-CM

## 2024-08-21 DIAGNOSIS — Z79.4 TYPE 2 DIABETES MELLITUS WITH DIABETIC POLYNEUROPATHY, WITH LONG-TERM CURRENT USE OF INSULIN (HCC): Primary | ICD-10-CM

## 2024-08-21 DIAGNOSIS — G89.29 CHRONIC PAIN OF LEFT KNEE: ICD-10-CM

## 2024-08-21 DIAGNOSIS — M79.671 PAIN IN BOTH FEET: ICD-10-CM

## 2024-08-21 LAB — HBA1C MFR BLD: 8.2 %

## 2024-08-21 PROCEDURE — 3052F HG A1C>EQUAL 8.0%<EQUAL 9.0%: CPT | Performed by: FAMILY MEDICINE

## 2024-08-21 PROCEDURE — 3077F SYST BP >= 140 MM HG: CPT | Performed by: FAMILY MEDICINE

## 2024-08-21 PROCEDURE — 3080F DIAST BP >= 90 MM HG: CPT | Performed by: FAMILY MEDICINE

## 2024-08-21 PROCEDURE — 83036 HEMOGLOBIN GLYCOSYLATED A1C: CPT | Performed by: FAMILY MEDICINE

## 2024-08-21 PROCEDURE — 99214 OFFICE O/P EST MOD 30 MIN: CPT | Performed by: FAMILY MEDICINE

## 2024-08-21 RX ORDER — INSULIN LISPRO 200 [IU]/ML
INJECTION, SOLUTION SUBCUTANEOUS
Qty: 5 ADJUSTABLE DOSE PRE-FILLED PEN SYRINGE | Refills: 5 | Status: SHIPPED | OUTPATIENT
Start: 2024-08-21

## 2024-08-21 RX ORDER — HYDROCODONE BITARTRATE AND ACETAMINOPHEN 5; 325 MG/1; MG/1
1 TABLET ORAL EVERY 6 HOURS PRN
Qty: 120 TABLET | Refills: 0 | Status: SHIPPED | OUTPATIENT
Start: 2024-08-21 | End: 2024-09-20

## 2024-08-21 RX ORDER — LOSARTAN POTASSIUM 100 MG/1
100 TABLET ORAL DAILY
Qty: 90 TABLET | Refills: 1 | Status: SHIPPED | OUTPATIENT
Start: 2024-08-21

## 2024-08-21 RX ORDER — MELOXICAM 7.5 MG/1
7.5 TABLET ORAL DAILY
Qty: 90 TABLET | Refills: 1 | Status: SHIPPED | OUTPATIENT
Start: 2024-08-21

## 2024-08-21 RX ORDER — INSULIN DEGLUDEC 100 U/ML
50 INJECTION, SOLUTION SUBCUTANEOUS NIGHTLY
Qty: 10 ADJUSTABLE DOSE PRE-FILLED PEN SYRINGE | Refills: 1 | Status: SHIPPED | OUTPATIENT
Start: 2024-08-21

## 2024-08-21 RX ORDER — METOPROLOL SUCCINATE 25 MG/1
25 TABLET, EXTENDED RELEASE ORAL DAILY
Qty: 90 TABLET | Refills: 1 | Status: SHIPPED | OUTPATIENT
Start: 2024-08-21

## 2024-08-21 NOTE — PROGRESS NOTES
and rhythm. Chest is clear; no wheezes or rales. No edema or JVD.  feet: normal DP and PT pulses, no trophic changes or ulcerative lesions, and normal monofilament exam   A1c 8.2%  Assessment:       Diagnosis Orders   1. Type 2 diabetes mellitus with diabetic polyneuropathy, with long-term current use of insulin (HCC)  POCT glycosylated hemoglobin (Hb A1C)   Improved but needs improvement Insulin Degludec (TRESIBA FLEXTOUCH) 100 UNIT/ML SOPN    insulin lispro (HUMALOG KWIKPEN) 200 UNIT/ML SOPN pen    metFORMIN (GLUCOPHAGE) 1000 MG tablet    Microalbumin / Creatinine Urine Ratio    Comprehensive Metabolic Panel      2. Essential hypertension  losartan (COZAAR) 100 MG tablet   Needs improvement metoprolol succinate (TOPROL XL) 25 MG extended release tablet    Comprehensive Metabolic Panel      3. Pure hypercholesterolemia  Lipid Panel   Asymptomatic   4. Pain in both feet  meloxicam (MOBIC) 7.5 MG tablet   Okay control   5. Chronic pain of left knee  meloxicam (MOBIC) 7.5 MG tablet   Okay control   6. Traumatic tear of right rotator cuff, unspecified tear extent, sequela  HYDROcodone-acetaminophen (NORCO) 5-325 MG per tablet   Needs improvement        Plan:    Patient to continue to titrate insulin to keep his fasting sugars tween 80 and 120.  Continue Norco 4 times daily as needed for pain  Otherwise  1)  Medication: continue current medication regimen unchanged due to effectiveness  2)  Recheck in 3 months, sooner should new symptoms or problems arise.     This note is intended for the physician writing it, as well as to communicate findings to other healthcare professionals.  Progress notes use the medical lexicon that may be misunderstood by non-medical persons. Therefore, interpretations of medical notes and terminology should be approached with caution

## 2024-08-22 LAB
ALBUMIN SERPL-MCNC: 4.7 G/DL (ref 3.4–5)
ALBUMIN/GLOB SERPL: 1.8 {RATIO} (ref 1.1–2.2)
ALP SERPL-CCNC: 79 U/L (ref 40–129)
ALT SERPL-CCNC: 31 U/L (ref 10–40)
ANION GAP SERPL CALCULATED.3IONS-SCNC: 14 MMOL/L (ref 3–16)
AST SERPL-CCNC: 26 U/L (ref 15–37)
BILIRUB SERPL-MCNC: 0.4 MG/DL (ref 0–1)
BUN SERPL-MCNC: 17 MG/DL (ref 7–20)
CALCIUM SERPL-MCNC: 9.7 MG/DL (ref 8.3–10.6)
CHLORIDE SERPL-SCNC: 96 MMOL/L (ref 99–110)
CHOLEST SERPL-MCNC: 196 MG/DL (ref 0–199)
CO2 SERPL-SCNC: 22 MMOL/L (ref 21–32)
CREAT SERPL-MCNC: 0.9 MG/DL (ref 0.9–1.3)
CREAT UR-MCNC: 91.6 MG/DL (ref 39–259)
GFR SERPLBLD CREATININE-BSD FMLA CKD-EPI: >90 ML/MIN/{1.73_M2}
GLUCOSE SERPL-MCNC: 267 MG/DL (ref 70–99)
HDLC SERPL-MCNC: 73 MG/DL (ref 40–60)
LDLC SERPL CALC-MCNC: 104 MG/DL
MICROALBUMIN UR DL<=1MG/L-MCNC: 4.43 MG/DL
MICROALBUMIN/CREAT UR: 48.4 MG/G (ref 0–30)
POTASSIUM SERPL-SCNC: 4.6 MMOL/L (ref 3.5–5.1)
PROT SERPL-MCNC: 7.3 G/DL (ref 6.4–8.2)
SODIUM SERPL-SCNC: 132 MMOL/L (ref 136–145)
TRIGL SERPL-MCNC: 97 MG/DL (ref 0–150)
VLDLC SERPL CALC-MCNC: 19 MG/DL

## 2024-08-22 RX ORDER — DAPAGLIFLOZIN 10 MG/1
10 TABLET, FILM COATED ORAL EVERY MORNING
Qty: 90 TABLET | Refills: 1 | Status: SHIPPED | OUTPATIENT
Start: 2024-08-22

## 2024-08-28 RX ORDER — SILDENAFIL 100 MG/1
100 TABLET, FILM COATED ORAL DAILY PRN
Qty: 30 TABLET | Refills: 1 | Status: SHIPPED | OUTPATIENT
Start: 2024-08-28

## 2024-10-20 DIAGNOSIS — N52.9 ERECTILE DYSFUNCTION, UNSPECIFIED ERECTILE DYSFUNCTION TYPE: ICD-10-CM

## 2024-10-21 RX ORDER — TADALAFIL 20 MG/1
TABLET ORAL
Qty: 10 TABLET | Refills: 0 | OUTPATIENT
Start: 2024-10-21

## 2024-10-23 DIAGNOSIS — N52.9 ERECTILE DYSFUNCTION, UNSPECIFIED ERECTILE DYSFUNCTION TYPE: ICD-10-CM

## 2024-10-23 RX ORDER — TADALAFIL 20 MG/1
TABLET ORAL
Qty: 10 TABLET | Refills: 0 | OUTPATIENT
Start: 2024-10-23

## 2024-10-25 DIAGNOSIS — G62.9 NEUROPATHY: ICD-10-CM

## 2024-10-29 RX ORDER — AMITRIPTYLINE HYDROCHLORIDE 50 MG/1
50 TABLET ORAL NIGHTLY
Qty: 30 TABLET | Refills: 0 | Status: SHIPPED | OUTPATIENT
Start: 2024-10-29

## 2024-10-29 RX ORDER — SILDENAFIL 100 MG/1
TABLET, FILM COATED ORAL
Qty: 30 TABLET | Refills: 0 | Status: SHIPPED | OUTPATIENT
Start: 2024-10-29

## 2024-11-06 ENCOUNTER — TELEPHONE (OUTPATIENT)
Dept: CARDIOLOGY CLINIC | Age: 54
End: 2024-11-06

## 2024-11-06 NOTE — TELEPHONE ENCOUNTER
Cardiologist: Dr. Bowling  Surgeon: Dr. Kim  Surgery: Right Shoulder Rotator Cuff Repair/Subacromial Decompression -Lysis of adhesion    Surgery/Procedure should be done in the hospital setting    _____ yes    _____  no    Anesthesia: General  Date: TBD  Fax# [phone]  # [phone]    Last OV 8/9/2023 w/Dash    CORONARY ARTERY DISEASE:None known.C/O Chest pain   HYPERTENSION:for more than 10 years.  Controlled on current management. Takes HCTZ & Cozaar.   Counseled regarding low salt diet, exercise & weight control.  May have to add meds if remains high      VALVULAR HEART DISEASE:no    DYSLIPIDEMIA: yes, Good profile on current medication. Takes Lipitor  Patient's profile is at / near Goal.yes    Diabetes mellitis:yes, For > 10 years, well control, says A1c is down from 15 to 5.4.     ARRHYTHMIAS:yes, WPW with PAF. s/p Ablation with .Still C/O exertional SOB,Palpitations CHEST HEAVINESS.                   His CHADS score is high. He is on Xarelto & Multaq.      Last EKG- 7/30/2024      NM- 9/5/2023  Normal study.   Normal perfusion study with normal distribution in all coronal, short, and   horizontal axis.   The observed defect is consistent with diaphragmatic attenuation.   Normal LV function. LVEF is 60 %.      Echo- 12/21/2022  This is a limited echo to assess EF and RVSP.   Left ventricular function is normal, EF is estimated at 55-60%.   Mild tricuspid regurgitation.RVSP of 24mmHg.   No evidence of any pericardial effusion.   When this echo is compared with the echo from 12/13/2022, no significant   interval changes have occurred.

## 2024-11-11 NOTE — PATIENT INSTRUCTIONS
GYN POSTOPERATIVE NOTE     This visit is being performed virtually via Video visit using [employer].   Clinical Location: [facility] - Evgeny Hartmann's location Home and is physically present in   the [facility] at the time of this visit.     CHIEF COMPLAINT: Postoperative visit    SUBJECTIVE:  Mary Kate is a 51 year old female,      , who presents today for  4 week postop visit following Laprascopic Hysterectomy on 10/16/24 for AUB, adenomyosis.  Patient is is doing well with no complaints. Finished last prednisone today. Rash is improved. Itching has improved. Not having any bleeding-had spotting on Saturday, nothing since. Wondering if she can walk on treadmill.     PHYSICAL EXAM:  Visit Vitals  LMP 10/03/2024 (Approximate)     ABDDOMEN:  Benign, Soft, non-tender, No masses, organomegaly.  INCISION:  dry and intact, healing well with good reapproximation, no evidence of infection, separation or keloid formation via video-redness improved    Labs:   Surgical pathology:   Pathologic Diagnosis   Uterus, cervix, bilateral fallopian tubes, hysterectomy and bilateral salpingectomy:   -Proliferative endometrium.  -Adenomyosis.  -Benign cervix.  -Benign bilateral fallopian tubes       ASSESSMENT:   4 week postop check-up with satisfactory progress.      PLAN:  > Restrictions discussed, ok to walk, nothing per vagina for 2 months s/p surgery, no heavy lifting until 6 weeks postop. Return to work letter given. Working now.  > prednisone helped  > Surgery reviewed with patient in detail, operative findings reviewed.  Patient's pathologic findings were discussed in detail.  Postop follow-up and expectations discussed and reviewed.  Short and long term treatment options and follow-up reviewed also.  All questions answered.  > Return to clinic for recheck next Friday for vaginal cuff check      Cece Carter MD  Obstetrician/Gynecologist       We are committed to providing you the best care possible. If you receive a survey after visiting one of our offices, please take time to share your experience concerning your physician office visit. These surveys are confidential and no health information about you is shared. We are eager to improve for you and we are counting on your feedback to help make that happen.     Pantoprazole (Protonix), Dicyclomine (Bentyl)

## 2024-11-20 ENCOUNTER — OFFICE VISIT (OUTPATIENT)
Dept: FAMILY MEDICINE CLINIC | Age: 54
End: 2024-11-20
Payer: COMMERCIAL

## 2024-11-20 VITALS
WEIGHT: 214.6 LBS | TEMPERATURE: 97.3 F | DIASTOLIC BLOOD PRESSURE: 80 MMHG | HEART RATE: 88 BPM | BODY MASS INDEX: 27.55 KG/M2 | SYSTOLIC BLOOD PRESSURE: 150 MMHG | OXYGEN SATURATION: 97 %

## 2024-11-20 DIAGNOSIS — E78.00 PURE HYPERCHOLESTEROLEMIA: ICD-10-CM

## 2024-11-20 DIAGNOSIS — Z79.4 TYPE 2 DIABETES MELLITUS WITH DIABETIC POLYNEUROPATHY, WITH LONG-TERM CURRENT USE OF INSULIN (HCC): Primary | ICD-10-CM

## 2024-11-20 DIAGNOSIS — E11.42 TYPE 2 DIABETES MELLITUS WITH DIABETIC POLYNEUROPATHY, WITH LONG-TERM CURRENT USE OF INSULIN (HCC): Primary | ICD-10-CM

## 2024-11-20 DIAGNOSIS — Z12.5 SCREENING PSA (PROSTATE SPECIFIC ANTIGEN): ICD-10-CM

## 2024-11-20 DIAGNOSIS — N52.9 ERECTILE DISORDER: ICD-10-CM

## 2024-11-20 DIAGNOSIS — I10 ESSENTIAL HYPERTENSION: ICD-10-CM

## 2024-11-20 LAB — HBA1C MFR BLD: 7.6 %

## 2024-11-20 PROCEDURE — 3077F SYST BP >= 140 MM HG: CPT | Performed by: FAMILY MEDICINE

## 2024-11-20 PROCEDURE — 99214 OFFICE O/P EST MOD 30 MIN: CPT | Performed by: FAMILY MEDICINE

## 2024-11-20 PROCEDURE — 3051F HG A1C>EQUAL 7.0%<8.0%: CPT | Performed by: FAMILY MEDICINE

## 2024-11-20 PROCEDURE — 83036 HEMOGLOBIN GLYCOSYLATED A1C: CPT | Performed by: FAMILY MEDICINE

## 2024-11-20 PROCEDURE — 3079F DIAST BP 80-89 MM HG: CPT | Performed by: FAMILY MEDICINE

## 2024-11-20 RX ORDER — EPINEPHRINE 0.3 MG/.3ML
0.3 INJECTION SUBCUTANEOUS ONCE
Qty: 0.3 ML | Refills: 0 | Status: SHIPPED | OUTPATIENT
Start: 2024-11-20 | End: 2024-11-20

## 2024-11-20 NOTE — PROGRESS NOTES
Nathan Graham is a 53 y.o. male who presents for evaluation of hypertension, hyperlipidemia, and diabetes.. He indicates that he is feeling well and denies any symptoms referable to his elevated blood pressure.   Specifically denies chest pain, palpitations, dyspnea, orthopnea, PND or peripheral edema.  No anorexia, arthralgia, or leg cramps noted. Current medication regimen is as listed below. He denies any side effects of medication, and has been taking it regularly. medication compliance:  compliant all of the time, Tresiba 40 units qhs, Humalog sliding scale with meals, diabetic diet compliance:  compliant most of the time, home glucose monitoring: are performed regularly, are usually normal, further diabetic ROS: no polyuria or polydipsia, no chest pain, dyspnea or TIAs, no numbness, tingling or pain in extremities, last eye exam approximately 1 months ago    Patient is scheduled to have right rotator cuff surgery the day after [place].    Patient states he was stressed after a phone call, and here today and that is why his blood pressure is elevated.    Patient with erectile dysfunction nothing seems to be working very well.  He like to have his PSA and testosterone checked.    Patient with a history of allergies to hornets.  He like to have an EpiPen.    Current Outpatient Medications   Medication Sig Dispense Refill    sildenafil (VIAGRA) 100 MG tablet TAKE 1 TABLET BY MOUTH ONCE DAILY AS NEEDED FOR ERECTILE DYSFUNCTION 30 tablet 0    amitriptyline (ELAVIL) 50 MG tablet Take 1 tablet by mouth nightly 30 tablet 0    dapagliflozin (FARXIGA) 10 MG tablet Take 1 tablet by mouth every morning 90 tablet 1    Insulin Degludec (TRESIBA FLEXTOUCH) 100 UNIT/ML SOPN Inject 50 Units into the skin nightly 10 Adjustable Dose Pre-filled Pen Syringe 1    insulin lispro (HUMALOG KWIKPEN) 200 UNIT/ML SOPN pen TID with meals Glucose [id number] No Insulin 140-199 2 Units 200-249 4 Units 250-299 6 Units 300-349 8 Units

## 2024-11-21 LAB — PSA SERPL DL<=0.01 NG/ML-MCNC: 0.58 NG/ML (ref 0–4)

## 2024-11-22 DIAGNOSIS — N52.9 ERECTILE DISORDER: Primary | ICD-10-CM

## 2024-11-22 LAB — TESTOST SERPL-MCNC: 171 NG/DL (ref 193–740)

## 2024-11-22 RX ORDER — SILDENAFIL 100 MG/1
TABLET, FILM COATED ORAL
Qty: 30 TABLET | Refills: 5 | Status: SHIPPED | OUTPATIENT
Start: 2024-11-22

## 2024-11-24 DIAGNOSIS — G62.9 NEUROPATHY: ICD-10-CM

## 2024-11-25 DIAGNOSIS — R79.89 LOW TESTOSTERONE: Primary | ICD-10-CM

## 2024-11-25 PROCEDURE — 36415 COLL VENOUS BLD VENIPUNCTURE: CPT | Performed by: FAMILY MEDICINE

## 2024-11-25 RX ORDER — AMITRIPTYLINE HYDROCHLORIDE 50 MG/1
50 TABLET ORAL NIGHTLY
Qty: 30 TABLET | Refills: 0 | Status: SHIPPED | OUTPATIENT
Start: 2024-11-25

## 2024-11-30 DIAGNOSIS — Z79.4 TYPE 2 DIABETES MELLITUS WITH DIABETIC POLYNEUROPATHY, WITH LONG-TERM CURRENT USE OF INSULIN (HCC): ICD-10-CM

## 2024-11-30 DIAGNOSIS — E11.42 TYPE 2 DIABETES MELLITUS WITH DIABETIC POLYNEUROPATHY, WITH LONG-TERM CURRENT USE OF INSULIN (HCC): ICD-10-CM

## 2024-12-02 RX ORDER — INSULIN DEGLUDEC 100 U/ML
INJECTION, SOLUTION SUBCUTANEOUS
Qty: 9 ML | Refills: 0 | OUTPATIENT
Start: 2024-12-02

## 2024-12-11 DIAGNOSIS — E11.42 TYPE 2 DIABETES MELLITUS WITH DIABETIC POLYNEUROPATHY, WITH LONG-TERM CURRENT USE OF INSULIN (HCC): ICD-10-CM

## 2024-12-11 DIAGNOSIS — Z79.4 TYPE 2 DIABETES MELLITUS WITH DIABETIC POLYNEUROPATHY, WITH LONG-TERM CURRENT USE OF INSULIN (HCC): ICD-10-CM

## 2024-12-11 DIAGNOSIS — G62.9 NEUROPATHY: ICD-10-CM

## 2024-12-11 RX ORDER — INSULIN DEGLUDEC 100 U/ML
INJECTION, SOLUTION SUBCUTANEOUS
Qty: 9 ML | Refills: 0 | OUTPATIENT
Start: 2024-12-11

## 2024-12-11 RX ORDER — GABAPENTIN 600 MG/1
600 TABLET ORAL 3 TIMES DAILY
Qty: 270 TABLET | Refills: 0 | OUTPATIENT
Start: 2024-12-11

## 2024-12-11 RX ORDER — GABAPENTIN 600 MG/1
600 TABLET ORAL 3 TIMES DAILY
Qty: 270 TABLET | Refills: 1 | Status: SHIPPED | OUTPATIENT
Start: 2024-12-11 | End: 2025-06-09

## 2024-12-22 DIAGNOSIS — G62.9 NEUROPATHY: ICD-10-CM

## 2024-12-23 RX ORDER — AMITRIPTYLINE HYDROCHLORIDE 50 MG/1
50 TABLET ORAL NIGHTLY
Qty: 90 TABLET | Refills: 1 | Status: SHIPPED | OUTPATIENT
Start: 2024-12-23

## 2025-01-29 ENCOUNTER — OFFICE VISIT (OUTPATIENT)
Dept: FAMILY MEDICINE CLINIC | Age: 55
End: 2025-01-29
Payer: COMMERCIAL

## 2025-01-29 VITALS
HEART RATE: 81 BPM | OXYGEN SATURATION: 97 % | BODY MASS INDEX: 27.81 KG/M2 | TEMPERATURE: 97.3 F | SYSTOLIC BLOOD PRESSURE: 128 MMHG | WEIGHT: 216.6 LBS | DIASTOLIC BLOOD PRESSURE: 88 MMHG

## 2025-01-29 DIAGNOSIS — R06.6 INTRACTABLE HICCUPS: Primary | ICD-10-CM

## 2025-01-29 DIAGNOSIS — M75.01 ADHESIVE CAPSULITIS OF RIGHT SHOULDER: ICD-10-CM

## 2025-01-29 DIAGNOSIS — R11.2 NAUSEA AND VOMITING, UNSPECIFIED VOMITING TYPE: ICD-10-CM

## 2025-01-29 PROCEDURE — 3079F DIAST BP 80-89 MM HG: CPT | Performed by: FAMILY MEDICINE

## 2025-01-29 PROCEDURE — 3074F SYST BP LT 130 MM HG: CPT | Performed by: FAMILY MEDICINE

## 2025-01-29 PROCEDURE — 99214 OFFICE O/P EST MOD 30 MIN: CPT | Performed by: FAMILY MEDICINE

## 2025-01-29 RX ORDER — CELECOXIB 200 MG/1
CAPSULE ORAL
COMMUNITY
Start: 2024-12-26

## 2025-01-29 RX ORDER — OMEPRAZOLE 40 MG/1
40 CAPSULE, DELAYED RELEASE ORAL
Qty: 30 CAPSULE | Refills: 0 | Status: SHIPPED | OUTPATIENT
Start: 2025-01-29

## 2025-01-29 SDOH — ECONOMIC STABILITY: FOOD INSECURITY: WITHIN THE PAST 12 MONTHS, THE FOOD YOU BOUGHT JUST DIDN'T LAST AND YOU DIDN'T HAVE MONEY TO GET MORE.: NEVER TRUE

## 2025-01-29 SDOH — ECONOMIC STABILITY: FOOD INSECURITY: WITHIN THE PAST 12 MONTHS, YOU WORRIED THAT YOUR FOOD WOULD RUN OUT BEFORE YOU GOT MONEY TO BUY MORE.: NEVER TRUE

## 2025-01-29 ASSESSMENT — PATIENT HEALTH QUESTIONNAIRE - PHQ9
3. TROUBLE FALLING OR STAYING ASLEEP: NOT AT ALL
5. POOR APPETITE OR OVEREATING: NOT AT ALL
SUM OF ALL RESPONSES TO PHQ QUESTIONS 1-9: 0
SUM OF ALL RESPONSES TO PHQ9 QUESTIONS 1 & 2: 0
10. IF YOU CHECKED OFF ANY PROBLEMS, HOW DIFFICULT HAVE THESE PROBLEMS MADE IT FOR YOU TO DO YOUR WORK, TAKE CARE OF THINGS AT HOME, OR GET ALONG WITH OTHER PEOPLE: NOT DIFFICULT AT ALL
9. THOUGHTS THAT YOU WOULD BE BETTER OFF DEAD, OR OF HURTING YOURSELF: NOT AT ALL
2. FEELING DOWN, DEPRESSED OR HOPELESS: NOT AT ALL
4. FEELING TIRED OR HAVING LITTLE ENERGY: NOT AT ALL
7. TROUBLE CONCENTRATING ON THINGS, SUCH AS READING THE NEWSPAPER OR WATCHING TELEVISION: NOT AT ALL
SUM OF ALL RESPONSES TO PHQ QUESTIONS 1-9: 0
6. FEELING BAD ABOUT YOURSELF - OR THAT YOU ARE A FAILURE OR HAVE LET YOURSELF OR YOUR FAMILY DOWN: NOT AT ALL
1. LITTLE INTEREST OR PLEASURE IN DOING THINGS: NOT AT ALL
SUM OF ALL RESPONSES TO PHQ QUESTIONS 1-9: 0
8. MOVING OR SPEAKING SO SLOWLY THAT OTHER PEOPLE COULD HAVE NOTICED. OR THE OPPOSITE, BEING SO FIGETY OR RESTLESS THAT YOU HAVE BEEN MOVING AROUND A LOT MORE THAN USUAL: NOT AT ALL
SUM OF ALL RESPONSES TO PHQ QUESTIONS 1-9: 0

## 2025-01-29 NOTE — PROGRESS NOTES
History of Present Illness  The patient presents for evaluation of hiccups.    He reports an improvement in his condition, with the absence of hiccups at present. The onset of the hiccups was noted on Friday, without any changes in his dietary or fluid intake. The severity of the hiccups escalated over the weekend, peaking on Monday and Tuesday. He experienced hiccups upon awakening this morning, but they have since subsided. He describes the hiccups as occasionally mild, sometimes accompanied by a burp, and at times, severe. He has been experiencing night sweats for the past week, suggestive of a possible fever. He also reports intermittent heartburn and reflux over the past 5 days, with a few episodes occurring at work yesterday. He reports no unexplained weight changes, maintaining a stable weight between 210 to 215 pounds. He finds relief from cold water and milkshakes during hiccup episodes.    He underwent shoulder surgery a month ago, performed by Dr. Kim, to address a frozen shoulder. He has not had any abdominal surgeries. Post-surgery, he was prescribed Percocet and Vicodin, but he did not take them due to personal preference. He has not been taking any anti-inflammatory medications. He reports no new food or supplement intake.    Additionally, he reports frequent vomiting of mucus, occurring 5 to 6 times a week, typically in the mornings and evenings. These episodes are often preceded by coughing, stomach rumbling, and a stinging sensation. The mucus is expelled through his nose.    Supplemental Information  He has ketamine cream but does not use it.    MEDICATIONS  Current: ketamine cream    O:   Vitals:    01/29/25 0841   BP: 128/88   Pulse: 81   Temp: 97.3 °F (36.3 °C)   SpO2: 97%     No acute distress.  Alert and Oriented x 3  HEENT:  PERRLA, EOMI, Conjunctiva clear  Oropharynx clear, mucosa moist.  Nasal mucosa normal  NECK: without thyromegaly, lymphadenopathy, JVD  LUNGS:Clear to ascultation

## 2025-01-30 LAB
ALBUMIN SERPL-MCNC: 4.7 G/DL (ref 3.4–5)
ALBUMIN/GLOB SERPL: 1.8 {RATIO} (ref 1.1–2.2)
ALP SERPL-CCNC: 81 U/L (ref 40–129)
ALT SERPL-CCNC: 39 U/L (ref 10–40)
ANION GAP SERPL CALCULATED.3IONS-SCNC: 15 MMOL/L (ref 3–16)
AST SERPL-CCNC: 30 U/L (ref 15–37)
BILIRUB SERPL-MCNC: 0.3 MG/DL (ref 0–1)
BUN SERPL-MCNC: 21 MG/DL (ref 7–20)
CALCIUM SERPL-MCNC: 10.5 MG/DL (ref 8.3–10.6)
CHLORIDE SERPL-SCNC: 102 MMOL/L (ref 99–110)
CO2 SERPL-SCNC: 22 MMOL/L (ref 21–32)
CREAT SERPL-MCNC: 0.9 MG/DL (ref 0.9–1.3)
DEPRECATED RDW RBC AUTO: 13.8 % (ref 12.4–15.4)
GFR SERPLBLD CREATININE-BSD FMLA CKD-EPI: >90 ML/MIN/{1.73_M2}
GLUCOSE SERPL-MCNC: 178 MG/DL (ref 70–99)
HCT VFR BLD AUTO: 51.5 % (ref 40.5–52.5)
HGB BLD-MCNC: 17.2 G/DL (ref 13.5–17.5)
MCH RBC QN AUTO: 29.9 PG (ref 26–34)
MCHC RBC AUTO-ENTMCNC: 33.5 G/DL (ref 31–36)
MCV RBC AUTO: 89.3 FL (ref 80–100)
PLATELET # BLD AUTO: 226 K/UL (ref 135–450)
PMV BLD AUTO: 8.5 FL (ref 5–10.5)
POTASSIUM SERPL-SCNC: 4.6 MMOL/L (ref 3.5–5.1)
PROT SERPL-MCNC: 7.3 G/DL (ref 6.4–8.2)
RBC # BLD AUTO: 5.77 M/UL (ref 4.2–5.9)
SODIUM SERPL-SCNC: 139 MMOL/L (ref 136–145)
WBC # BLD AUTO: 4.3 K/UL (ref 4–11)

## 2025-02-03 RX ORDER — FLUTICASONE PROPIONATE 50 MCG
2 SPRAY, SUSPENSION (ML) NASAL DAILY
Qty: 16 G | Refills: 0 | Status: SHIPPED | OUTPATIENT
Start: 2025-02-03

## 2025-02-03 RX ORDER — DEXTROMETHORPHAN HYDROBROMIDE AND PROMETHAZINE HYDROCHLORIDE 15; 6.25 MG/5ML; MG/5ML
5 SYRUP ORAL 4 TIMES DAILY PRN
Qty: 240 ML | Refills: 0 | Status: SHIPPED | OUTPATIENT
Start: 2025-02-03

## 2025-02-10 RX ORDER — AMOXICILLIN 500 MG/1
500 CAPSULE ORAL 3 TIMES DAILY
Qty: 21 CAPSULE | Refills: 0 | Status: SHIPPED | OUTPATIENT
Start: 2025-02-10 | End: 2025-02-17

## 2025-02-20 RX ORDER — DAPAGLIFLOZIN 10 MG/1
10 TABLET, FILM COATED ORAL EVERY MORNING
Qty: 90 TABLET | Refills: 0 | OUTPATIENT
Start: 2025-02-20

## 2025-04-02 ENCOUNTER — OFFICE VISIT (OUTPATIENT)
Dept: SURGERY | Age: 55
End: 2025-04-02
Payer: COMMERCIAL

## 2025-04-02 VITALS
DIASTOLIC BLOOD PRESSURE: 88 MMHG | WEIGHT: 222.1 LBS | OXYGEN SATURATION: 99 % | SYSTOLIC BLOOD PRESSURE: 136 MMHG | HEART RATE: 81 BPM | BODY MASS INDEX: 28.5 KG/M2 | HEIGHT: 74 IN

## 2025-04-02 DIAGNOSIS — R10.84 GENERALIZED ABDOMINAL PAIN: Primary | ICD-10-CM

## 2025-04-02 PROCEDURE — 99213 OFFICE O/P EST LOW 20 MIN: CPT | Performed by: SURGERY

## 2025-04-02 PROCEDURE — 3075F SYST BP GE 130 - 139MM HG: CPT | Performed by: SURGERY

## 2025-04-02 PROCEDURE — 3079F DIAST BP 80-89 MM HG: CPT | Performed by: SURGERY

## 2025-04-02 ASSESSMENT — PATIENT HEALTH QUESTIONNAIRE - PHQ9
SUM OF ALL RESPONSES TO PHQ QUESTIONS 1-9: 0
2. FEELING DOWN, DEPRESSED OR HOPELESS: NOT AT ALL
SUM OF ALL RESPONSES TO PHQ QUESTIONS 1-9: 0
SUM OF ALL RESPONSES TO PHQ QUESTIONS 1-9: 0
1. LITTLE INTEREST OR PLEASURE IN DOING THINGS: NOT AT ALL
SUM OF ALL RESPONSES TO PHQ QUESTIONS 1-9: 0

## 2025-04-02 NOTE — PROGRESS NOTES
SUBJECTIVE:  HPI:     History of Present Illness  Patient presents with concern for hernia.  He has a distant history of back surgery via anterior approach.  Has had a EGD/colonoscopy by me 2 years ago for GI bleed concerns.  Subsequently had a robotic SHAWN ventral hernia repair with unremarkable postoperative course.  He recently was at work and had a twist towards the left side at which time he felt popping sensation over his left sided spine surgery surgical scar.  Has had discomfort over area since then.  With certain movements and palpating area he has point tenderness.        He has been doing low-impact weightlifting for arm rehabilitation for 2 months, unrelated to current symptoms.  Surgical history includes umbilical hernia repair, heart catheterization, EGD, colonoscopy, 2 heart procedures, spine surgery, and shoulder procedure.        Past Surgical History:   Procedure Laterality Date    ABDOMEN SURGERY      ABLATION OF DYSRHYTHMIC FOCUS  12/13/2022    atrial fibrillation ablation and WPW accessory pathway ablation    COLONOSCOPY N/A 04/25/2022    COLONOSCOPY POLYPECTOMY SNARE/COLD BIOPSY performed by Vinnie Nicole DO at [facility] ENDOSCOPY    ELBOW SURGERY Right     HERNIA REPAIR      HERNIA REPAIR N/A 05/17/2022    HERNIA INCISIONAL REPAIR LAPAROSCOPIC ROBOTIC WITH MESH performed by Vinnie Nicole DO at [facility] OR    OTHER SURGICAL HISTORY N/A 05/10/2022    Electrophysiology study and cardioversion performed by Dr. Braxton.    SPINE SURGERY  1990, 2010    Fusion of L3, L4, L5    UMBILICAL HERNIA REPAIR  11/04/2015    repair incarcerated supra umbilical hernia    UPPER GASTROINTESTINAL ENDOSCOPY N/A 04/25/2022    EGD DIAGNOSTIC ONLY performed by Vinnie Nicole DO at [facility] ENDOSCOPY     Past Medical History:   Diagnosis Date    Anxiety     Asthma     Atrial fibrillation (HCC)     Congenital heart disease     Depression     Diabetes mellitus (HCC)     Hx of echocardiogram 12/21/2022    This is a limited echo

## 2025-04-07 SDOH — ECONOMIC STABILITY: TRANSPORTATION INSECURITY
IN THE PAST 12 MONTHS, HAS THE LACK OF TRANSPORTATION KEPT YOU FROM MEDICAL APPOINTMENTS OR FROM GETTING MEDICATIONS?: NO

## 2025-04-07 SDOH — ECONOMIC STABILITY: FOOD INSECURITY: WITHIN THE PAST 12 MONTHS, YOU WORRIED THAT YOUR FOOD WOULD RUN OUT BEFORE YOU GOT MONEY TO BUY MORE.: NEVER TRUE

## 2025-04-07 SDOH — ECONOMIC STABILITY: INCOME INSECURITY: IN THE LAST 12 MONTHS, WAS THERE A TIME WHEN YOU WERE NOT ABLE TO PAY THE MORTGAGE OR RENT ON TIME?: NO

## 2025-04-07 SDOH — ECONOMIC STABILITY: FOOD INSECURITY: WITHIN THE PAST 12 MONTHS, THE FOOD YOU BOUGHT JUST DIDN'T LAST AND YOU DIDN'T HAVE MONEY TO GET MORE.: NEVER TRUE

## 2025-04-09 ENCOUNTER — OFFICE VISIT (OUTPATIENT)
Dept: FAMILY MEDICINE CLINIC | Age: 55
End: 2025-04-09
Payer: COMMERCIAL

## 2025-04-09 ENCOUNTER — HOSPITAL ENCOUNTER (OUTPATIENT)
Dept: CT IMAGING | Age: 55
Discharge: HOME | End: 2025-04-09
Attending: SURGERY
Payer: COMMERCIAL

## 2025-04-09 VITALS
SYSTOLIC BLOOD PRESSURE: 138 MMHG | DIASTOLIC BLOOD PRESSURE: 84 MMHG | OXYGEN SATURATION: 98 % | BODY MASS INDEX: 28.68 KG/M2 | WEIGHT: 223.4 LBS | HEART RATE: 79 BPM | TEMPERATURE: 97.3 F

## 2025-04-09 DIAGNOSIS — E11.42 TYPE 2 DIABETES MELLITUS WITH DIABETIC POLYNEUROPATHY, WITH LONG-TERM CURRENT USE OF INSULIN (HCC): Primary | ICD-10-CM

## 2025-04-09 DIAGNOSIS — I10 ESSENTIAL HYPERTENSION: ICD-10-CM

## 2025-04-09 DIAGNOSIS — Z79.4 TYPE 2 DIABETES MELLITUS WITH DIABETIC POLYNEUROPATHY, WITH LONG-TERM CURRENT USE OF INSULIN (HCC): Primary | ICD-10-CM

## 2025-04-09 DIAGNOSIS — L98.491 CHRONIC SKIN ULCER, LIMITED TO BREAKDOWN OF SKIN (HCC): ICD-10-CM

## 2025-04-09 DIAGNOSIS — I48.0 PAROXYSMAL ATRIAL FIBRILLATION (HCC): ICD-10-CM

## 2025-04-09 DIAGNOSIS — F33.2 SEVERE RECURRENT MAJOR DEPRESSION WITHOUT PSYCHOTIC FEATURES (HCC): ICD-10-CM

## 2025-04-09 DIAGNOSIS — G62.9 NEUROPATHY: ICD-10-CM

## 2025-04-09 DIAGNOSIS — R10.84 GENERALIZED ABDOMINAL PAIN: ICD-10-CM

## 2025-04-09 DIAGNOSIS — R06.6 INTRACTABLE HICCUPS: ICD-10-CM

## 2025-04-09 LAB
EGFR, POC: >90 ML/MIN/1.73M2
HBA1C MFR BLD: 7.9 %
POC CREATININE: 0.8 MG/DL (ref 0.5–1.2)

## 2025-04-09 PROCEDURE — 6360000004 HC RX CONTRAST MEDICATION: Performed by: SURGERY

## 2025-04-09 PROCEDURE — 74177 CT ABD & PELVIS W/CONTRAST: CPT

## 2025-04-09 PROCEDURE — 3051F HG A1C>EQUAL 7.0%<8.0%: CPT | Performed by: FAMILY MEDICINE

## 2025-04-09 PROCEDURE — 82565 ASSAY OF CREATININE: CPT

## 2025-04-09 PROCEDURE — 83036 HEMOGLOBIN GLYCOSYLATED A1C: CPT | Performed by: FAMILY MEDICINE

## 2025-04-09 PROCEDURE — 3079F DIAST BP 80-89 MM HG: CPT | Performed by: FAMILY MEDICINE

## 2025-04-09 PROCEDURE — 3075F SYST BP GE 130 - 139MM HG: CPT | Performed by: FAMILY MEDICINE

## 2025-04-09 PROCEDURE — 99214 OFFICE O/P EST MOD 30 MIN: CPT | Performed by: FAMILY MEDICINE

## 2025-04-09 RX ORDER — INSULIN LISPRO 200 [IU]/ML
INJECTION, SOLUTION SUBCUTANEOUS
Qty: 5 ADJUSTABLE DOSE PRE-FILLED PEN SYRINGE | Refills: 5 | Status: SHIPPED | OUTPATIENT
Start: 2025-04-09

## 2025-04-09 RX ORDER — GABAPENTIN 600 MG/1
600 TABLET ORAL 3 TIMES DAILY
Qty: 270 TABLET | Refills: 1 | Status: SHIPPED | OUTPATIENT
Start: 2025-04-09 | End: 2025-10-06

## 2025-04-09 RX ORDER — OMEPRAZOLE 40 MG/1
40 CAPSULE, DELAYED RELEASE ORAL
Qty: 30 CAPSULE | Refills: 0 | Status: SHIPPED | OUTPATIENT
Start: 2025-04-09

## 2025-04-09 RX ORDER — LOSARTAN POTASSIUM 100 MG/1
100 TABLET ORAL DAILY
Qty: 90 TABLET | Refills: 1 | Status: SHIPPED | OUTPATIENT
Start: 2025-04-09

## 2025-04-09 RX ORDER — AMITRIPTYLINE HYDROCHLORIDE 50 MG/1
50 TABLET ORAL NIGHTLY
Qty: 90 TABLET | Refills: 1 | Status: SHIPPED | OUTPATIENT
Start: 2025-04-09

## 2025-04-09 RX ORDER — DAPAGLIFLOZIN 10 MG/1
10 TABLET, FILM COATED ORAL EVERY MORNING
Qty: 90 TABLET | Refills: 1 | Status: SHIPPED | OUTPATIENT
Start: 2025-04-09

## 2025-04-09 RX ORDER — METOPROLOL SUCCINATE 25 MG/1
25 TABLET, EXTENDED RELEASE ORAL DAILY
Qty: 90 TABLET | Refills: 1 | Status: SHIPPED | OUTPATIENT
Start: 2025-04-09

## 2025-04-09 RX ORDER — IOPAMIDOL 755 MG/ML
100 INJECTION, SOLUTION INTRAVASCULAR
Status: COMPLETED | OUTPATIENT
Start: 2025-04-09 | End: 2025-04-09

## 2025-04-09 RX ORDER — INSULIN DEGLUDEC 100 U/ML
50 INJECTION, SOLUTION SUBCUTANEOUS NIGHTLY
Qty: 10 ADJUSTABLE DOSE PRE-FILLED PEN SYRINGE | Refills: 1 | Status: SHIPPED | OUTPATIENT
Start: 2025-04-09

## 2025-04-09 RX ADMIN — IOPAMIDOL 100 ML: 755 INJECTION, SOLUTION INTRAVENOUS at 09:40

## 2025-04-09 NOTE — PROGRESS NOTES
History of Present Illness  The patient presents for evaluation of weight gain, diabetes, atrial fibrillation, erectile dysfunction, and medication management.    The chief complaint is weight gain despite adhering to a clean diet, devoid of sodas, for the past 5 weeks. Despite this, a weight increase from 206 to 223 pounds has been experienced. Dietary intake includes protein and eggs, with an increased consumption of eggs over the past 1.5 months. Daily caloric intake is believed to not exceed 2000 calories. Light duty was maintained for the past 6 weeks, and floor duties at [employer] have recently resumed, resulting in foot discomfort. Nightly low-impact cardio exercises are engaged in, with daily walking distance increased from 1-2 miles to 7-10 miles since returning to work. Intermittent low-impact weightlifting is also performed. No numbness or tingling in the feet is reported, attributing the burning sensation to increased physical activity. Gabapentin is taken 3 times daily for neuropathy and amitriptyline twice daily for sleep. Flonase is used when ill, and omeprazole is taken regularly. Medication intake is being reduced, and pain is being managed without medication. Cialis is currently taken but reported as ineffective.    Testosterone levels were previously tested and a retest is due. Interest in Adipex for weight management has been expressed.    Diabetes has been diagnosed, and refills for medications are required. Blood glucose levels have been averaging between 147 and 153, which is believed to indicate improved control. Morning blood glucose levels typically range between 120 and 125. Fast food and soda have been eliminated from the diet for the past month. Farxiga and insulin are currently taken, with doses of 20-25 units of Humalog and Tresiba at night. Metformin is taken twice daily.    A history of atrial fibrillation is noted, which was managed with ablation and catheterization. Daily episodes of

## 2025-04-23 DIAGNOSIS — E11.42 TYPE 2 DIABETES MELLITUS WITH DIABETIC POLYNEUROPATHY, WITH LONG-TERM CURRENT USE OF INSULIN (HCC): ICD-10-CM

## 2025-04-23 DIAGNOSIS — Z79.4 TYPE 2 DIABETES MELLITUS WITH DIABETIC POLYNEUROPATHY, WITH LONG-TERM CURRENT USE OF INSULIN (HCC): ICD-10-CM

## 2025-04-23 RX ORDER — DAPAGLIFLOZIN 10 MG/1
10 TABLET, FILM COATED ORAL EVERY MORNING
Qty: 90 TABLET | Refills: 1 | Status: SHIPPED | OUTPATIENT
Start: 2025-04-23

## 2025-05-15 ENCOUNTER — TELEPHONE (OUTPATIENT)
Dept: CARDIOLOGY CLINIC | Age: 55
End: 2025-05-15

## 2025-05-15 NOTE — TELEPHONE ENCOUNTER
Pt states that last two nights he woke up gasping for breath. Only happens when sleeping at night. Pt is at work today and may miss our call back.

## 2025-05-16 NOTE — PROGRESS NOTES
MRN: [id number]  Name: Nahtan Graham  : 1970    Insurance: Payor: [facility] /  /  /      Phone #: [phone]  Provider: Jocelyn Maldonado MD     Date of Visit: 2025    Reason for visit: SOB last seen   Recent Hospitalization Date:    Reason for Hospitalization:    Last EK/24  Type of Device:       Vitals BP HR O2% WT HT ORTHO BP LYING ORTHO BP SITTING ORTHO BP SITTING   Today's Findings           Patients work up- Check List     Testing Last Date Completed Date Expected  ([place] [facility]) Additional Notes    MA to document For provider to complete Either MA or Provider    Carotid Duplex  STAT 1 WK 6 MTH       THIS WK 2 WK 1 YEAR     Cardiac CTA  STAT 1 WK 6 MTH       THIS WK 2 WK 1 YEAR     Cardiac CT Calcium scoring  STAT 1 WK 6 MTH       THIS WK 2 WK 1 YEAR     CTA Chest, Abdomen & Pelvis  STAT 1 WK 6 MTH       THIS WK 2 WK 1 YEAR     CT Chest IV w/ Contrast  STAT 1 WK 6 MTH       THIS WK 2 WK 1 YEAR     CT Chest w/o Contrast  STAT 1 WK 6 MTH       THIS WK 2 WK 1 YEAR     CXR  STAT 1 WK 6 MTH       THIS WK 2 WK 1 YEAR     ECHO  Stress Complete Limited     MRI- Cardiac  STAT 1 WK 6 MTH       THIS WK 2 WK 1 YEAR     MUGA Scan  STAT 1 WK 6 MTH       THIS WK 2 WK 1 YEAR     Nuclear Stress  Lexiscan Cardiolite     PFT  STAT 1 WK 6 MTH       THIS WK 2 WK 1 YEAR     Treadmill Stress Test  STAT 1 WK 6 MTH       THIS WK 2 WK 1 YEAR     Vascular Duplex  Lower: Right Left Bilat       Upper: Right Left Bilat     Other Test Not Listed:    Monitors Last Date Completed Day's Request/Ordered     Holter  Short term 24 hours 48 hours      Long term 3 days 7 days 14 days   Event   (1-30 days)      Procedures Last Date Performed Procedure Details Date Expected   Additional Notes    ASD Closure        Carotid Angio        Cardioversion        Heart Cath  R L R&L      Peripheral Angio  R L      PFO Closure        PTCA/PCI        GINGER        GINGER/Cardioversion        Venogram        Tilt Table        Other Type of Procedure:   Cardiac

## 2025-05-21 ENCOUNTER — TELEPHONE (OUTPATIENT)
Dept: CARDIOLOGY CLINIC | Age: 55
End: 2025-05-21

## 2025-05-21 ENCOUNTER — OFFICE VISIT (OUTPATIENT)
Dept: CARDIOLOGY CLINIC | Age: 55
End: 2025-05-21
Payer: COMMERCIAL

## 2025-05-21 VITALS
WEIGHT: 221 LBS | DIASTOLIC BLOOD PRESSURE: 84 MMHG | HEART RATE: 68 BPM | BODY MASS INDEX: 28.36 KG/M2 | SYSTOLIC BLOOD PRESSURE: 144 MMHG | HEIGHT: 74 IN

## 2025-05-21 DIAGNOSIS — I49.1 ATRIAL ECTOPY: ICD-10-CM

## 2025-05-21 DIAGNOSIS — I10 ESSENTIAL HYPERTENSION: Primary | ICD-10-CM

## 2025-05-21 DIAGNOSIS — R06.02 SHORTNESS OF BREATH: ICD-10-CM

## 2025-05-21 DIAGNOSIS — I48.0 PAROXYSMAL ATRIAL FIBRILLATION (HCC): ICD-10-CM

## 2025-05-21 DIAGNOSIS — R07.9 CHEST PAIN, UNSPECIFIED TYPE: ICD-10-CM

## 2025-05-21 PROCEDURE — 3079F DIAST BP 80-89 MM HG: CPT | Performed by: INTERNAL MEDICINE

## 2025-05-21 PROCEDURE — 3077F SYST BP >= 140 MM HG: CPT | Performed by: INTERNAL MEDICINE

## 2025-05-21 PROCEDURE — 99204 OFFICE O/P NEW MOD 45 MIN: CPT | Performed by: INTERNAL MEDICINE

## 2025-05-21 PROCEDURE — 93000 ELECTROCARDIOGRAM COMPLETE: CPT | Performed by: INTERNAL MEDICINE

## 2025-05-21 NOTE — PROGRESS NOTES
CARDIOLOGY NOTE      5/21/2025    RE: Nathan Graham  (1970)                               TO:  Drake Bradford MD            CHIEF COMPLAINT   Nathan is a 54 y.o. male who was seen today for management of atrial fibrillation                                    HPI:                   Pt has h/o atrial fibrillation status post ablation for WPW, hypertension, hyperlipidemia, seen today for worsening shortness of breath. Pt has been having worsening shortness of breath and palpitations as well denies any chest pain however  .hpisec  Nathan Graham has the following history recorded in care path:  Patient Active Problem List    Diagnosis Date Noted    S/P ablation of atrial fibrillation 12/13/2022    Hypercoagulable state due to paroxysmal atrial fibrillation (HCC) 10/28/2022    Insomnia 10/12/2022    Palpitations 07/13/2022    Tachycardia 07/13/2022    Paroxysmal atrial fibrillation (HCC) 05/10/2022    Left upper quadrant abdominal swelling, mass and lump 04/13/2022    Polycythemia 03/15/2022    History of COVID-19 03/15/2022    Pain in both feet 01/20/2022    Chronic pain of left knee 01/20/2022    Chronic depression 01/20/2022    Anxiety 01/20/2022    Pure hypercholesterolemia 07/21/2021    Fatty liver 03/24/2021    Neuropathy 03/24/2021    Dorsalgia 03/24/2021    Chest pain     Sigrid Parkinson White pattern seen on electrocardiogram     Erectile dysfunction 10/30/2019    Essential hypertension 09/12/2019    Gastroesophageal reflux disease without esophagitis 09/12/2019    Dupuytren's contracture 09/12/2019    Chronic skin ulcer, limited to breakdown of skin ([facility]) 09/12/2019    Chronic bilateral low back pain without sciatica 09/12/2019    Type 2 diabetes mellitus with diabetic polyneuropathy, with long-term current use of insulin (HCC) 09/12/2019    Ventral hernia without obstruction or gangrene 11/04/2015    Infectious discitis 05/17/2010     Current Outpatient Medications   Medication Sig

## 2025-05-22 NOTE — TELEPHONE ENCOUNTER
Per EP Multaq and amitriptyline cannot be taken together. Will forward note to Dr Kimble for possible alternative order

## 2025-05-23 RX ORDER — SILDENAFIL 100 MG/1
TABLET, FILM COATED ORAL
Qty: 30 TABLET | Refills: 0 | OUTPATIENT
Start: 2025-05-23

## 2025-05-29 ENCOUNTER — TELEPHONE (OUTPATIENT)
Dept: CARDIOLOGY CLINIC | Age: 55
End: 2025-05-29

## 2025-05-30 ENCOUNTER — TELEPHONE (OUTPATIENT)
Dept: CARDIOLOGY CLINIC | Age: 55
End: 2025-05-30

## 2025-05-30 NOTE — TELEPHONE ENCOUNTER
Spoke with patient and scheduled office visit follow up with Dr. Braxton on 7/9/25 @ 215pm.    Received referral from Dr. Frost to discuss afib (patient is wearing 30 day monitor currently.)    Patient advised understanding.

## 2025-06-09 DIAGNOSIS — N52.9 ERECTILE DYSFUNCTION, UNSPECIFIED ERECTILE DYSFUNCTION TYPE: ICD-10-CM

## 2025-06-09 RX ORDER — TADALAFIL 20 MG/1
20 TABLET ORAL PRN
Qty: 30 TABLET | Refills: 3 | Status: SHIPPED | OUTPATIENT
Start: 2025-06-09

## 2025-06-25 ENCOUNTER — TELEPHONE (OUTPATIENT)
Age: 55
End: 2025-06-25

## 2025-06-25 ENCOUNTER — TELEPHONE (OUTPATIENT)
Dept: CARDIOLOGY CLINIC | Age: 55
End: 2025-06-25

## 2025-06-25 ENCOUNTER — OFFICE VISIT (OUTPATIENT)
Age: 55
End: 2025-06-25
Payer: COMMERCIAL

## 2025-06-25 ENCOUNTER — OFFICE VISIT (OUTPATIENT)
Dept: CARDIOLOGY CLINIC | Age: 55
End: 2025-06-25
Payer: COMMERCIAL

## 2025-06-25 VITALS
WEIGHT: 211.8 LBS | SYSTOLIC BLOOD PRESSURE: 132 MMHG | HEART RATE: 78 BPM | OXYGEN SATURATION: 98 % | DIASTOLIC BLOOD PRESSURE: 78 MMHG | BODY MASS INDEX: 27.19 KG/M2

## 2025-06-25 VITALS
OXYGEN SATURATION: 96 % | HEART RATE: 86 BPM | WEIGHT: 212.2 LBS | DIASTOLIC BLOOD PRESSURE: 72 MMHG | BODY MASS INDEX: 27.23 KG/M2 | HEIGHT: 74 IN | SYSTOLIC BLOOD PRESSURE: 128 MMHG

## 2025-06-25 DIAGNOSIS — M79.2 NEUROPATHIC PAIN: ICD-10-CM

## 2025-06-25 DIAGNOSIS — R00.0 TACHYARRHYTHMIA: Primary | ICD-10-CM

## 2025-06-25 DIAGNOSIS — E55.9 VITAMIN D DEFICIENCY: ICD-10-CM

## 2025-06-25 DIAGNOSIS — E11.42 DIABETIC PERIPHERAL NEUROPATHY (HCC): ICD-10-CM

## 2025-06-25 DIAGNOSIS — M45.0 ANKYLOSING SPONDYLITIS OF MULTIPLE SITES IN SPINE (HCC): ICD-10-CM

## 2025-06-25 DIAGNOSIS — M54.16 LUMBAR RADICULOPATHY: Primary | ICD-10-CM

## 2025-06-25 PROCEDURE — 3078F DIAST BP <80 MM HG: CPT | Performed by: INTERNAL MEDICINE

## 2025-06-25 PROCEDURE — 3074F SYST BP LT 130 MM HG: CPT | Performed by: INTERNAL MEDICINE

## 2025-06-25 PROCEDURE — 3075F SYST BP GE 130 - 139MM HG: CPT | Performed by: NURSE PRACTITIONER

## 2025-06-25 PROCEDURE — 99214 OFFICE O/P EST MOD 30 MIN: CPT | Performed by: INTERNAL MEDICINE

## 2025-06-25 PROCEDURE — 3051F HG A1C>EQUAL 7.0%<8.0%: CPT | Performed by: NURSE PRACTITIONER

## 2025-06-25 PROCEDURE — 99214 OFFICE O/P EST MOD 30 MIN: CPT | Performed by: NURSE PRACTITIONER

## 2025-06-25 PROCEDURE — 3078F DIAST BP <80 MM HG: CPT | Performed by: NURSE PRACTITIONER

## 2025-06-25 RX ORDER — PREGABALIN 75 MG/1
75 CAPSULE ORAL 3 TIMES DAILY
Qty: 90 CAPSULE | Refills: 5 | Status: SHIPPED | OUTPATIENT
Start: 2025-06-25 | End: 2025-12-25

## 2025-06-25 RX ORDER — BUPROPION HYDROCHLORIDE 100 MG/1
100 TABLET, EXTENDED RELEASE ORAL 2 TIMES DAILY
COMMUNITY

## 2025-06-25 RX ORDER — MELOXICAM 7.5 MG/1
7.5 TABLET ORAL DAILY
COMMUNITY

## 2025-06-25 NOTE — TELEPHONE ENCOUNTER
Left msg for pt to call back.     Please inform pt we are referring to Rheumatology, and Lili will be reaching out regarding his compound cream.

## 2025-06-25 NOTE — PROGRESS NOTES
CARDIOLOGY NOTE      6/25/2025    RE: Nathan Graham  (1970)                               TO:  Drake Bradford MD            CHIEF COMPLAINT   Nathan is a 54 y.o. male who was seen today for management of atrial fibrillation                                    HPI:                   Pt has h/o atrial fibrillation status post ablation for WPW, hypertension, hyperlipidemia, seen today for worsening shortness of breath. Pt has been having worsening shortness of breath and palpitations as well denies any chest pain however  .hpisec  Nathan Graham has the following history recorded in care path:  Patient Active Problem List    Diagnosis Date Noted    S/P ablation of atrial fibrillation 12/13/2022    Hypercoagulable state due to paroxysmal atrial fibrillation (HCC) 10/28/2022    Insomnia 10/12/2022    Palpitations 07/13/2022    Tachycardia 07/13/2022    Paroxysmal atrial fibrillation (HCC) 05/10/2022    Left upper quadrant abdominal swelling, mass and lump 04/13/2022    Polycythemia 03/15/2022    History of COVID-19 03/15/2022    Pain in both feet 01/20/2022    Chronic pain of left knee 01/20/2022    Chronic depression 01/20/2022    Anxiety 01/20/2022    Pure hypercholesterolemia 07/21/2021    Fatty liver 03/24/2021    Neuropathy 03/24/2021    Dorsalgia 03/24/2021    Chest pain     Sigrid Parkinson White pattern seen on electrocardiogram     Erectile dysfunction 10/30/2019    Essential hypertension 09/12/2019    Gastroesophageal reflux disease without esophagitis 09/12/2019    Dupuytren's contracture 09/12/2019    Chronic skin ulcer, limited to breakdown of skin ([facility]) 09/12/2019    Chronic bilateral low back pain without sciatica 09/12/2019    Type 2 diabetes mellitus with diabetic polyneuropathy, with long-term current use of insulin (HCC) 09/12/2019    Ventral hernia without obstruction or gangrene 11/04/2015    Infectious discitis 05/17/2010     Current Outpatient Medications   Medication Sig

## 2025-06-25 NOTE — PROGRESS NOTES
size, pupil reactive to light and dark, pupil accomodates, and no ptosis              CN IV: normal              CN VI: normal              CN V Right: normal sensation and muscles of mastication intact              CN V Left: normal sensation and muscles of mastication intact              CN VII Right: normal facial expression              CN VII Left: normal facial expression              CN VIII Right: hearing in tact to normal conversation              CN VIII Left: hearing in tact to normal conversation              CN IX,X: normal palatal movement              CN XI Right: normal sternocleidomastoid and normal trapezius              CN XI Left: normal sternocleidomastoid and normal trapezius              CN XII: no tremors of the tongue, no fasciculation of the tongue, tongue protrudes midline, normal power to left, and normal power to right  Gait and Stance              Gait/Posture: ambulates independently, Romberg's test normal, wide based stance, unsteady casual gate  mild, and wide-based gait  ASSESSMENT/PLAN:       Diagnosis Orders   1. Lumbar radiculopathy  UNABLE TO FIND      2. Diabetic peripheral neuropathy (HCC)        3. Neuropathic pain  Vitamin B12 & Folate    TSH    Electrophoresis Protein, Serum    pregabalin (LYRICA) 75 MG capsule    UNABLE TO FIND      4. Vitamin D deficiency  Vitamin D 25 Hydroxy      5. Ankylosing spondylitis of multiple sites in spine (HCC)  Heidy Rosenthal MD, Rheumatology, [place]      Nathan was seen in neurological follow up in regards to lumbar radiculopathy, neuropathic pain.   - He continues to have neuropathic pain, has been out of Lyrica for 6 months.  Will restart Lyrica and have him wean off of gabapentin, instructions were provided and he verbalized understanding.  -Due to having increased neuropathic pain and paresthesias in all 4 extremities intermittently, will have him check labs for reversible causes of neuropathy/paresthesias.  - For his

## 2025-06-25 NOTE — PATIENT INSTRUCTIONS
**It is YOUR responsibilty to bring medication bottles and/or updated medication list to EACH APPOINTMENT. This will allow us to better serve you and all your healthcare needs**  Thank you for allowing us to care for you today!   We want to ensure we can follow your treatment plan and we strive to give you the best outcomes and experience possible.   If you ever have a life threatening emergency and call 911 - for an ambulance (EMS)   Our providers can only care for you at:   [facility] or [facility].   Even if you have someone take you or you drive yourself we can only care for you in a [facility] facility. Our providers are not setup at the other healthcare locations!       Thank you for allowing us to care for you today!   We want to ensure we can follow your treatment plan and we strive to give you the best outcomes and experience possible.   If you ever have a life threatening emergency and call 911 - for an ambulance (EMS)  REMEMBER  Our providers can only care for you at:   [facility] or [facility]   Even if you have someone take you or you drive yourself we can only care for you in a [facility] facility. Our providers are not setup at the other healthcare locations!    PLEASE CALL OUR OFFICE DURING NORMAL BUSINESS HOURS  Monday through Friday 8 am to 5 pm  AFTER HOURS the physician on-call cannot help with scheduling, rescheduling, procedure instruction questions or any type of medication need or issue.  [facility] P:[phone] - [facility] P:[phone] - [facility] P:[phone]      If you receive a survey:  We would appreciate you taking the time to share your experience concerning your provider visit in the office.    These surveys are confidential!  We are eager to improve and are counting on you to share your feedback so we can ensure you get the best care possible.

## 2025-06-30 ENCOUNTER — TELEPHONE (OUTPATIENT)
Dept: CARDIOLOGY CLINIC | Age: 55
End: 2025-06-30

## 2025-06-30 NOTE — TELEPHONE ENCOUNTER
Followed up with BRENDA Gilbert to see if pt is ok to keep appt on 7/9 or if he needs an earlier appt. She advised that per Dr Braxton, pt is to keep f/u on 7/9.

## 2025-06-30 NOTE — TELEPHONE ENCOUNTER
Called pt and advised to keep appt with Dr Braxton on 7/9 (see prior note). Pt voiced understanding.     Interpretation Summary     This is a event monitor for 30 days showing that the predominant rhythm is sinus rhythm  Maximum heart is 141  Minimum is 55  run of nonsustained ventricular tachycardia was noted which was for 8.2 seconds  EP appointment ASAP

## 2025-07-09 ENCOUNTER — HOSPITAL ENCOUNTER (OUTPATIENT)
Age: 55
Discharge: HOME | End: 2025-07-09
Payer: COMMERCIAL

## 2025-07-09 ENCOUNTER — OFFICE VISIT (OUTPATIENT)
Age: 55
End: 2025-07-09
Payer: COMMERCIAL

## 2025-07-09 VITALS
HEIGHT: 74 IN | SYSTOLIC BLOOD PRESSURE: 136 MMHG | BODY MASS INDEX: 28.13 KG/M2 | DIASTOLIC BLOOD PRESSURE: 80 MMHG | HEART RATE: 61 BPM | WEIGHT: 219.2 LBS

## 2025-07-09 DIAGNOSIS — I48.0 PAROXYSMAL ATRIAL FIBRILLATION (HCC): ICD-10-CM

## 2025-07-09 DIAGNOSIS — M79.2 NEUROPATHIC PAIN: ICD-10-CM

## 2025-07-09 DIAGNOSIS — R00.0 WIDE-COMPLEX TACHYCARDIA: Primary | ICD-10-CM

## 2025-07-09 DIAGNOSIS — R06.02 SHORTNESS OF BREATH: ICD-10-CM

## 2025-07-09 LAB
FOLATE SERPL-MCNC: 7.3 NG/ML (ref 4.8–24.2)
TSH SERPL DL<=0.05 MIU/L-ACNC: 0.79 UIU/ML (ref 0.27–4.2)
VIT B12 SERPL-MCNC: 462 PG/ML (ref 211–911)

## 2025-07-09 PROCEDURE — 82746 ASSAY OF FOLIC ACID SERUM: CPT

## 2025-07-09 PROCEDURE — 3075F SYST BP GE 130 - 139MM HG: CPT | Performed by: INTERNAL MEDICINE

## 2025-07-09 PROCEDURE — 3079F DIAST BP 80-89 MM HG: CPT | Performed by: INTERNAL MEDICINE

## 2025-07-09 PROCEDURE — 93000 ELECTROCARDIOGRAM COMPLETE: CPT | Performed by: INTERNAL MEDICINE

## 2025-07-09 PROCEDURE — 99214 OFFICE O/P EST MOD 30 MIN: CPT | Performed by: INTERNAL MEDICINE

## 2025-07-09 PROCEDURE — 82607 VITAMIN B-12: CPT

## 2025-07-09 PROCEDURE — 84443 ASSAY THYROID STIM HORMONE: CPT

## 2025-07-09 PROCEDURE — 84165 PROTEIN E-PHORESIS SERUM: CPT

## 2025-07-09 PROCEDURE — 84155 ASSAY OF PROTEIN SERUM: CPT

## 2025-07-09 ASSESSMENT — ENCOUNTER SYMPTOMS
DIARRHEA: 0
BACK PAIN: 0
EYE PAIN: 0
WHEEZING: 0
PHOTOPHOBIA: 0
CONSTIPATION: 0
COLOR CHANGE: 0
VOMITING: 0
BLOOD IN STOOL: 0
CHEST TIGHTNESS: 0
NAUSEA: 0
COUGH: 0
ABDOMINAL PAIN: 0
SHORTNESS OF BREATH: 1

## 2025-07-09 NOTE — PROGRESS NOTES
using speech recognition software and may contain errors related to that system including errors in grammar, punctuation, and spelling, as well as words and phrases that may be inappropriate. If there are any questions or concerns please feel free to contact the dictating provider for clarification.

## 2025-07-16 ENCOUNTER — OFFICE VISIT (OUTPATIENT)
Dept: FAMILY MEDICINE CLINIC | Age: 55
End: 2025-07-16
Payer: COMMERCIAL

## 2025-07-16 VITALS
BODY MASS INDEX: 27.42 KG/M2 | DIASTOLIC BLOOD PRESSURE: 82 MMHG | SYSTOLIC BLOOD PRESSURE: 130 MMHG | OXYGEN SATURATION: 100 % | HEART RATE: 74 BPM | WEIGHT: 213.6 LBS

## 2025-07-16 DIAGNOSIS — Z79.4 TYPE 2 DIABETES MELLITUS WITH DIABETIC POLYNEUROPATHY, WITH LONG-TERM CURRENT USE OF INSULIN (HCC): Primary | ICD-10-CM

## 2025-07-16 DIAGNOSIS — E11.42 TYPE 2 DIABETES MELLITUS WITH DIABETIC POLYNEUROPATHY, WITH LONG-TERM CURRENT USE OF INSULIN (HCC): Primary | ICD-10-CM

## 2025-07-16 LAB — HBA1C MFR BLD: 7.3 %

## 2025-07-16 PROCEDURE — 3051F HG A1C>EQUAL 7.0%<8.0%: CPT | Performed by: FAMILY MEDICINE

## 2025-07-16 PROCEDURE — 3079F DIAST BP 80-89 MM HG: CPT | Performed by: FAMILY MEDICINE

## 2025-07-16 PROCEDURE — 99214 OFFICE O/P EST MOD 30 MIN: CPT | Performed by: FAMILY MEDICINE

## 2025-07-16 PROCEDURE — 3075F SYST BP GE 130 - 139MM HG: CPT | Performed by: FAMILY MEDICINE

## 2025-07-16 PROCEDURE — 83036 HEMOGLOBIN GLYCOSYLATED A1C: CPT | Performed by: FAMILY MEDICINE

## 2025-07-16 RX ORDER — DAPAGLIFLOZIN 10 MG/1
10 TABLET, FILM COATED ORAL EVERY MORNING
COMMUNITY

## 2025-07-16 SDOH — ECONOMIC STABILITY: FOOD INSECURITY: WITHIN THE PAST 12 MONTHS, THE FOOD YOU BOUGHT JUST DIDN'T LAST AND YOU DIDN'T HAVE MONEY TO GET MORE.: NEVER TRUE

## 2025-07-16 SDOH — ECONOMIC STABILITY: FOOD INSECURITY: WITHIN THE PAST 12 MONTHS, YOU WORRIED THAT YOUR FOOD WOULD RUN OUT BEFORE YOU GOT MONEY TO BUY MORE.: NEVER TRUE

## 2025-07-16 NOTE — PROGRESS NOTES
History of Present Illness  The patient presents for evaluation of diabetes, atrial fibrillation, and neuropathy.    He reports that his blood sugar level was 224 this morning, despite only consuming a small amount of cranberries before bedtime. He has been making efforts to improve his diet, although he occasionally indulges in a coconut cold soda from [email or web address]. He is considering taking magnesium supplements but is hesitant about over-the-counter options. Currently, he uses Humalog insulin, with a dosage of 20 to 25 units, and has not used Tresiba recently.    He has undergone a heart monitor test at the [facility], which revealed no abnormalities. However, he experiences shortness of breath in the mornings, leading to a suspicion of lung issues. He has been diagnosed with atrial fibrillation and has undergone catheterization. His 30-day monitor showed atrial fibrillation as of 07/09/2025, but he was informed by a healthcare provider that he is not currently experiencing atrial fibrillation. He also reports episodes of apnea lasting 5 to 7 seconds. He has been referred to Dr. Suárez for further evaluation. He is currently on Xarelto and has not discontinued it.    He experiences daily numbness and tingling in his feet. He has consulted a neurologist who conducted blood tests and switched his medication from gabapentin to pregabalin. 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

## 2025-07-17 ENCOUNTER — TELEPHONE (OUTPATIENT)
Dept: CARDIOLOGY CLINIC | Age: 55
End: 2025-07-17

## 2025-07-17 NOTE — TELEPHONE ENCOUNTER
Patient called asking if we can just leave a message  That he does have AFIB , He is  at work can't take a  Call . Will keep future appt

## 2025-07-17 NOTE — TELEPHONE ENCOUNTER
Called patient and lm that patient does have A-fib. Patient is on Multaq and Xarelto. Advised to call back with any further concerns.

## 2025-07-17 NOTE — TELEPHONE ENCOUNTER
Pt seen Dr. Braxton 7/9 and states Dr. Braxton told him he does not have A-fib. Pt primary care provider was reading notes Dr. Braxton put in pt chart and states pt does have A-fib. Pt would like clarification on this diagnosis.

## 2025-07-18 LAB
MISCELLANEOUS LAB TEST RESULT: NORMAL
TEST NAME: NORMAL

## 2025-07-30 ENCOUNTER — OFFICE VISIT (OUTPATIENT)
Dept: CARDIOLOGY CLINIC | Age: 55
End: 2025-07-30
Payer: COMMERCIAL

## 2025-07-30 VITALS — SYSTOLIC BLOOD PRESSURE: 140 MMHG | HEART RATE: 60 BPM | DIASTOLIC BLOOD PRESSURE: 82 MMHG

## 2025-07-30 DIAGNOSIS — I48.0 PAROXYSMAL ATRIAL FIBRILLATION (HCC): Primary | ICD-10-CM

## 2025-07-30 PROCEDURE — 3079F DIAST BP 80-89 MM HG: CPT | Performed by: NURSE PRACTITIONER

## 2025-07-30 PROCEDURE — 3077F SYST BP >= 140 MM HG: CPT | Performed by: NURSE PRACTITIONER

## 2025-07-30 PROCEDURE — 99213 OFFICE O/P EST LOW 20 MIN: CPT | Performed by: NURSE PRACTITIONER

## 2025-07-30 ASSESSMENT — ENCOUNTER SYMPTOMS
SHORTNESS OF BREATH: 1
ORTHOPNEA: 0

## 2025-07-30 NOTE — PROGRESS NOTES
CLINICAL STAFF DOCUMENTATION    Jannet Sorto, TRACE     Nathan Trejoon  1970  [birth year]    Have you had any Chest Pain recently? - No        Have you had any Shortness of Breath - yes, ongoing     Have you had any dizziness - No    Have you had any palpitations recently? - No    Do you have any edema - swelling in No          When did you have your last labs drawn 01/2025  What doctor ordered Lamin       Do you need any prescriptions refilled? - No    Do you have a surgery or procedure scheduled in the near future - No    Do use tobacco products? - No  Do you drink alcohol? - No  Do you use any illicit drugs? - No  Caffeine? - 32 oz daily.     Check medication list thoroughly!!! AND RECONCILE OUTSIDE MEDICATIONS  If dose has changed change the entire order not just the MG  BE SURE TO ASK PATIENT IF THEY NEED MEDICATION REFILLS  Verify Pharmacy and update if incorrect    Add to every patient's \"wrap up\" the following dot phrase AFTERVISITCARDIOHEARTHOUSE and ensure we explain this to our patients   
less than 2 seconds.   Neurological:      Mental Status: He is alert and oriented to person, place, and time.                Current Outpatient Medications   Medication Sig Dispense Refill    dapagliflozin (FARXIGA) 10 MG tablet Take 1 tablet by mouth every morning      meloxicam (MOBIC) 7.5 MG tablet Take 1 tablet by mouth daily      buPROPion (WELLBUTRIN SR) 100 MG extended release tablet Take 1 tablet by mouth 2 times daily      pregabalin (LYRICA) 75 MG capsule Take 1 capsule by mouth 3 times daily for 183 days. Max Daily Amount: 225 mg 90 capsule 5    UNABLE TO FIND KBCGL-KETAMINE 10%, BACLOFEN 2%, CYCLOBENZAPRINE 2%, GABAPENTIN 6%, LIDOCAINE 5% 240 g 3    tadalafil (CIALIS) 20 MG tablet Take 1 tablet by mouth as needed for Erectile Dysfunction 30 tablet 3    dronedarone hcl (MULTAQ) 400 MG TABS Take 1 tablet by mouth 2 times daily (with meals) 180 tablet 1    rivaroxaban (XARELTO) 20 MG TABS tablet Take 1 tablet by mouth daily (with breakfast) 90 tablet 1    Insulin Degludec (TRESIBA FLEXTOUCH) 100 UNIT/ML SOPN Inject 50 Units into the skin nightly 10 Adjustable Dose Pre-filled Pen Syringe 1    insulin lispro (HUMALOG KWIKPEN) 200 UNIT/ML SOPN pen TID with meals Glucose [id number] No Insulin 140-199 2 Units 200-249 4 Units 250-299 6 Units 300-349 8 Units 350-400 10 Units over 400 12 Units 5 Adjustable Dose Pre-filled Pen Syringe 5    losartan (COZAAR) 100 MG tablet Take 1 tablet by mouth daily 90 tablet 1    metFORMIN (GLUCOPHAGE) 1000 MG tablet Take 1 tablet by mouth 2 times daily (with meals) 180 tablet 1    metoprolol succinate (TOPROL XL) 25 MG extended release tablet Take 1 tablet by mouth daily 90 tablet 1    celecoxib (CELEBREX) 200 MG capsule Take by mouth      Continuous Blood Gluc Sensor (DEXCOM G7 SENSOR) MISC Use daily.  Change sensor every 10 days 3 each 5    atorvastatin (LIPITOR) 40 MG tablet Take 1 tablet by mouth daily 90 tablet 1    escitalopram (LEXAPRO) 10 MG tablet TAKE 1 TABLET BY MOUTH

## 2025-07-30 NOTE — PATIENT INSTRUCTIONS
Thank you for allowing us to care for you today!   We want to ensure we can follow your treatment plan and we strive to give you the best outcomes and experience possible.   If you ever have a life threatening emergency and call 911 - for an ambulance (EMS)  REMEMBER  Our providers can only care for you at:   [facility] or [facility]   Even if you have someone take you or you drive yourself we can only care for you in a [facility] facility. Our providers are not setup at the other healthcare locations!    PLEASE CALL OUR OFFICE DURING NORMAL BUSINESS HOURS  Monday through Friday 8 am to 5 pm  AFTER HOURS the physician on-call cannot help with scheduling, rescheduling, procedure instruction questions or any type of medication need or issue.  [facility] P:[phone] - [facility] P:[phone] - [facility] P:[phone]      If you receive a survey:  We would appreciate you taking the time to share your experience concerning your provider visit in the office.    These surveys are confidential!  We are eager to improve and are counting on you to share your feedback so we can ensure you get the best care possible.

## 2025-08-13 ENCOUNTER — HOSPITAL ENCOUNTER (OUTPATIENT)
Dept: PULMONOLOGY | Age: 55
Discharge: HOME | End: 2025-08-13
Attending: INTERNAL MEDICINE
Payer: COMMERCIAL

## 2025-08-13 DIAGNOSIS — R06.02 SHORTNESS OF BREATH: ICD-10-CM

## 2025-08-13 LAB
DLCO %PRED: 74 %
DLCO PRED: NORMAL
DLCO/VA %PRED: NORMAL
DLCO/VA PRED: NORMAL
DLCO/VA: NORMAL
DLCO: NORMAL
EXPIRATORY TIME-POST: NORMAL
EXPIRATORY TIME: NORMAL
FEF 25-75 %CHNG: NORMAL
FEF 25-75 POST %PRED: NORMAL
FEF 25-75% %PRED-PRE: 92 L/SEC
FEF 25-75% PRED: NORMAL
FEF 25-75-POST: NORMAL
FEF 25-75-PRE: NORMAL
FEV1 %PRED-POST: NORMAL
FEV1 %PRED-PRE: 87 %
FEV1 PRED: NORMAL
FEV1-POST: NORMAL
FEV1-PRE: NORMAL
FEV1/FVC %PRED-POST: NORMAL
FEV1/FVC %PRED-PRE: 99 %
FEV1/FVC PRED: NORMAL
FEV1/FVC-POST: NORMAL
FEV1/FVC-PRE: NORMAL
FVC %PRED-POST: NORMAL
FVC %PRED-PRE: 87 %
FVC PRED: NORMAL
FVC-POST: NORMAL
FVC-PRE: NORMAL
GAW %PRED: NORMAL
GAW PRED: NORMAL
GAW: NORMAL
IC PRE %PRED: 88 %
IC PRED: NORMAL
IC: NORMAL
MEP: NORMAL
MIP: NORMAL
MVV %PRED-PRE: NORMAL
MVV PRED: NORMAL
MVV-PRE: NORMAL
PEF %PRED-POST: NORMAL
PEF %PRED-PRE: NORMAL
PEF PRED: NORMAL
PEF%CHNG: NORMAL
PEF-POST: NORMAL
PEF-PRE: NORMAL
RAW %PRED: NORMAL
RAW PRED: NORMAL
RAW: NORMAL
RV PRE %PRED: 94 %
RV PRED: NORMAL
RV: NORMAL
SVC %PRED: 89 %
SVC PRED: NORMAL
SVC: NORMAL
TLC PRE %PRED: 87 %
TLC PRED: NORMAL
TLC: NORMAL
VA %PRED: 89 %
VA PRED: NORMAL
VA: NORMAL
VTG %PRED: NORMAL
VTG PRED: NORMAL
VTG: NORMAL

## 2025-08-13 PROCEDURE — 94729 DIFFUSING CAPACITY: CPT

## 2025-08-13 PROCEDURE — 94727 GAS DIL/WSHOT DETER LNG VOL: CPT

## 2025-08-13 PROCEDURE — 94010 BREATHING CAPACITY TEST: CPT

## 2025-08-13 ASSESSMENT — PULMONARY FUNCTION TESTS
FEV1_PERCENT_PREDICTED_PRE: 87
FEV1/FVC_PERCENT_PREDICTED_PRE: 99
FVC_PERCENT_PREDICTED_PRE: 87

## 2025-08-18 DIAGNOSIS — Z79.4 TYPE 2 DIABETES MELLITUS WITH DIABETIC POLYNEUROPATHY, WITH LONG-TERM CURRENT USE OF INSULIN (HCC): ICD-10-CM

## 2025-08-18 DIAGNOSIS — E11.42 TYPE 2 DIABETES MELLITUS WITH DIABETIC POLYNEUROPATHY, WITH LONG-TERM CURRENT USE OF INSULIN (HCC): ICD-10-CM

## 2025-08-18 RX ORDER — INSULIN DEGLUDEC 100 U/ML
50 INJECTION, SOLUTION SUBCUTANEOUS NIGHTLY
Qty: 10 ADJUSTABLE DOSE PRE-FILLED PEN SYRINGE | Refills: 1 | Status: SHIPPED | OUTPATIENT
Start: 2025-08-18

## (undated) DEVICE — BLADE CLIPPER GEN PURP NS

## (undated) DEVICE — ARM DRAPE

## (undated) DEVICE — NEEDLE HYPO 20GA L1.5IN YEL POLYPR HUB S STL REG BVL STR

## (undated) DEVICE — SUTURE VCRL SZ 3-0 L27IN ABSRB UD L26MM SH 1/2 CIR J416H

## (undated) DEVICE — ENDOSCOPY KIT: Brand: MEDLINE INDUSTRIES, INC.

## (undated) DEVICE — DRESSING TRNSPAR W5XL4.5IN FLM SHT SEMIPERMEABLE WIND

## (undated) DEVICE — TROCAR: Brand: KII FIOS FIRST ENTRY

## (undated) DEVICE — EXCEL 10FT (3.05 M) INSUFFLATION TUBING SET WITH 0.1 MICRON FILTER: Brand: EXCEL

## (undated) DEVICE — PACK SURG LAP CHOLE

## (undated) DEVICE — GOWN,SIRUS,POLYRNF,BRTHSLV,XLN/XL,20/CS: Brand: MEDLINE

## (undated) DEVICE — Device

## (undated) DEVICE — BLADELESS OBTURATOR: Brand: WECK VISTA

## (undated) DEVICE — APPLICATOR MEDICATED 26 CC SOLUTION HI LT ORNG CHLORAPREP

## (undated) DEVICE — DRESSING TRNSPAR W4XL10IN FLM MIC POR SURESITE 123

## (undated) DEVICE — SUTURE VCRL SZ 4-0 L27IN ABSRB UD L19MM FS-2 3/8 CIR REV J422H

## (undated) DEVICE — GOWN,ECLIPSE,POLYRNF,BRTHSLV,L,30/CS: Brand: MEDLINE

## (undated) DEVICE — COLUMN DRAPE

## (undated) DEVICE — GLOVE ORTHO 8   MSG9480

## (undated) DEVICE — FORCEPS BX L240CM JAW DIA2.8MM L CAP W/ NDL MIC MESH TOOTH

## (undated) DEVICE — ADHESIVE SKIN CLSR 0.7ML TOP DERMBND ADV

## (undated) DEVICE — PENCIL ES CRD L10FT HND SWCHING ROCK SWCH W/ EDGE COAT BLDE

## (undated) DEVICE — REDUCER: Brand: ENDOWRIST

## (undated) DEVICE — Z DISCONTINUED (USE MFG CAT MVABO)  TUBING GAS SAMPLING STD 6.5 FT FEMALE CONN SMRT CAPNOLINE

## (undated) DEVICE — SEAL

## (undated) DEVICE — CANNULA SEAL

## (undated) DEVICE — SUTURE SZ 0 27IN 5/8 CIR UR-6  TAPER PT VIOLET ABSRB VICRYL J603H

## (undated) DEVICE — SUTURE STRATAFIX SZ 3-0 L20CM ABSRB VLT NDL SH-1 L22MM 1/2 SXPP1B453

## (undated) DEVICE — 3M™ IOBAN™ 2 ANTIMICROBIAL INCISE DRAPE 6650EZ: Brand: IOBAN™ 2

## (undated) DEVICE — TIP COVER ACCESSORY

## (undated) DEVICE — GLOVE ORANGE PI 7   MSG9070

## (undated) DEVICE — SUTURE 1 STRATAFIX SYMMETRIC PDS + 15CM CT-1 SXPP1A420

## (undated) DEVICE — ELECTRODE ES AD CRDLSS PT RET REM POLYHESIVE

## (undated) DEVICE — SUTURE STRATAFIX SPRL SZ 2-0 L9IN ABSRB VLT MH L36MM 1/2 SXPD2B408

## (undated) DEVICE — SUTURE VCRL SZ 2-0 L27IN ABSRB VLT L26MM SH 1/2 CIR J317H